# Patient Record
Sex: MALE | Race: WHITE | NOT HISPANIC OR LATINO | Employment: OTHER | URBAN - NONMETROPOLITAN AREA
[De-identification: names, ages, dates, MRNs, and addresses within clinical notes are randomized per-mention and may not be internally consistent; named-entity substitution may affect disease eponyms.]

---

## 2018-03-20 ENCOUNTER — OFFICE VISIT CONVERTED (OUTPATIENT)
Dept: FAMILY MEDICINE CLINIC | Age: 78
End: 2018-03-20
Attending: FAMILY MEDICINE

## 2018-03-21 LAB
ALBUMIN SERPL-MCNC: 4.1 G/DL
ALBUMIN/GLOB SERPL: 1.6 {RATIO}
ALP SERPL-CCNC: 69 IU/L
ALT SERPL-CCNC: 11 IU/L
AST SERPL-CCNC: 19 IU/L
BILIRUB SERPL-MCNC: 0.7 MG/DL
BUN SERPL-MCNC: 14 MG/DL
BUN/CREAT SERPL: 16
CALCIUM SERPL-MCNC: 9 MG/DL
CHLORIDE SERPL-SCNC: 99 MMOL/L
CHOLEST SERPL-MCNC: 114 MG/DL
CO2 SERPL-SCNC: 24 MMOL/L
CONV TOTAL PROTEIN: 6.7 G/DL
CREAT 24H UR-MCNC: 50.4 MG/DL
CREAT UR-MCNC: 0.88 MG/DL
GLOBULIN UR ELPH-MCNC: 2.6 G/DL
GLUCOSE SERPL-MCNC: 104 MG/DL
HBA1C MFR BLD: 6 %
HDLC SERPL-MCNC: 28 MG/DL
LDLC SERPL CALC-MCNC: 52 MG/DL
MICROALBUMIN UR-MCNC: 22.2 UG/ML
MICROALBUMIN/CREAT UR: 44 MG/G CREAT
POTASSIUM SERPL-SCNC: 4.4 MMOL/L
SODIUM SERPL-SCNC: 140 MMOL/L
TRIGL SERPL-MCNC: 170 MG/DL
VLDLC SERPL-MCNC: 34 MG/DL

## 2018-06-27 ENCOUNTER — OFFICE VISIT CONVERTED (OUTPATIENT)
Dept: FAMILY MEDICINE CLINIC | Age: 78
End: 2018-06-27
Attending: FAMILY MEDICINE

## 2018-07-17 ENCOUNTER — OFFICE VISIT CONVERTED (OUTPATIENT)
Dept: FAMILY MEDICINE CLINIC | Age: 78
End: 2018-07-17
Attending: FAMILY MEDICINE

## 2018-09-10 ENCOUNTER — OFFICE VISIT CONVERTED (OUTPATIENT)
Dept: FAMILY MEDICINE CLINIC | Age: 78
End: 2018-09-10
Attending: FAMILY MEDICINE

## 2018-10-24 ENCOUNTER — OFFICE VISIT CONVERTED (OUTPATIENT)
Dept: FAMILY MEDICINE CLINIC | Age: 78
End: 2018-10-24
Attending: FAMILY MEDICINE

## 2019-01-28 ENCOUNTER — HOSPITAL ENCOUNTER (OUTPATIENT)
Dept: OTHER | Facility: HOSPITAL | Age: 79
Discharge: HOME | End: 2019-01-28
Attending: FAMILY MEDICINE

## 2019-01-28 ENCOUNTER — OFFICE VISIT CONVERTED (OUTPATIENT)
Dept: FAMILY MEDICINE CLINIC | Age: 79
End: 2019-01-28
Attending: FAMILY MEDICINE

## 2019-01-28 LAB
CONV CREATININE URINE, RANDOM: 12.8 MG/DL (ref 10–300)
CONV MICROALBUM.,U,RANDOM: 51.6 MG/L (ref 0–20)
MICROALBUMIN/CREAT UR: 403.1 MG/G{CRE} (ref 0–25)

## 2019-02-01 ENCOUNTER — OFFICE VISIT CONVERTED (OUTPATIENT)
Dept: FAMILY MEDICINE CLINIC | Age: 79
End: 2019-02-01
Attending: FAMILY MEDICINE

## 2019-02-27 ENCOUNTER — OFFICE VISIT CONVERTED (OUTPATIENT)
Dept: FAMILY MEDICINE CLINIC | Age: 79
End: 2019-02-27
Attending: FAMILY MEDICINE

## 2019-02-28 ENCOUNTER — HOSPITAL ENCOUNTER (OUTPATIENT)
Dept: OTHER | Facility: HOSPITAL | Age: 79
Discharge: HOME | End: 2019-02-28
Attending: FAMILY MEDICINE

## 2019-04-10 ENCOUNTER — HOSPITAL ENCOUNTER (OUTPATIENT)
Dept: OTHER | Facility: HOSPITAL | Age: 79
Discharge: HOME | End: 2019-04-10
Attending: FAMILY MEDICINE

## 2019-04-10 LAB
ALBUMIN SERPL-MCNC: 4.2 G/DL (ref 3.5–5)
ALBUMIN/GLOB SERPL: 1.4 {RATIO} (ref 1.4–2.6)
ALP SERPL-CCNC: 58 U/L (ref 56–155)
ALT SERPL-CCNC: 14 U/L (ref 10–40)
ANION GAP SERPL CALC-SCNC: 16 MMOL/L (ref 8–19)
AST SERPL-CCNC: 19 U/L (ref 15–50)
BILIRUB SERPL-MCNC: 0.26 MG/DL (ref 0.2–1.3)
BUN SERPL-MCNC: 16 MG/DL (ref 5–25)
BUN/CREAT SERPL: 16 {RATIO} (ref 6–20)
CALCIUM SERPL-MCNC: 9.1 MG/DL (ref 8.7–10.4)
CHLORIDE SERPL-SCNC: 99 MMOL/L (ref 99–111)
CHOLEST SERPL-MCNC: 110 MG/DL (ref 107–200)
CHOLEST/HDLC SERPL: 2.9 {RATIO} (ref 3–6)
CONV CO2: 27 MMOL/L (ref 22–32)
CONV TOTAL PROTEIN: 7.1 G/DL (ref 6.3–8.2)
CREAT UR-MCNC: 0.98 MG/DL (ref 0.7–1.2)
EST. AVERAGE GLUCOSE BLD GHB EST-MCNC: 134 MG/DL
GFR SERPLBLD BASED ON 1.73 SQ M-ARVRAT: >60 ML/MIN/{1.73_M2}
GLOBULIN UR ELPH-MCNC: 2.9 G/DL (ref 2–3.5)
GLUCOSE SERPL-MCNC: 135 MG/DL (ref 70–99)
HBA1C MFR BLD: 6.3 % (ref 3.5–5.7)
HDLC SERPL-MCNC: 38 MG/DL (ref 40–60)
LDLC SERPL CALC-MCNC: 57 MG/DL (ref 70–100)
OSMOLALITY SERPL CALC.SUM OF ELEC: 289 MOSM/KG (ref 273–304)
POTASSIUM SERPL-SCNC: 4 MMOL/L (ref 3.5–5.3)
SODIUM SERPL-SCNC: 138 MMOL/L (ref 135–147)
TRIGL SERPL-MCNC: 75 MG/DL (ref 40–150)
VLDLC SERPL-MCNC: 15 MG/DL (ref 5–37)

## 2019-05-01 ENCOUNTER — OFFICE VISIT CONVERTED (OUTPATIENT)
Dept: FAMILY MEDICINE CLINIC | Age: 79
End: 2019-05-01
Attending: FAMILY MEDICINE

## 2019-05-03 ENCOUNTER — OFFICE VISIT CONVERTED (OUTPATIENT)
Dept: NEUROSURGERY | Facility: CLINIC | Age: 79
End: 2019-05-03
Attending: NEUROLOGICAL SURGERY

## 2019-05-07 ENCOUNTER — HOSPITAL ENCOUNTER (OUTPATIENT)
Dept: OTHER | Facility: HOSPITAL | Age: 79
Discharge: HOME | End: 2019-05-07
Attending: FAMILY MEDICINE

## 2019-05-07 LAB
BASOPHILS # BLD MANUAL: 0.03 10*3/UL (ref 0–0.2)
BASOPHILS NFR BLD MANUAL: 0.4 % (ref 0–3)
DEPRECATED RDW RBC AUTO: 41 FL
EOSINOPHIL # BLD MANUAL: 0.2 10*3/UL (ref 0–0.7)
EOSINOPHIL NFR BLD MANUAL: 3 % (ref 0–7)
ERYTHROCYTE [DISTWIDTH] IN BLOOD BY AUTOMATED COUNT: 13.1 % (ref 11.5–14.5)
GRANS (ABSOLUTE): 4.51 10*3/UL (ref 2–8)
GRANS: 67.5 % (ref 30–85)
HBA1C MFR BLD: 13.8 G/DL (ref 14–18)
HCT VFR BLD AUTO: 39.6 % (ref 42–52)
IMM GRANULOCYTES # BLD: 0.05 10*3/UL (ref 0–0.54)
IMM GRANULOCYTES NFR BLD: 0.7 % (ref 0–0.43)
LYMPHOCYTES # BLD MANUAL: 1.27 10*3/UL (ref 1–5)
LYMPHOCYTES NFR BLD MANUAL: 9.4 % (ref 3–10)
MCH RBC QN AUTO: 29.9 PG (ref 27–31)
MCHC RBC AUTO-ENTMCNC: 34.8 G/DL (ref 33–37)
MCV RBC AUTO: 85.9 FL (ref 80–96)
MONOCYTES # BLD AUTO: 0.63 10*3/UL (ref 0.2–1.2)
PLATELET # BLD AUTO: 184 10*3/UL (ref 130–400)
PMV BLD AUTO: 10 FL (ref 7.4–10.4)
RBC # BLD AUTO: 4.61 10*6/UL (ref 4.7–6.1)
VARIANT LYMPHS NFR BLD MANUAL: 19 % (ref 20–45)
WBC # BLD AUTO: 6.69 10*3/UL (ref 4.8–10.8)

## 2019-06-17 ENCOUNTER — OFFICE VISIT CONVERTED (OUTPATIENT)
Dept: FAMILY MEDICINE CLINIC | Age: 79
End: 2019-06-17
Attending: FAMILY MEDICINE

## 2019-06-28 ENCOUNTER — OFFICE VISIT CONVERTED (OUTPATIENT)
Dept: NEUROSURGERY | Facility: CLINIC | Age: 79
End: 2019-06-28
Attending: NEUROLOGICAL SURGERY

## 2019-07-19 ENCOUNTER — TELEPHONE (OUTPATIENT)
Dept: CARDIOLOGY | Facility: CLINIC | Age: 79
End: 2019-07-19

## 2019-07-19 NOTE — TELEPHONE ENCOUNTER
Received clearance request to hold medication.  Dr Kofi Campoverde reviewed, ok to hold Plavix 5-7 days prior.  Statement returned to their office. RTRN

## 2019-08-08 ENCOUNTER — OFFICE VISIT CONVERTED (OUTPATIENT)
Dept: FAMILY MEDICINE CLINIC | Age: 79
End: 2019-08-08
Attending: FAMILY MEDICINE

## 2019-08-13 PROBLEM — E78.5 HYPERLIPIDEMIA: Status: ACTIVE | Noted: 2019-08-13

## 2019-08-13 PROBLEM — Z92.89 H/O ECHOCARDIOGRAM: Status: ACTIVE | Noted: 2019-08-13

## 2019-08-13 PROBLEM — G47.30 SLEEP APNEA: Status: ACTIVE | Noted: 2019-08-13

## 2019-08-13 PROBLEM — I25.10 CORONARY ARTERIOSCLEROSIS: Status: ACTIVE | Noted: 2019-08-13

## 2019-08-13 PROBLEM — I51.89 CARDIAC DYSFUNCTION: Status: ACTIVE | Noted: 2019-08-13

## 2019-08-13 PROBLEM — R00.2 PALPITATIONS: Status: ACTIVE | Noted: 2019-08-13

## 2019-08-13 PROBLEM — Z95.1 HX OF CABG: Status: ACTIVE | Noted: 2019-08-13

## 2019-08-13 PROBLEM — Z95.5 STENTED CORONARY ARTERY: Status: ACTIVE | Noted: 2019-08-13

## 2019-08-13 PROBLEM — I42.9 CARDIOMYOPATHY: Status: ACTIVE | Noted: 2019-08-13

## 2019-08-13 PROBLEM — I10 BENIGN ESSENTIAL HYPERTENSION: Status: ACTIVE | Noted: 2019-08-13

## 2019-08-15 ENCOUNTER — OFFICE VISIT (OUTPATIENT)
Dept: CARDIOLOGY | Facility: CLINIC | Age: 79
End: 2019-08-15

## 2019-08-15 VITALS
WEIGHT: 143.7 LBS | DIASTOLIC BLOOD PRESSURE: 64 MMHG | OXYGEN SATURATION: 91 % | HEIGHT: 67 IN | BODY MASS INDEX: 22.55 KG/M2 | HEART RATE: 61 BPM | SYSTOLIC BLOOD PRESSURE: 116 MMHG

## 2019-08-15 DIAGNOSIS — I25.5 ISCHEMIC CARDIOMYOPATHY: ICD-10-CM

## 2019-08-15 DIAGNOSIS — Z95.5 STENTED CORONARY ARTERY: ICD-10-CM

## 2019-08-15 DIAGNOSIS — I10 BENIGN ESSENTIAL HYPERTENSION: ICD-10-CM

## 2019-08-15 DIAGNOSIS — Z95.1 HX OF CABG: ICD-10-CM

## 2019-08-15 DIAGNOSIS — I25.10 CORONARY ARTERIOSCLEROSIS: Primary | ICD-10-CM

## 2019-08-15 DIAGNOSIS — I51.89 CARDIAC DYSFUNCTION: ICD-10-CM

## 2019-08-15 DIAGNOSIS — G47.33 OBSTRUCTIVE SLEEP APNEA SYNDROME: ICD-10-CM

## 2019-08-15 DIAGNOSIS — I35.1 AORTIC VALVE INSUFFICIENCY, ETIOLOGY OF CARDIAC VALVE DISEASE UNSPECIFIED: ICD-10-CM

## 2019-08-15 DIAGNOSIS — E78.5 HYPERLIPIDEMIA, UNSPECIFIED HYPERLIPIDEMIA TYPE: ICD-10-CM

## 2019-08-15 DIAGNOSIS — R00.2 PALPITATIONS: ICD-10-CM

## 2019-08-15 PROCEDURE — 99214 OFFICE O/P EST MOD 30 MIN: CPT | Performed by: INTERNAL MEDICINE

## 2019-08-15 RX ORDER — MORPHINE SULFATE 15 MG/1
1 TABLET, FILM COATED, EXTENDED RELEASE ORAL 2 TIMES DAILY PRN
COMMUNITY
Start: 2019-07-20 | End: 2021-03-04 | Stop reason: ALTCHOICE

## 2019-08-15 NOTE — PROGRESS NOTES
[facility]        Subjective:     Encounter Date:08/15/2019      Patient ID: Marcello Larson is a 79 y.o. male.      HPI: I saw Marcello Larson today for continued cardiovascular care.  He is a very pleasant 79-year-old male with a history of coronary heart disease.  He underwent coronary artery bypass surgery 1/1/1991 with a left internal mammary graft to the LAD, saphenous vein graft to OM1 OM 2 in the right coronary artery.  On 4/11/2014 he underwent bare-metal stent deployment to the proximal segment of the vein graft to the right coronary artery and a second bare-metal stent to the mid to distal region of vein vein graft.  He remains relatively active and free of angina.  He does not experience any significant or progressive dyspnea on exertion.  And denies orthopnea or PND no lower extremity edema.  He is free of syncope near syncope or palpitation.    Of note is that June 2018 he had a severe GI bleed with a hemoglobin down to 6.6.  He remained stable from cardiovascular standpoint.  He has a history of obstructive sleep apnea and uses CPAP.  He has a history of dyslipidemia and remains on lovastatin 40 mg daily.  His lipids are monitored by Dr. Corley.  He has type 2 diabetes controlled with diet and metformin 500 mg daily.  He has a history of hypertension which remains stable and controlled under 130/80.    Echocardiogram was obtained 2/14/2019 which revealed left ventricular ejection fraction 40 to 45%, moderate concentric left ventricular hypertrophy, mild aortic mitral and tricuspid insufficiency.      The following portions of the patient's history were reviewed and updated as appropriate: allergies, current medications, past family history, past medical history, past social history, past surgical history and problem list.    Problem List:  Patient Active Problem List   Diagnosis   • Hyperlipidemia   • Benign essential hypertension   • Coronary arteriosclerosis   • Cardiomyopathy  (CMS/HCC)   • Cardiac dysfunction   • Sleep apnea   • Palpitations   • Hx of CABG   • Stented coronary artery   • H/O echocardiogram       Past Medical History:  Past Medical History:   Diagnosis Date   • Benign essential hypertension 8/13/2019   • Cardiac dysfunction 8/13/2019    Left ventricle   • Cardiomyopathy (CMS/HCC) 8/13/2019   • Coronary arteriosclerosis 8/13/2019   • H/O echocardiogram 8/13/2019 02/09/2015 - LV severely dilated.  LV systolic function is moderate to severely reduced.  EF 30-35%.  The transmittal spectral doppler flow pattern is suggestive of pseudonormalization.  There is moderate to severe global hypokinesis of the LV.  The left atrium is mildly dilated.  The mitral leaflets appear thickened, but open well.  Mild MR and AR.   • Hx of CABG 8/13/2019 01/01/1991 - left internal mammary graft to the LAD, SVG to OM1, OM2 in the RCA   • Hyperlipidemia 8/13/2019   • Palpitations 8/13/2019   • Sleep apnea 8/13/2019   • Stented coronary artery 8/13/2019 04/11/2014 - VISION bare metal stent to the proximal stenosis of the vein graft to the right and ISION bare metal stent in the mid to distal 80% stenosis.       Past Surgical History:  No past surgical history on file.    Social History:  Social History     Socioeconomic History   • Marital status:      Spouse name: Not on file   • Number of children: Not on file   • Years of education: Not on file   • Highest education level: Not on file   Tobacco Use   • Smoking status: Former Smoker     Types: Cigarettes       Allergies:  Allergies   Allergen Reactions   • Iodinated Diagnostic Agents Unknown (See Comments)         ROS:  Review of Systems   Constitution: Negative for weakness and malaise/fatigue.   HENT: Negative for hearing loss and nosebleeds.    Eyes: Negative for double vision and visual disturbance.   Cardiovascular: Negative for chest pain, claudication, dyspnea on exertion, near-syncope, orthopnea, palpitations, paroxysmal  "nocturnal dyspnea and syncope.   Respiratory: Negative for cough, shortness of breath, sleep disturbances due to breathing, snoring, sputum production and wheezing.    Endocrine: Negative for cold intolerance, heat intolerance, polydipsia and polyuria.   Hematologic/Lymphatic: Negative for adenopathy and bleeding problem. Does not bruise/bleed easily.   Skin: Negative for flushing and itching.   Musculoskeletal: Negative for back pain, falls, joint pain, joint swelling, muscle weakness and neck pain.   Gastrointestinal: Negative for abdominal pain, dysphagia, heartburn, nausea and vomiting.   Genitourinary: Negative for dysuria and frequency.   Neurological: Negative for disturbances in coordination, dizziness, light-headedness, loss of balance and numbness.   Psychiatric/Behavioral: Negative for altered mental status and memory loss. The patient is not nervous/anxious.    Allergic/Immunologic: Negative for hives and persistent infections.          Objective:         /64 (BP Location: Left arm, Patient Position: Sitting, Cuff Size: Adult)   Pulse 61   Ht 170.2 cm (67\")   Wt 65.2 kg (143 lb 11.2 oz)   SpO2 91%   BMI 22.51 kg/m²     Physical Exam   Constitutional: He is oriented to person, place, and time. He appears well-developed and well-nourished.   HENT:   Head: Normocephalic and atraumatic.   Eyes: Conjunctivae and EOM are normal. Pupils are equal, round, and reactive to light.   Neck: Neck supple. No thyromegaly present.   Cardiovascular: Normal rate, regular rhythm, S1 normal, S2 normal and intact distal pulses. PMI is not displaced. Exam reveals gallop and S4. Exam reveals no S3, no distant heart sounds, no friction rub, no midsystolic click and no opening snap.   No murmur heard.  Pulses:       Carotid pulses are 2+ on the right side, and 2+ on the left side.       Radial pulses are 2+ on the right side, and 2+ on the left side.        Femoral pulses are 2+ on the right side, and 2+ on the left " side.       Dorsalis pedis pulses are 2+ on the right side, and 2+ on the left side.        Posterior tibial pulses are 2+ on the right side, and 2+ on the left side.   Pulmonary/Chest: Effort normal and breath sounds normal. No respiratory distress. He has no wheezes. He has no rales.   Abdominal: Soft. Bowel sounds are normal. He exhibits no distension and no mass. There is no tenderness.   Musculoskeletal: Normal range of motion. He exhibits no edema.   Neurological: He is alert and oriented to person, place, and time.   Skin: Skin is warm and dry. No erythema.   Psychiatric: He has a normal mood and affect.       In-Office Procedure(s):  Procedures    ASCVD RIsk Score::  The ASCVD Risk score (Alexsander ABBY Jr., et al., 2013) failed to calculate for the following reasons:    Cannot find a previous HDL lab    Cannot find a previous total cholesterol lab        Assessment/Plan:         1. Coronary arteriosclerosis  Stable    2. Ischemic cardiomyopathy  Compensated    3. Hx of CABG  Stable    4. Stented coronary artery  Stable    5. Aortic valve insufficiency, etiology of cardiac valve disease unspecified  Compensated    6. Benign essential hypertension  Controlled    7. Hyperlipidemia, unspecified hyperlipidemia type  Continue diet and lovastatin 40 mg daily    8. Palpitations  Resolved    9. Obstructive sleep apnea syndrome  Continue CPAP    10. Cardiac dysfunction  Compensated    Mr. Larson has maintained his activity level, he is free of angina no significant volume excess.  I made no changes in his medications.  I will plan to see him in follow-up in 6 months sooner if needed.           Kofi Campoverde MD  08/15/19  .

## 2019-09-18 ENCOUNTER — HOSPITAL ENCOUNTER (OUTPATIENT)
Dept: OTHER | Facility: HOSPITAL | Age: 79
Discharge: HOME | End: 2019-09-18
Attending: FAMILY MEDICINE

## 2019-09-18 LAB
ALBUMIN SERPL-MCNC: 4.4 G/DL (ref 3.5–5)
ALBUMIN/GLOB SERPL: 1.4 {RATIO} (ref 1.4–2.6)
ALP SERPL-CCNC: 63 U/L (ref 56–155)
ALT SERPL-CCNC: 12 U/L (ref 10–40)
ANION GAP SERPL CALC-SCNC: 19 MMOL/L (ref 8–19)
AST SERPL-CCNC: 18 U/L (ref 15–50)
BILIRUB SERPL-MCNC: 0.59 MG/DL (ref 0.2–1.3)
BUN SERPL-MCNC: 15 MG/DL (ref 5–25)
BUN/CREAT SERPL: 18 {RATIO} (ref 6–20)
CALCIUM SERPL-MCNC: 9.6 MG/DL (ref 8.7–10.4)
CHLORIDE SERPL-SCNC: 101 MMOL/L (ref 99–111)
CHOLEST SERPL-MCNC: 115 MG/DL (ref 107–200)
CHOLEST/HDLC SERPL: 3.3 {RATIO} (ref 3–6)
CONV CO2: 26 MMOL/L (ref 22–32)
CONV CREATININE URINE, RANDOM: 39.4 MG/DL (ref 10–300)
CONV MICROALBUM.,U,RANDOM: 48.9 MG/L (ref 0–20)
CONV TOTAL PROTEIN: 7.6 G/DL (ref 6.3–8.2)
CREAT UR-MCNC: 0.83 MG/DL (ref 0.7–1.2)
EST. AVERAGE GLUCOSE BLD GHB EST-MCNC: 126 MG/DL
GFR SERPLBLD BASED ON 1.73 SQ M-ARVRAT: >60 ML/MIN/{1.73_M2}
GLOBULIN UR ELPH-MCNC: 3.2 G/DL (ref 2–3.5)
GLUCOSE SERPL-MCNC: 129 MG/DL (ref 70–99)
HBA1C MFR BLD: 6 % (ref 3.5–5.7)
HDLC SERPL-MCNC: 35 MG/DL (ref 40–60)
LDLC SERPL CALC-MCNC: 66 MG/DL (ref 70–100)
MICROALBUMIN/CREAT UR: 124.1 MG/G{CRE} (ref 0–25)
OSMOLALITY SERPL CALC.SUM OF ELEC: 297 MOSM/KG (ref 273–304)
POTASSIUM SERPL-SCNC: 4.3 MMOL/L (ref 3.5–5.3)
SODIUM SERPL-SCNC: 142 MMOL/L (ref 135–147)
TRIGL SERPL-MCNC: 72 MG/DL (ref 40–150)
VLDLC SERPL-MCNC: 14 MG/DL (ref 5–37)

## 2019-09-20 ENCOUNTER — OFFICE VISIT CONVERTED (OUTPATIENT)
Dept: FAMILY MEDICINE CLINIC | Age: 79
End: 2019-09-20
Attending: FAMILY MEDICINE

## 2019-09-23 ENCOUNTER — OFFICE VISIT CONVERTED (OUTPATIENT)
Dept: FAMILY MEDICINE CLINIC | Age: 79
End: 2019-09-23
Attending: NURSE PRACTITIONER

## 2019-09-30 ENCOUNTER — OFFICE VISIT CONVERTED (OUTPATIENT)
Dept: FAMILY MEDICINE CLINIC | Age: 79
End: 2019-09-30
Attending: FAMILY MEDICINE

## 2019-10-02 ENCOUNTER — HOSPITAL ENCOUNTER (OUTPATIENT)
Dept: OTHER | Facility: HOSPITAL | Age: 79
Discharge: HOME | End: 2019-10-02
Attending: FAMILY MEDICINE

## 2019-10-14 ENCOUNTER — OFFICE VISIT CONVERTED (OUTPATIENT)
Dept: FAMILY MEDICINE CLINIC | Age: 79
End: 2019-10-14
Attending: FAMILY MEDICINE

## 2019-11-07 ENCOUNTER — OFFICE VISIT CONVERTED (OUTPATIENT)
Dept: FAMILY MEDICINE CLINIC | Age: 79
End: 2019-11-07
Attending: FAMILY MEDICINE

## 2019-11-29 ENCOUNTER — OFFICE VISIT CONVERTED (OUTPATIENT)
Dept: FAMILY MEDICINE CLINIC | Age: 79
End: 2019-11-29
Attending: NURSE PRACTITIONER

## 2019-12-10 ENCOUNTER — OFFICE VISIT CONVERTED (OUTPATIENT)
Dept: SURGERY | Facility: CLINIC | Age: 79
End: 2019-12-10
Attending: SURGERY

## 2020-01-20 ENCOUNTER — HOSPITAL ENCOUNTER (OUTPATIENT)
Dept: GASTROENTEROLOGY | Facility: HOSPITAL | Age: 80
Setting detail: HOSPITAL OUTPATIENT SURGERY
Discharge: HOME | End: 2020-01-20
Attending: SURGERY

## 2020-01-20 LAB — GLUCOSE BLD-MCNC: 126 MG/DL (ref 70–99)

## 2020-01-28 ENCOUNTER — OFFICE VISIT CONVERTED (OUTPATIENT)
Dept: SURGERY | Facility: CLINIC | Age: 80
End: 2020-01-28
Attending: SURGERY

## 2020-02-19 PROBLEM — I51.89 CARDIAC DYSFUNCTION: Status: RESOLVED | Noted: 2019-08-13 | Resolved: 2020-02-19

## 2020-02-20 ENCOUNTER — OFFICE VISIT (OUTPATIENT)
Dept: CARDIOLOGY | Facility: CLINIC | Age: 80
End: 2020-02-20

## 2020-02-20 VITALS
OXYGEN SATURATION: 95 % | WEIGHT: 146 LBS | DIASTOLIC BLOOD PRESSURE: 68 MMHG | SYSTOLIC BLOOD PRESSURE: 142 MMHG | BODY MASS INDEX: 22.91 KG/M2 | HEIGHT: 67 IN | HEART RATE: 65 BPM

## 2020-02-20 DIAGNOSIS — I25.5 ISCHEMIC CARDIOMYOPATHY: ICD-10-CM

## 2020-02-20 DIAGNOSIS — Z95.1 HX OF CABG: ICD-10-CM

## 2020-02-20 DIAGNOSIS — R00.2 PALPITATIONS: ICD-10-CM

## 2020-02-20 DIAGNOSIS — I25.10 CORONARY ARTERIOSCLEROSIS: Primary | ICD-10-CM

## 2020-02-20 DIAGNOSIS — Z95.5 STENTED CORONARY ARTERY: ICD-10-CM

## 2020-02-20 DIAGNOSIS — G47.33 OBSTRUCTIVE SLEEP APNEA SYNDROME: ICD-10-CM

## 2020-02-20 DIAGNOSIS — I10 BENIGN ESSENTIAL HYPERTENSION: ICD-10-CM

## 2020-02-20 DIAGNOSIS — E78.5 HYPERLIPIDEMIA, UNSPECIFIED HYPERLIPIDEMIA TYPE: ICD-10-CM

## 2020-02-20 PROCEDURE — 99213 OFFICE O/P EST LOW 20 MIN: CPT | Performed by: INTERNAL MEDICINE

## 2020-02-20 RX ORDER — RANITIDINE 150 MG/1
150 TABLET ORAL 2 TIMES DAILY
COMMUNITY
End: 2021-03-04 | Stop reason: ALTCHOICE

## 2020-02-20 RX ORDER — ISOSORBIDE MONONITRATE 30 MG/1
30 TABLET, EXTENDED RELEASE ORAL DAILY
COMMUNITY
Start: 2019-12-06 | End: 2021-08-24

## 2020-02-20 NOTE — PROGRESS NOTES
[facility] SPECIALISTS        Subjective:     Encounter Date:02/20/2020      Patient ID: Marcello Larson is a 79 y.o. male.      HPI: I saw Marcello Baer today for cardiovascular care.  He is a very pleasant 79-year-old male with a history of coronary heart disease.  He underwent coronary artery bypass surgery in 1991 with a left internal mammary graft to the LAD, saphenous vein graft to OM1, OM 2 in the right coronary artery.  In 2014 he underwent bare-metal stent x2 to the saphenous vein graft to the right coronary artery.  His activity level has remained stable.  He is in good spirits and denies symptoms of angina.  He has his baseline dyspnea on exertion but this is not progressive.  He is free of orthopnea or PND no syncope near syncope or any significant palpitations.  Echocardiogram previously obtained 2/14/2019 revealed left ventricular ejection fraction 45% with moderate concentric left ventricular hypertrophy and mild aortic mitral and tricuspid insufficiency.  He has a history of hypertension his blood pressure today is 142/68.  He has type 2 diabetes controlled with diet and oral agent.  He has known dyslipidemia and remains on lovastatin 40 mg daily.      The following portions of the patient's history were reviewed and updated as appropriate: allergies, current medications, past family history, past medical history, past social history, past surgical history and problem list.    Problem List:  Patient Active Problem List   Diagnosis   • Hyperlipidemia   • Benign essential hypertension   • Coronary arteriosclerosis   • Cardiomyopathy (CMS/HCC)   • Sleep apnea   • Palpitations   • Hx of CABG   • Stented coronary artery   • H/O echocardiogram       Past Medical History:  Past Medical History:   Diagnosis Date   • Benign essential hypertension 8/13/2019   • Cardiac dysfunction 8/13/2019    Left ventricle   • Cardiomyopathy (CMS/HCC) 8/13/2019   • Coronary arteriosclerosis 8/13/2019   • H/O  echocardiogram 8/13/2019 02/09/2015 - LV severely dilated.  LV systolic function is moderate to severely reduced.  EF 30-35%.  The transmittal spectral doppler flow pattern is suggestive of pseudonormalization.  There is moderate to severe global hypokinesis of the LV.  The left atrium is mildly dilated.  The mitral leaflets appear thickened, but open well.  Mild MR and AR.   • Hx of CABG 8/13/2019 01/01/1991 - left internal mammary graft to the LAD, SVG to OM1, OM2 in the RCA   • Hyperlipidemia 8/13/2019   • Palpitations 8/13/2019   • Sleep apnea 8/13/2019   • Stented coronary artery 8/13/2019 04/11/2014 - VISION bare metal stent to the proximal stenosis of the vein graft to the right and ISION bare metal stent in the mid to distal 80% stenosis.       Past Surgical History:  No past surgical history on file.    Social History:  Social History     Socioeconomic History   • Marital status:      Spouse name: Not on file   • Number of children: Not on file   • Years of education: Not on file   • Highest education level: Not on file   Tobacco Use   • Smoking status: Former Smoker     Types: Cigarettes       Allergies:  Allergies   Allergen Reactions   • Iodinated Diagnostic Agents Unknown (See Comments)         ROS:  Review of Systems   Constitution: Negative for malaise/fatigue.   HENT: Negative for hearing loss and nosebleeds.    Eyes: Negative for double vision and visual disturbance.   Cardiovascular: Negative for chest pain, claudication, dyspnea on exertion, near-syncope, orthopnea, palpitations, paroxysmal nocturnal dyspnea and syncope.   Respiratory: Negative for cough, shortness of breath, sleep disturbances due to breathing, snoring, sputum production and wheezing.    Endocrine: Negative for cold intolerance, heat intolerance, polydipsia and polyuria.   Hematologic/Lymphatic: Negative for adenopathy and bleeding problem. Does not bruise/bleed easily.   Skin: Negative for flushing and itching.  "  Musculoskeletal: Negative for back pain, falls, joint pain, joint swelling, muscle weakness and neck pain.   Gastrointestinal: Negative for abdominal pain, dysphagia, heartburn, nausea and vomiting.   Genitourinary: Negative for dysuria and frequency.   Neurological: Negative for disturbances in coordination, dizziness, light-headedness, loss of balance, numbness and weakness.   Psychiatric/Behavioral: Negative for altered mental status and memory loss. The patient is not nervous/anxious.    Allergic/Immunologic: Negative for hives and persistent infections.          Objective:         /68 (BP Location: Left arm, Patient Position: Sitting, Cuff Size: Adult)   Pulse 65   Ht 170.2 cm (67\")   Wt 66.2 kg (146 lb)   SpO2 95%   BMI 22.87 kg/m²     Physical Exam   Constitutional: He is oriented to person, place, and time. He appears well-developed and well-nourished.   HENT:   Head: Normocephalic and atraumatic.   Eyes: Pupils are equal, round, and reactive to light. Conjunctivae and EOM are normal.   Neck: Neck supple. No thyromegaly present.   Cardiovascular: Normal rate, regular rhythm, S1 normal, S2 normal and intact distal pulses. PMI is not displaced. Exam reveals gallop and S4. Exam reveals no S3, no distant heart sounds, no friction rub, no midsystolic click and no opening snap.   No murmur heard.  Pulses:       Carotid pulses are 2+ on the right side, and 2+ on the left side.       Radial pulses are 2+ on the right side, and 2+ on the left side.        Femoral pulses are 2+ on the right side, and 2+ on the left side.       Dorsalis pedis pulses are 2+ on the right side, and 2+ on the left side.        Posterior tibial pulses are 2+ on the right side, and 2+ on the left side.   Pulmonary/Chest: Effort normal and breath sounds normal. No respiratory distress. He has no wheezes. He has no rales.   Abdominal: Soft. Bowel sounds are normal. He exhibits no distension and no mass. There is no tenderness. "   Musculoskeletal: Normal range of motion. He exhibits no edema.   Neurological: He is alert and oriented to person, place, and time.   Skin: Skin is warm and dry. No erythema.   Psychiatric: He has a normal mood and affect.       In-Office Procedure(s):  Procedures    ASCVD RIsk Score::  The ASCVD Risk score (Alexsanderdagmar MORA JrRoma, et al., 2013) failed to calculate for the following reasons:    Cannot find a previous HDL lab    Cannot find a previous total cholesterol lab        Assessment/Plan:         1. Coronary arteriosclerosis  Stable free of angina    2. Stented coronary artery  Stable    3. Hx of CABG  Stable    4. Ischemic cardiomyopathy  Compensated    5. Benign essential hypertension  Target less than 130/80    6. Hyperlipidemia, unspecified hyperlipidemia type  Continue diet and statin therapy    7. Palpitations  Stable    8. Obstructive sleep apnea syndrome  Encouraged CPAP    Mr. Larson is free of angina remains euvolemic.  His activity level has remained stable.  We discussed continued risk modification.  I have encouraged him to remain as active as possible.  His lipids remain well controlled on current diet and statin therapy.  I recommended he restrict his salt is close to 2000 mg a day as possible.  I have discussed with him further evaluation and treatment for his possible sleep apnea but he is reluctant at the present time.  Also discussed and recommended noninvasive ischemic assessment over the next 6 to 12 months.           Kofi Campoverde MD  02/20/20  .

## 2020-05-08 ENCOUNTER — OFFICE VISIT CONVERTED (OUTPATIENT)
Dept: FAMILY MEDICINE CLINIC | Age: 80
End: 2020-05-08
Attending: FAMILY MEDICINE

## 2020-08-18 ENCOUNTER — HOSPITAL ENCOUNTER (OUTPATIENT)
Dept: OTHER | Facility: HOSPITAL | Age: 80
Discharge: HOME | End: 2020-08-18
Attending: FAMILY MEDICINE

## 2020-08-18 ENCOUNTER — OFFICE VISIT CONVERTED (OUTPATIENT)
Dept: FAMILY MEDICINE CLINIC | Age: 80
End: 2020-08-18
Attending: FAMILY MEDICINE

## 2020-08-18 LAB
CONV CREATININE URINE, RANDOM: 32.4 MG/DL (ref 10–300)
CONV MICROALBUM.,U,RANDOM: 61.3 MG/L (ref 0–20)
MICROALBUMIN/CREAT UR: 189.2 MG/G{CRE} (ref 0–25)

## 2020-08-25 ENCOUNTER — HOSPITAL ENCOUNTER (OUTPATIENT)
Dept: OTHER | Facility: HOSPITAL | Age: 80
Discharge: HOME | End: 2020-08-25
Attending: FAMILY MEDICINE

## 2020-08-25 LAB
ALBUMIN SERPL-MCNC: 4.4 G/DL (ref 3.5–5)
ALBUMIN/GLOB SERPL: 1.3 {RATIO} (ref 1.4–2.6)
ALP SERPL-CCNC: 61 U/L (ref 56–155)
ALT SERPL-CCNC: 11 U/L (ref 10–40)
ANION GAP SERPL CALC-SCNC: 18 MMOL/L (ref 8–19)
AST SERPL-CCNC: 16 U/L (ref 15–50)
BILIRUB SERPL-MCNC: 0.51 MG/DL (ref 0.2–1.3)
BUN SERPL-MCNC: 16 MG/DL (ref 5–25)
BUN/CREAT SERPL: 19 {RATIO} (ref 6–20)
CALCIUM SERPL-MCNC: 9.6 MG/DL (ref 8.7–10.4)
CHLORIDE SERPL-SCNC: 98 MMOL/L (ref 99–111)
CHOLEST SERPL-MCNC: 117 MG/DL (ref 107–200)
CHOLEST/HDLC SERPL: 3.3 {RATIO} (ref 3–6)
CONV CO2: 26 MMOL/L (ref 22–32)
CONV CREATININE URINE, RANDOM: 44.5 MG/DL (ref 10–300)
CONV MICROALBUM.,U,RANDOM: 99.5 MG/L (ref 0–20)
CONV TOTAL PROTEIN: 7.7 G/DL (ref 6.3–8.2)
CREAT UR-MCNC: 0.83 MG/DL (ref 0.7–1.2)
EST. AVERAGE GLUCOSE BLD GHB EST-MCNC: 114 MG/DL
GFR SERPLBLD BASED ON 1.73 SQ M-ARVRAT: >60 ML/MIN/{1.73_M2}
GLOBULIN UR ELPH-MCNC: 3.3 G/DL (ref 2–3.5)
GLUCOSE SERPL-MCNC: 111 MG/DL (ref 70–99)
HBA1C MFR BLD: 5.6 % (ref 3.5–5.7)
HDLC SERPL-MCNC: 36 MG/DL (ref 40–60)
LDLC SERPL CALC-MCNC: 69 MG/DL (ref 70–100)
MICROALBUMIN/CREAT UR: 223.6 MG/G{CRE} (ref 0–25)
OSMOLALITY SERPL CALC.SUM OF ELEC: 288 MOSM/KG (ref 273–304)
POTASSIUM SERPL-SCNC: 4.1 MMOL/L (ref 3.5–5.3)
SODIUM SERPL-SCNC: 138 MMOL/L (ref 135–147)
TRIGL SERPL-MCNC: 60 MG/DL (ref 40–150)
VLDLC SERPL-MCNC: 12 MG/DL (ref 5–37)

## 2020-09-03 ENCOUNTER — OFFICE VISIT (OUTPATIENT)
Dept: CARDIOLOGY | Facility: CLINIC | Age: 80
End: 2020-09-03

## 2020-09-03 VITALS
HEIGHT: 67 IN | WEIGHT: 147 LBS | BODY MASS INDEX: 23.07 KG/M2 | DIASTOLIC BLOOD PRESSURE: 66 MMHG | SYSTOLIC BLOOD PRESSURE: 120 MMHG | HEART RATE: 57 BPM

## 2020-09-03 DIAGNOSIS — Z95.5 STENTED CORONARY ARTERY: ICD-10-CM

## 2020-09-03 DIAGNOSIS — I25.10 CORONARY ARTERIOSCLEROSIS: Primary | ICD-10-CM

## 2020-09-03 DIAGNOSIS — G47.33 OBSTRUCTIVE SLEEP APNEA SYNDROME: ICD-10-CM

## 2020-09-03 DIAGNOSIS — Z95.1 HX OF CABG: ICD-10-CM

## 2020-09-03 DIAGNOSIS — I25.5 ISCHEMIC CARDIOMYOPATHY: ICD-10-CM

## 2020-09-03 DIAGNOSIS — I10 BENIGN ESSENTIAL HYPERTENSION: ICD-10-CM

## 2020-09-03 DIAGNOSIS — E78.5 HYPERLIPIDEMIA, UNSPECIFIED HYPERLIPIDEMIA TYPE: ICD-10-CM

## 2020-09-03 PROCEDURE — 99214 OFFICE O/P EST MOD 30 MIN: CPT | Performed by: INTERNAL MEDICINE

## 2020-09-03 RX ORDER — LOSARTAN POTASSIUM 25 MG/1
1 TABLET ORAL DAILY
COMMUNITY
Start: 2020-08-26 | End: 2021-07-17 | Stop reason: SDUPTHER

## 2020-09-03 NOTE — PROGRESS NOTES
[facility] SPECIALISTS        Subjective:     Encounter Date:09/03/2020      Patient ID: Marcello Larson is a 80 y.o. male.      HPI: I saw Marcello Larson today for cardiovascular care.  He is a very pleasant 80-year-old male who observes the CDC guidelines for social distancing.  He remains free of fever cough or increased shortness of breath.  He does not report any change in his sense of smell or taste.  Mr. Larson has a history of coronary heart disease.  He underwent coronary artery bypass surgery in 1991 with placement of a left internal mammary graft to the LAD, saphenous vein graft to OM1, OM 2 and the right coronary artery.  In 2014 he underwent bare-metal stent deployment x2 to the saphenous vein graft to the right coronary artery.  His overall activity level is stable.  He does however report some left upper chest discomfort that he describes as an ache.  This seems to occur with activity more so than at rest.  It does not last very long less than a minute in duration.  He states there is no associated shortness of breath diaphoresis or nausea.  He is free of any progressive dyspnea on exertion.  He does not report orthopnea or PND no lower extremity edema.  He has a history of hypertension which is controlled.  He has known dyslipidemia and remains on lovastatin 40 mg daily.  His lipids are controlled.Echocardiogram obtained 2/14/2019 revealed left ventricular ejection fraction 40 to 45% with mild aortic and mitral insufficiency.      The following portions of the patient's history were reviewed and updated as appropriate: allergies, current medications, past family history, past medical history, past social history, past surgical history and problem list.    Problem List:  Patient Active Problem List   Diagnosis   • Hyperlipidemia   • Benign essential hypertension   • Coronary arteriosclerosis   • Cardiomyopathy (CMS/HCC)   • Sleep apnea   • Palpitations   • Hx of CABG   • Stented coronary  artery   • H/O echocardiogram       Past Medical History:  Past Medical History:   Diagnosis Date   • Benign essential hypertension 8/13/2019   • Cardiac dysfunction 8/13/2019    Left ventricle   • Cardiomyopathy (CMS/HCC) 8/13/2019   • Coronary arteriosclerosis 8/13/2019   • H/O echocardiogram 8/13/2019 02/09/2015 - LV severely dilated.  LV systolic function is moderate to severely reduced.  EF 30-35%.  The transmittal spectral doppler flow pattern is suggestive of pseudonormalization.  There is moderate to severe global hypokinesis of the LV.  The left atrium is mildly dilated.  The mitral leaflets appear thickened, but open well.  Mild MR and AR.   • Hx of CABG 8/13/2019 01/01/1991 - left internal mammary graft to the LAD, SVG to OM1, OM2 in the RCA   • Hyperlipidemia 8/13/2019   • Palpitations 8/13/2019   • Sleep apnea 8/13/2019   • Stented coronary artery 8/13/2019 04/11/2014 - VISION bare metal stent to the proximal stenosis of the vein graft to the right and ISION bare metal stent in the mid to distal 80% stenosis.       Past Surgical History:  No past surgical history on file.    Social History:  Social History     Socioeconomic History   • Marital status:      Spouse name: Not on file   • Number of children: Not on file   • Years of education: Not on file   • Highest education level: Not on file   Tobacco Use   • Smoking status: Former Smoker     Types: Cigarettes       Allergies:  Allergies   Allergen Reactions   • Iodinated Diagnostic Agents Anaphylaxis   • Prednisone Cough         ROS:  Review of Systems   Constitution: Negative for malaise/fatigue.   HENT: Negative for hearing loss and nosebleeds.    Eyes: Negative for double vision and visual disturbance.   Cardiovascular: Positive for chest pain. Negative for claudication, dyspnea on exertion, near-syncope, orthopnea, palpitations, paroxysmal nocturnal dyspnea and syncope.   Respiratory: Negative for cough, shortness of breath, sleep  "disturbances due to breathing, snoring, sputum production and wheezing.    Endocrine: Negative for cold intolerance, heat intolerance, polydipsia and polyuria.   Hematologic/Lymphatic: Negative for adenopathy and bleeding problem. Does not bruise/bleed easily.   Skin: Negative for flushing and itching.   Musculoskeletal: Negative for back pain, falls, joint pain, joint swelling, muscle weakness and neck pain.   Gastrointestinal: Negative for abdominal pain, dysphagia, heartburn, nausea and vomiting.   Genitourinary: Negative for dysuria and frequency.   Neurological: Negative for disturbances in coordination, dizziness, light-headedness, loss of balance, numbness and weakness.   Psychiatric/Behavioral: Negative for altered mental status and memory loss. The patient is not nervous/anxious.    Allergic/Immunologic: Negative for hives and persistent infections.          Objective:         /66   Pulse 57   Ht 170.2 cm (67\")   Wt 66.7 kg (147 lb)   BMI 23.02 kg/m²     Physical Exam   Constitutional: He is oriented to person, place, and time. He appears well-developed and well-nourished.   HENT:   Head: Normocephalic and atraumatic.   Eyes: Pupils are equal, round, and reactive to light. Conjunctivae and EOM are normal.   Neck: Neck supple. No thyromegaly present.   Cardiovascular: Normal rate, regular rhythm, S1 normal, S2 normal and intact distal pulses. PMI is not displaced. Exam reveals gallop and S4. Exam reveals no S3, no distant heart sounds, no friction rub, no midsystolic click and no opening snap.   No murmur heard.  Pulses:       Carotid pulses are 2+ on the right side, and 2+ on the left side.       Radial pulses are 2+ on the right side, and 2+ on the left side.        Femoral pulses are 2+ on the right side, and 2+ on the left side.       Dorsalis pedis pulses are 2+ on the right side, and 2+ on the left side.        Posterior tibial pulses are 2+ on the right side, and 2+ on the left side. "   Pulmonary/Chest: Effort normal and breath sounds normal. No respiratory distress. He has no wheezes. He has no rales.   Abdominal: Soft. Bowel sounds are normal. He exhibits no distension and no mass. There is no tenderness.   Musculoskeletal: Normal range of motion. He exhibits no edema.   Neurological: He is alert and oriented to person, place, and time.   Skin: Skin is warm and dry. No erythema.   Psychiatric: He has a normal mood and affect.       In-Office Procedure(s):  Procedures    ASCVD RIsk Score::  The ASCVD Risk score (Alexsander ABBY Jr., et al., 2013) failed to calculate for the following reasons:    The 2013 ASCVD risk score is only valid for ages 40 to 79        Assessment/Plan:         1. Coronary arteriosclerosis  New chest pain    2. Hx of CABG      3. Stented coronary artery      4. Ischemic cardiomyopathy  Compensated    5. Benign essential hypertension  Controlled    6. Hyperlipidemia, unspecified hyperlipidemia type  Controlled    7. Obstructive sleep apnea syndrome  Encourage CPAP    8.  New onset chest pain  Lexiscan Cardiolite noninvasive ischemic assessment    Mr. Larson has maintained his activity level while observing the CDC guidelines for social distancing.  He does report fleeting left upper chest discomfort but this does occur during exertion.  I will obtain a Lexiscan Cardiolite noninvasive ischemic assessment.  I stressed importance of continued risk modification by observing a low-cholesterol low saturated fat diet and following his medical regimen.  I will tentatively plan to see him in follow-up in 6 months pending the results of his study.       Kofi Campoverde MD  09/03/20  .

## 2020-09-17 ENCOUNTER — HOSPITAL ENCOUNTER (OUTPATIENT)
Dept: NUCLEAR MEDICINE | Facility: HOSPITAL | Age: 80
Discharge: HOME | End: 2020-09-17

## 2020-09-17 DIAGNOSIS — Z95.5 STENTED CORONARY ARTERY: ICD-10-CM

## 2020-09-17 DIAGNOSIS — Z95.1 HX OF CABG: ICD-10-CM

## 2020-09-17 DIAGNOSIS — I25.10 CORONARY ARTERIOSCLEROSIS: ICD-10-CM

## 2020-09-17 PROCEDURE — 0 TECHNETIUM SESTAMIBI: Performed by: INTERNAL MEDICINE

## 2020-09-17 PROCEDURE — 78452 HT MUSCLE IMAGE SPECT MULT: CPT | Performed by: INTERNAL MEDICINE

## 2020-09-17 PROCEDURE — 78452 HT MUSCLE IMAGE SPECT MULT: CPT

## 2020-09-17 PROCEDURE — 93017 CV STRESS TEST TRACING ONLY: CPT

## 2020-09-17 PROCEDURE — 25010000002 REGADENOSON 0.4 MG/5ML SOLUTION: Performed by: INTERNAL MEDICINE

## 2020-09-17 PROCEDURE — A9500 TC99M SESTAMIBI: HCPCS | Performed by: INTERNAL MEDICINE

## 2020-09-17 PROCEDURE — 93018 CV STRESS TEST I&R ONLY: CPT | Performed by: INTERNAL MEDICINE

## 2020-09-17 RX ADMIN — REGADENOSON 0.4 MG: 0.08 INJECTION, SOLUTION INTRAVENOUS at 10:20

## 2020-09-17 RX ADMIN — TECHNETIUM TC 99M SESTAMIBI 1 DOSE: 1 INJECTION INTRAVENOUS at 10:20

## 2020-09-17 RX ADMIN — TECHNETIUM TC 99M SESTAMIBI 1 DOSE: 1 INJECTION INTRAVENOUS at 07:27

## 2020-09-18 LAB
BH CV STRESS COMMENTS STAGE 1: NORMAL
BH CV STRESS DOSE REGADENOSON STAGE 1: 0.4
BH CV STRESS DURATION MIN STAGE 1: 0
BH CV STRESS DURATION SEC STAGE 1: 10
BH CV STRESS PROTOCOL 1: NORMAL
BH CV STRESS RECOVERY BP: NORMAL MMHG
BH CV STRESS RECOVERY HR: 72 BPM
BH CV STRESS STAGE 1: 1
MAXIMAL PREDICTED HEART RATE: 140 BPM
PERCENT MAX PREDICTED HR: 60.71 %
STRESS BASELINE BP: NORMAL MMHG
STRESS BASELINE HR: 67 BPM
STRESS PERCENT HR: 71 %
STRESS POST PEAK BP: NORMAL MMHG
STRESS POST PEAK HR: 85 BPM
STRESS TARGET HR: 119 BPM

## 2020-09-24 ENCOUNTER — TELEPHONE (OUTPATIENT)
Dept: CARDIOLOGY | Facility: CLINIC | Age: 80
End: 2020-09-24

## 2020-09-24 DIAGNOSIS — Z01.818 PRE-OP TESTING: ICD-10-CM

## 2020-09-24 DIAGNOSIS — R07.9 CHEST PAIN, UNSPECIFIED TYPE: ICD-10-CM

## 2020-09-24 DIAGNOSIS — Z91.041 ALLERGY TO CONTRAST MEDIA (USED FOR DIAGNOSTIC X-RAYS): ICD-10-CM

## 2020-09-24 DIAGNOSIS — R94.39 POSITIVE CARDIAC STRESS TEST: Primary | ICD-10-CM

## 2020-09-24 DIAGNOSIS — I25.10 CORONARY ARTERIOSCLEROSIS: ICD-10-CM

## 2020-09-24 NOTE — TELEPHONE ENCOUNTER
VANDANA and told pt he only needs to proceed with cath if he has SOB or CP. Asked pt to call me back and let me know if he is having these symptoms. Kat

## 2020-09-24 NOTE — TELEPHONE ENCOUNTER
CNA pt,  Patient had nuclear stress test on 09/03/2020 and was told he needs a heart catherization. Would like to know if he can proceed with heart cath?

## 2020-09-28 ENCOUNTER — TELEPHONE (OUTPATIENT)
Dept: CARDIOLOGY | Facility: CLINIC | Age: 80
End: 2020-09-28

## 2020-09-28 NOTE — TELEPHONE ENCOUNTER
CNA  Pt called stating he went to [facility] ER (EKG) last evening after experiencing some chest pain reaching into the washing machine.    CB# [phone]

## 2020-09-29 RX ORDER — PREDNISONE 50 MG/1
TABLET ORAL
Qty: 3 TABLET | Refills: 0 | Status: SHIPPED | OUTPATIENT
Start: 2020-09-29 | End: 2021-03-04 | Stop reason: ALTCHOICE

## 2020-09-29 RX ORDER — CIMETIDINE 400 MG/1
TABLET, FILM COATED ORAL
Qty: 1 TABLET | Refills: 0 | Status: SHIPPED | OUTPATIENT
Start: 2020-09-29 | End: 2021-03-04 | Stop reason: ALTCHOICE

## 2020-09-29 NOTE — TELEPHONE ENCOUNTER
Kat, yes I would like him premedicated with our standard to include prednisone Benadryl and Tagamet.  Thank you

## 2020-09-29 NOTE — TELEPHONE ENCOUNTER
LMOM for pt to call me back in regards to his CP. There is need to proceed with cath and CNA agrees. Ordered with pre med as pt lists iodinated drug allergy. Kat

## 2020-09-29 NOTE — TELEPHONE ENCOUNTER
Pt has drug allergy listed for iodinated diagnostic agents. Would you like him pre medicated?  Thanks,  Kat

## 2020-09-30 PROBLEM — R07.9 CHEST PAIN: Status: ACTIVE | Noted: 2020-09-30

## 2020-09-30 PROBLEM — R94.39 POSITIVE CARDIAC STRESS TEST: Status: ACTIVE | Noted: 2020-09-30

## 2020-10-06 ENCOUNTER — TRANSCRIBE ORDERS (OUTPATIENT)
Dept: SLEEP MEDICINE | Facility: HOSPITAL | Age: 80
End: 2020-10-06

## 2020-10-06 DIAGNOSIS — Z01.818 OTHER SPECIFIED PRE-OPERATIVE EXAMINATION: Primary | ICD-10-CM

## 2020-10-07 ENCOUNTER — HOSPITAL ENCOUNTER (OUTPATIENT)
Dept: OTHER | Facility: HOSPITAL | Age: 80
Discharge: HOME | End: 2020-10-07
Attending: INTERNAL MEDICINE

## 2020-10-07 LAB
ALBUMIN SERPL-MCNC: 4 G/DL (ref 3.5–5)
ALBUMIN/GLOB SERPL: 1.3 {RATIO} (ref 1.4–2.6)
ALP SERPL-CCNC: 57 U/L (ref 56–155)
ALT SERPL-CCNC: 8 U/L (ref 10–40)
ANION GAP SERPL CALC-SCNC: 14 MMOL/L (ref 8–19)
AST SERPL-CCNC: 15 U/L (ref 15–50)
BASOPHILS # BLD AUTO: 0.04 10*3/UL (ref 0–0.2)
BASOPHILS NFR BLD AUTO: 0.6 % (ref 0–3)
BILIRUB SERPL-MCNC: 0.67 MG/DL (ref 0.2–1.3)
BUN SERPL-MCNC: 11 MG/DL (ref 5–25)
BUN/CREAT SERPL: 15 {RATIO} (ref 6–20)
CALCIUM SERPL-MCNC: 8.9 MG/DL (ref 8.7–10.4)
CHLORIDE SERPL-SCNC: 94 MMOL/L (ref 99–111)
CONV ABS IMM GRAN: 0.02 10*3/UL (ref 0–0.2)
CONV CO2: 27 MMOL/L (ref 22–32)
CONV IMMATURE GRAN: 0.3 % (ref 0–1.8)
CONV TOTAL PROTEIN: 7.1 G/DL (ref 6.3–8.2)
CREAT UR-MCNC: 0.75 MG/DL (ref 0.7–1.2)
DEPRECATED RDW RBC AUTO: 42.1 FL (ref 35.1–43.9)
EOSINOPHIL # BLD AUTO: 0.1 10*3/UL (ref 0–0.7)
EOSINOPHIL # BLD AUTO: 1.5 % (ref 0–7)
ERYTHROCYTE [DISTWIDTH] IN BLOOD BY AUTOMATED COUNT: 13.2 % (ref 11.6–14.4)
GFR SERPLBLD BASED ON 1.73 SQ M-ARVRAT: >60 ML/MIN/{1.73_M2}
GLOBULIN UR ELPH-MCNC: 3.1 G/DL (ref 2–3.5)
GLUCOSE SERPL-MCNC: 117 MG/DL (ref 70–99)
HCT VFR BLD AUTO: 36.9 % (ref 42–52)
HGB BLD-MCNC: 12.1 G/DL (ref 14–18)
INR PPP: 0.95 (ref 2–3)
LYMPHOCYTES # BLD AUTO: 1.05 10*3/UL (ref 1–5)
LYMPHOCYTES NFR BLD AUTO: 16.2 % (ref 20–45)
MCH RBC QN AUTO: 28.8 PG (ref 27–31)
MCHC RBC AUTO-ENTMCNC: 32.8 G/DL (ref 33–37)
MCV RBC AUTO: 87.9 FL (ref 80–96)
MONOCYTES # BLD AUTO: 0.8 10*3/UL (ref 0.2–1.2)
MONOCYTES NFR BLD AUTO: 12.3 % (ref 3–10)
NEUTROPHILS # BLD AUTO: 4.49 10*3/UL (ref 2–8)
NEUTROPHILS NFR BLD AUTO: 69.1 % (ref 30–85)
NRBC CBCN: 0 % (ref 0–0.7)
OSMOLALITY SERPL CALC.SUM OF ELEC: 272 MOSM/KG (ref 273–304)
PLATELET # BLD AUTO: 138 10*3/UL (ref 130–400)
PMV BLD AUTO: 9.7 FL (ref 9.4–12.4)
POTASSIUM SERPL-SCNC: 4.1 MMOL/L (ref 3.5–5.3)
PROTHROMBIN TIME: 10.4 S (ref 9.4–12)
RBC # BLD AUTO: 4.2 10*6/UL (ref 4.7–6.1)
SODIUM SERPL-SCNC: 131 MMOL/L (ref 135–147)
WBC # BLD AUTO: 6.5 10*3/UL (ref 4.8–10.8)

## 2020-10-10 ENCOUNTER — LAB (OUTPATIENT)
Dept: LAB | Facility: HOSPITAL | Age: 80
End: 2020-10-10

## 2020-10-10 DIAGNOSIS — Z01.818 OTHER SPECIFIED PRE-OPERATIVE EXAMINATION: ICD-10-CM

## 2020-10-10 PROCEDURE — U0004 COV-19 TEST NON-CDC HGH THRU: HCPCS

## 2020-10-10 PROCEDURE — C9803 HOPD COVID-19 SPEC COLLECT: HCPCS

## 2020-10-12 LAB — SARS-COV-2 RNA RESP QL NAA+PROBE: NOT DETECTED

## 2020-10-13 ENCOUNTER — HOSPITAL ENCOUNTER (OUTPATIENT)
Facility: HOSPITAL | Age: 80
Setting detail: HOSPITAL OUTPATIENT SURGERY
Discharge: HOME | End: 2020-10-13
Attending: INTERNAL MEDICINE | Admitting: INTERNAL MEDICINE

## 2020-10-13 VITALS
TEMPERATURE: 98.3 F | DIASTOLIC BLOOD PRESSURE: 81 MMHG | RESPIRATION RATE: 18 BRPM | HEART RATE: 63 BPM | SYSTOLIC BLOOD PRESSURE: 119 MMHG | OXYGEN SATURATION: 98 % | WEIGHT: 144 LBS | HEIGHT: 67 IN | BODY MASS INDEX: 22.6 KG/M2

## 2020-10-13 DIAGNOSIS — I25.10 CORONARY ARTERIOSCLEROSIS: ICD-10-CM

## 2020-10-13 DIAGNOSIS — R94.39 POSITIVE CARDIAC STRESS TEST: ICD-10-CM

## 2020-10-13 DIAGNOSIS — R07.9 CHEST PAIN, UNSPECIFIED TYPE: ICD-10-CM

## 2020-10-13 LAB — GLUCOSE BLDC GLUCOMTR-MCNC: 160 MG/DL (ref 70–130)

## 2020-10-13 PROCEDURE — C1769 GUIDE WIRE: HCPCS | Performed by: INTERNAL MEDICINE

## 2020-10-13 PROCEDURE — 25010000002 MIDAZOLAM PER 1 MG: Performed by: INTERNAL MEDICINE

## 2020-10-13 PROCEDURE — C1894 INTRO/SHEATH, NON-LASER: HCPCS | Performed by: INTERNAL MEDICINE

## 2020-10-13 PROCEDURE — 25010000002 FENTANYL CITRATE (PF) 100 MCG/2ML SOLUTION: Performed by: INTERNAL MEDICINE

## 2020-10-13 PROCEDURE — 99152 MOD SED SAME PHYS/QHP 5/>YRS: CPT | Performed by: INTERNAL MEDICINE

## 2020-10-13 PROCEDURE — 82962 GLUCOSE BLOOD TEST: CPT

## 2020-10-13 PROCEDURE — 99153 MOD SED SAME PHYS/QHP EA: CPT | Performed by: INTERNAL MEDICINE

## 2020-10-13 PROCEDURE — 93459 L HRT ART/GRFT ANGIO: CPT | Performed by: INTERNAL MEDICINE

## 2020-10-13 PROCEDURE — 0 IOPAMIDOL PER 1 ML: Performed by: INTERNAL MEDICINE

## 2020-10-13 RX ORDER — SODIUM CHLORIDE 9 MG/ML
75 INJECTION, SOLUTION INTRAVENOUS CONTINUOUS
Status: DISCONTINUED | OUTPATIENT
Start: 2020-10-13 | End: 2020-10-13 | Stop reason: HOSPADM

## 2020-10-13 RX ORDER — SODIUM CHLORIDE 0.9 % (FLUSH) 0.9 %
10 SYRINGE (ML) INJECTION AS NEEDED
Status: DISCONTINUED | OUTPATIENT
Start: 2020-10-13 | End: 2020-10-13 | Stop reason: HOSPADM

## 2020-10-13 RX ORDER — LIDOCAINE HYDROCHLORIDE 20 MG/ML
INJECTION, SOLUTION INFILTRATION; PERINEURAL AS NEEDED
Status: DISCONTINUED | OUTPATIENT
Start: 2020-10-13 | End: 2020-10-13 | Stop reason: HOSPADM

## 2020-10-13 RX ORDER — MIDAZOLAM HYDROCHLORIDE 1 MG/ML
INJECTION INTRAMUSCULAR; INTRAVENOUS AS NEEDED
Status: DISCONTINUED | OUTPATIENT
Start: 2020-10-13 | End: 2020-10-13 | Stop reason: HOSPADM

## 2020-10-13 RX ORDER — ACETAMINOPHEN 325 MG/1
650 TABLET ORAL EVERY 4 HOURS PRN
Status: DISCONTINUED | OUTPATIENT
Start: 2020-10-13 | End: 2020-10-13 | Stop reason: HOSPADM

## 2020-10-13 RX ORDER — SODIUM CHLORIDE 0.9 % (FLUSH) 0.9 %
3 SYRINGE (ML) INJECTION EVERY 12 HOURS SCHEDULED
Status: DISCONTINUED | OUTPATIENT
Start: 2020-10-13 | End: 2020-10-13 | Stop reason: HOSPADM

## 2020-10-13 RX ORDER — SODIUM CHLORIDE 9 MG/ML
250 INJECTION, SOLUTION INTRAVENOUS ONCE AS NEEDED
Status: DISCONTINUED | OUTPATIENT
Start: 2020-10-13 | End: 2020-10-13 | Stop reason: HOSPADM

## 2020-10-13 RX ORDER — METFORMIN HYDROCHLORIDE 500 MG/1
500 TABLET, EXTENDED RELEASE ORAL
Qty: 3 TABLET | Refills: 0
Start: 2020-10-16 | End: 2021-07-06 | Stop reason: SDUPTHER

## 2020-10-13 RX ORDER — METFORMIN HYDROCHLORIDE 500 MG/1
500 TABLET, EXTENDED RELEASE ORAL
Start: 2020-10-13 | End: 2020-10-13 | Stop reason: SDUPTHER

## 2020-10-13 RX ORDER — FENTANYL CITRATE 50 UG/ML
INJECTION, SOLUTION INTRAMUSCULAR; INTRAVENOUS AS NEEDED
Status: DISCONTINUED | OUTPATIENT
Start: 2020-10-13 | End: 2020-10-13 | Stop reason: HOSPADM

## 2020-10-13 RX ADMIN — SODIUM CHLORIDE 75 ML/HR: 9 INJECTION, SOLUTION INTRAVENOUS at 09:06

## 2020-10-13 NOTE — H&P
[facility] H&P        Subjective:     Encounter Date:09/30/2020      Patient ID: Marcello Larson Sr. is a 80 y.o. male.      Chief Complaint:    HPI: He is a very pleasant 80-year-old male who observes the CDC guidelines for social distancing.  He remains free of fever cough or increased shortness of breath.  He does not report any change in his sense of smell or taste.  Mr. Larson has a history of coronary heart disease.  He underwent coronary artery bypass surgery in 1991 with placement of a left internal mammary graft to the LAD, saphenous vein graft to OM1, OM 2 and the right coronary artery.  In 2014 he underwent bare-metal stent deployment x2 to the saphenous vein graft to the right coronary artery.  His overall activity level is stable.  He does however report some left upper chest discomfort that he describes as an ache.  This seems to occur with activity more so than at rest.  It does not last very long less than a minute in duration.  He states there is no associated shortness of breath diaphoresis or nausea.  He is free of any progressive dyspnea on exertion.  He does not report orthopnea or PND no lower extremity edema.  He has a history of hypertension which is controlled.  He has known dyslipidemia and remains on lovastatin 40 mg daily.  His lipids are controlled.Echocardiogram obtained 2/14/2019 revealed left ventricular ejection fraction 40 to 45% with mild aortic and mitral insufficiency.  Mr. Larson underwent nuclear stress test 9/17/2020.  This demonstrated a decrease in his left ventricular contractility with an ejection fraction of 20%.  There was evidence of apical wall infarction, inferior wall infarction without reversibility.  There was evidence of anterior lateral wall ischemia.  He presents today to undergo coronary angiography.      The following portions of the patient's history were reviewed and updated as appropriate: allergies, current medications, past family  history, past medical history, past social history, past surgical history and problem list.      Past Medical History:   Diagnosis Date   • Benign essential hypertension 8/13/2019   • Cardiac dysfunction 8/13/2019    Left ventricle   • Cardiomyopathy (CMS/HCC) 8/13/2019   • CHF (congestive heart failure) (CMS/HCC)    • Coronary arteriosclerosis 8/13/2019   • H/O echocardiogram 8/13/2019 02/09/2015 - LV severely dilated.  LV systolic function is moderate to severely reduced.  EF 30-35%.  The transmittal spectral doppler flow pattern is suggestive of pseudonormalization.  There is moderate to severe global hypokinesis of the LV.  The left atrium is mildly dilated.  The mitral leaflets appear thickened, but open well.  Mild MR and AR.   • Hx of CABG 8/13/2019 01/01/1991 - left internal mammary graft to the LAD, SVG to OM1, OM2 in the RCA   • Hyperlipidemia 8/13/2019   • Palpitations 8/13/2019   • Sleep apnea 8/13/2019   • Stented coronary artery 8/13/2019 04/11/2014 - VISION bare metal stent to the proximal stenosis of the vein graft to the right and ISION bare metal stent in the mid to distal 80% stenosis.         Past Surgical History:   Procedure Laterality Date   • AMPUTATION Left     LOWER EXTREMITY   • APPENDECTOMY     • CARDIAC CATHETERIZATION     • CHOLECYSTECTOMY     • CORONARY ARTERY BYPASS GRAFT     • OTHER SURGICAL HISTORY      UROSTOMY S/P BLADDER CA         Social History     Socioeconomic History   • Marital status:      Spouse name: Not on file   • Number of children: Not on file   • Years of education: Not on file   • Highest education level: Not on file   Tobacco Use   • Smoking status: Former Smoker     Types: Cigarettes   • Smokeless tobacco: Never Used   Substance and Sexual Activity   • Alcohol use: Never     Frequency: Never   • Drug use: Never   • Sexual activity: Defer         Allergies   Allergen Reactions   • Iodinated Diagnostic Agents Anaphylaxis   • Prednisone Cough  "      Current Medications:   Scheduled Meds:sodium chloride, 3 mL, Intravenous, Q12H      Continuous Infusions:sodium chloride, 75 mL/hr, Last Rate: 75 mL/hr (10/13/20 0906)        Review of Systems   Constitution: Negative for malaise/fatigue.   HENT: Negative for hearing loss and nosebleeds.    Eyes: Negative for double vision and visual disturbance.   Cardiovascular: Positive for chest pain and dyspnea on exertion. Negative for claudication, near-syncope, orthopnea, palpitations, paroxysmal nocturnal dyspnea and syncope.   Respiratory: Negative for cough, shortness of breath, sleep disturbances due to breathing, snoring, sputum production and wheezing.    Endocrine: Negative for cold intolerance, heat intolerance, polydipsia and polyuria.   Hematologic/Lymphatic: Negative for adenopathy and bleeding problem. Does not bruise/bleed easily.   Skin: Negative for flushing and itching.   Musculoskeletal: Negative for back pain, falls, joint pain, joint swelling, muscle weakness and neck pain.   Gastrointestinal: Negative for abdominal pain, dysphagia, heartburn, nausea and vomiting.   Genitourinary: Negative for dysuria and frequency.   Neurological: Negative for disturbances in coordination, dizziness, light-headedness, loss of balance, numbness and weakness.   Psychiatric/Behavioral: Negative for altered mental status and memory loss. The patient is not nervous/anxious.    Allergic/Immunologic: Negative for hives and persistent infections.          Objective:         /77 (BP Location: Left arm, Patient Position: Lying)   Pulse 87   Temp 98.3 °F (36.8 °C) (Temporal)   Resp 16   Ht 170.2 cm (67\")   Wt 65.3 kg (144 lb)   SpO2 94%   BMI 22.55 kg/m²     Constitutional:       Appearance: Well-developed.   Eyes:      Conjunctiva/sclera: Conjunctivae normal.      Pupils: Pupils are equal, round, and reactive to light.   HENT:      Head: Normocephalic and atraumatic.   Neck:      Musculoskeletal: Neck supple.     "  Thyroid: No thyromegaly.   Pulmonary:      Effort: Pulmonary effort is normal. No respiratory distress.      Breath sounds: Normal breath sounds. No wheezing. No rales.   Cardiovascular:      Normal rate. Regular rhythm.      S4 Gallop. No S3 gallop. Midsystolic click click.   Edema:     Peripheral edema absent.   Abdominal:      General: Bowel sounds are normal. There is no distension.      Palpations: Abdomen is soft. There is no abdominal mass.      Tenderness: There is no abdominal tenderness.   Musculoskeletal: Normal range of motion.      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      Findings: No erythema.   Neurological:      Mental Status: Alert and oriented to person, place, and time.               ASCVD RIsk Score::  The ASCVD Risk score (Russellvilledagmar MORA Jr., et al., 2013) failed to calculate for the following reasons:    The 2013 ASCVD risk score is only valid for ages 40 to 79    Recent Radiology:  Imaging Results (Most Recent)     None          EKG:        Assessment:         Active Hospital Problems    Diagnosis POA   • **Coronary arteriosclerosis [I25.10] Unknown   • Positive cardiac stress test [R94.39] Unknown     Added automatically from request for surgery [id number]     • Chest pain [R07.9] Unknown     Added automatically from request for surgery [id number]     • Hyperlipidemia [E78.5] Yes   • Benign essential hypertension [I10] Yes   • Cardiomyopathy (CMS/HCC) [I42.9] Yes   • Sleep apnea [G47.30] Yes   • Hx of CABG [Z95.1] Not Applicable     01/01/1991 - left internal mammary graft to the LAD, SVG to OM1, OM2 in the RCA     • Stented coronary artery [Z95.5] Not Applicable     04/11/2014 - VISION bare metal stent to the proximal stenosis of the vein graft to the right and ISION bare metal stent in the mid to distal 80% stenosis.            Plan:       Mr. Larson has a history of coronary heart disease and is undergone redo coronary artery  bypass surgery in 1991.  He reports some recent chest discomfort and dyspnea.  Nuclear stress test revealed left ventricular ejection fraction 20% with anterior lateral ischemia, her apical and inferior infarct without ischemia.  He presents to undergo coronary angiography.  Risk benefits been explained patient understands and is prepared to proceed.             Kofi Campoverde MD  10/13/20  10:25 EDT

## 2020-10-13 NOTE — DISCHARGE INSTRUCTIONS
[facility]  [address] [place] [address]      Coronary Angiogram (Femoral Approach) After Care     Refer to this sheet in the next few weeks. These instructions provide you with information on caring for yourself after your procedure. Your health care provider may also give you more specific instructions. Your treatment has been planned according to current medical practices, but problems sometimes occur. Call your health care provider if you have any problems or questions after your procedure.      What to Expect After the Procedure:  After your procedure, it is typical to have the following sensations:  · Minor discomfort or tenderness and a small bump at the catheter insertion site. The bump should usually decrease in size and tenderness within 1 to 2 weeks.  · Any bruising will usually fade within 2 to 4 weeks.  Home Care Instructions:  · Do not apply powder or lotion to the site.  · Do not take baths, swim, or use a hot tub until your health care provider approves and the site is completely healed.  · Do not bend, squat, or lift anything over 20 lb (9 kg) or as directed by your health care provider. However, we recommend lifting nothing heavier than a gallon of milk.    · You may shower 24 hours after the procedure. Remove the bandage (dressing) and gently wash the site with plain soap and water. Gently pat the site dry. You may apply a band aid daily for 2 days if desired.    · Inspect the site at least twice daily.  · Increase your fluid intake for the next 2 days.    · Limit your activity for the first 48 hours. .    · Avoid strenuous activity for 1 week or as advised by your physician.    · Follow instructions about when you can drive or return to work as directed by your physician.    · Hold direct pressure over the site when you cough, sneeze, laugh or change positions.  Do this for the next 2 days.    · Do not operate machinery or power tools for 24 hours.  · A responsible adult  should be with you for the first 24 hours after you arrive home. Do not make any important legal decisions or sign legal papers for 24 hours.  Do not drink alcohol for 24 hours.  · Metformin or any medications containing Metformin should not be taken for 48 hours after your procedure.    Call Your Doctor If:  · You have drainage (other than a small amount of blood on the dressing).  · You have chills or a fever > 101.  · You have redness, warmth, swelling(larger than a walnut), or pain at the insertion site  · .You have heavy bleeding from the site. If this happens, hold pressure on the site and call 911.  · You develop chest pain or shortness of breath, feel faint, or pass out.  · You develop pain, discoloration, coldness, numbness, tingling, or severe bruising in the leg that held the catheter.  · You develop bleeding from any other place, such as the bowels.    Make Sure You:  · Understand these instructions.  · Will watch your condition.  · Will get help right away if you are not doing well or get worse.

## 2020-10-27 ENCOUNTER — TELEPHONE (OUTPATIENT)
Dept: CARDIOLOGY | Facility: CLINIC | Age: 80
End: 2020-10-27

## 2020-10-27 NOTE — TELEPHONE ENCOUNTER
10/27/20-Dr Kofi Campoverde  Pt: Marcello Larson  : 1940  Number:   Reason for Call: Pt. Needs to come off his plavix for 5 days for his back injection. He states that Dr Campoverde has okayed it before. Please call him at the number above, Thank you

## 2021-01-06 ENCOUNTER — OFFICE VISIT CONVERTED (OUTPATIENT)
Dept: FAMILY MEDICINE CLINIC | Age: 81
End: 2021-01-06
Attending: NURSE PRACTITIONER

## 2021-01-30 ENCOUNTER — OFFICE VISIT CONVERTED (OUTPATIENT)
Dept: FAMILY MEDICINE CLINIC | Age: 81
End: 2021-01-30
Attending: NURSE PRACTITIONER

## 2021-02-19 ENCOUNTER — OFFICE VISIT CONVERTED (OUTPATIENT)
Dept: FAMILY MEDICINE CLINIC | Age: 81
End: 2021-02-19
Attending: FAMILY MEDICINE

## 2021-02-20 ENCOUNTER — HOSPITAL ENCOUNTER (OUTPATIENT)
Dept: OTHER | Facility: HOSPITAL | Age: 81
Discharge: HOME | End: 2021-02-20
Attending: FAMILY MEDICINE

## 2021-02-20 LAB
ALBUMIN SERPL-MCNC: 4.2 G/DL (ref 3.5–5)
ALBUMIN/GLOB SERPL: 1.6 {RATIO} (ref 1.4–2.6)
ALP SERPL-CCNC: 62 U/L (ref 56–155)
ALT SERPL-CCNC: 12 U/L (ref 10–40)
ANION GAP SERPL CALC-SCNC: 13 MMOL/L (ref 8–19)
AST SERPL-CCNC: 18 U/L (ref 15–50)
BILIRUB SERPL-MCNC: 0.41 MG/DL (ref 0.2–1.3)
BUN SERPL-MCNC: 13 MG/DL (ref 5–25)
BUN/CREAT SERPL: 18 {RATIO} (ref 6–20)
CALCIUM SERPL-MCNC: 8.8 MG/DL (ref 8.7–10.4)
CHLORIDE SERPL-SCNC: 97 MMOL/L (ref 99–111)
CHOLEST SERPL-MCNC: 129 MG/DL (ref 107–200)
CHOLEST/HDLC SERPL: 3.1 {RATIO} (ref 3–6)
CONV CO2: 25 MMOL/L (ref 22–32)
CONV TOTAL PROTEIN: 6.9 G/DL (ref 6.3–8.2)
CREAT UR-MCNC: 0.72 MG/DL (ref 0.7–1.2)
EST. AVERAGE GLUCOSE BLD GHB EST-MCNC: 123 MG/DL
GFR SERPLBLD BASED ON 1.73 SQ M-ARVRAT: >60 ML/MIN/{1.73_M2}
GLOBULIN UR ELPH-MCNC: 2.7 G/DL (ref 2–3.5)
GLUCOSE SERPL-MCNC: 127 MG/DL (ref 70–99)
HBA1C MFR BLD: 5.9 % (ref 3.5–5.7)
HDLC SERPL-MCNC: 42 MG/DL (ref 40–60)
LDLC SERPL CALC-MCNC: 77 MG/DL (ref 70–100)
OSMOLALITY SERPL CALC.SUM OF ELEC: 274 MOSM/KG (ref 273–304)
POTASSIUM SERPL-SCNC: 4.2 MMOL/L (ref 3.5–5.3)
SODIUM SERPL-SCNC: 131 MMOL/L (ref 135–147)
TRIGL SERPL-MCNC: 50 MG/DL (ref 40–150)
VLDLC SERPL-MCNC: 10 MG/DL (ref 5–37)

## 2021-03-03 PROBLEM — I25.10 CORONARY ARTERIOSCLEROSIS: Chronic | Status: ACTIVE | Noted: 2019-08-13

## 2021-03-03 PROBLEM — Z95.5 STENTED CORONARY ARTERY: Chronic | Status: ACTIVE | Noted: 2019-08-13

## 2021-03-03 PROBLEM — I10 BENIGN ESSENTIAL HYPERTENSION: Chronic | Status: ACTIVE | Noted: 2019-08-13

## 2021-03-03 PROBLEM — Z95.1 HX OF CABG: Chronic | Status: ACTIVE | Noted: 2019-08-13

## 2021-03-03 PROBLEM — I42.9 CARDIOMYOPATHY: Chronic | Status: ACTIVE | Noted: 2019-08-13

## 2021-03-03 PROBLEM — E78.5 HYPERLIPIDEMIA: Chronic | Status: ACTIVE | Noted: 2019-08-13

## 2021-03-03 PROBLEM — G47.30 SLEEP APNEA: Chronic | Status: ACTIVE | Noted: 2019-08-13

## 2021-03-04 ENCOUNTER — OFFICE VISIT (OUTPATIENT)
Dept: CARDIOLOGY | Facility: CLINIC | Age: 81
End: 2021-03-04

## 2021-03-04 VITALS
HEIGHT: 67 IN | WEIGHT: 150 LBS | HEART RATE: 66 BPM | BODY MASS INDEX: 23.54 KG/M2 | DIASTOLIC BLOOD PRESSURE: 64 MMHG | SYSTOLIC BLOOD PRESSURE: 130 MMHG

## 2021-03-04 DIAGNOSIS — I10 BENIGN ESSENTIAL HYPERTENSION: Chronic | ICD-10-CM

## 2021-03-04 DIAGNOSIS — Z95.5 STENTED CORONARY ARTERY: Chronic | ICD-10-CM

## 2021-03-04 DIAGNOSIS — R00.2 PALPITATIONS: ICD-10-CM

## 2021-03-04 DIAGNOSIS — I25.10 CORONARY ARTERIOSCLEROSIS: Primary | Chronic | ICD-10-CM

## 2021-03-04 DIAGNOSIS — G47.33 OBSTRUCTIVE SLEEP APNEA SYNDROME: Chronic | ICD-10-CM

## 2021-03-04 DIAGNOSIS — I25.5 ISCHEMIC CARDIOMYOPATHY: Chronic | ICD-10-CM

## 2021-03-04 DIAGNOSIS — E78.5 HYPERLIPIDEMIA, UNSPECIFIED HYPERLIPIDEMIA TYPE: Chronic | ICD-10-CM

## 2021-03-04 DIAGNOSIS — Z95.1 HX OF CABG: Chronic | ICD-10-CM

## 2021-03-04 PROCEDURE — 99214 OFFICE O/P EST MOD 30 MIN: CPT | Performed by: INTERNAL MEDICINE

## 2021-03-04 RX ORDER — BUSPIRONE HYDROCHLORIDE 5 MG/1
5 TABLET ORAL 2 TIMES DAILY
COMMUNITY
End: 2022-03-09

## 2021-03-04 RX ORDER — RANOLAZINE 500 MG/1
1 TABLET, EXTENDED RELEASE ORAL 2 TIMES DAILY
COMMUNITY
Start: 2021-01-22 | End: 2021-11-23

## 2021-03-04 RX ORDER — HYDROCODONE BITARTRATE AND ACETAMINOPHEN 5; 325 MG/1; MG/1
1 TABLET ORAL EVERY 8 HOURS PRN
COMMUNITY
Start: 2021-01-21

## 2021-03-04 NOTE — PROGRESS NOTES
Chief Complaint  Coronary Artery Disease    Subjective    History of Present Illness      I saw Marcello Larson today for continued cardiovascular care.  He is a very pleasant 80-year-old male who continues to observe the CDC guidelines during the coronavirus pandemic.  He is received his COVID-19 vaccine.  Mr. Larson has a history of coronary heart disease and has undergone previous coronary artery bypass surgery in 1991.  At that time he had a LIMA to the LAD, saphenous vein graft OM1, OM2 of the right coronary artery.  2014 he underwent bare-metal stent deployment x2 to the saphenous vein graft to the right coronary artery.  He underwent coronary [id number]/13/2020.  This demonstrated extensive three-vessel native coronary artery disease.  The LIMA to the LAD was widely patent.  There is a patent large saphenous vein graft to the right coronary artery.  Saphenous vein graft to OM1 and OM 2 are occluded.  There are extensive collaterals from the LAD and the right coronary artery to the circumflex system.  Mr. Tineo remains free of angina on medical treatment.  He does not report any significant or progressive dyspnea on exertion.  He is free of orthopnea or PND no lower extremity edema.  Review of echocardiogram from 2/14/2019 reveals left ventricular ejection fraction 40 to 45%, mild aortic mitral tricuspid and pulmonic insufficiency.  There was stage I diastolic dysfunction, moderate concentric left ventricular hypertrophy.  Marcello is free of syncope near syncope or palpitations.  He tolerates his medications well.  His blood pressure is controlled.  He remains on dual antiplatelet therapy which he tolerates well.  He has a history of hyperlipidemia and remains on lovastatin 40 mg daily.  Lab data from recent evaluation at [facility] for nephrolithiasis on 2/20/2021 reveals creatinine 1.72, potassium 4.2 liver function test within normal limits triglycerides 50, HDL 42 and LDL  "77    Objective   Vital Signs:   /64   Pulse 66   Ht 170.2 cm (67\")   Wt 68 kg (150 lb)   BMI 23.49 kg/m²     Constitutional:       Appearance: Well-developed.   Eyes:      Conjunctiva/sclera: Conjunctivae normal.      Pupils: Pupils are equal, round, and reactive to light.   HENT:      Head: Normocephalic and atraumatic.   Neck:      Musculoskeletal: Neck supple.      Thyroid: No thyromegaly.   Pulmonary:      Effort: Pulmonary effort is normal. No respiratory distress.      Breath sounds: Normal breath sounds. No wheezing. No rales.   Cardiovascular:      Normal rate. Regular rhythm.      Murmurs: There is a grade 1/4 high frequency blowing decrescendo, early diastolic murmur at the URSB, radiating to the apex.      S4 Gallop. No S3 gallop. Midsystolic click click.   Edema:     Peripheral edema absent.   Abdominal:      General: Bowel sounds are normal. There is no distension.      Palpations: Abdomen is soft. There is no abdominal mass.      Tenderness: There is no abdominal tenderness.   Musculoskeletal: Normal range of motion.      Comments: Left lower extremity amputation with prosthesis in place   Skin:     General: Skin is warm and dry.      Findings: No erythema.   Neurological:      Mental Status: Alert and oriented to person, place, and time.         Result Review :   The following data was reviewed by: Kofi Campoverde MD on 03/04/2021: Lab data reviewed from 2/20/2021      Data reviewed: Cardiology studies Echocardiogram 2/14/2019, coronary angiography 10/13/2020          Assessment and Plan    1. Coronary arteriosclerosis  Stable free of angina    2. Hx of CABG  Stable    3. Stented coronary artery  Continue dual antiplatelet therapy    4. Ischemic cardiomyopathy  Compensated     5. Benign essential hypertension  Controlled    6. Hyperlipidemia, unspecified hyperlipidemia type  Controlled    7. Palpitations  Stable    8. Obstructive sleep apnea syndrome  CPAP    Mr. Larson remains stable from " the cardiovascular standpoint.  He enjoys a good activity level and observes the CDC guidelines.  He is free of angina and remains euvolemic.  His blood pressure lipids are controlled.  He tolerates his medications well.  He is aware the importance of continued risk modification.  We will see him in follow-up in 6 months at which time we will plan to repeat an echocardiogram to reevaluate valvular function and left ventricular contractility.        Follow Up   No follow-ups on file.  Patient was given instructions and counseling regarding his condition or for health maintenance advice. Please see specific information pulled into the AVS if appropriate.

## 2021-03-10 ENCOUNTER — HOSPITAL ENCOUNTER (OUTPATIENT)
Dept: OTHER | Facility: HOSPITAL | Age: 81
Discharge: HOME | End: 2021-03-10
Attending: FAMILY MEDICINE

## 2021-03-10 LAB
ANION GAP SERPL CALC-SCNC: 15 MMOL/L (ref 8–19)
BUN SERPL-MCNC: 13 MG/DL (ref 5–25)
BUN/CREAT SERPL: 18 {RATIO} (ref 6–20)
CALCIUM SERPL-MCNC: 9.1 MG/DL (ref 8.7–10.4)
CHLORIDE SERPL-SCNC: 98 MMOL/L (ref 99–111)
CONV CO2: 27 MMOL/L (ref 22–32)
CREAT UR-MCNC: 0.73 MG/DL (ref 0.7–1.2)
GFR SERPLBLD BASED ON 1.73 SQ M-ARVRAT: >60 ML/MIN/{1.73_M2}
GLUCOSE SERPL-MCNC: 124 MG/DL (ref 70–99)
OSMOLALITY SERPL CALC.SUM OF ELEC: 284 MOSM/KG (ref 273–304)
POTASSIUM SERPL-SCNC: 4.1 MMOL/L (ref 3.5–5.3)
SODIUM SERPL-SCNC: 136 MMOL/L (ref 135–147)

## 2021-05-15 VITALS
HEIGHT: 67 IN | SYSTOLIC BLOOD PRESSURE: 154 MMHG | WEIGHT: 147 LBS | DIASTOLIC BLOOD PRESSURE: 84 MMHG | BODY MASS INDEX: 23.07 KG/M2

## 2021-05-15 VITALS — HEIGHT: 67 IN | RESPIRATION RATE: 16 BRPM | BODY MASS INDEX: 23.6 KG/M2 | WEIGHT: 150.37 LBS

## 2021-05-15 VITALS — HEIGHT: 67 IN | RESPIRATION RATE: 16 BRPM | BODY MASS INDEX: 23.43 KG/M2 | WEIGHT: 149.25 LBS

## 2021-05-15 VITALS
SYSTOLIC BLOOD PRESSURE: 140 MMHG | HEIGHT: 67 IN | DIASTOLIC BLOOD PRESSURE: 73 MMHG | WEIGHT: 147.12 LBS | BODY MASS INDEX: 23.09 KG/M2

## 2021-05-18 NOTE — PROGRESS NOTES
Marcello Larson 1940     Office/Outpatient Visit    Visit Date: Thu, Aug 8, 2019 10:32 am    Provider: Zeny Corley MD (Assistant: Donna Vazquez MA)    Location: [facility]        Electronically signed by Zeny Corley MD on  08/08/2019 10:54:09 AM                             SUBJECTIVE:        CC:     Mr. Larson is a 79 year old White male.  Patient presents today for three month follow up         HPI: Marcello is here today to follow-up of chronic issues.          He is on metformin for diabetes.  He has been extremely well controlled.  He does adhere to a diabetic diet.  He is on a statin and ASA.  He is UTD on eye exam (6/2019).  He is due for foot exam (7/2018) -- R foot only, s/p L BKA.        He is on DAPT with ASA/Plavix as well as amlodipine, carvedilol, isosorbide mononitrate, lovastatin, and Lasix with KCl supplementation for HTN, HLD and CAD.  He is s/p 2v. CABG.  He has nitro for prn use but rarely uses them.   Follows with Dr. Kofi Knight.        He is on pantoprazole for GERD and h/o bleeding ulcer thought to be NSAID-induced.  He also has sucralfate that he uses prn.  He is currently on iron supplementation.        He is now following with [facility] for chronic low back pain and leg pain.  They are prescribing morphine for him.  He is also doing PT and that helps as well.  He has some constipation with the morphine, unfortunately.  He is using Metamucil and stool softener both.  [facility] in Sept.        He is on Singulair for allergies.         He feels pretty sleepy during the day.  He does sleep 7 hours at night but wakes up still feeling tired.  He has been told he stops breathing when he sleeps.  He is not interested in getting checked for JANNY at this time.     ROS:     CONSTITUTIONAL:  Positive for fatigue.   Negative for fever.      EYES:  Negative for blurred vision.      E/N/T:  Positive for diminished hearing, nasal congestion and  frequent rhinorrhea.      CARDIOVASCULAR:  Negative for chest pain, palpitations and tachycardia.      RESPIRATORY:  Negative for recent cough and dyspnea.      GASTROINTESTINAL:  Positive for constipation.   Negative for abdominal pain, diarrhea, nausea or vomiting.      MUSCULOSKELETAL:  Positive for back pain.   Negative for arthralgias or myalgias.      NEUROLOGICAL:  Negative for headaches, paresthesias and weakness.      PSYCHIATRIC:  Negative for anxiety, depression and sleep disturbance.          PMH/FMH/SH:     Last Reviewed on 8/08/2019 10:42 AM by Zeny Corley    Past Medical History:             PAST MEDICAL HISTORY         Coronary Artery Disease    Hyperlipidemia    Hypertension     Gastroesophageal Reflux Disease     Glomerulonephritis: membranous;     Gout    Osteoarthritis    back pain     bladder cancer         CURRENT MEDICAL PROVIDERS:    Cardiologist: Dr. Tuttle in [place]    Chiropractor    Ophthalmologist         PREVENTIVE HEALTH MAINTENANCE             BONE DENSITY: was last done 6/7/13 with the following abnormality noted-- osteoporosis     COLORECTAL CANCER SCREENING: Up to date (colonoscopy q10y; sigmoidoscopy q5y; Cologuard q3y) was last done 6/7/13; colonoscopy with the following abnormalities noted-- Polyp(s)     EYE EXAM: Diabetic Eye Exam during this calendar year and results are in chart was last done 6/10/2019 no retinopathy     PSA: was last done 5/26/15 with the following abnormalaties noted-- 20.1         PAST MEDICAL HISTORY             ADVANCE DIRECTIVE Living will on file         PAST MEDICAL HISTORY             CURRENT MEDICAL PROVIDERS:    Cardiologist: Kofi Knight    Chiropractor: mayda         Surgical History:         Coronary Artery Bypass Graft: 1984, 1991;      Appendectomy    Cholecystectomy    cystoprostatectomy with ileoconduit;      Cataract Removal    LLE amputation;         Social History:     Occupation:    Disabled     Marital Status:       Children: 2 children         Tobacco/Alcohol/Supplements:     Last Reviewed on 8/08/2019 10:42 AM by Zeny Corley    Tobacco: He has a past history of cigarette smoking; quit date:  1984.          Substance Abuse History:     Last Reviewed on 8/08/2019 10:42 AM by Zeny Corley        Mental Health History:     Last Reviewed on 8/08/2019 10:42 AM by Zeny Corley        Communicable Diseases (eg STDs):     Last Reviewed on 8/08/2019 10:42 AM by Zeny Corley            Current Problems:     Last Reviewed on 6/17/2019 03:30 PM by Zeny Corley    Amputation below knee     Anemia, unspecified     Fatigue     Type 2 diabetes     Chronic low back pain     Hypertensive urgency     Use of high risk medications     GERD     Anxiety     Osteoarthritis     Chronic membranous glomerulonephritis     Mixed hyperlipidemia     Essential hypertension     History of bladder cancer     CAD     Allergic rhinitis due to pollen         Immunizations:     Havrix -adult dose (HepA) 6/14/2018     Prevnar 13 (Pneumococcal PCV 13) 9/1/2015     Fluzone High-Dose pf (>=65 yr) 10/3/2016     Fluzone High-Dose pf (>=65 yr) 9/19/2017     Fluzone High-Dose pf (>=65 yr) 10/26/2018     PNEUMOVAX 23 (Pneumococcal PPV23) 10/1/2008     Tdap (Tetanus, reduced diph, acellular pertussis) 1/1/2013     Zostavax (Zoster live) 10/1/2006         Allergies:     Last Reviewed on 6/17/2019 03:30 PM by Zeny Corley    Prednisone: sweating (Adverse Reaction)    Lisinopril: cough        Current Medications:     Last Reviewed on 6/17/2019 03:30 PM by Zeny Corley    Amlodipine  10mg Tablet 1 tab daily     Sucralfate 1gm Tablets 1 tab qid     Carvedilol 6.25mg Tablet 1 tab bid     Lovastatin 40mg Tablets, Extended Release Take 1 tablet(s) by mouth each evening     Metformin HCl 500mg Tablet 1 tab daily     Potassium Chloride 10mEq Tablets, Extended Release Take 1 tablet(s) by mouth daily     Voltaren  1% Topical Gel applly qid     Isosorbide  Mononitrate 30mg Tablets, Extended Release Take 1 tablet(s) by mouth qam     Singulair  10mg Tablet Take 1 tablet(s) by mouth each evening     Nitrostat 0.4mg Tablets, Sublingual as directed     Gabapentin 300mg Capsules 2 capsules BID     Clopidogrel 75mg Tablet 1 tab daily     Prochlorperazine 10mg Tablets 1 tab q8h prn     Furosemide 40mg Tablets 1 by mouth  daily     Fish Oil 8 tabs daily 1200mg each     Aspirin (ASA) 81mg Tablets, Enteric Coated 1 tab daily     Pantoprazole 40mg Tablets, Delayed Release 1 tab bid     Ciprofloxacin HCl 500mg Tablet 1 tab bid     Metronidazole 500mg Tablet 1 tab tid till gone     Morphine Sulfate     Ondansetron HCl 4mg Tablet 1 po q 6 hours prn     Iron 28 MG     Vitamin B6 100MG qd     Vitamin C 1,000mg Tablet Take 1 tablet(s) by mouth daily         OBJECTIVE:        Vitals:         Current: 8/8/2019 10:38:40 AM    Ht:  5 ft, 6.5 in;  Wt: 146.8 lbs;  BMI: 23.3    T: 98.8 F (oral);  BP: 134/57 mm Hg (right arm, sitting);  P: 57 bpm (right arm (BP Cuff), sitting);  sCr: 0.98 mg/dL;  GFR: 52.84        Exams:     PHYSICAL EXAM:     GENERAL: Vitals recorded well developed, well nourished;  well groomed;  no apparent distress;     EYES: extraocular movements intact; conjunctiva and cornea are normal; PERRLA;     E/N/T: OROPHARYNX:  normal mucosa, dentition, gingiva, and posterior pharynx;     RESPIRATORY: normal respiratory rate and pattern with no distress; normal breath sounds with no rales, rhonchi, wheezes or rubs;     CARDIOVASCULAR: normal rate; rhythm is regular;  no systolic murmur; no edema;     GASTROINTESTINAL: nontender; normal bowel sounds; stoma appears pink and healthy with clear urine output in bag;     MUSCULOSKELETAL: normal gait; normal overall tone LLE BKA; prosthesis in place;     Right foot exam    Protective sensation using Monofilament test: NORMAL sensation. Patient detects .07 grams of force which is considered normal.    Vascular status: normal peripheral  vascular exam with palpable dorsal pedal and posterior tibal pulses and brisk digital capillary refill    Skin is intact without sores or ulcers         ASSESSMENT           250.00   E11.9  Type 2 diabetes              DDx:     401   I10  Essential hypertension              DDx:     272.2   E78.2  Mixed hyperlipidemia              DDx:     530.81   K21.9  GERD              DDx:         ORDERS:         Radiology/Test Orders:       3017F  Colorectal CA screen results documented and reviewed (PV)  (In-House)         2022F  Dilated retinal eye exam w/interpret by ophthalmologist/optometrist documented/reviewed (DM)4  (In-House)           Lab Orders:       [id number]  DIAB1 - [facility] LIPID,CMP, A1C: 76553, 20866, 86165  (Send-Out)         49767  DANIEL - [facility] Microablbumin, quantitative  (Send-Out)         APPTO  Appointment need  (In-House)           Other Orders:       2028F  Foot examination performed (includes examination through visual inspection, sensory exam with monofi  (In-House)                   PLAN:          Type 2 diabetes He is on metformin for diabetes.  No refills needed.  He has been extremely well controlled; checking labs, ordered as below.  He does adhere to a diabetic diet.  He is on a statin and ASA.  He is UTD on eye exam (6/2019).  R foot exam today is normal, s/p L BKA.  RTC 3 months.     LABORATORY:  Labs ordered to be performed today include Diabetes Panel 1; CMP, Lipid, A1C and Microalbumin.  MIPS Vaccines Flu and Pneumonia updated in Shot record Colorectal Cancer Screening is up to date and the results are in the chart   Diabetic Eye Exam during this calendar year and results are in chart     FOLLOW-UP: Schedule a follow-up visit in 3 months..  f/u DM with [place]           Orders:       [id number]  DIAB1 - [facility] LIPID,CMP, A1C: 41467, 80089, 81728  (Send-Out)         94341  DANIEL [facility] Microablbumin, quantitative  (Send-Out)         3017F  Colorectal CA screen results documented and reviewed (PV)  (In-House)          2022F  Dilated retinal eye exam w/interpret by ophthalmologist/optometrist documented/reviewed (DM)4  (In-House)         APPTO  Appointment need  (In-House)            Essential hypertension BP at goal.  No refills needed.  Checking labs.           Mixed hyperlipidemia No refills needed.  Checking labs.           GERD Stable.  No refills needed.  Still on PPI plus sucralfate prn.             Other Orders:       2028F  Foot examination performed (includes examination through visual inspection, sensory exam with monofi  (In-House)           Patient Recommendations:        For  Type 2 diabetes:     Schedule a follow-up visit in 3 months.                APPOINTMENT INFORMATION:        Monday Tuesday Wednesday Thursday Friday Saturday Sunday            Time:___________________AM  PM   Date:_____________________             CHARGE CAPTURE           **Please note: ICD descriptions below are intended for billing purposes only and may not represent clinical diagnoses**        Primary Diagnosis:         250.00 Type 2 diabetes            E11.9    Type 2 diabetes mellitus without complications              Orders:          04439   Office/outpatient visit; established patient, level 4  (In-House)             3017F   Colorectal CA screen results documented and reviewed (PV)  (In-House)             2022F   Dilated retinal eye exam w/interpret by ophthalmologist/optometrist documented/reviewed (DM)4  (In-House)             APPTO   Appointment need  (In-House)           401 Essential hypertension            I10    Essential (primary) hypertension    272.2 Mixed hyperlipidemia            E78.2    Mixed hyperlipidemia    530.81 GERD            K21.9    Gastro-esophageal reflux disease without esophagitis        Other Orders:           2028F   Foot examination performed (includes examination through visual inspection, sensory exam with monofi  (In-House)

## 2021-05-18 NOTE — PROGRESS NOTES
Marcello Larson  1940     Office/Outpatient Visit    Visit Date: Fri, Nov 29, 2019 09:33 am    Provider: Bryanna Ascencio N.P. (Assistant: Donna Vazquez MA)    Location: [facility]        Electronically signed by Bryanna Ascencio N.P. on  11/29/2019 01:04:48 PM                             Subjective:        CC: Mr. Larson is a 79 year old White male.  Patient presents today with complaints of congestion and drainage X 2-3 days, also has possible infection of left lower leg         HPI:           Patient complains of acute upper respiratory infection, unspecified.  These have been present for the past 2 weeks.  The symptoms include productive cough, runny nose and scratchy throat.  He denies ear complaints or fever.  He has already tried to relieve the symptoms with Zyrtec.  Marcello also complains of irritation to left stump for the past 3 days. Started after walking frequently when visiting at hospital. Notes that prosthetic was rubbing. He also has abrasion to left knee. Using crutches today.    ROS:     CONSTITUTIONAL:  Negative for chills and fever.      EYES:  Negative for blurred vision and eye drainage.      E/N/T:  Positive for frequent rhinorrhea, sinus pressure and scratchy throat.   Negative for ear pain or sore throat.      CARDIOVASCULAR:  Negative for chest pain and palpitations.      RESPIRATORY:  Positive for recent cough.   Negative for dyspnea or frequent wheezing.      GASTROINTESTINAL:  Negative for abdominal pain and vomiting.      INTEGUMENTARY:  Positive for irritation to left stump.          Past Medical History / Family History / Social History:         Last Reviewed on 11/07/2019 09:58 AM by Zeny Corley    Past Medical History:             PAST MEDICAL HISTORY         Coronary Artery Disease    Hyperlipidemia    Hypertension     Gastroesophageal Reflux Disease     Glomerulonephritis: membranous;     Gout    Osteoarthritis    back pain     bladder cancer         CURRENT  MEDICAL PROVIDERS:    Cardiologist: Dr. Tuttle in [place]    Chiropractor    Ophthalmologist         PREVENTIVE HEALTH MAINTENANCE             BONE DENSITY: was last done 6/7/13 with the following abnormality noted-- osteoporosis     COLORECTAL CANCER SCREENING: Up to date (colonoscopy q10y; sigmoidoscopy q5y; Cologuard q3y) was last done 8/26/2019; sigmoidoscopy with the following abnormalaties noted-- diverticulitis with stricture; unable to pass scope to complete full colonoscopy     EYE EXAM: Diabetic Eye Exam during this calendar year and results are in chart was last done 6/10/2019 no retinopathy     PSA: was last done 5/26/15 with the following abnormalaties noted-- 20.1         PAST MEDICAL HISTORY             ADVANCE DIRECTIVE Living will on file         PAST MEDICAL HISTORY             CURRENT MEDICAL PROVIDERS:    Cardiologist: Kofi Knight    Chiropractor: mayda         Surgical History:         Coronary Artery Bypass Graft: 1984, 1991;     Appendectomy    Cholecystectomy    cystoprostatectomy with ileoconduit;     Cataract Removal    LLE amputation;         Social History:     Occupation:    Disabled     Marital Status:      Children: 2 children         Tobacco/Alcohol/Supplements:     Last Reviewed on 11/07/2019 09:58 AM by Zeny Corley    Tobacco: He has a past history of cigarette smoking; quit date:  1984.          Substance Abuse History:     Last Reviewed on 11/07/2019 09:58 AM by Zeny Corley        Mental Health History:     Last Reviewed on 11/07/2019 09:58 AM by Zeny Corley        Communicable Diseases (eg STDs):     Last Reviewed on 11/07/2019 09:58 AM by Zeny Corley        Current Problems:     Last Reviewed on 11/07/2019 09:58 AM by Zeny Corley    Mixed hyperlipidemia    Essential hypertension    Chronic membranous glomerulonephritis    History of bladder cancer    Mixed hyperlipidemia    Anxiety disorder, unspecified    Essential (primary) hypertension     Atherosclerotic heart disease of native coronary artery without angina pectoris    Gastro-esophageal reflux disease without esophagitis    Bilateral primary osteoarthritis of knee    CAD    GERD    Osteoarthritis    Anxiety    Allergic rhinitis due to pollen    Allergic rhinitis due to pollen    Low back pain    Chronic low back pain    Type 2 diabetes    Type 2 diabetes mellitus without complications    Anemia, unspecified    Anemia, unspecified    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    Amputation below knee    Olecranon bursitis    Olecranon bursitis, unspecified elbow        Immunizations:     Havrix -adult dose (HepA) 6/14/2018    Prevnar 13 (Pneumococcal PCV 13) 9/1/2015    Fluzone High-Dose pf (>=65 yr) 10/3/2016    Fluzone High-Dose pf (>=65 yr) 9/19/2017    Fluzone High-Dose pf (>=65 yr) 10/26/2018    Fluzone High-Dose pf (>=65 yr) 10/14/2019    PNEUMOVAX 23 (Pneumococcal PPV23) 10/1/2008    Tdap (Tetanus, reduced diph, acellular pertussis) 1/1/2013    Zostavax (Zoster live) 10/1/2006        Allergies:     Last Reviewed on 11/07/2019 09:58 AM by Zeny Corley    Cardiac Dye:      Prednisone: sweating  (Adverse Reaction)    Lisinopril: cough         Current Medications:     Last Reviewed on 11/07/2019 09:58 AM by Zeny Corley    Vitamin C 1,000 mg oral tablet [Take 1 tablet(s) by mouth daily]    Iron 28 MG     aspirin 81 mg oral tablet, delayed release (enteric coated) [1 tab daily]    Vitamin B6 100MG qd     Clopidogrel 75 mg oral tablet [1 tab daily]    Nitrostat 0.4mg Tablets, Sublingual [as directed]    Potassium Chloride 10 mEq oral tablet, extended release [Take 1 tablet(s) by mouth daily]    Lovastatin 40mg Tablets, Extended Release [Take 1 tablet(s) by mouth each evening]    Sucralfate 1 gram oral tablet [1 tab qid]    Isosorbide Mononitrate 30mg Tablets, Extended Release [Take 1 tablet(s) by mouth qam]    Furosemide 40 mg oral tablet [1 by mouth  daily]    Carvedilol 6.25 mg oral  tablet [1 tab bid]    Amlodipine  10 mg oral tablet [1 tab daily]    diclofenac sodium 1 % Topical Gel [APPLY FOUR TIMES DAILY]    Fish Oil 8 tabs daily 1200mg each     metFORMIN 500 mg oral tablet [TAKE ONE TABLET BY MOUTH DAILY]    Singulair  10mg Tablet [Take 1 tablet(s) by mouth each evening]    prochlorperazine maleate 10 mg oral tablet [1 tab q8h prn]    Pantoprazole 40 mg oral tablet, delayed release (enteric coated) [1 tab bid]    Gabapentin 300 mg oral capsule [2 capsules BID]    Ondansetron HCl 4 mg oral tablet [1 po q 6 hours prn]    Morphine Sulfate     Movantik 12.5mg Tablet [Take 1 tablet(s) by mouth daily on empty stomach 1 hour before or 2 hours after first meal.]        Objective:        Vitals:         Current: 11/29/2019 9:38:06 AM    Ht:  5 ft, 6.5 in;  Wt: 133.2 lbs;  BMI: 21.2T: 98.1 F (oral);  BP: 134/53 mm Hg (right arm, sitting);  P: 79 bpm (right arm (BP Cuff), sitting);  sCr: 0.83 mg/dL;  GFR: 59.86        Exams:     PHYSICAL EXAM:     GENERAL: well developed, well nourished;  no apparent distress;     EYES: PERRL, EOMI     E/N/T: EARS: external auditory canal normal;  both TMs are dull;  NOSE: normal turbinates; bilateral maxillary sinus tenderness present; OROPHARYNX: oral mucosa is normal; posterior pharynx shows no exudate and post nasal drip;     NECK: range of motion is normal; trachea is midline;     RESPIRATORY: normal appearance and symmetric expansion of chest wall; normal respiratory rate and pattern with no distress; normal breath sounds with no rales, rhonchi, wheezes or rubs;     CARDIOVASCULAR: normal rate; rhythm is regular;     SKIN: quarter sized abrasion to left knee, left amputation with dime sized blister with surrounding erythema, no drainage noted;     NEUROLOGIC: mental status: alert and oriented x 3; GROSSLY INTACT     PSYCHIATRIC: appropriate affect and demeanor;         Assessment:         J01   Acute sinusitis       S80.822A   Blister (nonthermal), left lower  leg, initial encounter       S80.212   Abrasion, left knee           ORDERS:         Meds Prescribed:       [New Rx] doxycycline hyclate 100 mg oral capsule [take 1 capsule (100 mg) by oral route every 12 hours ], #20 (twenty) capsules, Refills: 0 (zero)                 Plan:         Acute sinusitis        RECOMMENDATIONS given include: Push Fluids, Rest, Follow up if no improvement or worsening symptoms like high fevers, vomiting, weakness, or increasing shortness of air.    .      FOLLOW-UP: Patient already scheduled for follow-up appointment with PCP           Prescriptions:       [New Rx] doxycycline hyclate 100 mg oral capsule [take 1 capsule (100 mg) by oral route every 12 hours ], #20 (twenty) capsules, Refills: 0 (zero)         Blister (nonthermal), left lower leg, initial encounterBlister to left stump with surrounding redness. Advised not to wear prosthetic until healed. Monitor for worsening symptoms. Covering with antibiotics as above.        Abrasion, left kneeKeep area clean. Monitor for worsening symptoms.             Charge Capture:         Primary Diagnosis:     J01  Acute sinusitis           Orders:      [id number]  Office/outpatient visit; established patient, level 4  (In-House)              S80.822A  Blister (nonthermal), left lower leg, initial encounter     S80.212  Abrasion, left knee

## 2021-05-18 NOTE — PROGRESS NOTES
Marcello Larson 1940     Office/Outpatient Visit    Visit Date: Fri, Feb 1, 2019 09:18 am    Provider: Zeny Corley MD (Assistant: Lucy Bustillos LPN)    Location: [facility]        Electronically signed by Zeny Corley MD on  02/01/2019 09:52:07 AM                             SUBJECTIVE:        CC:     Mr. Larson is a 78 year old White male.  Chronic back pain         HPI: Marcello is here as a walk-in for ongoing back pain.  He has had worsened back pain since Christmas.  He was seen earlier this week, at which time he was started on tizanidine.  He has Norco that he uses for breakthrough back pain, but he tries to use that pretty sparingly.  He also uses Voltaren gel.  He is having some radiculopathy with radiation of the pain down the RLE to the knee.  He does follow with [facility] where he gets epidurals.  He went to the ED last night because the pain was so bad.  He has tried the Zanaflex but he doesn't really think that it has helped much.      ROS:     CONSTITUTIONAL:  Negative for fatigue and fever.      CARDIOVASCULAR:  Negative for chest pain and palpitations.      RESPIRATORY:  Negative for recent cough and dyspnea.      MUSCULOSKELETAL:  Positive for arthralgias, (R knee) back pain and limb pain ( right leg pain ).   Negative for myalgias.      NEUROLOGICAL:  Negative for paresthesias and weakness.          PMH/FMH/SH:     Last Reviewed on 2/01/2019 09:42 AM by Zeny Corley    Past Medical History:             PAST MEDICAL HISTORY         Coronary Artery Disease    Hyperlipidemia    Hypertension     Gastroesophageal Reflux Disease     Glomerulonephritis: membranous;     Gout    Osteoarthritis    back pain     bladder cancer         CURRENT MEDICAL PROVIDERS:    Cardiologist: Dr. Ttutle in [place]    Chiropractor    Ophthalmologist         PREVENTIVE HEALTH MAINTENANCE             BONE DENSITY: was last done 6/7/13 with the following abnormality noted--  osteoporosis     COLORECTAL CANCER SCREENING: Up to date (colonoscopy q10y; sigmoidoscopy q5y; Cologuard q3y) was last done 6/7/13; colonoscopy with the following abnormalities noted-- Polyp(s)     EYE EXAM: Diabetic Eye Exam during this calendar year and results are in chart was last done 2/15/2018     PSA: was last done 5/26/15 with the following abnormalaties noted-- 20.1         PAST MEDICAL HISTORY             ADVANCE DIRECTIVE Living will on file         PAST MEDICAL HISTORY             CURRENT MEDICAL PROVIDERS:    Cardiologist: Kofi Cha    Chiropractor: mayda         Surgical History:         Coronary Artery Bypass Graft: 1984, 1991;      Appendectomy    Cholecystectomy    cystoprostatectomy with ileoconduit;      Cataract Removal    LLE amputation;         Social History:     Occupation:    Disabled     Marital Status:      Children: 2 children         Tobacco/Alcohol/Supplements:     Last Reviewed on 2/01/2019 09:42 AM by Zeny Corley    Tobacco: He has a past history of cigarette smoking; quit date:  1984.          Substance Abuse History:     Last Reviewed on 2/01/2019 09:42 AM by Zeny Corley        Mental Health History:     Last Reviewed on 2/01/2019 09:42 AM by Zeny Corley        Communicable Diseases (eg STDs):     Last Reviewed on 2/01/2019 09:42 AM by Zeny Corley            Current Problems:     Last Reviewed on 1/28/2019 10:50 AM by Zeny Corley    Anemia, unspecified     Fatigue     Type 2 diabetes     Chronic low back pain     Hypertensive urgency     Use of high risk medications     GERD     Anxiety     Osteoarthritis     Chronic membranous glomerulonephritis     Mixed hyperlipidemia     Essential hypertension     History of bladder cancer     CAD     Screening for depression     Allergic rhinitis due to pollen         Immunizations:     Prevnar 13 (Pneumococcal PCV 13) 9/1/2015     Fluzone High-Dose pf (>=65 yr) 10/3/2016     Fluzone High-Dose pf (>=65 yr)  9/19/2017     Fluzone High-Dose pf (>=65 yr) 10/26/2018     PNEUMOVAX 23 (Pneumococcal PPV23) 10/1/2008     Tdap (Tetanus, reduced diph, acellular pertussis) 1/1/2013     Zostavax (Zoster live) 10/1/2006         Allergies:     Last Reviewed on 2/01/2019 09:20 AM by Lucy Bustillos    Prednisone: sweating (Adverse Reaction)    Lisinopril: cough        Current Medications:     Last Reviewed on 2/01/2019 09:22 AM by Lucy Bustillos    Hydrocodone/Acetaminophen 7.5mg/325mg Tablet 1 PO Q4-6 HOURS PRN PAIN     Amlodipine  10mg Tablet 1 tab daily     Carvedilol 6.25mg Tablet 1 tab bid     Metformin HCl 500mg Tablet 1 tab daily     Potassium Chloride 10mEq Tablets, Extended Release Take 1 tablet(s) by mouth daily     Sucralfate 1gm Tablets 1 tab qid     Isosorbide Mononitrate 30mg Tablets, Extended Release Take 1 tablet(s) by mouth qam     Clopidogrel 75mg Tablet 1 tab daily     Furosemide 40mg Tablets 1 by mouth  daily     Voltaren  1% Topical Gel applly qid     Lovastatin 40mg Tablets, Extended Release Take 1 tablet(s) by mouth each evening     Singulair  10mg Tablet Take 1 tablet(s) by mouth each evening     Nitrostat 0.4mg Tablets, Sublingual as directed     Prochlorperazine 10mg Tablets 1 tab q8h prn     Fish Oil 8 tabs daily 1200mg each     Aspirin (ASA) 81mg Tablets, Enteric Coated 1 tab daily     Tizanidine HCl 2mg Tablet 1 tab PO BID prn spasm     Pantoprazole 40mg Tablets, Delayed Release 1 tab bid     Contour Next Test Strips  Reagent Strips check blood sugar 1-2 daily  DX  E11.9     Fluticasone Propionate 50mcg/1actuation Nasal Spray 1 spray each nostil daily     Cetirizine HCl 10mg Tablet 1 tab daily     Iron 28 MG     Vitamin B6 100MG qd     Vitamin C 1,000mg Tablet Take 1 tablet(s) by mouth daily         OBJECTIVE:        Vitals:         Current: 2/1/2019 9:24:31 AM    Ht:  5 ft, 6.5 in;  Wt: 155 lbs;  BMI: 24.6    T: 97.4 F (oral);  BP: 154/61 mm Hg (left arm, sitting);  P: 63 bpm (left arm (BP Cuff),  sitting);  sCr: 0.94 mg/dL;  GFR: 57.26        Exams:     PHYSICAL EXAM:     GENERAL: Vitals recorded well developed, well nourished;  well groomed;  no apparent distress;     EYES: extraocular movements intact; conjunctiva and cornea are normal; PERRLA;     RESPIRATORY: normal respiratory rate and pattern with no distress; normal breath sounds with no rales, rhonchi, wheezes or rubs;     CARDIOVASCULAR: normal rate; rhythm is regular;  no systolic murmur; no edema;     MUSCULOSKELETAL: gait: antalgic;  normal overall tone LLE BKA; prosthesis in place; + R lumbar paraspinal TTP;     NEUROLOGIC: mental status: alert and oriented x 3;         ASSESSMENT           724.2   M54.5  Chronic low back pain              DDx:         ORDERS:         Meds Prescribed:       Gabapentin 300mg Capsules 1 capsule PO QHS x 3 days, then increase to 1 capsule PO BID if tolerated  #60 (Sixty) capsule(s) Refills: 0         Procedures Ordered:       REFER  Referral to Specialist or Other Facility  (Send-Out)                   PLAN:          Chronic low back pain His back pain has continued to worsen throughout the week.  He is having trouble sleeping, the pain is so bad, and actually went to the ED last night.  I am going to start him on some gabapentin 300 mg BID (OK to start BID right away if he doesn't have anywhere he needs to drive today).  He is already UTD on his controlled substance monitoring due to his Norco; he has been trying to avoid use of this.  OK to increase his Zanaflex to 2 caps (4 mg total) since he is not having any sedation with it and current dose is ineffective.  However, be cautious combining the gabapentin and Zanaflex together as both can cause sedation.  He is aware.  He would like to get back in with [facility].  Has not been there since 4/2018.  Will get him an appt today.         REFERRALS:  Referral initiated to a chronic pain specialist ( [facility]; for evaluation of back pain;  already a pt there (last seen 4/2018), just needs an appt laila ).      FOLLOW-UP: Keep currently scheduled appointment..  Controlled substance documentation: Raffi reviewed; prior drug screen consistent; consent is reviewed and signed and on the chart.  He is aware of risk of addiction on this medication, understands that he will need to follow up for a review every 3 months and his medications will be adjusted or decreased as deemed appropriate at each visit.  No history of drug or alcohol abuse.  No concerns about diversion or abuse. He denies side effects related to the medication.  He is aware that he may be called in for pill counts.  The dosing of this medication will be reviewed on a regular basis and reduced if possible..  Ongoing use of a controlled substance is necessary for this patient to have a normal quality of life           Prescriptions:       Gabapentin 300mg Capsules 1 capsule PO QHS x 3 days, then increase to 1 capsule PO BID if tolerated  #60 (Sixty) capsule(s) Refills: 0           Orders:       REFER  Referral to Specialist or Other Facility  (Send-Out)             Patient Education Handouts:       [facility] Medication Compliance              Patient Recommendations:        For  Chronic low back pain:                     APPOINTMENT INFORMATION:        Monday Tuesday Wednesday Thursday Friday Saturday Sunday            Time:___________________AM  PM   Date:_____________________             CHARGE CAPTURE           **Please note: ICD descriptions below are intended for billing purposes only and may not represent clinical diagnoses**        Primary Diagnosis:         724.2 Chronic low back pain            M54.5    Low back pain              Orders:          [id number]   Office/outpatient visit; established patient, level 3  (In-House)

## 2021-05-18 NOTE — PROGRESS NOTES
Marcello Larson 1940     Office/Outpatient Visit    Visit Date: Wed, Jun 27, 2018 02:41 pm    Provider: Zeny Corley MD (Assistant: Daphney Puri RN)    Location: [facility]        Electronically signed by Zeny Corley MD on  06/27/2018 03:53:17 PM                             SUBJECTIVE:        CC:     Mr. Larson is a 78 year old White male.  He is here today following a transition of care from an inpatient hospital: [facility]. The patient was admitted on 6/21/18 and discharged on 6/26/18. The patient was admitted for sycope/bleeding ulcer. During the patient's hospital stay the patient was treated by Dr. Arias.          HPI: Marcello is here today for a JONATHAN appt after being admitted to [facility] 6/21/18 to 6/26/18 with bleeding gastric ulcer and ABLA.  He initially presented to the hospital after a syncopal episode.  Upon arrival, he was found to have a Hgb of 6.6.  He was transfused then admitted for emergency EGD by Dr. Po Arias.  Marcello was found to have a large gastric ulceration in the mid-body of the stomach which was injected with epinephrine.  His initial Protonix gtt was transitioned to BID dosing in addition to continuation of carafate.  Hemoglobin stabilized at 11.  It was recommended that he hold ASA/Plavix for 1 week after discharge and hold all NSAIDs, as his ulcer was felt to be NSAID-induced.  He has a lot of chronic pain issues for which he has been taking NSAIDs OTC.  He was discharged with a rx of tramadol and also told that he could use his hydrocodone/APAP for severe pain as long as he did not overlap this with the tramadol.  Since discharge, he has been feeling weak.  No CP, palpitations, SOB, epigastric pain.  He did have some N/V prior to going in to the hospital, but that has resolved.     ROS:     CONSTITUTIONAL:  Positive for fatigue.   Negative for fever.      EYES:  Negative for blurred vision.      CARDIOVASCULAR:  Negative for chest pain,  palpitations and tachycardia.      RESPIRATORY:  Negative for recent cough and dyspnea.      GASTROINTESTINAL:  Positive for abdominal pain ( epigastric ).   Negative for constipation, diarrhea, nausea or vomiting.      MUSCULOSKELETAL:  Positive for arthralgias, back pain ( occasional ) and limb pain ( left leg pain ).   Negative for myalgias.      NEUROLOGICAL:  Negative for headaches, paresthesias and weakness.          PMH/FMH/SH:     Last Reviewed on 6/27/2018 03:02 PM by Zeny Corley    Past Medical History:             PAST MEDICAL HISTORY         Coronary Artery Disease    Hyperlipidemia    Hypertension     Gastroesophageal Reflux Disease     Glomerulonephritis: membranous;     Gout    Osteoarthritis    back pain     bladder cancer         CURRENT MEDICAL PROVIDERS:    Cardiologist: Dr. Tuttle in [place]    Chiropractor    Ophthalmologist         PREVENTIVE HEALTH MAINTENANCE             BONE DENSITY: was last done 6/7/13 with the following abnormality noted-- osteoporosis     COLORECTAL CANCER SCREENING: Up to date (colonoscopy q10y; sigmoidoscopy q5y; Cologuard q3y) was last done 6/7/13; colonoscopy with the following abnormalities noted-- Polyp(s)     EYE EXAM: Diabetic Eye Exam during this calendar year and results are in chart was last done 2/15/2018     PSA: was last done 5/26/15 with the following abnormalaties noted-- 20.1         PAST MEDICAL HISTORY             ADVANCE DIRECTIVE Living will on file         Surgical History:         Coronary Artery Bypass Graft: 1984, 1991;      Appendectomy    Cholecystectomy    cystoprostatectomy with ileoconduit;      Cataract Removal    LLE amputation;         Social History:     Occupation:    Disabled     Marital Status:      Children: 2 children         Tobacco/Alcohol/Supplements:     Last Reviewed on 6/27/2018 03:02 PM by Zeny Corley    Tobacco: He has a past history of cigarette smoking; quit date:  1984.          Substance Abuse  History:     Last Reviewed on 6/27/2018 03:02 PM by Zeny Corley        Mental Health History:     Last Reviewed on 6/27/2018 03:02 PM by Zeny Corley        Communicable Diseases (eg STDs):     Last Reviewed on 6/27/2018 03:02 PM by Zeny Corley            Current Problems:     Last Reviewed on 3/20/2018 09:58 AM by Zeny Corley    Fatigue     Type 2 diabetes     Chronic low back pain     Hypertensive urgency     Use of high risk medications     GERD     Anxiety     Osteoarthritis     Chronic membranous glomerulonephritis     Mixed hyperlipidemia     Essential hypertension     History of bladder cancer     CAD     Allergic rhinitis due to pollen         Immunizations:     Prevnar 13 (Pneumococcal PCV 13) 9/1/2015     Fluzone High-Dose pf (>=65 yr) 10/3/2016     Fluzone High-Dose pf (>=65 yr) 9/19/2017     PNEUMOVAX 23 (Pneumococcal PPV23) 10/1/2008     Tdap (Tetanus, reduced diph, acellular pertussis) 1/1/2013     Zostavax (Zoster) 10/1/2006         Allergies:     Last Reviewed on 6/27/2018 02:45 PM by Daphney Puri    Prednisone: sweating (Adverse Reaction)    Lisinopril: cough        Current Medications:     Last Reviewed on 6/27/2018 02:51 PM by Daphney Puri    Carvedilol 6.25mg Tablet 1 tab bid     Amlodipine  10mg Tablet 1 tab daily     Clopidogrel 75mg Tablet 1 tab daily     Hydrocodone/Acetaminophen 7.5mg/325mg Tablet 1 PO Q4-6 HOURS PRN PAIN     Isosorbide Mononitrate 30mg Tablets, Extended Release Take 1 tablet(s) by mouth qam     Lovastatin 40mg Tablets, Extended Release Take 1 tablet(s) by mouth each evening     Potassium Chloride 10mEq Tablets, Extended Release Take 1 tablet(s) by mouth daily     Sucralfate 1gm Tablets 1 tab qid     Singulair  10mg Tablet Take 1 tablet(s) by mouth each evening     Furosemide 40mg Tablets 1 by mouth  daily     Voltaren  1% Topical Gel applly qid     Ranitidine 150mg Capsules 1 tab bid     Metformin HCl 500mg Tablet 1 tab daily     Nitrostat 0.4mg  Tablets, Sublingual as directed     Prochlorperazine 10mg Tablets 1 tab q8h prn     Fish Oil 8 tabs daily 1200mg each     Aspirin (ASA) 81mg Tablets, Enteric Coated 1 tab daily     Fluticasone Propionate     Cetirizine HCl 10mg Tablet 1 tab daily     Iron 28 MG     Vitamin B6 100MG qd     Vitamin C 1,000mg Tablet Take 1 tablet(s) by mouth daily     Pantoprazole 40mg Tablets, Delayed Release 1 tab bid     Tramadol 50mg Tablet 1  tid prn         OBJECTIVE:        Vitals:         Current: 6/27/2018 2:45:08 PM    Ht:  5 ft, 6.5 in;  Wt: 145.3 lbs;  BMI: 23.1    T: 98.3 F (oral);  BP: 138/68 mm Hg (left arm, sitting);  P: 78 bpm (left arm (BP Cuff), sitting);  sCr: 0.88 mg/dL;  GFR: 59.51        Exams:     PHYSICAL EXAM:     GENERAL: Vitals recorded well developed, well nourished;  well groomed;  no apparent distress;     EYES: extraocular movements intact; conjunctiva and cornea are normal; PERRLA;     E/N/T: OROPHARYNX:  normal mucosa, dentition, gingiva, and posterior pharynx;     RESPIRATORY: normal respiratory rate and pattern with no distress; normal breath sounds with no rales, rhonchi, wheezes or rubs;     CARDIOVASCULAR: normal rate; rhythm is regular;  no systolic murmur; no edema;     GASTROINTESTINAL: nontender; normal bowel sounds; stoma appears pink and healthy with clear urine output in bag;     MUSCULOSKELETAL: normal gait; normal overall tone LLE BKA; prosthesis in place;         ASSESSMENT           531.00   K25.0  Acute bleeding gastric ulcer              DDx:     285.1   D62  Anemia due to acute blood loss              DDx:         PLAN:          Acute bleeding gastric ulcer Marcello is doing well since discharge. Discharge medications reconciled with our list.  He is no longer taking naproxen and instead has tramadol to use for pain.  He also has hydrocodone/APAP for breakthrough pain.  I will continue this regimen for him.  After July 1st, he is going to restart his Plavix, ASA, ascorbic acid, B6, and  ferrous sulfate.  He has routine f/u scheduled for July and at that time, we will recheck a CBC to see where his blood levels are.  No refills needed today but he can call back if he runs out of anything (including the tramadol) before his next appt.  Continue Protonix BID and carafate.           Patient Education Handouts:       [facility] Medication Compliance           Anemia due to acute blood loss S/p transfusion.  As above.             CHARGE CAPTURE           **Please note: ICD descriptions below are intended for billing purposes only and may not represent clinical diagnoses**        Primary Diagnosis:         531.00 Acute bleeding gastric ulcer            K25.0    Acute gastric ulcer with hemorrhage              Orders:          [id number]   Transitional care manage service 7 day discharge  (In-House)           285.1 Anemia due to acute blood loss            D62    Acute posthemorrhagic anemia

## 2021-05-18 NOTE — PROGRESS NOTES
Marcello Larson A  1940     Office/Outpatient Visit    Visit Date: Fri, Feb 19, 2021 08:39 am    Provider: Zeny Corley MD (Assistant: Timmy Lawton MA)    Location: [facility]        Electronically signed by Zeny Corley MD on  02/19/2021 09:15:38 AM                             Subjective:        CC: Mr. Larson is a 80 year old White male.  This is a follow-up visit.  not taking morphine         HPI: Marcello is here today for routine follow-up on chronic issues.          He is on metformin for diabetes.  His diabetes has been quite well controlled.  He does adhere to a diabetic diet.  He is on a statin and ASA.  He is UTD on eye exam (7/2020); no diabetic retinopathy.  He is UTD on foot exam (8/2020) -- R foot only, s/p L BKA.        He is on DAPT with ASA/Plavix, isosorbide mononitrate and Ranexa for CAD.  He was started on Ranexa for chest pain earlier in the winter, but he is pretty sure that it was just pain coming from the back, so he is not sure he needs to stay on it.  Also takes Lasix with K+.  He is on amlodipine and carvedilol for HTN.  He is on lovastatin for cholesterol. He is s/p 2v. CABG.  He has nitro for prn use but rarely uses them.  He follows with Dr. Kofi Campoverde.  He goes there in another week.        He is on pantoprazole for GERD and h/o bleeding ulcer thought to be NSAID-induced.  He also has sucralfate that he uses prn.  Does still take iron supplementation OTC.        He is on gabapentin for chronic low back pain and leg pain through [facility].  S/p physical therapy, which did help some.  He had RFA done in December and got wonderful pain relief from that.  No longer needing morphine (which has resolved his constipation).  Did get some Norco post-op but hasn't needed any.        He is on Singulair for allergies.        No falls in the past year.    ROS:     CONSTITUTIONAL:  Positive for fatigue.   Negative for fever.      EYES:  Negative for  blurred vision.      E/N/T:  Positive for diminished hearing, nasal congestion and frequent rhinorrhea.      CARDIOVASCULAR:  Negative for chest pain, palpitations and tachycardia.      RESPIRATORY:  Negative for recent cough and dyspnea.      GASTROINTESTINAL:  Negative for abdominal pain, constipation, diarrhea, nausea and vomiting.      GENITOURINARY:  Negative for nocturia and change in urine stream.      MUSCULOSKELETAL:  Positive for back pain.   Negative for arthralgias or myalgias.      NEUROLOGICAL:  Negative for headaches, paresthesias and weakness.      PSYCHIATRIC:  Negative for anxiety, depression and sleep disturbance.          Past Medical History / Family History / Social History:         Last Reviewed on 2/19/2021 08:57 AM by Zeny Corley    Past Medical History:             PAST MEDICAL HISTORY         Coronary Artery Disease    Hyperlipidemia    Hypertension     Gastroesophageal Reflux Disease     Glomerulonephritis: membranous;     Gout    Osteoarthritis    back pain     bladder cancer         CURRENT MEDICAL PROVIDERS:    Cardiologist: Dr. Tuttle in [place]    Chiropractor    Ophthalmologist         PREVENTIVE HEALTH MAINTENANCE             BONE DENSITY: was last done 6/7/13 with the following abnormality noted-- osteoporosis     COLORECTAL CANCER SCREENING: Up to date (colonoscopy q10y; sigmoidoscopy q5y; Cologuard q3y) was last done 8/26/2019; sigmoidoscopy with the following abnormalaties noted-- diverticulitis with stricture; unable to pass scope to complete full colonoscopy     EYE EXAM: Diabetic Eye Exam during this calendar year and results are in chart was last done 7/1/2020 no retinopathy     PSA: was last done 5/26/15 with the following abnormalaties noted-- 20.1         PAST MEDICAL HISTORY             ADVANCE DIRECTIVE Living will on file         PAST MEDICAL HISTORY             CURRENT MEDICAL PROVIDERS:    Cardiologist: Kofi Knight    Chiropractor: mayda Araiza  History:         Coronary Artery Bypass Graft: 1984, 1991;     Appendectomy    Cholecystectomy    cystoprostatectomy with ileoconduit;     Cataract Removal    LLE amputation;         Social History:     Occupation:    Disabled     Marital Status:      Children: 2 children         Tobacco/Alcohol/Supplements:     Last Reviewed on 2/19/2021 08:57 AM by Zeny Corley    Tobacco: He has a past history of cigarette smoking; quit date:  1984.          Substance Abuse History:     Last Reviewed on 2/19/2021 08:57 AM by Zeny Corley        Mental Health History:     Last Reviewed on 2/19/2021 08:57 AM by Zeny Corley        Communicable Diseases (eg STDs):     Last Reviewed on 2/19/2021 08:57 AM by Zeny Corley        Current Problems:     Last Reviewed on 1/30/2021 02:05 PM by Chandrika Leyva    HLD - Mixed hyperlipidemia    Anxiety disorder, unspecified    HTN - Essential (primary) hypertension    CAD - Atherosclerotic heart disease of native coronary artery without angina pectoris    GERD - Gastro-esophageal reflux disease without esophagitis    OA B knees - Bilateral primary osteoarthritis of knee    Allergic rhinitis due to pollen    Low back pain    Type 2 diabetes mellitus without complications    Anemia, unspecified    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    Olecranon bursitis, unspecified elbow    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    H/o bladder CA - Personal history of malignant neoplasm of bladder    Other long term (current) drug therapy    Encounter for general adult medical examination without abnormal findings    Encounter for follow-up examination after completed treatment for malignant neoplasm    Follow-up examination    Unstable angina    Acute sinusitis, unspecified        Immunizations:     Hep A, adult 11/20/2019    Influenza, high dose seasonal 10/1/2020    zoster recombinant 5/6/2020    Havrix -adult dose (HepA) 6/14/2018    Prevnar 13  (Pneumococcal PCV 13) 9/1/2015    Fluzone High-Dose pf (>=65 yr) 10/3/2016    Fluzone High-Dose pf (>=65 yr) 9/19/2017    Fluzone High-Dose pf (>=65 yr) 10/26/2018    Fluzone High-Dose pf (>=65 yr) 10/14/2019    PNEUMOVAX 23 (Pneumococcal PPV23) 10/1/2008    Tdap (Tetanus, reduced diph, acellular pertussis) 1/1/2013    Zostavax (Zoster live) 10/1/2006        Allergies:     Last Reviewed on 2/19/2021 08:41 AM by Timmy Lawton    Cardiac Dye:      Prednisone: sweating  (Adverse Reaction)    Lisinopril: cough         Current Medications:     Last Reviewed on 2/19/2021 08:46 AM by Timmy Lawton    fluticasone propionate 50 mcg/actuation Intranasal Spray, Suspension [USE 1 SPRAY EACH NOSTIL DAILY]    diclofenac sodium 1 % Topical Gel [APPLY FOUR TIMES DAILY]    nitroglycerin 0.4 mg Sublingual Tablet, Sublingual [DISSOLVE 1 UNDER TONGUE EVERY 5 MINUTES FOR 3 DOSES; IF NO RELIEF GO TO ER]    Vitamin C 1,000 mg oral tablet [Take 1 tablet(s) by mouth daily]    Iron 28 MG     aspirin 81 mg oral tablet, delayed release (enteric coated) [1 tab daily]    Vitamin B6 100MG qd     furosemide 40 mg oral tablet [TAKE 1 TABLET BY MOUTH DAILY]    lovastatin 40 mg oral tablet [TAKE 1 TABLET BY MOUTH EVERY EVENING]    potassium chloride 10 mEq oral Tablet, Extended Release Particles/Crystals [TAKE 1 TABLET BY MOUTH DAILY]    clopidogreL 75 mg oral tablet [TAKE 1 TABLET DAILY]    carvediloL 6.25 mg oral tablet [TAKE 1 TABLET BY MOUTH TWICE DAILY]    amLODIPine 10 mg oral tablet [TAKE 1 TABLET BY MOUTH EVERY DAY]    sucralfate 1 gram oral tablet [TAKE 1 TABLET BY MOUTH FOUR TIMES DAILY]    isosorbide mononitrate 30 mg oral Tablet, Extended Release 24 hr [TAKE 1 TABLET BY MOUTH EVERY MORNING]    Fish Oil 8 tabs daily 1200mg each     metFORMIN 500 mg oral tablet [TAKE 1 TABLET BY MOUTH EVERY DAY]    montelukast 10 mg oral tablet [TAKE 1 TABLET BY MOUTH EACH EVENING]    prochlorperazine maleate 10 mg oral tablet [TAKE 1 TABLET BY  MOUTH EVERY 8 HOURS AS NEEDED]    pantoprazole 40 mg oral tablet, delayed release (enteric coated) [TAKE 1 TABLET BY MOUTH TWICE DAILY]    gabapentin 300 mg oral capsule [TAKE 2 CAPSULES BY MOUTH TWICE DAILY]    Ondansetron HCl 4 mg oral tablet [1 po q 6 hours prn]    Morphine Sulfate     busPIRone 5 mg oral tablet [take 1 tablet (5 mg) by oral route 2 times per day prn]    losartan 25 mg oral tablet [TAKE 1/2 TABLET BY MOUTH EVERY DAY]    ranolazine 500 mg oral Tablet, Extended Release 12 hr [take 1 tablet (500 mg) by oral route 2 times per day]    hydrocodone 5 mg-acetaminophen 325 mg tablet        Objective:        Vitals:         Current: 2/19/2021 8:47:49 AM    Ht:  5 ft, 6.5 in;  Wt: 152 lbs;  BMI: 24.2T: 96.6 F (temporal);  BP: 134/64 mm Hg (left arm, sitting);  P: 67 bpm (left arm (BP Cuff), sitting);  sCr: 0.83 mg/dL;  GFR: 62.32        Exams:     PHYSICAL EXAM:     GENERAL: Vitals recorded well developed, well nourished;  well groomed;  no apparent distress;     EYES: extraocular movements intact; conjunctiva and cornea are normal; PERRLA;     RESPIRATORY: normal respiratory rate and pattern with no distress; normal breath sounds with no rales, rhonchi, wheezes or rubs;     CARDIOVASCULAR: normal rate; rhythm is regular;  no systolic murmur; no edema;     GASTROINTESTINAL: nontender; stoma appears pink and healthy with clear urine output in bag; normal bowel sounds;     MUSCULOSKELETAL: normal gait; normal overall tone LLE BKA; prosthesis in place;         Assessment:         E11.9   Type 2 diabetes mellitus without complications       I10   HTN - Essential (primary) hypertension       E78.2   HLD - Mixed hyperlipidemia       I25.10   CAD - Atherosclerotic heart disease of native coronary artery without angina pectoris       K21.9   GERD - Gastro-esophageal reflux disease without esophagitis       M54.5   Low back pain       Z85.51   H/o bladder CA - Personal history of malignant neoplasm of bladder        Z89.519   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           ORDERS:         Meds Prescribed:       [Refilled] metFORMIN 500 mg oral tablet [TAKE 1 TABLET BY MOUTH EVERY DAY], #90 (ninety) tablets, Refills: 1 (one)         Radiology/Test Orders:       3017F  Colorectal CA screen results documented and reviewed (PV)  (In-House)            2022F  Dilated retinal eye exam w/interpret by ophthalmologist/optometrist documented/reviewed (DM)4  (In-House)              Lab Orders:       [id number]  [facility] [place] [address] LIPID,CMP, A1C: [id number], 40640, [id number]  (Send-Out)            APPTO  Appointment need  (In-House)              Other Orders:       1123F  Advance Care Planning discussed and doc; advance care plan or surrogate decision maker doc. in MR  (Send-Out)            1101F  Pt screen for fall risk; document no falls in past year or only 1 fall w/o injury in past year (SOFYA)  (In-House)                      Plan:         Type 2 diabetes mellitus without complicationsHe is on metformin for diabetes.  Refill sent.  His diabetes has been quite well controlled.  Checking labs.  He does adhere to a diabetic diet.  He is on a statin and ASA.  He is UTD on eye exam (7/2020); no diabetic retinopathy.  He is UTD on foot exam (8/2020) -- R foot only, s/p L BKA.  RTC 3 months.    LABORATORY:  Labs ordered to be performed today include Diabetes Panel 1; CMP, Lipid, A1C.  MIPS Has had no falls or only one fall without injury in the past year Vaccines Flu and Pneumonia updated in Shot record Colorectal Cancer Screening is up to date and the results are in the chart Diabetic Eye Exam during this calendar year and results are in chart ACP discussion: Advance Directive/Surrogate Decision Maker discussed and scanned into chart     FOLLOW-UP: Schedule a follow-up visit in 3 months.:.  f/u DM with [facility]          Prescriptions:       [Refilled] metFORMIN 500 mg oral tablet [TAKE 1 TABLET BY MOUTH EVERY DAY], #90 (ninety) tablets, Refills: 1 (one)            Orders:       [id number]  DIAB - [facility] LIPID,CMP, A1C: 16424, 71641, [id number]  (Send-Out)            3017F  Colorectal CA screen results documented and reviewed (PV)  (In-House)            2022F  Dilated retinal eye exam w/interpret by ophthalmologist/optometrist documented/reviewed (DM)4  (In-House)            1123F  Advance Care Planning discussed and doc; advance care plan or surrogate decision maker doc. in MR  (Send-Out)            APPTO  Appointment need  (In-House)            1101F  Pt screen for fall risk; document no falls in past year or only 1 fall w/o injury in past year (SOFYA)  (In-House)              HTN - Essential (primary) hypertensionBP at goal.  No refills needed.  Checking labs.        HLD - Mixed hyperlipidemiaNo refills needed.  Checking labs.        CAD - Atherosclerotic heart disease of native coronary artery without angina pectorisNo refills needed.  Checking labs.  Follows with Dr. Kofi Campoverde and goes back there in a week.  He is going to ask if he really needs to continue on the Ranexa or if he can come off.        GERD - Gastro-esophageal reflux disease without esophagitisStable.  No refills needed.        Low back painFollows with [facility].  S/p RFA and he is off of all opioids, doing GREAT!  Following with Dr. Rascon.        H/o bladder CA - Personal history of malignant neoplasm of bladderNo recurrence.        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Stable.            Patient Recommendations:        For  Type 2 diabetes mellitus without complications:    Schedule a follow-up visit in 3 months.                APPOINTMENT INFORMATION:        Monday Tuesday Wednesday Thursday Friday Saturday Sunday            Time:___________________AM  PM   Date:_____________________             Charge Capture:         Primary Diagnosis:     E11.9  Type 2 diabetes mellitus without complications           Orders:      [id number]  Office/outpatient visit; established patient, level 4   (In-House)            3017F  Colorectal CA screen results documented and reviewed (PV)  (In-House)            2022F  Dilated retinal eye exam w/interpret by ophthalmologist/optometrist documented/reviewed (DM)4  (In-House)            APPTO  Appointment need  (In-House)            1101F  Pt screen for fall risk; document no falls in past year or only 1 fall w/o injury in past year (SOFYA)  (In-House)              I10  HTN - Essential (primary) hypertension     E78.2  HLD - Mixed hyperlipidemia     I25.10  CAD - Atherosclerotic heart disease of native coronary artery without angina pectoris     K21.9  GERD - Gastro-esophageal reflux disease without esophagitis     M54.5  Low back pain     Z85.51  H/o bladder CA - Personal history of malignant neoplasm of bladder     Z89.519  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

## 2021-05-18 NOTE — PROGRESS NOTES
Marcello Larson 1940     Office/Outpatient Visit    Visit Date: Fri, Sep 20, 2019 12:59 pm    Provider: Zeny Corley MD (Assistant: Donna Vazquez MA)    Location: [facility]        Electronically signed by Zeny Corley MD on  09/20/2019 03:04:02 PM                             SUBJECTIVE:        CC:     Mr. Larson is a 79 year old White male.  Patient presents today for follow up on ER - [facility] ER 09/12/2019 due to N/V         HPI: Marcello is here today to follow up after presenting to the [facility] ED on 9/12 with N/V and concern for colonic obstruction.  He was actually seen by Dr. Kaur for colonoscopy on 8/26 at which time he was noted to have a spasm or stricture (thought d/t adhesions) at 40 cm beyond which Dr. Kaur was unable to safely pass the scope.  When he presented to the ED, he was primarily having N/V and lightheadedness.  He has had problems with constipation intermittently.  He is now on Metamucil and stool softener.  Also using Miralax.  He really started having the most problems after the [facility] started him on morphine.        He is on metformin for diabetes.  He has been extremely well controlled.  He does adhere to a diabetic diet.  He is on a statin and ASA.  He is UTD on eye exam (6/2019).  He is UTD on foot exam (8/2019) -- R foot only, s/p L BKA.     ROS:     CONSTITUTIONAL:  Positive for fatigue.   Negative for fever.      EYES:  Negative for blurred vision.      E/N/T:  Positive for diminished hearing, nasal congestion and frequent rhinorrhea.      CARDIOVASCULAR:  Negative for chest pain, palpitations and tachycardia.      RESPIRATORY:  Negative for recent cough and dyspnea.      GASTROINTESTINAL:  Positive for constipation.   Negative for abdominal pain, diarrhea, nausea or vomiting.      MUSCULOSKELETAL:  Positive for back pain.   Negative for arthralgias or myalgias.      NEUROLOGICAL:  Negative for headaches, paresthesias and weakness.       PSYCHIATRIC:  Negative for anxiety, depression and sleep disturbance.          PMH/FMH/SH:     Last Reviewed on 9/20/2019 01:08 PM by Zeny Corley    Past Medical History:             PAST MEDICAL HISTORY         Coronary Artery Disease    Hyperlipidemia    Hypertension     Gastroesophageal Reflux Disease     Glomerulonephritis: membranous;     Gout    Osteoarthritis    back pain     bladder cancer         CURRENT MEDICAL PROVIDERS:    Cardiologist: Dr. Tuttle in [place]    Chiropractor    Ophthalmologist         PREVENTIVE HEALTH MAINTENANCE             BONE DENSITY: was last done 6/7/13 with the following abnormality noted-- osteoporosis     COLORECTAL CANCER SCREENING: Up to date (colonoscopy q10y; sigmoidoscopy q5y; Cologuard q3y) was last done 8/26/2019; sigmoidoscopy with the following abnormalaties noted-- diverticulitis with stricture; unable to pass scope to complete full colonoscopy     EYE EXAM: Diabetic Eye Exam during this calendar year and results are in chart was last done 6/10/2019 no retinopathy     PSA: was last done 5/26/15 with the following abnormalaties noted-- 20.1         PAST MEDICAL HISTORY             ADVANCE DIRECTIVE Living will on file         PAST MEDICAL HISTORY             CURRENT MEDICAL PROVIDERS:    Cardiologist: Kofi Knight    Chiropractor: mayda         Surgical History:         Coronary Artery Bypass Graft: 1984, 1991;      Appendectomy    Cholecystectomy    cystoprostatectomy with ileoconduit;      Cataract Removal    LLE amputation;         Social History:     Occupation:    Disabled     Marital Status:      Children: 2 children         Tobacco/Alcohol/Supplements:     Last Reviewed on 9/20/2019 01:08 PM by Zeny Corley    Tobacco: He has a past history of cigarette smoking; quit date:  1984.          Substance Abuse History:     Last Reviewed on 9/20/2019 01:08 PM by Zeny Corley        Mental Health History:     Last Reviewed on 9/20/2019 01:08 PM by  Zeny Corley        Communicable Diseases (eg STDs):     Last Reviewed on 9/20/2019 01:08 PM by Zeny Corley            Current Problems:     Last Reviewed on 8/08/2019 10:42 AM by Zeny Corley    Amputation below knee     Anemia, unspecified     Type 2 diabetes     Chronic low back pain     GERD     Anxiety     Osteoarthritis     Chronic membranous glomerulonephritis     Mixed hyperlipidemia     Essential hypertension     History of bladder cancer     CAD     Allergic rhinitis due to pollen         Immunizations:     Havrix -adult dose (HepA) 6/14/2018     Prevnar 13 (Pneumococcal PCV 13) 9/1/2015     Fluzone High-Dose pf (>=65 yr) 10/3/2016     Fluzone High-Dose pf (>=65 yr) 9/19/2017     Fluzone High-Dose pf (>=65 yr) 10/26/2018     PNEUMOVAX 23 (Pneumococcal PPV23) 10/1/2008     Tdap (Tetanus, reduced diph, acellular pertussis) 1/1/2013     Zostavax (Zoster live) 10/1/2006         Allergies:     Last Reviewed on 8/08/2019 10:42 AM by Zeny Corley    Prednisone: sweating (Adverse Reaction)    Lisinopril: cough        Current Medications:     Last Reviewed on 8/08/2019 10:42 AM by Zeny Corley    Furosemide 40mg Tablets 1 by mouth  daily     Isosorbide Mononitrate 30mg Tablets, Extended Release Take 1 tablet(s) by mouth qam     Metformin HCl 500mg Tablet 1 tab daily     Amlodipine  10mg Tablet 1 tab daily     Sucralfate 1gm Tablets 1 tab qid     Carvedilol 6.25mg Tablet 1 tab bid     Lovastatin 40mg Tablets, Extended Release Take 1 tablet(s) by mouth each evening     Potassium Chloride 10mEq Tablets, Extended Release Take 1 tablet(s) by mouth daily     Voltaren  1% Topical Gel applly qid     Singulair  10mg Tablet Take 1 tablet(s) by mouth each evening     Nitrostat 0.4mg Tablets, Sublingual as directed     Gabapentin 300mg Capsules 2 capsules BID     Clopidogrel 75mg Tablet 1 tab daily     Prochlorperazine 10mg Tablets 1 tab q8h prn     Fish Oil 8 tabs daily 1200mg each     Aspirin (ASA) 81mg  Tablets, Enteric Coated 1 tab daily     Pantoprazole 40mg Tablets, Delayed Release 1 tab bid     Morphine Sulfate     Ondansetron HCl 4mg Tablet 1 po q 6 hours prn     Iron 28 MG     Vitamin B6 100MG qd     Vitamin C 1,000mg Tablet Take 1 tablet(s) by mouth daily         OBJECTIVE:        Vitals:         Current: 9/20/2019 1:04:05 PM    Ht:  5 ft, 6.5 in;  Wt: 145.2 lbs;  BMI: 23.1    T: 98.7 F (oral);  BP: 147/63 mm Hg (right arm, sitting);  P: 67 bpm (right arm (BP Cuff), sitting);  sCr: 0.83 mg/dL;  GFR: 62.10        Repeat:     1:26:57 PM     BP:   146/61mm Hg (right arm, sitting)         Exams:     PHYSICAL EXAM:     GENERAL: Vitals recorded well developed, well nourished;  well groomed;  no apparent distress;     EYES: extraocular movements intact; conjunctiva and cornea are normal; PERRLA;     E/N/T: OROPHARYNX:  normal mucosa, dentition, gingiva, and posterior pharynx;     RESPIRATORY: normal respiratory rate and pattern with no distress; normal breath sounds with no rales, rhonchi, wheezes or rubs;     CARDIOVASCULAR: normal rate; rhythm is regular;  no systolic murmur; no edema;     GASTROINTESTINAL: nontender; stoma appears pink and healthy with clear urine output in bag; normal bowel sounds;     MUSCULOSKELETAL: normal gait; normal overall tone LLE BKA; prosthesis in place;         ASSESSMENT           560.9   K56.69  Obstruction of colon              DDx:     E935.2   K59.03  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  #30 (Thirty) tablet(s) Refills: 2         Radiology/Test Orders:       3017F  Colorectal CA screen results documented and reviewed (PV)   (In-House)         2022F  Dilated retinal eye exam w/interpret by ophthalmologist/optometrist documented/reviewed (DM)4  (In-House)                   PLAN:          Obstruction of colon It sounds like Marcello's main issue re: constipation is related to his morphine use.  Will try to get Movantik approved for him; sent as below.  I will see him back as scheduled in 2 months.     MIPS Vaccines Flu and Pneumonia updated in Shot record Colorectal Cancer Screening is up to date and the results are in the chart   Diabetic Eye Exam during this calendar year and results are in chart     FOLLOW-UP: Keep currently scheduled appointment.:.            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           Prescriptions:       Movantik (Naloxegol) 12.5mg Tablet Take 1 tablet(s) by mouth daily on empty stomach 1 hour before or 2 hours after first meal.  #30 (Thirty) tablet(s) Refills: 2          Type 2 diabetes He is on metformin for diabetes.  He has been extremely well controlled.  He does adhere to a diabetic diet.  He is on a statin and ASA.  He is UTD on eye exam (6/2019).  He is UTD on foot exam (8/2019) -- R foot only, s/p L BKA.  Labs reviewed with him today and copies provided.           Prescriptions:       Refill of: Gabapentin 300mg Capsules 2 capsules BID  #120 (One [place] and Twenty) capsule(s) Refills: 1             Patient Recommendations:        For  Obstruction of colon:                     APPOINTMENT INFORMATION:        Monday Tuesday Wednesday Thursday Friday Saturday Sunday            Time:___________________AM  PM   Date:_____________________             CHARGE CAPTURE           **Please note: ICD descriptions below are intended for billing purposes only and may not represent  clinical diagnoses**        Primary Diagnosis:         560.9 Obstruction of colon            K56.69    Other intestinal obstruction              Orders:          18110   Office/outpatient visit; established patient, level 4  (In-House)             3017F   Colorectal CA screen results documented and reviewed (PV)  (In-House)             2022F   Dilated retinal eye exam w/interpret by ophthalmologist/optometrist documented/reviewed (DM)4  (In-House)           E935.2 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

## 2021-05-18 NOTE — PROGRESS NOTES
Marcello Larson 1940     Office/Outpatient Visit    Visit Date: Thu, Nov 7, 2019 09:43 am    Provider: Zeny Corley MD (Assistant: Donna Vazquez MA)    Location: [facility]        Electronically signed by Zeny Corley MD on  11/07/2019 12:48:11 PM                             SUBJECTIVE:        CC:     Mr. Larson is a 79 year old White male.  Patient presents today for three month follow up, also wants to discuss Shingrix and Hep A         HPI: Marcello is here today to follow-up of chronic issues.          He is on metformin for diabetes.  He has been extremely well controlled.  He does adhere to a diabetic diet.  He is on a statin and ASA.  He is UTD on eye exam (6/2019).  He is due for foot exam (8/2019) -- R foot only, s/p L BKA.        He is on DAPT with ASA/Plavix as well as amlodipine, carvedilol, isosorbide mononitrate, lovastatin, and Lasix with KCl supplementation for HTN, HLD and CAD.  He is s/p 2v. CABG.  He has nitro for prn use but rarely uses them.   Follows with Dr. Kofi Knight.        He is on pantoprazole for GERD and h/o bleeding ulcer thought to be NSAID-induced.  He also has sucralfate that he uses prn.  He is currently on iron supplementation.  He is having some trouble first thing in the morning swallowing water for the past 6 months.  For the rest of the day he's fine, after that first swallow.        He is on morphine for chronic low back pain and leg pain through [facility].  He is also doing PT and that helps as well.  He has some constipation with the morphine.  He is using Metamucil and stool softener both.        He is on Singulair for allergies.     ROS:     CONSTITUTIONAL:  Positive for fatigue.   Negative for fever.      EYES:  Negative for blurred vision.      E/N/T:  Positive for diminished hearing, nasal congestion and frequent rhinorrhea.      CARDIOVASCULAR:  Negative for chest pain, palpitations and tachycardia.      RESPIRATORY:   Negative for recent cough and dyspnea.      GASTROINTESTINAL:  Positive for constipation.   Negative for abdominal pain, diarrhea, nausea or vomiting.      MUSCULOSKELETAL:  Positive for back pain.   Negative for arthralgias or myalgias.      NEUROLOGICAL:  Negative for headaches, paresthesias and weakness.      PSYCHIATRIC:  Negative for anxiety, depression and sleep disturbance.          PMH/FMH/SH:     Last Reviewed on 11/07/2019 09:58 AM by Zeny Corley    Past Medical History:             PAST MEDICAL HISTORY         Coronary Artery Disease    Hyperlipidemia    Hypertension     Gastroesophageal Reflux Disease     Glomerulonephritis: membranous;     Gout    Osteoarthritis    back pain     bladder cancer         CURRENT MEDICAL PROVIDERS:    Cardiologist: Dr. Tuttle in [place]    Chiropractor    Ophthalmologist         PREVENTIVE HEALTH MAINTENANCE             BONE DENSITY: was last done 6/7/13 with the following abnormality noted-- osteoporosis     COLORECTAL CANCER SCREENING: Up to date (colonoscopy q10y; sigmoidoscopy q5y; Cologuard q3y) was last done 8/26/2019; sigmoidoscopy with the following abnormalaties noted-- diverticulitis with stricture; unable to pass scope to complete full colonoscopy     EYE EXAM: Diabetic Eye Exam during this calendar year and results are in chart was last done 6/10/2019 no retinopathy     PSA: was last done 5/26/15 with the following abnormalaties noted-- 20.1         PAST MEDICAL HISTORY             ADVANCE DIRECTIVE Living will on file         PAST MEDICAL HISTORY             CURRENT MEDICAL PROVIDERS:    Cardiologist: Kofi Knight    Chiropractor: mayda         Surgical History:         Coronary Artery Bypass Graft: 1984, 1991;      Appendectomy    Cholecystectomy    cystoprostatectomy with ileoconduit;      Cataract Removal    LLE amputation;         Social History:     Occupation:    Disabled     Marital Status:      Children: 2 children          Tobacco/Alcohol/Supplements:     Last Reviewed on 11/07/2019 09:58 AM by Zeny Corley    Tobacco: He has a past history of cigarette smoking; quit date:  1984.          Substance Abuse History:     Last Reviewed on 11/07/2019 09:58 AM by Zeny Corley        Mental Health History:     Last Reviewed on 11/07/2019 09:58 AM by Zeny Corley        Communicable Diseases (eg STDs):     Last Reviewed on 11/07/2019 09:58 AM by Zeny Corley            Current Problems:     Last Reviewed on 9/20/2019 01:08 PM by Zeny Corley    Amputation below knee     Anemia, unspecified     Type 2 diabetes     Chronic low back pain     GERD     Anxiety     Osteoarthritis     Chronic membranous glomerulonephritis     Mixed hyperlipidemia     Essential hypertension     History of bladder cancer     CAD     Olecranon bursitis     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     Obstruction of colon     Allergic rhinitis due to pollen         Immunizations:     Havrix -adult dose (HepA) 6/14/2018     Prevnar 13 (Pneumococcal PCV 13) 9/1/2015     Fluzone High-Dose pf (>=65 yr) 10/3/2016     Fluzone High-Dose pf (>=65 yr) 9/19/2017     Fluzone High-Dose pf (>=65 yr) 10/26/2018     Fluzone High-Dose pf (>=65 yr) 10/14/2019     PNEUMOVAX 23 (Pneumococcal PPV23) 10/1/2008     Tdap (Tetanus, reduced diph, acellular pertussis) 1/1/2013     Zostavax (Zoster live) 10/1/2006         Allergies:     Last Reviewed on 10/14/2019 11:49 AM by Spurling, Sarah C    Prednisone: sweating (Adverse Reaction)    Lisinopril: cough        Current Medications:     Last Reviewed on 10/14/2019 11:50 AM by Spurling, Sarah C    Amlodipine  10mg Tablet 1 tab daily     Carvedilol 6.25mg Tablet 1 tab bid     Prochlorperazine 10mg Tablets 1 tab q8h prn     Gabapentin 300mg Capsules 2 capsules BID     Furosemide 40mg Tablets 1 by mouth  daily     Isosorbide Mononitrate 30mg Tablets, Extended Release Take 1 tablet(s) by mouth qam     Metformin HCl 500mg Tablet 1 tab daily      Sucralfate 1gm Tablets 1 tab qid     Lovastatin 40mg Tablets, Extended Release Take 1 tablet(s) by mouth each evening     Potassium Chloride 10mEq Tablets, Extended Release Take 1 tablet(s) by mouth daily     Voltaren  1% Topical Gel applly qid     Singulair  10mg Tablet Take 1 tablet(s) by mouth each evening     Nitrostat 0.4mg Tablets, Sublingual as directed     Clopidogrel 75mg Tablet 1 tab daily     Fish Oil 8 tabs daily 1200mg each     Aspirin (ASA) 81mg Tablets, Enteric Coated 1 tab daily     Pantoprazole 40mg Tablets, Delayed Release 1 tab bid     Movantik 12.5mg Tablet Take 1 tablet(s) by mouth daily on empty stomach 1 hour before or 2 hours after first meal.     Morphine Sulfate     Ondansetron HCl 4mg Tablet 1 po q 6 hours prn     Iron 28 MG     Vitamin B6 100MG qd     Vitamin C 1,000mg Tablet Take 1 tablet(s) by mouth daily         OBJECTIVE:        Vitals:         Current: 11/7/2019 9:49:01 AM    Ht:  5 ft, 6.5 in;  Wt: 148.8 lbs;  BMI: 23.7    T: 97.8 F (oral);  BP: 141/65 mm Hg (left arm, sitting);  P: 84 bpm (left arm (BP Cuff), sitting);  sCr: 0.83 mg/dL;  GFR: 62.75        Repeat:     10:15:14 AM     BP:   132/62mm Hg (right arm, sitting)     10:15:28 AM     P:   60bpm (right arm (BP Cuff), sitting)         Exams:     PHYSICAL EXAM:     GENERAL: Vitals recorded well developed, well nourished;  well groomed;  no apparent distress;     EYES: extraocular movements intact; conjunctiva and cornea are normal; PERRLA;     E/N/T: OROPHARYNX:  normal mucosa, dentition, gingiva, and posterior pharynx;     RESPIRATORY: normal respiratory rate and pattern with no distress; normal breath sounds with no rales, rhonchi, wheezes or rubs;     CARDIOVASCULAR: normal rate; rhythm is regular;  no systolic murmur; no edema;     GASTROINTESTINAL: nontender; stoma appears pink and healthy with clear urine output in bag; normal bowel sounds;     MUSCULOSKELETAL: normal gait; normal overall tone LLE BKA; prosthesis in  place;         ASSESSMENT           250.00   E11.9  Type 2 diabetes              DDx:     401   I10  Essential hypertension              DDx:     272.2   E78.2  Mixed hyperlipidemia              DDx:     414.9   I25.10  CAD              DDx:     530.81   K21.9  GERD              DDx:     724.2   M54.5  Chronic low back pain              DDx:     477.0   J30.1  Allergic rhinitis due to pollen              DDx:         ORDERS:         Radiology/Test Orders:       3017F  Colorectal CA screen results documented and reviewed (PV)  (In-House)         2022F  Dilated retinal eye exam w/interpret by ophthalmologist/optometrist documented/reviewed (DM)4  (In-House)           Lab Orders:       APPTO  Appointment need  (In-House)           Procedures Ordered:       REFER  Referral to Specialist or Other Facility  (Send-Out)                   PLAN:          Type 2 diabetes He is on metformin for diabetes.  No refills needed.  He has been extremely well controlled.  He does adhere to a diabetic diet.  He is on a statin and ASA.  He is UTD on eye exam (6/2019).  He is UTD on foot exam (8/2019) -- R foot only, s/p L BKA.  RTC 3 months for AWV.     MIPS Vaccines Flu and Pneumonia updated in Shot record Colorectal Cancer Screening is up to date and the results are in the chart   Diabetic Eye Exam during this calendar year and results are in chart     FOLLOW-UP: Schedule a follow-up visit in 3 months.:.  Medicare wellness 30 min with [place]           Orders:       3017F  Colorectal CA screen results documented and reviewed (PV)  (In-House)         2022F  Dilated retinal eye exam w/interpret by ophthalmologist/optometrist documented/reviewed (DM)4  (In-House)         APPTO  Appointment need  (In-House)            Essential hypertension BP at goal at home.  No refills needed.  Labs reviewed and UTD.          Mixed hyperlipidemia Stable.  No refills needed.  Labs reviewed and UTD.          CAD Stable.  No refills needed.          GERD  C/o refractory GERD on PPI.  H/o peptic ulcer, now having dysphagia x 6 months.  He also has sucralfate that he uses at home prn.         REFERRALS:  Referral initiated to a general surgeon ( Dr. Po Arias; for evaluation of refractory GERD on PPI, h/o peptic ulcer, now having dysphagia x 6 months ).            Orders:       REFER  Referral to Specialist or Other Facility  (Send-Out)            Chronic low back pain On morphine, follows with [facility].          Allergic rhinitis due to pollen Stable.  No refills needed.             Patient Recommendations:        For  Type 2 diabetes:     Schedule a follow-up visit in 3 months.                APPOINTMENT INFORMATION:        Monday Tuesday Wednesday Thursday Friday Saturday Sunday            Time:___________________AM  PM   Date:_____________________             CHARGE CAPTURE           **Please note: ICD descriptions below are intended for billing purposes only and may not represent clinical diagnoses**        Primary Diagnosis:         250.00 Type 2 diabetes            E11.9    Type 2 diabetes mellitus without complications              Orders:          33121   Office/outpatient visit; established patient, level 4  (In-House)             3017F   Colorectal CA screen results documented and reviewed (PV)  (In-House)             2022F   Dilated retinal eye exam w/interpret by ophthalmologist/optometrist documented/reviewed (DM)4  (In-House)             APPTO   Appointment need  (In-House)           401 Essential hypertension            I10    Essential (primary) hypertension    272.2 Mixed hyperlipidemia            E78.2    Mixed hyperlipidemia    414.9 CAD            I25.10    Atherosclerotic heart disease of native coronary artery without angina pectoris    530.81 GERD            K21.9    Gastro-esophageal reflux disease without esophagitis    724.2 Chronic low back pain            M54.5    Low back pain    477.0 Allergic rhinitis due to  pollen            J30.1    Allergic rhinitis due to pollen

## 2021-05-18 NOTE — PROGRESS NOTES
Marcello Larson 1940     Office/Outpatient Visit    Visit Date: Wed, Oct 24, 2018 08:28 am    Provider: Zeny Corley MD (Assistant: Donna Vazquez MA)    Location: [facility]        Electronically signed by Zeny Corley MD on  10/24/2018 11:13:51 AM                             SUBJECTIVE:        CC:     Mr. Larson is a 78 year old White male.  This is a follow-up visit.  patient presents today for three month follow up         HPI: Marcello is here today for routine follow-up of chronic issues.          He is on metformin for diabetes.  Last A1c was 5.8 in July; extremely well controlled.  He does adhere to a diabetic diet.  He is on a statin and ASA.  He is UTD on eye exam (2/2018).  He is UTD on foot exam (7/2017) -- R foot only, s/p L BKA.        He is on DAPT with ASA/Plavix as well as amlodipine, carvedilol, isosorbide mononitrate, lovastatin, and Lasix with KCl supplementation for HTN, HLD and CAD.  He is s/p 2v. CABG.  He has nitro for prn use but rarely uses them.  He follows with Dr. Kofi Knight.  He has an appt with him in a couple of months.        He is on pantoprazole for GERD and h/o bleeding ulcer thought to be NSAID-induced.  He is currently on iron supplementation.        He is on hydrocodone/APAP for chronic low back and leg pain from a LLE amputation.  He uses this only for breakthrough pain, 0-4 times per week.  He uses ice and heat preferentially.  He is on Voltaren gel for back pain.  He follows with [facility] for epidurals but I have been filling his hydrocodone/APAP since he does not require it regularly.  He has been getting massages regularly.  He does need a refill on his hydrocodone/APAP today.  He goes through about 10 tabs per month.        He is on Singulair for allergies.     ROS:     CONSTITUTIONAL:  Positive for fatigue.   Negative for fever.      EYES:  Negative for blurred vision.      E/N/T:  Positive for diminished hearing, nasal  congestion and frequent rhinorrhea.      CARDIOVASCULAR:  Negative for chest pain, palpitations and tachycardia.      RESPIRATORY:  Negative for recent cough and dyspnea.      GASTROINTESTINAL:  Negative for abdominal pain, constipation, diarrhea, nausea and vomiting.      MUSCULOSKELETAL:  Positive for back pain ( occasional ).   Negative for arthralgias or myalgias.      NEUROLOGICAL:  Negative for headaches, paresthesias and weakness.          PMH/FMH/SH:     Last Reviewed on 10/24/2018 08:39 AM by Zeny Corley    Past Medical History:             PAST MEDICAL HISTORY         Coronary Artery Disease    Hyperlipidemia    Hypertension     Gastroesophageal Reflux Disease     Glomerulonephritis: membranous;     Gout    Osteoarthritis    back pain     bladder cancer         CURRENT MEDICAL PROVIDERS:    Cardiologist: Dr. Tuttle in [place]    Chiropractor    Ophthalmologist         PREVENTIVE HEALTH MAINTENANCE             BONE DENSITY: was last done 6/7/13 with the following abnormality noted-- osteoporosis     COLORECTAL CANCER SCREENING: Up to date (colonoscopy q10y; sigmoidoscopy q5y; Cologuard q3y) was last done 6/7/13; colonoscopy with the following abnormalities noted-- Polyp(s)     EYE EXAM: Diabetic Eye Exam during this calendar year and results are in chart was last done 2/15/2018     PSA: was last done 5/26/15 with the following abnormalaties noted-- 20.1         PAST MEDICAL HISTORY             ADVANCE DIRECTIVE Living will on file         Surgical History:         Coronary Artery Bypass Graft: 1984, 1991;      Appendectomy    Cholecystectomy    cystoprostatectomy with ileoconduit;      Cataract Removal    LLE amputation;         Social History:     Occupation:    Disabled     Marital Status:      Children: 2 children         Tobacco/Alcohol/Supplements:     Last Reviewed on 10/24/2018 08:39 AM by Zeny Corley    Tobacco: He has a past history of cigarette smoking; quit date:  1984.           Substance Abuse History:     Last Reviewed on 10/24/2018 08:39 AM by Zeny Corley        Mental Health History:     Last Reviewed on 10/24/2018 08:39 AM by Zeny Corley        Communicable Diseases (eg STDs):     Last Reviewed on 10/24/2018 08:39 AM by Zeny Corley            Current Problems:     Last Reviewed on 6/27/2018 03:02 PM by Zeny Corley    Anemia, unspecified     Fatigue     Type 2 diabetes     Chronic low back pain     Hypertensive urgency     Use of high risk medications     GERD     Anxiety     Osteoarthritis     Chronic membranous glomerulonephritis     Mixed hyperlipidemia     Essential hypertension     History of bladder cancer     CAD     Eustachian tube dysfunction     Allergic rhinitis due to pollen         Immunizations:     Prevnar 13 (Pneumococcal PCV 13) 9/1/2015     Fluzone High-Dose pf (>=65 yr) 10/3/2016     Fluzone High-Dose pf (>=65 yr) 9/19/2017     PNEUMOVAX 23 (Pneumococcal PPV23) 10/1/2008     Tdap (Tetanus, reduced diph, acellular pertussis) 1/1/2013     Zostavax (Zoster live) 10/1/2006         Allergies:     Last Reviewed on 9/10/2018 01:57 PM by Joanne Troncoso April    Prednisone: sweating (Adverse Reaction)    Lisinopril: cough        Current Medications:     Last Reviewed on 9/10/2018 01:58 PM by Joanne Troncoso April    Carvedilol 6.25mg Tablet 1 tab bid     Clopidogrel 75mg Tablet 1 tab daily     Furosemide 40mg Tablets 1 by mouth  daily     Voltaren  1% Topical Gel applly qid     Amlodipine  10mg Tablet 1 tab daily     Lovastatin 40mg Tablets, Extended Release Take 1 tablet(s) by mouth each evening     Hydrocodone/Acetaminophen 7.5mg/325mg Tablet 1 PO Q4-6 HOURS PRN PAIN     Isosorbide Mononitrate 30mg Tablets, Extended Release Take 1 tablet(s) by mouth qam     Potassium Chloride 10mEq Tablets, Extended Release Take 1 tablet(s) by mouth daily     Sucralfate 1gm Tablets 1 tab qid     Singulair  10mg Tablet Take 1 tablet(s) by mouth each evening      Metformin HCl 500mg Tablet 1 tab daily     Nitrostat 0.4mg Tablets, Sublingual as directed     Prochlorperazine 10mg Tablets 1 tab q8h prn     Fish Oil 8 tabs daily 1200mg each     Aspirin (ASA) 81mg Tablets, Enteric Coated 1 tab daily     Pantoprazole 40mg Tablets, Delayed Release 1 tab bid     Fluticasone Propionate     Cetirizine HCl 10mg Tablet 1 tab daily     Iron 28 MG     Vitamin B6 100MG qd     Vitamin C 1,000mg Tablet Take 1 tablet(s) by mouth daily         OBJECTIVE:        Vitals:         Current: 10/24/2018 8:34:40 AM    Ht:  5 ft, 6.5 in;  Wt: 149.6 lbs;  BMI: 23.8    T: 97.6 F (oral);  BP: 136/65 mm Hg (left arm, sitting);  P: 54 bpm (left arm (BP Cuff), sitting);  sCr: 0.94 mg/dL;  GFR: 56.41        Exams:     PHYSICAL EXAM:     GENERAL: Vitals recorded well developed, well nourished;  well groomed;  no apparent distress;     EYES: extraocular movements intact; conjunctiva and cornea are normal; PERRLA;     E/N/T: OROPHARYNX:  normal mucosa, dentition, gingiva, and posterior pharynx;     RESPIRATORY: normal respiratory rate and pattern with no distress; normal breath sounds with no rales, rhonchi, wheezes or rubs;     CARDIOVASCULAR: normal rate; rhythm is regular;  no systolic murmur; no edema;     GASTROINTESTINAL: nontender; normal bowel sounds; stoma appears pink and healthy with clear urine output in bag;     MUSCULOSKELETAL: normal gait; normal overall tone LLE BKA; prosthesis in place;         Lab/Test Results:             Amphetamines Screen, Urin:  Negative (10/24/2018),     BAR-Barbiturates Screen, Urin:  Negative (10/24/2018),     Buprenorphine:  Negative (10/24/2018),     BZO-Benzodiazepines Screen,Ur:  Negative (10/24/2018),     Cocaine(Metab.)Screen, Ur:  Negative (10/24/2018),     MDMA-Ecstasy:  Negative (10/24/2018),     Met-Methamphetamine:  Positive (10/24/2018),     MTD-Methadone Screen, Urine:  Negative (10/24/2018),     Opiate Screen, Urine:  Negative (10/24/2018),      OXY-Oxycodone:  Negative (10/24/2018),     PCP-Phencyclidine Screen, Uri:  Negative (10/24/2018),     THC Cannabinoids Screen, Urin:  Negative (10/24/2018),     Urine temperature:  confirmed (10/24/2018),     Date and time of last pill:  Hydrocodone 10/23/18 @10:30PM/ AS (10/24/2018),     Performed by:  tls (10/24/2018),     Collection Time:  0850 (10/24/2018),             ASSESSMENT           250.00   E11.9  Type 2 diabetes              DDx:     414.9   I25.10  CAD              DDx:     724.2   M54.5  Chronic low back pain              DDx:     V58.69   Z79.891  Use of high risk medications              DDx:     530.81   K21.9  GERD              DDx:         ORDERS:         Meds Prescribed:       Refill of: Hydrocodone/Acetaminophen 7.5mg/325mg Tablet 1 PO Q4-6 HOURS PRN PAIN  #30 (Thirty) tablet(s) Refills: 0       Refill of: Fluticasone Propionate 50mcg/1actuation Nasal Spray 1 spray each nostil daily  #48 ([place]) ml Refills: 3       Contour Next Test Strips (Glucose Reagent Blood Test Strips) Reagent Strips check blood sugar 1-2 daily  DX  E11.9  #100 ([place]) each Refills: 5         Lab Orders:       APPTO  Appointment need  (In-House)         [id number]  Drug test prsmv qual dir optical obs per day  (In-House)                   PLAN:          Type 2 diabetes He is on metformin for diabetes.  Last A1c was 5.8 in July; extremely well controlled.  Will plan to recheck that in 3 months.  He does adhere to a diabetic diet.  He is on a statin and ASA.  He is UTD on eye exam (2/2018).  He is UTD on foot exam (7/2017) -- R foot only, s/p L BKA.  Refills sent on test strips today; no other refills needed.  RTC 3 months for AWV.         FOLLOW-UP: Schedule a follow-up visit in 3 months..  Medicare wellness 30 min with Barney           Prescriptions:       Contour Next Test Strips (Glucose Reagent Blood Test Strips) Reagent Strips check blood sugar 1-2 daily  DX  E11.9  #100 ([place]) each Refills: 5            Orders:       APPTO  Appointment need  (In-House)             Patient Education Handouts:       [facility] Medication Compliance           CAD Stable on meds.  Follows with Dr. Knight.  No refills needed.  Labs UTD.          Chronic low back pain He does use the hydrocodone/APAP about #10 per month.  He is no longer able to use NSAIDs regularly (which was his former go-to) after having a bleeding ulcer back in June.  He does require ongoing use of this controlled substance to function.  Tox screen and Raffi run today.  Refills sent.            Prescriptions:       Refill of: Hydrocodone/Acetaminophen 7.5mg/325mg Tablet 1 PO Q4-6 HOURS PRN PAIN  #30 (Thirty) tablet(s) Refills: 0          Use of high risk medications     Controlled substance documentation: Raffi reviewed; drug screen performed and appropriate; consent is reviewed and signed and on the chart.  He is aware of risk of addiction on this medication, understands that he will need to follow up for a review every 3 months and his medications will be adjusted or decreased as deemed appropriate at each visit.  No history of drug or alcohol abuse.  No concerns about diversion or abuse. He denies side effects related to the medication.  He is aware that he may be called in for pill counts.  The dosing of this medication will be reviewed on a regular basis and reduced if possible..  Ongoing use of a controlled substance is necessary for this patient to have a normal quality of life           Orders:       [id number]  Drug test prsmv qual dir optical obs per day  (In-House)            GERD Stable.  No recurrence of bleeding.  Has been avoiding NSAIDs.  No refills needed.             Other Prescriptions:       Refill of: Fluticasone Propionate 50mcg/1actuation Nasal Spray 1 spray each nostil daily  #48 ([place]) ml Refills: 3         Patient Recommendations:        For  Type 2 diabetes:     Schedule a follow-up visit in 3 months.                APPOINTMENT  INFORMATION:        Monday Tuesday Wednesday Thursday Friday Saturday Sunday            Time:___________________AM  PM   Date:_____________________             CHARGE CAPTURE           **Please note: ICD descriptions below are intended for billing purposes only and may not represent clinical diagnoses**        Primary Diagnosis:         250.00 Type 2 diabetes            E11.9    Type 2 diabetes mellitus without complications              Orders:          [id number]   Office/outpatient visit; established patient, level 4  (In-House)             APPTO   Appointment need  (In-House)           414.9 CAD            I25.10    Atherosclerotic heart disease of native coronary artery without angina pectoris    724.2 Chronic low back pain            M54.5    Low back pain    V58.69 Use of high risk medications            Z79.891    Long term (current) use of opiate analgesic              Orders:          [id number]   Drug test prsmv qual dir optical obs per day  (In-House)           530.81 GERD            K21.9    Gastro-esophageal reflux disease without esophagitis

## 2021-05-18 NOTE — PROGRESS NOTES
"Marcello Larson. 1940     Office/Outpatient Visit    Visit Date: Wed, May 1, 2019 10:29 am    Provider: Zeny Corley MD (Assistant: Donna Vazquez MA)    Location: [facility]        Electronically signed by Zeny Corley MD on  05/02/2019 11:51:09 AM                             SUBJECTIVE:        CC:     Mr. Larson is a 79 year old White male.  Patient presents today for two month follow up         HPI: Marcello is here today to follow up on chronic back pain.  Pain worsened around 4 months ago.  He was given tizanidine, gabapentin and even Norco , which he started requiring much more frequently than normal.  He was referred to Dr. Henriquez at [facility] where they started doing epidural injections.  However, the epidurals did not seem to provide much benefit.  He gets some relief but only for a couple days at most.  He put him on some morphine.  Tried him on OxyContin but he could not tolerate that.  The morphine is giving him nausea.  \"I don't feel right on it.\"  He has been taking less than prescribed.  He requested and was given a referral to [facility] for Neurosurgery consultation.  He has an appt to see Dr. Bustillos on Friday.  He goes back to see Dr. Henriquez May 7th.        He went to [facility] ED on 4-19-19 and again on 4-28-19.  Treated with cipro/Flagyl.  Feeling better now.  N/V resolved.     ROS:     CONSTITUTIONAL:  Negative for fatigue and fever.      CARDIOVASCULAR:  Negative for chest pain and palpitations.      RESPIRATORY:  Negative for recent cough and dyspnea.      MUSCULOSKELETAL:  Positive for arthralgias, (R knee) back pain and limb pain ( right leg pain ).   Negative for myalgias.      NEUROLOGICAL:  Negative for paresthesias and weakness.          PMH/FMH/SH:     Last Reviewed on 5/01/2019 10:58 AM by Zeny Corley    Past Medical History:             PAST MEDICAL HISTORY         Coronary Artery Disease    Hyperlipidemia    Hypertension     " Gastroesophageal Reflux Disease     Glomerulonephritis: membranous;     Gout    Osteoarthritis    back pain     bladder cancer         CURRENT MEDICAL PROVIDERS:    Cardiologist: Dr. uTttle in [place]    Chiropractor    Ophthalmologist         PREVENTIVE HEALTH MAINTENANCE             BONE DENSITY: was last done 6/7/13 with the following abnormality noted-- osteoporosis     COLORECTAL CANCER SCREENING: Up to date (colonoscopy q10y; sigmoidoscopy q5y; Cologuard q3y) was last done 6/7/13; colonoscopy with the following abnormalities noted-- Polyp(s)     EYE EXAM: Diabetic Eye Exam during this calendar year and results are in chart was last done 2/15/2018     PSA: was last done 5/26/15 with the following abnormalaties noted-- 20.1         PAST MEDICAL HISTORY             ADVANCE DIRECTIVE Living will on file         PAST MEDICAL HISTORY             CURRENT MEDICAL PROVIDERS:    Cardiologist: Kofi Cha    Chiropractor: mayda         Surgical History:         Coronary Artery Bypass Graft: 1984, 1991;      Appendectomy    Cholecystectomy    cystoprostatectomy with ileoconduit;      Cataract Removal    LLE amputation;         Social History:     Occupation:    Disabled     Marital Status:      Children: 2 children         Tobacco/Alcohol/Supplements:     Last Reviewed on 5/01/2019 10:58 AM by Zeny Corley    Tobacco: He has a past history of cigarette smoking; quit date:  1984.          Substance Abuse History:     Last Reviewed on 5/01/2019 10:58 AM by Zeny Corley        Mental Health History:     Last Reviewed on 5/01/2019 10:58 AM by Zeny Corley        Communicable Diseases (eg STDs):     Last Reviewed on 5/01/2019 10:58 AM by Zeny Corley            Current Problems:     Last Reviewed on 2/27/2019 09:03 AM by Zeny Corley    Anemia, unspecified     Fatigue     Type 2 diabetes     Chronic low back pain     Hypertensive urgency     Use of high risk medications     GERD     Anxiety      Osteoarthritis     Chronic membranous glomerulonephritis     Mixed hyperlipidemia     Essential hypertension     History of bladder cancer     CAD     Allergic rhinitis due to pollen         Immunizations:     Havrix -adult dose (HepA) 6/14/2018     Prevnar 13 (Pneumococcal PCV 13) 9/1/2015     Fluzone High-Dose pf (>=65 yr) 10/3/2016     Fluzone High-Dose pf (>=65 yr) 9/19/2017     Fluzone High-Dose pf (>=65 yr) 10/26/2018     PNEUMOVAX 23 (Pneumococcal PPV23) 10/1/2008     Tdap (Tetanus, reduced diph, acellular pertussis) 1/1/2013     Zostavax (Zoster live) 10/1/2006         Allergies:     Last Reviewed on 2/27/2019 09:03 AM by Zeny Corley    Prednisone: sweating (Adverse Reaction)    Lisinopril: cough        Current Medications:     Last Reviewed on 2/27/2019 09:03 AM by Zeny Corley    Sucralfate 1gm Tablets 1 tab qid     Amlodipine  10mg Tablet 1 tab daily     Carvedilol 6.25mg Tablet 1 tab bid     Clopidogrel 75mg Tablet 1 tab daily     Prochlorperazine 10mg Tablets 1 tab q8h prn     Metformin HCl 500mg Tablet 1 tab daily     Furosemide 40mg Tablets 1 by mouth  daily     Potassium Chloride 10mEq Tablets, Extended Release Take 1 tablet(s) by mouth daily     Isosorbide Mononitrate 30mg Tablets, Extended Release Take 1 tablet(s) by mouth qam     Voltaren  1% Topical Gel applly qid     Lovastatin 40mg Tablets, Extended Release Take 1 tablet(s) by mouth each evening     Singulair  10mg Tablet Take 1 tablet(s) by mouth each evening     Nitrostat 0.4mg Tablets, Sublingual as directed     Fish Oil 8 tabs daily 1200mg each     Aspirin (ASA) 81mg Tablets, Enteric Coated 1 tab daily     Pantoprazole 40mg Tablets, Delayed Release 1 tab bid     Tizanidine HCl 4mg Capsules One PO BID PRN     Iron 28 MG     Vitamin B6 100MG qd     Vitamin C 1,000mg Tablet Take 1 tablet(s) by mouth daily         OBJECTIVE:        Vitals:         Current: 5/1/2019 10:35:22 AM    Ht:  5 ft, 6.5 in;  Wt: 148.2 lbs;  BMI: 23.6    T: 98.2 F  (oral);  BP: 136/66 mm Hg (right arm, sitting);  P: 65 bpm (right arm (BP Cuff), sitting);  sCr: 0.98 mg/dL;  GFR: 53.05        Exams:     PHYSICAL EXAM:     GENERAL: Vitals recorded well developed, well nourished;  well groomed;  no apparent distress;     EYES: extraocular movements intact; conjunctiva and cornea are normal; PERRLA;     RESPIRATORY: normal respiratory rate and pattern with no distress; normal breath sounds with no rales, rhonchi, wheezes or rubs;     CARDIOVASCULAR: normal rate; rhythm is regular;  no systolic murmur; no edema;     MUSCULOSKELETAL: gait: antalgic;  normal overall tone LLE BKA; prosthesis in place; + R lumbar paraspinal TTP;         ASSESSMENT           724.2   M54.5  Chronic low back pain              DDx:         ORDERS:         Lab Orders:       APPTO  Appointment need  (In-House)                   PLAN:          Chronic low back pain Now following with Pain Management.  Epidurals are not doing much but they have him on morphine as well.  He can talk to Dr. Henriquez about side effects from this.  Going to see Dr. Jarrett Bustillos on Friday for Neurosurg consult.  I will see Marcello mckeon in 3 months or sooner prn.         FOLLOW-UP: Schedule a follow-up visit in 3 months..  f/u HTN with [place]           Orders:       APPTO  Appointment need  (In-House)               Patient Recommendations:        For  Chronic low back pain:     Schedule a follow-up visit in 3 months.                APPOINTMENT INFORMATION:        Monday Tuesday Wednesday Thursday Friday Saturday Sunday            Time:___________________AM  PM   Date:_____________________             CHARGE CAPTURE           **Please note: ICD descriptions below are intended for billing purposes only and may not represent clinical diagnoses**        Primary Diagnosis:         724.2 Chronic low back pain            M54.5    Low back pain              Orders:          [id number]   Office/outpatient visit; established patient, level 3   (In-House)             APPTO   Appointment need  (In-House)

## 2021-05-18 NOTE — PROGRESS NOTES
Marcello Larson 1940     Office/Outpatient Visit    Visit Date: Mon, Sep 23, 2019 09:33 am    Provider: Mariluz Carbone N.P. (Assistant: Sarah Spurling, MA)    Location: [facility]        Electronically signed by Mariluz Carbone N.P. on  09/30/2019 09:12:49 AM                             SUBJECTIVE:        CC:     Mr. Larson is a 79 year old White male.  Pt is here today for left elbow pain. Started yesterday. He said he remembered bumping it a fewe weeks ago but nothing ever happened after that. He can almost straighten it out completely but he dislocated that elbow years ago.          HPI:         Patient to be evaluated for elbow pain.  This is the right elbow.  The initial onset was 2 days ago.  The patient reports a pain level (between 1 and 10) of a 4.  The precipitating event seems to be remembers hitting elbow about 2 weeks ago.  It does not radiate.  Associated symptoms include erythema and swelling.  He denies associated arm weakness, fever or rash.  Palliative factors include ice.  The pain is aggravated by elbow extension.      ROS:     CONSTITUTIONAL:  Negative for chills, fatigue and fever.      CARDIOVASCULAR:  Negative for chest pain and dizziness.      RESPIRATORY:  Negative for recent cough and dyspnea.      GASTROINTESTINAL:  Negative for abdominal pain, nausea and vomiting.      INTEGUMENTARY:  Negative for rash.      NEUROLOGICAL:  Negative for dizziness and headaches.          PMH/FMH/SH:     Last Reviewed on 9/20/2019 01:08 PM by Zeny Corley    Past Medical History:             PAST MEDICAL HISTORY         Coronary Artery Disease    Hyperlipidemia    Hypertension     Gastroesophageal Reflux Disease     Glomerulonephritis: membranous;     Gout    Osteoarthritis    back pain     bladder cancer         CURRENT MEDICAL PROVIDERS:    Cardiologist: Dr. Tuttle in [place]    Chiropractor    Ophthalmologist         PREVENTIVE HEALTH MAINTENANCE             BONE DENSITY:  was last done 6/7/13 with the following abnormality noted-- osteoporosis     COLORECTAL CANCER SCREENING: Up to date (colonoscopy q10y; sigmoidoscopy q5y; Cologuard q3y) was last done 8/26/2019; sigmoidoscopy with the following abnormalaties noted-- diverticulitis with stricture; unable to pass scope to complete full colonoscopy     EYE EXAM: Diabetic Eye Exam during this calendar year and results are in chart was last done 6/10/2019 no retinopathy     PSA: was last done 5/26/15 with the following abnormalaties noted-- 20.1         PAST MEDICAL HISTORY             ADVANCE DIRECTIVE Living will on file         PAST MEDICAL HISTORY             CURRENT MEDICAL PROVIDERS:    Cardiologist: Kofi Knight    Chiropractor: mayda         Surgical History:         Coronary Artery Bypass Graft: 1984, 1991;      Appendectomy    Cholecystectomy    cystoprostatectomy with ileoconduit;      Cataract Removal    LLE amputation;         Social History:     Occupation:    Disabled     Marital Status:      Children: 2 children         Tobacco/Alcohol/Supplements:     Last Reviewed on 9/20/2019 01:08 PM by Zeny Corley    Tobacco: He has a past history of cigarette smoking; quit date:  1984.          Substance Abuse History:     Last Reviewed on 9/20/2019 01:08 PM by Zeny Corley        Mental Health History:     Last Reviewed on 9/20/2019 01:08 PM by Zeny Corley        Communicable Diseases (eg STDs):     Last Reviewed on 9/20/2019 01:08 PM by Zeny Corley            Current Problems:     Last Reviewed on 9/20/2019 01:08 PM by Zeny Corley    Amputation below knee     Anemia, unspecified     Type 2 diabetes     Chronic low back pain     GERD     Anxiety     Osteoarthritis     Chronic membranous glomerulonephritis     Mixed hyperlipidemia     Essential hypertension     History of bladder cancer     CAD     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     Obstruction of colon     Allergic rhinitis due to pollen          Allergies:     Last Reviewed on 9/20/2019 01:08 PM by Zeny Corley    Prednisone: sweating (Adverse Reaction)    Lisinopril: cough    Cardiac Dye:        Current Medications:     Last Reviewed on 9/20/2019 01:08 PM by Zeny Corley    Gabapentin 300mg Capsules 2 capsules BID     Furosemide 40mg Tablets 1 by mouth  daily     Isosorbide Mononitrate 30mg Tablets, Extended Release Take 1 tablet(s) by mouth qam     Metformin HCl 500mg Tablet 1 tab daily     Amlodipine  10mg Tablet 1 tab daily     Sucralfate 1gm Tablets 1 tab qid     Carvedilol 6.25mg Tablet 1 tab bid     Lovastatin 40mg Tablets, Extended Release Take 1 tablet(s) by mouth each evening     Potassium Chloride 10mEq Tablets, Extended Release Take 1 tablet(s) by mouth daily     Voltaren  1% Topical Gel applly qid     Singulair  10mg Tablet Take 1 tablet(s) by mouth each evening     Nitrostat 0.4mg Tablets, Sublingual as directed     Clopidogrel 75mg Tablet 1 tab daily     Prochlorperazine 10mg Tablets 1 tab q8h prn     Fish Oil 8 tabs daily 1200mg each     Aspirin (ASA) 81mg Tablets, Enteric Coated 1 tab daily     Movantik 12.5mg Tablet Take 1 tablet(s) by mouth daily on empty stomach 1 hour before or 2 hours after first meal.     Pantoprazole 40mg Tablets, Delayed Release 1 tab bid     Morphine Sulfate     Ondansetron HCl 4mg Tablet 1 po q 6 hours prn     Iron 28 MG     Vitamin B6 100MG qd     Vitamin C 1,000mg Tablet Take 1 tablet(s) by mouth daily         OBJECTIVE:        Vitals:         Current: 9/23/2019 9:38:38 AM    Ht:  5 ft, 6.5 in;  Wt: 147.8 lbs;  BMI: 23.5    T: 99.1 F (oral);  BP: 131/58 mm Hg (left arm, sitting);  P: 70 bpm (left arm (BP Cuff), sitting);  sCr: 0.83 mg/dL;  GFR: 62.57        Exams:     PHYSICAL EXAM:     GENERAL:  well developed and nourished; appropriately groomed; in no apparent distress;     SKIN: right elbow significant swelling, mild redness, no drainage;     MUSCULOSKELETAL: full ROM with right arm, pain elicited with  elbow extension;         Procedures:     Olecranon bursitis         Procedure Note: Fine Needle Aspirate     Written informed consent is obtained.  Fine needle aspiration of right elbow is performed.  The site is prepped with betadine.  The site is anesthetized with spray.  The needle is inserted into the nodule. Localization of the nodule is achived by palpation.   Material obtained from the biopsy includes clear/yellow fluid obtained 10 cc.  No complications.  Estimated blood loss: 0 cc.  The specimen is not sent for path.              ASSESSMENT           726.33   M70.20  Olecranon bursitis              DDx:         ORDERS:         Procedures Ordered:       [id number]  Fine needle aspiration; without imaging guidance  (In-House)                   PLAN:          Olecranon bursitis     Procedure performed by Dr. Esequiel Kirk. Patient informed of procedure and risks including infection and bleeding; consent signed and in chart. Patient tolerated procedure well. Wrapped arm with ace bandage. If the swelling returns, f/u for an OV. Any s/s of infection f/u immediately, just walk in to be seen. Discussed anti-inflammatory use. Patient said he is on one for his back but can't remember the name of it. I only see the Voltaren topical on his list. Reviewed kidney function and asked patient to call back with name of the anti-inflammatory he is taking so we can add to his list.            Orders:       [id number]  Fine needle aspiration; without imaging guidance  (In-House)               CHARGE CAPTURE           **Please note: ICD descriptions below are intended for billing purposes only and may not represent clinical diagnoses**        Primary Diagnosis:         726.33 Olecranon bursitis            M70.20    Olecranon bursitis, unspecified elbow              Orders:          [id number]   Office/outpatient visit; established patient, level 3  (In-House)             [id number]   Fine needle aspiration; without imaging guidance  (In-House)                ADDENDUMS:      ____________________________________    Addendum: 10/01/2019 04:24 PM - Esequiel Kirk        Agree with the above note.    Procedure was performed without difficulty.    Patient tolerated well.    Instructions given to call back if any evidence of infection        mas

## 2021-05-18 NOTE — PROGRESS NOTES
Marcello Larson 1940     Office/Outpatient Visit    Visit Date: Tue, Mar 20, 2018 09:09 am    Provider: Zeny Corley MD (Assistant: Trudy Torres MA)    Location: [facility]        Electronically signed by Zeny Corley MD on  03/20/2018 10:45:42 AM                             SUBJECTIVE:        CC:     Mr. Larson is a 77 year old White male.  This is a follow-up visit.  med refills, sinuses         HPI: Marcello is here today for routine follow-up of chronic issues.          Marcello is on metformin for diabetes.  He is on a statin and ASA.  He has been adhering to a diabetic diet.  UTD on eye exam (2/2018).  He is UTD on foot exam (9/2017).  He is due for labs.        He is on ranitidine for GERD.  He also uses sucralfate prn.        He is on DAPT with ASA/Plavix as well as amlodipine, lisinopril, carvedilol, isosorbide mononitrate, and Lasix with KCl supplementation for HTN and CAD.  He is on lovastatin for HLD and CAD.  He is s/p 2v. CABG.  He has nitro for prn use but rarely uses them.  He follows with Dr. Kofi Knight.        He is on hydrocodone/APAP for chronic low back and leg pain from a LLE amputated leg.  He uses this very rarely, usually not more than 3-4 x per week.  He uses ice and heat to avoid it when he can.  He also has Voltaren gel for the back.  He has been following with [facility] and is doing injections there.  The first one only lasted 3 days.  He goes back in April for his next one.        He is on Singulair for allergies.  He has congestion and rhinorrhea x 2 days.     ROS:     CONSTITUTIONAL:  Positive for fatigue.   Negative for fever.      EYES:  Negative for blurred vision.      E/N/T:  Positive for diminished hearing, nasal congestion and frequent rhinorrhea.      CARDIOVASCULAR:  Negative for chest pain, palpitations and tachycardia.      RESPIRATORY:  Negative for recent cough and dyspnea.      GASTROINTESTINAL:  Negative for abdominal pain,  constipation, diarrhea, nausea and vomiting.      MUSCULOSKELETAL:  Positive for back pain ( occasional ).   Negative for arthralgias or myalgias.      NEUROLOGICAL:  Negative for headaches, paresthesias and weakness.          PMH/FMH/SH:     Last Reviewed on 3/20/2018 09:58 AM by Zeny Corley    Past Medical History:             PAST MEDICAL HISTORY         Coronary Artery Disease    Hyperlipidemia    Hypertension     Gastroesophageal Reflux Disease     Glomerulonephritis: membranous;     Gout    Osteoarthritis    back pain     bladder cancer         CURRENT MEDICAL PROVIDERS:    Cardiologist: Dr. Tuttle in [place]    Chiropractor    Ophthalmologist         PREVENTIVE HEALTH MAINTENANCE             BONE DENSITY: was last done 6/7/13 with the following abnormality noted-- osteoporosis     COLORECTAL CANCER SCREENING: Up to date (colonoscopy q10y; sigmoidoscopy q5y; Cologuard q3y) was last done 6/7/13; colonoscopy with the following abnormalities noted-- Polyp(s)     EYE EXAM: Diabetic Eye Exam during this calendar year and results are in chart was last done 2/15/2018     PSA: was last done 5/26/15 with the following abnormalaties noted-- 20.1         PAST MEDICAL HISTORY             ADVANCE DIRECTIVE Living will on file         Surgical History:         Coronary Artery Bypass Graft: 1984, 1991;      Appendectomy    Cholecystectomy    cystoprostatectomy with ileoconduit;      Cataract Removal    LLE amputation;         Social History:     Occupation:    Disabled     Marital Status:      Children: 2 children         Tobacco/Alcohol/Supplements:     Last Reviewed on 3/20/2018 09:58 AM by Zeny Corley    Tobacco: He has a past history of cigarette smoking; quit date:  1984.          Substance Abuse History:     Last Reviewed on 3/20/2018 09:58 AM by Zeny Corley        Mental Health History:     Last Reviewed on 3/20/2018 09:58 AM by Zeny Corley        Communicable Diseases (eg STDs):     Last  Reviewed on 3/20/2018 09:58 AM by Zeny Corley            Current Problems:     Last Reviewed on 12/18/2017 09:03 AM by Zeny Corley    Fatigue     Type 2 diabetes     Chronic low back pain     Hypertensive urgency     Use of high risk medications     GERD     Anxiety     Osteoarthritis     Chronic membranous glomerulonephritis     Mixed hyperlipidemia     Essential hypertension     History of bladder cancer     CAD     Allergic rhinitis due to pollen         Immunizations:     Prevnar 13 (Pneumococcal PCV 13) 9/1/2015     Fluzone High-Dose pf (>=65 yr) 10/3/2016     Fluzone High-Dose pf (>=65 yr) 9/19/2017     PNEUMOVAX 23 (Pneumococcal PPV23) 10/1/2008     Tdap (Tetanus, reduced diph, acellular pertussis) 1/1/2013     Zostavax (Zoster) 10/1/2006         Allergies:     Last Reviewed on 12/18/2017 09:03 AM by Zeny Corley    Prednisone: sweating (Adverse Reaction)    Lisinopril: cough        Current Medications:     Last Reviewed on 12/18/2017 09:03 AM by Zeny Corley    Clopidogrel 75mg Tablet 1 tab daily     Singulair  10mg Tablet Take 1 tablet(s) by mouth each evening     Furosemide 40mg Tablets 1 by mouth  daily     Isosorbide Mononitrate 30mg Tablets, Extended Release Take 1 tablet(s) by mouth qam     Sucralfate 1gm Tablets 1 tab qid     Voltaren  1% Topical Gel applly qid     Ranitidine 150mg Capsules 1 tab bid     Hydrocodone/Acetaminophen 7.5mg/325mg Tablet 1 PO Q4-6 HOURS PRN PAIN     Metformin HCl 500mg Tablet 1 tab daily     Nitrostat 0.4mg Tablets, Sublingual as directed     Carvedilol 3.125mg Tablet 1 tab bid     Amlodipine  10mg Tablet 1 tab daily     Lovastatin 40mg Tablets, Extended Release Take 1 tablet(s) by mouth each evening     Potassium Chloride 10mEq Tablets, Extended Release Take 1 tablet(s) by mouth daily     Fish Oil 8 tabs daily 1200mg each     Aspirin (ASA) 81mg Tablets, Enteric Coated 1 tab daily     Cetirizine HCl 10mg Tablet 1 tab daily     Naproxen Sodium 220mg Tablet 1  tab PRN     Iron 28 MG     Vitamin B6 100MG qd     Vitamin C 1,000mg Tablet Take 1 tablet(s) by mouth daily         OBJECTIVE:        Vitals:         Current: 3/20/2018 9:11:33 AM    Ht:  5 ft, 6.5 in;  Wt: 150.2 lbs;  BMI: 23.9    T: 97 F (oral);  BP: 113/60 mm Hg (left arm, sitting);  P: 64 bpm (left arm (BP Cuff), sitting);  sCr: 0.87 mg/dL;  GFR: 61.99        Exams:     PHYSICAL EXAM:     GENERAL: Vitals recorded well developed, well nourished;  well groomed;  no apparent distress;     EYES: extraocular movements intact; conjunctiva and cornea are normal; PERRLA;     E/N/T: EARS:  normal external auditory canals and tympanic membranes;  grossly normal hearing; NOSE: nasal mucosa is edematous and erythematous;  OROPHARYNX: oral mucosa reveals erythema;  posterior pharynx shows no exudate;     RESPIRATORY: normal respiratory rate and pattern with no distress; normal breath sounds with no rales, rhonchi, wheezes or rubs;     CARDIOVASCULAR: normal rate; rhythm is regular;  no systolic murmur; no edema;     GASTROINTESTINAL: nontender; normal bowel sounds; stoma appears pink and healthy with clear urine output in bag;     MUSCULOSKELETAL: normal gait; normal overall tone LLE BKA; prosthesis in place;         Lab/Test Results:             Urine temperature:  confirmed (03/20/2018),     All urine drug screen levels confirmed negative:  yes (03/20/2018),     Date and time of last pill:  Hydrocodone 3-19-18 8:30pm (03/20/2018),     Performed by:  pr (03/20/2018),     Collection Time:  10:12 (03/20/2018),             ASSESSMENT           250.00   E11.9  Type 2 diabetes              DDx:     401   I10  Essential hypertension              DDx:     272.2   E78.2  Mixed hyperlipidemia              DDx:     414.9   I25.10  CAD              DDx:     724.2   M54.5  Chronic low back pain              DDx:     V58.69   Z79.891  Use of high risk medications              DDx:     477.0   J30.1  Allergic rhinitis due to pollen               DDx:         ORDERS:         Meds Prescribed:       Refill of: Clopidogrel 75mg Tablet 1 tab daily  #90 (Ninety) tablet(s) Refills: 1       Refill of: Isosorbide Mononitrate 30mg Tablets, Extended Release Take 1 tablet(s) by mouth qam  #90 (Ninety) tablet(s) Refills: 1       Refill of: Sucralfate 1gm Tablets 1 tab qid  #120 (One [place] [address]) tablet(s) Refills: 5       Refill of: Hydrocodone/Acetaminophen 7.5mg/325mg Tablet 1 PO Q4-6 HOURS PRN PAIN  #30 (Thirty) tablet(s) Refills: 0       Refill of: Amlodipine  10mg Tablet 1 tab daily  #90 (Ninety) tablet(s) Refills: 1       Refill of: Lovastatin 40mg Tablets, Extended Release Take 1 tablet(s) by mouth each evening  #90 (Ninety) tablet(s) Refills: 1       Refill of: Potassium Chloride 10mEq Tablets, Extended Release Take 1 tablet(s) by mouth daily  #90 (Ninety) tablet(s) Refills: 1         Lab Orders:       [id number]  [id number] Diabetes 2 Panel [facility]: CMP A1C Lipid and Micro Albumin  (Send-Out)         [id number]  Drug test prsmv qual dir optical obs per day  (In-House)           Other Orders:         Calculated BMI within normal parameters and documented  (In-House)                   PLAN:          Type 2 diabetes Marcello is on metformin for diabetes.  No refills needed.  He is on a statin and ASA.  He has been adhering to a diabetic diet.  UTD on eye exam (2/2018).  He is UTD on foot exam (9/2017).  He is due for labs; ordered as below.  RTC 3 months.     LABORAT[facility]:  [facility] panel: CMP, A1C, Lipid MicroAlbumin.  MIPS     BMI Normal - No follow-up plan necessary           Orders:       [id number]  [id number] Diabetes 2 Panel [facility]: CMP A1C Lipid and Micro Albumin  (Send-Out)           Calculated BMI within normal parameters and documented  (In-House)             Patient Education Handouts:       [facility] [place] [facility]           Essential hypertension BP at goal.  Follows with Dr. Knight for CAD,  HTN, HLD.  Refills sent as below.  Checking labs.  He has appt with Dr. Knight 4-12-18.  Will defer to his cardiologist as to whether he can safely stop the Plavix for his next epidural injection for the back pain.          Mixed hyperlipidemia As per HTN.          CAD As per HTN.           Prescriptions:       Refill of: Clopidogrel 75mg Tablet 1 tab daily  #90 (Ninety) tablet(s) Refills: 1       Refill of: Isosorbide Mononitrate 30mg Tablets, Extended Release Take 1 tablet(s) by mouth qam  #90 (Ninety) tablet(s) Refills: 1       Refill of: Amlodipine  10mg Tablet 1 tab daily  #90 (Ninety) tablet(s) Refills: 1       Refill of: Lovastatin 40mg Tablets, Extended Release Take 1 tablet(s) by mouth each evening  #90 (Ninety) tablet(s) Refills: 1       Refill of: Potassium Chloride 10mEq Tablets, Extended Release Take 1 tablet(s) by mouth daily  #90 (Ninety) tablet(s) Refills: 1          Chronic low back pain Stable on current low usage of hydrocodone/APAP.  He is following with Pain Management and getting back injections there.  First one was only minimally helpful; he goes back in less than a month.  For now, he does require ongoing use of this controlled substance to function.  No adverse effects.  Refill sent today.  Tox screen and Raffi run.  RTC 3 months.           Prescriptions:       Refill of: Hydrocodone/Acetaminophen 7.5mg/325mg Tablet 1 PO Q4-6 HOURS PRN PAIN  #30 (Thirty) tablet(s) Refills: 0          Use of high risk medications         FOLLOW-UP: Schedule a follow-up visit in 3 months..  Controlled substance documentation: Raffi reviewed; drug screen performed and appropriate; consent is reviewed and signed and on the chart.  He is aware of risk of addiction on this medication, understands that he will need to follow up for a review every 3 months and his medications will be adjusted or decreased as deemed appropriate at each visit.  No history of drug or alcohol abuse.  No concerns about diversion or  abuse. He denies side effects related to the medication.  He is aware that he may be called in for pill counts.  The dosing of this medication will be reviewed on a regular basis and reduced if possible..  Ongoing use of a controlled substance is necessary for this patient to have a normal quality of life Drug screen           Orders:       [id number]  Drug test prsmv qual dir optical obs per day  (In-House)            Allergic rhinitis due to pollen No refills needed.  I think he may be starting with a viral URI.  Watch sx for now.  He can call back at the end of the week if no improvement and I will send in an antibiotic.             Other Prescriptions:       Refill of: Sucralfate 1gm Tablets 1 tab qid  #120 ([address] [place] [address]) tablet(s) Refills: 5         Patient Recommendations:        For  Use of high risk medications:     Schedule a follow-up visit in 3 months.                APPOINTMENT INFORMATION:        Monday Tuesday Wednesday Thursday Friday Saturday Sunday            Time:___________________AM  PM   Date:_____________________             CHARGE CAPTURE           **Please note: ICD descriptions below are intended for billing purposes only and may not represent clinical diagnoses**        Primary Diagnosis:         250.00 Type 2 diabetes            E11.9    Type 2 diabetes mellitus without complications              Orders:          [id number]   Office/outpatient visit; established patient, level 4  (In-House)                Calculated BMI within normal parameters and documented  (In-House)           401 Essential hypertension            I10    Essential (primary) hypertension    272.2 Mixed hyperlipidemia            E78.2    Mixed hyperlipidemia    414.9 CAD            I25.10    Atherosclerotic heart disease of native coronary artery without angina pectoris    724.2 Chronic low back pain            M54.5    Low back pain    V58.69 Use of high risk medications            Z79.891    Long term  (current) use of opiate analgesic              Orders:          [id number]   Drug test prsmv qual dir optical obs per day  (In-House)           477.0 Allergic rhinitis due to pollen            J30.1    Allergic rhinitis due to pollen

## 2021-05-18 NOTE — PROGRESS NOTES
Marcello Larson 1940     Office/Outpatient Visit    Visit Date: Wed, Feb 27, 2019 08:41 am    Provider: Zeny Corley MD (Assistant: Samantha Ch MA)    Location: [facility]        Electronically signed by Zeny Corley MD on  02/27/2019 09:21:09 AM                             SUBJECTIVE:        CC:     Mr. Larson is a 78 year old White male.  This is a follow-up visit.  chronic illnesses         HPI: Marcello is here today for follow up on his back pain.  He has a history of chronic back pain, but it had been pretty much at baseline up until Christmas.  It has been really hard for him to sleep.  He has been using tizanidine with minimal relief and he was also started on gabapentin 300 mg BID in addition to his usual Norco.  He does feel that the gabapentin is helping somewhat.  He has been relying on that a lot more than usual lately.  In fact, after his second trip to the ED, I increased his Norco from 7.5/325 mg to 10/325 mg.  We have been working on getting him back into [facility], where he was previously seen, and he goes there on 3-4-19 (next week).  He has also started having some knee pain so he is getting back in with Dr. Mathews for that.         He is on pantoprazole for GERD and esophagitis along with sucralfate.  Follows with GI.     ROS:     CONSTITUTIONAL:  Negative for fatigue and fever.      CARDIOVASCULAR:  Negative for chest pain and palpitations.      RESPIRATORY:  Negative for recent cough and dyspnea.      MUSCULOSKELETAL:  Positive for arthralgias, (R knee) back pain and limb pain ( right leg pain ).   Negative for myalgias.      NEUROLOGICAL:  Negative for paresthesias and weakness.          PMH/FMH/SH:     Last Reviewed on 2/27/2019 09:03 AM by Zeny Corley    Past Medical History:             PAST MEDICAL HISTORY         Coronary Artery Disease    Hyperlipidemia    Hypertension     Gastroesophageal Reflux Disease     Glomerulonephritis:  membranous;     Gout    Osteoarthritis    back pain     bladder cancer         CURRENT MEDICAL PROVIDERS:    Cardiologist: Dr. Tuttle in [place]    Chiropractor    Ophthalmologist         PREVENTIVE HEALTH MAINTENANCE             BONE DENSITY: was last done 6/7/13 with the following abnormality noted-- osteoporosis     COLORECTAL CANCER SCREENING: Up to date (colonoscopy q10y; sigmoidoscopy q5y; Cologuard q3y) was last done 6/7/13; colonoscopy with the following abnormalities noted-- Polyp(s)     EYE EXAM: Diabetic Eye Exam during this calendar year and results are in chart was last done 2/15/2018     PSA: was last done 5/26/15 with the following abnormalaties noted-- 20.1         PAST MEDICAL HISTORY             ADVANCE DIRECTIVE Living will on file         PAST MEDICAL HISTORY             CURRENT MEDICAL PROVIDERS:    Cardiologist: Kofi Cha    Chiropractor: mayda         Surgical History:         Coronary Artery Bypass Graft: 1984, 1991;      Appendectomy    Cholecystectomy    cystoprostatectomy with ileoconduit;      Cataract Removal    LLE amputation;         Social History:     Occupation:    Disabled     Marital Status:      Children: 2 children         Tobacco/Alcohol/Supplements:     Last Reviewed on 2/27/2019 09:03 AM by Zeny Corley    Tobacco: He has a past history of cigarette smoking; quit date:  1984.          Substance Abuse History:     Last Reviewed on 2/27/2019 09:03 AM by Zeny Corley        Mental Health History:     Last Reviewed on 2/27/2019 09:03 AM by Zeny Corley        Communicable Diseases (eg STDs):     Last Reviewed on 2/27/2019 09:03 AM by Zeny Corley            Current Problems:     Last Reviewed on 2/01/2019 09:42 AM by Zeny Corley    Anemia, unspecified     Fatigue     Type 2 diabetes     Chronic low back pain     Hypertensive urgency     Use of high risk medications     GERD     Anxiety     Osteoarthritis     Chronic membranous glomerulonephritis      Mixed hyperlipidemia     Essential hypertension     History of bladder cancer     CAD     Screening for depression     Allergic rhinitis due to pollen         Immunizations:     Havrix -adult dose (HepA) 6/14/2018     Prevnar 13 (Pneumococcal PCV 13) 9/1/2015     Fluzone High-Dose pf (>=65 yr) 10/3/2016     Fluzone High-Dose pf (>=65 yr) 9/19/2017     Fluzone High-Dose pf (>=65 yr) 10/26/2018     PNEUMOVAX 23 (Pneumococcal PPV23) 10/1/2008     Tdap (Tetanus, reduced diph, acellular pertussis) 1/1/2013     Zostavax (Zoster live) 10/1/2006         Allergies:     Last Reviewed on 2/27/2019 08:48 AM by Samantha Ch    Prednisone: sweating (Adverse Reaction)    Lisinopril: cough        Current Medications:     Last Reviewed on 2/27/2019 08:48 AM by Samantha Ch    Amlodipine  10mg Tablet 1 tab daily     Carvedilol 6.25mg Tablet 1 tab bid     Metformin HCl 500mg Tablet 1 tab daily     Potassium Chloride 10mEq Tablets, Extended Release Take 1 tablet(s) by mouth daily     Sucralfate 1gm Tablets 1 tab qid     Isosorbide Mononitrate 30mg Tablets, Extended Release Take 1 tablet(s) by mouth qam     Clopidogrel 75mg Tablet 1 tab daily     Furosemide 40mg Tablets 1 by mouth  daily     Voltaren  1% Topical Gel applly qid     Lovastatin 40mg Tablets, Extended Release Take 1 tablet(s) by mouth each evening     Singulair  10mg Tablet Take 1 tablet(s) by mouth each evening     Nitrostat 0.4mg Tablets, Sublingual as directed     Prochlorperazine 10mg Tablets 1 tab q8h prn     Fish Oil 8 tabs daily 1200mg each     Aspirin (ASA) 81mg Tablets, Enteric Coated 1 tab daily     Hydrocodone/Acetaminophen 10mg/325mg Tablet 1 tablet q 6 hrs prn     Gabapentin 300mg Capsules 2 capsules BID     Contour Next Test Strips  Reagent Strips check blood sugar 1-2 daily  DX  E11.9     Iron 28 MG     Vitamin B6 100MG qd     Vitamin C 1,000mg Tablet Take 1 tablet(s) by mouth daily         OBJECTIVE:        Vitals:         Current: 2/27/2019  8:49:44 AM    Ht:  5 ft, 6.5 in;  Wt: 153.4 lbs;  BMI: 24.4    T: 97.5 F (oral);  BP: 107/57 mm Hg (right arm, sitting);  P: 56 bpm (right arm (BP Cuff), sitting);  sCr: 0.94 mg/dL;  GFR: 57.01        Exams:     PHYSICAL EXAM:     GENERAL: Vitals recorded well developed, well nourished;  well groomed;  no apparent distress;     EYES: extraocular movements intact; conjunctiva and cornea are normal; PERRLA;     RESPIRATORY: normal respiratory rate and pattern with no distress; normal breath sounds with no rales, rhonchi, wheezes or rubs;     CARDIOVASCULAR: normal rate; rhythm is regular;  no systolic murmur; no edema;     MUSCULOSKELETAL: gait: antalgic;  normal overall tone LLE BKA; prosthesis in place; + R lumbar paraspinal TTP;         ASSESSMENT           724.2   M54.5  Chronic low back pain              DDx:     530.81   K21.9  GERD              DDx:         ORDERS:         Meds Prescribed:       Refill of: Tizanidine HCl 4mg Capsules One PO BID PRN  #60 (Sixty) capsule(s) Refills: 0       Refill of: Pantoprazole (Pantoprazole)  40mg Tablets, Delayed Release 1 tab bid  #60 (Sixty) tablet(s) Refills: 1       Refill of: Gabapentin (Gabapentin)  300mg Capsules 2 capsules BID  #120 (One [place] and Twenty) capsule(s) Refills: 0         Radiology/Test Orders:       [id number]  Magnetic resonance imaging, spinal canal and contents, lumbar; without contrast  (Send-Out)           Lab Orders:       APPTO  Appointment need  (In-House)                   PLAN:          Chronic low back pain Ongoing issues with chronic low back pain despite multiple trials of therapy.  XRs looked okay.  I would like to get an MRI L spine to make sure he does not have anything more sinister going on, given severe nature of pain and sudden onset.  He is going back to [facility] next month.  Continue gabapentin.  He has cut back on the Norco because he felt it was giving him sweats.  Will try increasing Zanaflex.  He can continue topical  agents as well including OTC pain patch and Voltaren gel.  RTC 2 months.         RADIOLOGY:  I have ordered MRI Lumbar Spine w/o contrast to be done today.      FOLLOW-UP: Schedule a follow-up visit in 2 months..  f/u chronic back pain with [place]           Prescriptions:       Refill of: Tizanidine HCl 4mg Capsules One PO BID PRN  #60 (Sixty) capsule(s) Refills: 0       Refill of: Gabapentin (Gabapentin)  300mg Capsules 2 capsules BID  #120 (One [place] and Twenty) capsule(s) Refills: 0           Orders:       APPTO  Appointment need  (In-House)         [id number]  Magnetic resonance imaging, spinal canal and contents, lumbar; without contrast  (Send-Out)             Patient Education Handouts:       [facility] Medication Compliance           GERD Stable.  Refills sent.           Prescriptions:       Refill of: Pantoprazole (Pantoprazole)  40mg Tablets, Delayed Release 1 tab bid  #60 (Sixty) tablet(s) Refills: 1             Patient Recommendations:        For  Chronic low back pain:     Schedule a follow-up visit in 2 months.                APPOINTMENT INFORMATION:        Monday Tuesday Wednesday Thursday Friday Saturday Sunday            Time:___________________AM  PM   Date:_____________________             CHARGE CAPTURE           **Please note: ICD descriptions below are intended for billing purposes only and may not represent clinical diagnoses**        Primary Diagnosis:         724.2 Chronic low back pain            M54.5    Low back pain              Orders:          [id number]   Office/outpatient visit; established patient, level 4  (In-House)             APPTO   Appointment need  (In-House)           530.81 GERD            K21.9    Gastro-esophageal reflux disease without esophagitis

## 2021-05-18 NOTE — PROGRESS NOTES
Marcello Larson 1940     Office/Outpatient Visit    Visit Date: Mon, Sep 10, 2018 01:52 pm    Provider: Augustin Magana,   (Assistant: Joanne Troncoso LPN)    Location: [facility]        Electronically signed by Augustin Magana,   on  09/10/2018 02:52:45 PM                             SUBJECTIVE:        CC:     Mr. Larson is a 78 year old White male.  He presents with earache.          HPI:     Overall symptoms are mild     Mr. Larson presents with ear ache.      Mr. Larson complains of right ear pain.  Associated symptoms include diminished hearing.  He denies cough, dizziness, ear drainage or fever.  The symptoms began one week ago.  Pertinent history includes allergies.  History is negative for wearing hearing aid(s) or recent swimming.  He has not tried any medications for symptomatic relief.  feels as if there's water in right ear, with popping, no pain.  Overall symptoms are mild, not really pain but more of a sensation of water in his ear. Patient reports maybe there is hearing loss but not really sure. He has no fever, drainage, or any alarming Sx.     ROS:     CONSTITUTIONAL:  Negative for fatigue and fever.      EYES:  Negative for blurred vision.      E/N/T:  Positive for ear pain ( right ), diminished hearing ( right ) and hoarseness ( slight hoarse throat coincident with right ear sensation ).   Negative for nasal congestion, sinus pressure or sore throat.      CARDIOVASCULAR:  Negative for chest pain and palpitations.      RESPIRATORY:  Negative for recent cough and dyspnea.      GASTROINTESTINAL:  Negative for abdominal pain, constipation, diarrhea, nausea and vomiting.      GENITOURINARY:  Negative for dysuria.      MUSCULOSKELETAL:  Negative for arthralgias and myalgias.      INTEGUMENTARY:  Negative for atypical mole(s) and rash.      NEUROLOGICAL:  Negative for paresthesias and weakness.      PSYCHIATRIC:  Negative for anxiety, depression and sleep disturbance.           PMH/FMH/SH:     Last Reviewed on 6/27/2018 03:02 PM by Zeny Corley    Past Medical History:             PAST MEDICAL HISTORY         Coronary Artery Disease    Hyperlipidemia    Hypertension     Gastroesophageal Reflux Disease     Glomerulonephritis: membranous;     Gout    Osteoarthritis    back pain     bladder cancer         CURRENT MEDICAL PROVIDERS:    Cardiologist: Dr. Tuttle in [place]    Chiropractor    Ophthalmologist         PREVENTIVE HEALTH MAINTENANCE             BONE DENSITY: was last done 6/7/13 with the following abnormality noted-- osteoporosis     COLORECTAL CANCER SCREENING: Up to date (colonoscopy q10y; sigmoidoscopy q5y; Cologuard q3y) was last done 6/7/13; colonoscopy with the following abnormalities noted-- Polyp(s)     EYE EXAM: Diabetic Eye Exam during this calendar year and results are in chart was last done 2/15/2018     PSA: was last done 5/26/15 with the following abnormalaties noted-- 20.1         PAST MEDICAL HISTORY             ADVANCE DIRECTIVE Living will on file         Surgical History:         Coronary Artery Bypass Graft: 1984, 1991;      Appendectomy    Cholecystectomy    cystoprostatectomy with ileoconduit;      Cataract Removal    LLE amputation;         Social History:     Occupation:    Disabled     Marital Status:      Children: 2 children         Tobacco/Alcohol/Supplements:     Last Reviewed on 7/17/2018 09:16 AM by Daphney Puri    Tobacco: He has a past history of cigarette smoking; quit date:  1984.          Substance Abuse History:     Last Reviewed on 6/27/2018 03:02 PM by Zeny Corley        Mental Health History:     Last Reviewed on 6/27/2018 03:02 PM by Zeny Corley        Communicable Diseases (eg STDs):     Last Reviewed on 6/27/2018 03:02 PM by Zeny Corley            Current Problems:     Last Reviewed on 6/27/2018 03:02 PM by Zeny Corley    Anemia, unspecified     Fatigue     Type 2 diabetes     Chronic low back pain      Hypertensive urgency     Use of high risk medications     GERD     Anxiety     Osteoarthritis     Chronic membranous glomerulonephritis     Mixed hyperlipidemia     Essential hypertension     History of bladder cancer     CAD     Allergic rhinitis due to pollen         Immunizations:     Prevnar 13 (Pneumococcal PCV 13) 9/1/2015     Fluzone High-Dose pf (>=65 yr) 10/3/2016     Fluzone High-Dose pf (>=65 yr) 9/19/2017     PNEUMOVAX 23 (Pneumococcal PPV23) 10/1/2008     Tdap (Tetanus, reduced diph, acellular pertussis) 1/1/2013     Zostavax (Zoster) 10/1/2006         Allergies:     Last Reviewed on 9/10/2018 01:57 PM by Joanne Troncoso April    Prednisone: sweating (Adverse Reaction)    Lisinopril: cough    Cardiac Dye:        Current Medications:     Last Reviewed on 9/10/2018 01:58 PM by Joanne Troncoso April    Clopidogrel 75mg Tablet 1 tab daily     Furosemide 40mg Tablets 1 by mouth  daily     Voltaren  1% Topical Gel applly qid     Amlodipine  10mg Tablet 1 tab daily     Lovastatin 40mg Tablets, Extended Release Take 1 tablet(s) by mouth each evening     Hydrocodone/Acetaminophen 7.5mg/325mg Tablet 1 PO Q4-6 HOURS PRN PAIN     Carvedilol 6.25mg Tablet 1 tab bid     Isosorbide Mononitrate 30mg Tablets, Extended Release Take 1 tablet(s) by mouth qam     Potassium Chloride 10mEq Tablets, Extended Release Take 1 tablet(s) by mouth daily     Sucralfate 1gm Tablets 1 tab qid     Singulair  10mg Tablet Take 1 tablet(s) by mouth each evening     Metformin HCl 500mg Tablet 1 tab daily     Nitrostat 0.4mg Tablets, Sublingual as directed     Prochlorperazine 10mg Tablets 1 tab q8h prn     Fish Oil 8 tabs daily 1200mg each     Aspirin (ASA) 81mg Tablets, Enteric Coated 1 tab daily     Pantoprazole 40mg Tablets, Delayed Release 1 tab bid     Fluticasone Propionate     Cetirizine HCl 10mg Tablet 1 tab daily     Iron 28 MG     Vitamin B6 100MG qd     Vitamin C 1,000mg Tablet Take 1 tablet(s) by mouth daily          OBJECTIVE:        Vitals:         Current: 9/10/2018 1:57:31 PM    Ht:  5 ft, 6.5 in;  Wt: 147.6 lbs;  BMI: 23.5    T: 98.5 F (oral);  BP: 142/70 mm Hg (left arm, sitting);  P: 73 bpm (left arm (BP Cuff), sitting);  sCr: 0.94 mg/dL;  GFR: 56.08        Exams:     PHYSICAL EXAM:     GENERAL: Vitals recorded well developed, well nourished;  well groomed;  no apparent distress;     EYES: extraocular movements intact; conjunctiva and cornea are normal; PERRLA;     E/N/T: EARS: external auditory canal normal bilaterally;  the left TM is scarred and mild, probably old scarring and the right TM is retracted and possibly retracted right TM;  OROPHARYNX:  normal mucosa, dentition, gingiva, and posterior pharynx;     NECK: range of motion is normal; thyroid exam reveals not enlarged;     RESPIRATORY: normal respiratory rate and pattern with no distress; normal breath sounds with no rales, rhonchi, wheezes or rubs;     CARDIOVASCULAR: normal rate; rhythm is regular;  no systolic murmur; no edema;     GASTROINTESTINAL: nontender; normal bowel sounds;     LYMPHATIC: no enlargement of cervical or facial nodes;     MUSCULOSKELETAL: normal gait; normal overall tone     NEUROLOGIC: mental status: alert and oriented x 3;     PSYCHIATRIC:  appropriate affect and demeanor; normal speech pattern; grossly normal memory;         ASSESSMENT           381.81   H69.81  Eustachian tube dysfunction              DDx:         PLAN:          Eustachian tube dysfunction     Overall Sx are mild and there are no alarming Sx such as drainage or confirmed hearing loss. Ear exam is normal aside from possible retracted right TM and scarred left TM. Given mild presentation with Sx only 1 week, will not order labs or imaging at this time. Pt counseled if Sx persist for 1 more week or worsen then RTC for additional evaluation. Sx of hoarse voice is slightly suspicious for throat cancer but no dysphagia. Will cont to monitor at this time.              CHARGE  CAPTURE           **Please note: ICD descriptions below are intended for billing purposes only and may not represent clinical diagnoses**        Primary Diagnosis:         381.81 Eustachian tube dysfunction            H69.81    Other specified disorders of Eustachian tube, right ear              Orders:          [id number]   Office/outpatient visit; established patient, level 2  (In-House)

## 2021-05-18 NOTE — PROGRESS NOTES
Marcello Larson 1940     Office/Outpatient Visit    Visit Date: Mon, Jun 17, 2019 03:04 pm    Provider: Zeny Corley MD (Assistant: Donna Vazquez MA)    Location: [facility]        Electronically signed by Zeny Corley MD on  06/17/2019 03:42:53 PM                             SUBJECTIVE:        CC:     Mr. Larson is a 79 year old White male.  Patient presents today to discuss prosthetic leg         HPI: Marcello is here today to discuss his prosthetic leg.  He underwent LLE BKA in 1971 after a big die fell on his leg.  He needs some new sleeves for padding over the LLE.  HE has already developed a small ulcer on the L medial knee from where the prosthesis rubs.  He goes to [facility].      ROS:     CONSTITUTIONAL:  Negative for fatigue and fever.      CARDIOVASCULAR:  Negative for chest pain and palpitations.      RESPIRATORY:  Negative for recent cough and dyspnea.      MUSCULOSKELETAL:  Positive for arthralgias, (R knee) back pain and limb pain ( right leg pain ).   Negative for myalgias.      NEUROLOGICAL:  Negative for paresthesias and weakness.          PMH/FMH/SH:     Last Reviewed on 6/17/2019 03:30 PM by Zeny Corley    Past Medical History:             PAST MEDICAL HISTORY         Coronary Artery Disease    Hyperlipidemia    Hypertension     Gastroesophageal Reflux Disease     Glomerulonephritis: membranous;     Gout    Osteoarthritis    back pain     bladder cancer         CURRENT MEDICAL PROVIDERS:    Cardiologist: Dr. Tuttle in [place]    Chiropractor    Ophthalmologist         PREVENTIVE HEALTH MAINTENANCE             BONE DENSITY: was last done 6/7/13 with the following abnormality noted-- osteoporosis     COLORECTAL CANCER SCREENING: Up to date (colonoscopy q10y; sigmoidoscopy q5y; Cologuard q3y) was last done 6/7/13; colonoscopy with the following abnormalities noted-- Polyp(s)     EYE EXAM: Diabetic Eye Exam during this calendar year and results are in  chart was last done 6/10/2019 no retinopathy     PSA: was last done 5/26/15 with the following abnormalaties noted-- 20.1         PAST MEDICAL HISTORY             ADVANCE DIRECTIVE Living will on file         PAST MEDICAL HISTORY             CURRENT MEDICAL PROVIDERS:    Cardiologist: Kofi Cha    Chiropractor: mayda         Surgical History:         Coronary Artery Bypass Graft: 1984, 1991;      Appendectomy    Cholecystectomy    cystoprostatectomy with ileoconduit;      Cataract Removal    LLE amputation;         Social History:     Occupation:    Disabled     Marital Status:      Children: 2 children         Tobacco/Alcohol/Supplements:     Last Reviewed on 6/17/2019 03:30 PM by Zeny Corley    Tobacco: He has a past history of cigarette smoking; quit date:  1984.          Substance Abuse History:     Last Reviewed on 6/17/2019 03:30 PM by Zeny Corley        Mental Health History:     Last Reviewed on 6/17/2019 03:30 PM by Zeny Corley        Communicable Diseases (eg STDs):     Last Reviewed on 6/17/2019 03:30 PM by Zeny Corley            Current Problems:     Last Reviewed on 5/01/2019 10:58 AM by Zeny Corley    Anemia, unspecified     Fatigue     Type 2 diabetes     Chronic low back pain     Hypertensive urgency     Use of high risk medications     GERD     Anxiety     Osteoarthritis     Chronic membranous glomerulonephritis     Mixed hyperlipidemia     Essential hypertension     History of bladder cancer     CAD     Low platelets     Allergic rhinitis due to pollen         Immunizations:     Havrix -adult dose (HepA) 6/14/2018     Prevnar 13 (Pneumococcal PCV 13) 9/1/2015     Fluzone High-Dose pf (>=65 yr) 10/3/2016     Fluzone High-Dose pf (>=65 yr) 9/19/2017     Fluzone High-Dose pf (>=65 yr) 10/26/2018     PNEUMOVAX 23 (Pneumococcal PPV23) 10/1/2008     Tdap (Tetanus, reduced diph, acellular pertussis) 1/1/2013     Zostavax (Zoster live) 10/1/2006         Allergies:     Last  Reviewed on 5/01/2019 10:58 AM by Zeny Corley    Prednisone: sweating (Adverse Reaction)    Lisinopril: cough        Current Medications:     Last Reviewed on 5/01/2019 10:58 AM by Zeny Corley    Lovastatin 40mg Tablets, Extended Release Take 1 tablet(s) by mouth each evening     Metformin HCl 500mg Tablet 1 tab daily     Potassium Chloride 10mEq Tablets, Extended Release Take 1 tablet(s) by mouth daily     Voltaren  1% Topical Gel applly qid     Isosorbide Mononitrate 30mg Tablets, Extended Release Take 1 tablet(s) by mouth qam     Singulair  10mg Tablet Take 1 tablet(s) by mouth each evening     Nitrostat 0.4mg Tablets, Sublingual as directed     Gabapentin 300mg Capsules 2 capsules BID     Sucralfate 1gm Tablets 1 tab qid     Amlodipine  10mg Tablet 1 tab daily     Carvedilol 6.25mg Tablet 1 tab bid     Clopidogrel 75mg Tablet 1 tab daily     Prochlorperazine 10mg Tablets 1 tab q8h prn     Furosemide 40mg Tablets 1 by mouth  daily     Fish Oil 8 tabs daily 1200mg each     Aspirin (ASA) 81mg Tablets, Enteric Coated 1 tab daily     Pantoprazole 40mg Tablets, Delayed Release 1 tab bid     Tizanidine HCl 4mg Capsules One PO BID PRN     Ciprofloxacin HCl 500mg Tablet 1 tab bid     Metronidazole 500mg Tablet 1 tab tid till gone     Morphine Sulfate     Ondansetron HCl 4mg Tablet 1 po q 6 hours prn     Iron 28 MG     Vitamin B6 100MG qd     Vitamin C 1,000mg Tablet Take 1 tablet(s) by mouth daily         OBJECTIVE:        Vitals:         Current: 6/17/2019 3:08:34 PM    Ht:  5 ft, 6.5 in;  Wt: 150.8 lbs;  BMI: 24.0    T: 98.2 F (oral);  BP: 134/57 mm Hg (right arm, sitting);  P: 68 bpm (right arm (BP Cuff), sitting);  sCr: 0.98 mg/dL;  GFR: 53.45        Exams:     PHYSICAL EXAM:     GENERAL: Vitals recorded well developed, well nourished;  well groomed;  no apparent distress;     EYES: extraocular movements intact; conjunctiva and cornea are normal; PERRLA;     RESPIRATORY: normal respiratory rate and pattern  with no distress; normal breath sounds with no rales, rhonchi, wheezes or rubs;     CARDIOVASCULAR: normal rate; rhythm is regular;  no systolic murmur; no edema;     SKIN: small clean 1 cm linear ulcer on L medial knee under the lip of his BKA prosthesis;     MUSCULOSKELETAL: gait: antalgic;  normal overall tone LLE BKA; prosthesis in place;         ASSESSMENT           V49.75   Z89.519  Amputation below knee              DDx:         PLAN:          Amputation below knee Will send in order to [facility] for requested supplies for LLE BKA prosthesis.  He has developed a small 1 cm ulcer on the L medial knee.  He will continue to watch this and hopefully it will quickly resolve with the new equipment.  No signs of infection today.             CHARGE CAPTURE           **Please note: ICD descriptions below are intended for billing purposes only and may not represent clinical diagnoses**        Primary Diagnosis:         V49.75 Amputation below knee            Z89.519    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              Orders:          [id number]   Office/outpatient visit; established patient, level 3  (In-House)

## 2021-05-18 NOTE — PROGRESS NOTES
Marcello Larson 1940     Office/Outpatient Visit    Visit Date: Mon, Jan 28, 2019 10:26 am    Provider: Zeny Corley MD (Assistant: Daphney Puri RN)    Location: [facility]        Electronically signed by Zeny Corley MD on  01/28/2019 12:08:57 PM                             SUBJECTIVE:        CC:     Mr. Larson is a 78 year old White male.  Medicare Wellness Exam         HPI:     He is UTD on colonoscopy, last done 6/2013 and this showed polyps.  Prostate cancer screening is no longer indicated by age and history; s/p prostatectomy 2/2 bladder cancer.  He is UTD on Pneumovax, (10/2008), Prevnar (9/2015), Zostavax (10/2006), Tdap (1/2013) and flu (10/2018).  He is due for Shingrix and Havrix.  He is due for routine labs including diabetes panel.  He is UTD on eye exam (2/2018).  He is UTD on foot exam (7/2018).        He is on Norco for breakthrough pain from chronic low back and leg pain from LLE amputation, 0-4 times per week, about #10 tabs per month.  He uses ice and heat preferentially.  He is on Voltaren gel for back pain.  He follows with [facility] for epidurals.  He goes for regular massages.  +Low back pain with radiation down the R leg to the knee.  He thinks the R leg pain is radiating down from the back.  He went for massage this morning and that helped some.  He took a Norco this morning but has been worried about running out because he's getting low.  Pain in the back has been worse since [place].  The only thing he knows he did was turn on a stool while lifting down a Ruthy present from a high shelf.  No numbness or paresthesias.      Mr. Larson is here for a Medicare wellness visit.  ADVANCE DIRECTIVES (Please update in PMH): Living will on file, [facility] Returning to health checkup, the required HRA questions are integrated within this visit note. Family medical history and individual medical/surgical history were reviewed and updated.  A  current height, weight, BMI, blood pressure, and pulse were recorded in the vitals section of the note and have been reviewed. Patient's medications, including supplements, were recorded in the chart and reviewed.  Current providers and suppliers were reviewed and updated.          Self-Assessment of Health: He rates his health as fair. He rates his confidence of being able to control/manage most of his health problems as very confident. His physical/emotional health has limited his social activites not at all.  A review of cognitive impairment was performed, including ability to drive a car, manage finances, and any memory changes, and was found to be negative.  A review of functional ability, including bathing, dressing, walking, and urine/bowel continence as well as level of safety was performed and was found to be negative.  Falls Risk: Has fallen 2 or more times or had one fall with injury in the past year.  He denies having trouble hearing the TV/radio when others do not, having to strain to hear or understand conversations and wearing hearing aid(s).  Concerning home safety, he reports that at home he DOES have adequate lighting, a skid resistant shower/tub, functioning smoke alarms and absence of throw rugs, but not grab bars in the bath or handrails on stairs.          Immunization Status: Up to date; Physical Activity: He exercises for at least 20 minutes 3 or more days/week.; Type of diet patient normally eats is described as diabetic diet Preventative Health updated today.          PHQ-9 Depression Screening: Completed form scanned and in chart; Total Score 4 Alcohol Consumption Screening: Completed form scanned and in chart; Total Score 0     ROS:     CONSTITUTIONAL:  Negative for fatigue and fever.      EYES:  Negative for blurred vision.      E/N/T:  Negative for diminished hearing and nasal congestion.      CARDIOVASCULAR:  Negative for chest pain and palpitations.      RESPIRATORY:  Negative for  recent cough and dyspnea.      GASTROINTESTINAL:  Negative for abdominal pain, constipation, diarrhea, nausea and vomiting.      GENITOURINARY:  Negative for nocturia and change in urine stream.      MUSCULOSKELETAL:  Positive for arthralgias, (R knee) back pain and limb pain ( right leg pain ).   Negative for myalgias.  He thinks the R leg pain is radiating down from the back.  He went for massage this morning and that helped some.  He took a Norco this morning but has been worried about running out because he's getting low.      INTEGUMENTARY:  Negative for atypical mole(s) and rash.      NEUROLOGICAL:  Negative for paresthesias and weakness.      PSYCHIATRIC:  Negative for anxiety, depression and sleep disturbance.          PMH/FMH/SH:     Last Reviewed on 1/28/2019 10:50 AM by Zeny Corley    Past Medical History:             PAST MEDICAL HISTORY         Coronary Artery Disease    Hyperlipidemia    Hypertension     Gastroesophageal Reflux Disease     Glomerulonephritis: membranous;     Gout    Osteoarthritis    back pain     bladder cancer         CURRENT MEDICAL PROVIDERS:    Cardiologist: Dr. Tuttle in [place]    Chiropractor    Ophthalmologist         PREVENTIVE HEALTH MAINTENANCE             BONE DENSITY: was last done 6/7/13 with the following abnormality noted-- osteoporosis     COLORECTAL CANCER SCREENING: Up to date (colonoscopy q10y; sigmoidoscopy q5y; Cologuard q3y) was last done 6/7/13; colonoscopy with the following abnormalities noted-- Polyp(s)     EYE EXAM: Diabetic Eye Exam during this calendar year and results are in chart was last done 2/15/2018     PSA: was last done 5/26/15 with the following abnormalaties noted-- 20.1         PAST MEDICAL HISTORY             ADVANCE DIRECTIVE Living will on file         PAST MEDICAL HISTORY             CURRENT MEDICAL PROVIDERS:    Cardiologist: Kofi Cha    Chiropractor: mayda         Surgical History:         Coronary Artery Bypass Graft:  1984, 1991;      Appendectomy    Cholecystectomy    cystoprostatectomy with ileoconduit;      Cataract Removal    LLE amputation;         Social History:     Occupation:    Disabled     Marital Status:      Children: 2 children         Tobacco/Alcohol/Supplements:     Last Reviewed on 1/28/2019 10:50 AM by Zeny Corley    Tobacco: He has a past history of cigarette smoking; quit date:  1984.          Substance Abuse History:     Last Reviewed on 1/28/2019 10:50 AM by Zeny Corley        Mental Health History:     Last Reviewed on 1/28/2019 10:50 AM by Zeny Corley        Communicable Diseases (eg STDs):     Last Reviewed on 1/28/2019 10:50 AM by Zeny Corley            Current Problems:     Last Reviewed on 10/24/2018 08:39 AM by Zeny Corley    Anemia, unspecified     Fatigue     Type 2 diabetes     Chronic low back pain     Hypertensive urgency     Use of high risk medications     GERD     Anxiety     Osteoarthritis     Chronic membranous glomerulonephritis     Mixed hyperlipidemia     Essential hypertension     History of bladder cancer     CAD     Allergic rhinitis due to pollen         Immunizations:     Prevnar 13 (Pneumococcal PCV 13) 9/1/2015     Fluzone High-Dose pf (>=65 yr) 10/3/2016     Fluzone High-Dose pf (>=65 yr) 9/19/2017     Fluzone High-Dose pf (>=65 yr) 10/26/2018     PNEUMOVAX 23 (Pneumococcal PPV23) 10/1/2008     Tdap (Tetanus, reduced diph, acellular pertussis) 1/1/2013     Zostavax (Zoster live) 10/1/2006         Allergies:     Last Reviewed on 1/28/2019 10:33 AM by Daphney Puri    Prednisone: sweating (Adverse Reaction)    Lisinopril: cough        Current Medications:     Last Reviewed on 1/28/2019 10:34 AM by Daphney Puri    Amlodipine  10mg Tablet 1 tab daily     Carvedilol 6.25mg Tablet 1 tab bid     Metformin HCl 500mg Tablet 1 tab daily     Potassium Chloride 10mEq Tablets, Extended Release Take 1 tablet(s) by mouth daily     Sucralfate 1gm Tablets 1 tab qid      Isosorbide Mononitrate 30mg Tablets, Extended Release Take 1 tablet(s) by mouth qam     Hydrocodone/Acetaminophen 7.5mg/325mg Tablet 1 PO Q4-6 HOURS PRN PAIN     Clopidogrel 75mg Tablet 1 tab daily     Furosemide 40mg Tablets 1 by mouth  daily     Voltaren  1% Topical Gel applly qid     Lovastatin 40mg Tablets, Extended Release Take 1 tablet(s) by mouth each evening     Singulair  10mg Tablet Take 1 tablet(s) by mouth each evening     Nitrostat 0.4mg Tablets, Sublingual as directed     Prochlorperazine 10mg Tablets 1 tab q8h prn     Fish Oil 8 tabs daily 1200mg each     Aspirin (ASA) 81mg Tablets, Enteric Coated 1 tab daily     Pantoprazole 40mg Tablets, Delayed Release 1 tab bid     Contour Next Test Strips  Reagent Strips check blood sugar 1-2 daily  DX  E11.9     Fluticasone Propionate 50mcg/1actuation Nasal Spray 1 spray each nostil daily     Cetirizine HCl 10mg Tablet 1 tab daily     Iron 28 MG     Vitamin B6 100MG qd     Vitamin C 1,000mg Tablet Take 1 tablet(s) by mouth daily         OBJECTIVE:        Vitals:         Current: 1/28/2019 10:45:01 AM    Ht:  5 ft, 6.5 in;  Wt: 152.6 lbs;  BMI: 24.3    T: 97.4 F (oral);  BP: 133/74 mm Hg (left arm, sitting);  P: 71 bpm (left arm (BP Cuff), sitting);  sCr: 0.94 mg/dL;  GFR: 56.88    VA: 20/200 OD, 20/200 OS (near, without correction)        Exams:     PHYSICAL EXAM:     GENERAL: Vitals recorded well developed, well nourished;  well groomed;  no apparent distress;     EYES: extraocular movements intact; conjunctiva and cornea are normal; PERRLA;     E/N/T: OROPHARYNX:  normal mucosa, dentition, gingiva, and posterior pharynx;     RESPIRATORY: normal respiratory rate and pattern with no distress; normal breath sounds with no rales, rhonchi, wheezes or rubs;     CARDIOVASCULAR: normal rate; rhythm is regular;  no systolic murmur; no edema;     GASTROINTESTINAL: nontender; normal bowel sounds; stoma appears pink and healthy with clear urine output in bag;      MUSCULOSKELETAL: gait: antalgic;  normal overall tone LLE BKA; prosthesis in place; + R lumbar paraspinal TTP;     NEUROLOGIC: mental status: alert and oriented x 3; reflexes: brachioradialis: 2+; knee jerks: R 2+;     PSYCHIATRIC: appropriate affect and demeanor; normal psychomotor function; normal speech pattern;         Lab/Test Results:             Amphetamines Screen, Urin:  Negative (01/28/2019),     BAR-Barbiturates Screen, Urin:  Negative (01/28/2019),     Buprenorphine:  Negative (01/28/2019),     BZO-Benzodiazepines Screen,Ur:  Negative (01/28/2019),     Cocaine(Metab.)Screen, Ur:  Negative (01/28/2019),     MDMA-Ecstasy:  Negative (01/28/2019),     MTD-Methadone Screen, Urine:  Negative (01/28/2019),     Opiate Screen, Urine:  Positive (01/28/2019),     OXY-Oxycodone:  Negative (01/28/2019),     PCP-Phencyclidine Screen, Uri:  Negative (01/28/2019),     THC Cannabinoids Screen, Urin:  Negative (01/28/2019),     Urine temperature:  confirmed (01/28/2019),     Date and time of last pill:  Hydrocodone 01/28/19 @200am/AS (01/28/2019),     Performed by:  judy (01/28/2019),     Collection Time:  1100 (01/28/2019),             ASSESSMENT           V70.0   Z00.00  Health checkup              DDx:     V79.0   Z13.89  Screening for depression              DDx:     724.2   M54.5  Chronic low back pain              DDx:     V58.69   Z79.891  Use of high risk medications              DDx:     250.00   E11.9  Type 2 diabetes              DDx:         ORDERS:         Meds Prescribed:       Refill of: Hydrocodone/Acetaminophen 7.5mg/325mg Tablet 1 PO Q4-6 HOURS PRN PAIN  #30 (Thirty) tablet(s) Refills: 0       Tizanidine HCl 2mg Tablet 1 tab PO BID prn spasm  #30 (Thirty) tablet(s) Refills: 0         Radiology/Test Orders:       3017F  Colorectal CA screen results documented and reviewed (PV)  (In-House)         2022F  Dilated retinal eye exam w/interpret by ophthalmologist/optometrist documented/reviewed (DM)4   (In-House)           Lab Orders:       75805  Drug test prsmv qual dir optical obs per day  (In-House)         76712  DIAB1 [facility] LIPID,CMP, A1C: [id number], [id number], [id number]  (Send-Out)         [id number]  DANIEL [facility] Microablbumin, quantitative  (Send-Out)         APPTO  Appointment need  (In-House)           Procedures Ordered:         Annual wellness visit, includes a PPPS, subsequent visit  (In-House)           Other Orders:         Depression screen negative  (In-House)         1100F  Pt screen for fall risk; document 2+ falls in the past yr or any fall w/injury in past year (SOFYA)  (In-House)           Negative EtOH screen  (In-House)                   PLAN:          Health checkup He is UTD on colonoscopy, last done 6/2013 and this showed polyps.  Prostate cancer screening is no longer indicated by age and history; s/p prostatectomy 2/2 bladder cancer.  He is UTD on Pneumovax, (10/2008), Prevnar (9/2015), Zostavax (10/2006), Tdap (1/2013) and flu (10/2018).  He is due for Shingrix and Havrix; can be done at pharmacy or Health Dept.  He is due for routine labs including diabetes panel; ordered.  He is UTD on eye exam (2/2018).  He is UTD on foot exam (7/2018).  He has had some falls in the past year; he would like to hold off on PT at this time but may call back if he changes his mind ([facility]ers [facility]).  No memory issues, no signs/symptoms of depression.  He lives alone.  He is able to drive and perform ADLs/manages finances independently.   Hearing is adequate.  He has a living will and preventive services handout and safety handout were given to him.  Current doctor list updated.  RTC 3 months.     [facility] PHQ-9 Depression Screening Completed form scanned and in chart; Total Score 4 Negative alcohol screen     COLORECTAL CANCER SCREENING: Results are in chart     FOLLOW-UP: Schedule a follow-up visit in 3 months..  f/u DM with Mac[facility] AND 1 MONTH F/U BACK PAIN WITH MACIUBA **OK TO OPEN ACUTE ILLNESS**            Orders:         Annual wellness visit, includes a PPPS, subsequent visit  (In-House)           Depression screen negative  (In-House)         1100F  Pt screen for fall risk; document 2+ falls in the past yr or any fall w/injury in past year (SOFYA)  (In-House)           Negative EtOH screen  (In-House)         3017F  Colorectal CA screen results documented and reviewed (PV)  (In-House)         2022F  Dilated retinal eye exam w/interpret by ophthalmologist/optometrist documented/reviewed (DM)4  (In-House)         APPTO  Appointment need  (In-House)             Patient Education Handouts:       Physical Exam 65+ year, Male.1           Chronic low back pain Stable on hydrocodone/APAP in general but has had worsened back pain with RLE radiation for the past month after twisting while lifting down a Ruthy present.  He declines PT at this time but will let me know if he changes his mind.  Needs refills on Norco today; he does require ongoing use of this controlled substance to function.  Tox screen and Raffi run today.  I will also send in some Zanaflex for him to try.  He has had problems with sedation from Flexeril in the past; 2 mg tabs sent.  He will let me know if he has a problem  with sedation.  RTC 1 month to f/u back pain.           Prescriptions:       Refill of: Hydrocodone/Acetaminophen 7.5mg/325mg Tablet 1 PO Q4-6 HOURS PRN PAIN  #30 (Thirty) tablet(s) Refills: 0       Tizanidine HCl 2mg Tablet 1 tab PO BID prn spasm  #30 (Thirty) tablet(s) Refills: 0           Patient Education Handouts:       Exercises - Treatment for Low Back Pain    PTC           Use of high risk medications     Controlled substance documentation: Raffi reviewed; drug screen performed and appropriate; consent is reviewed and signed and on the chart.  He is aware of risk of addiction on this medication, understands that he will need to follow up for a review every 3 months and his medications will be adjusted or  decreased as deemed appropriate at each visit.  No history of drug or alcohol abuse.  No concerns about diversion or abuse. He denies side effects related to the medication.  He is aware that he may be called in for pill counts.  The dosing of this medication will be reviewed on a regular basis and reduced if possible..  Ongoing use of a controlled substance is necessary for this patient to have a normal quality of life           Orders:       68069  Drug test prsmv qual dir optical obs per day  (In-House)            Type 2 diabetes     LABORATORY:  Labs ordered to be performed today include Diabetes Panel 1; CMP, Lipid, A1C and Microalbumin.            Orders:       [id number]  DIAB1 [facility] LIPID,CMP, A1C: 96243, 08882, 95695  (Send-Out)         638[facility]  [facility]  (Send-Out)               Patient Recommendations:        For  Health checkup:     Schedule a follow-up visit in 3 months.                APPOINTMENT INFORMATION:        Monday Tuesday Wednesday Thursday Friday Saturday Sunday            Time:___________________AM  PM   Date:_____________________             CHARGE CAPTURE           **Please note: ICD descriptions below are intended for billing purposes only and may not represent clinical diagnoses**        Primary Diagnosis:         V70.0 Health checkup            Z00.00    Encounter for general adult medical examination without abnormal findings              Orders:             Annual wellness visit, includes a PPPS, subsequent visit  (In-House)                Depression screen negative  (In-House)             1100F   Pt screen for fall risk; document 2+ falls in the past yr or any fall w/injury in past year (SOFYA)  (In-House)                Negative EtOH screen  (In-House)             3017F   Colorectal CA screen results documented and reviewed (PV)  (In-House)             2022F   Dilated retinal eye exam w/interpret by ophthalmologist/optometrist  documented/reviewed (DM)4  (In-House)             APPTO   Appointment need  (In-House)           V79.0 Screening for depression            Z13.89    Encounter for screening for other disorder    724.2 Chronic low back pain            M54.5    Low back pain    V58.69 Use of high risk medications            Z79.891    Long term (current) use of opiate analgesic              Orders:          [id number]   Drug test prsmv qual dir optical obs per day  (In-House)           250.00 Type 2 diabetes            E11.9    Type 2 diabetes mellitus without complications

## 2021-05-18 NOTE — PROGRESS NOTES
Marcello Larson 1940     Office/Outpatient Visit    Visit Date: Mon, Oct 14, 2019 11:47 am    Provider: Esequiel Kirk MD (Assistant: Sarah Spurling, MA)    Location: [facility]        Electronically signed by Esequiel Kirk MD on  10/14/2019 12:43:07 PM                             SUBJECTIVE:        CC:     Mr. Larson is a 79 year old White male.  This is a follow-up visit.  right elbow, getting better. says it's going down a little every day.          HPI:     Mr. Goldstein says that his elbow is doing fine now.  He will just has a little bit of fullness there but nearly returned to normal.  We reviewed the x-ray results together.  He says that he has had a dislocated elbow in that location before.     His blood pressure looks okay today.  We reviewed his recent lab work which looked great.  He is got a follow-up appointment with Dr. Corley in November.     ROS:     CONSTITUTIONAL:  Negative for chills, fatigue and fever.      CARDIOVASCULAR:  Negative for chest pain, orthopnea, paroxysmal nocturnal dyspnea and pedal edema.      RESPIRATORY:  Negative for dyspnea and cough.      GASTROINTESTINAL:  Negative for abdominal pain, heartburn, constipation, diarrhea, and stool changes.      GENITOURINARY:  Negative for dysuria and polyuria.      PSYCHIATRIC:  Negative for anxiety and depression.          PMH/FMH/SH:     Last Reviewed on 9/20/2019 01:08 PM by Zeny Corley    Past Medical History:             PAST MEDICAL HISTORY         Coronary Artery Disease    Hyperlipidemia    Hypertension     Gastroesophageal Reflux Disease     Glomerulonephritis: membranous;     Gout    Osteoarthritis    back pain     bladder cancer         CURRENT MEDICAL PROVIDERS:    Cardiologist: Dr. Tuttle in [place]    Chiropractor    Ophthalmologist         PREVENTIVE HEALTH MAINTENANCE             BONE DENSITY: was last done 6/7/13 with the following abnormality noted-- osteoporosis     COLORECTAL CANCER  SCREENING: Up to date (colonoscopy q10y; sigmoidoscopy q5y; Cologuard q3y) was last done 8/26/2019; sigmoidoscopy with the following abnormalaties noted-- diverticulitis with stricture; unable to pass scope to complete full colonoscopy     EYE EXAM: Diabetic Eye Exam during this calendar year and results are in chart was last done 6/10/2019 no retinopathy     PSA: was last done 5/26/15 with the following abnormalaties noted-- 20.1         PAST MEDICAL HISTORY             ADVANCE DIRECTIVE Living will on file         PAST MEDICAL HISTORY             CURRENT MEDICAL PROVIDERS:    Cardiologist: Kofi Knight    Chiropractor: mayda         Surgical History:         Coronary Artery Bypass Graft: 1984, 1991;      Appendectomy    Cholecystectomy    cystoprostatectomy with ileoconduit;      Cataract Removal    LLE amputation;         Social History:     Occupation:    Disabled     Marital Status:      Children: 2 children         Tobacco/Alcohol/Supplements:     Last Reviewed on 9/30/2019 09:01 AM by Spurling, Sarah C    Tobacco: He has a past history of cigarette smoking; quit date:  1984.          Substance Abuse History:     Last Reviewed on 9/20/2019 01:08 PM by Zeny Corley        Mental Health History:     Last Reviewed on 9/20/2019 01:08 PM by Zeny Corley        Communicable Diseases (eg STDs):     Last Reviewed on 9/20/2019 01:08 PM by Zeny Corley            Allergies:     Last Reviewed on 9/30/2019 09:01 AM by Spurling, Sarah C    Prednisone: sweating (Adverse Reaction)    Lisinopril: cough        Current Medications:     Last Reviewed on 9/30/2019 09:01 AM by Spurling, Sarah C    Carvedilol 6.25mg Tablet 1 tab bid     Prochlorperazine 10mg Tablets 1 tab q8h prn     Gabapentin 300mg Capsules 2 capsules BID     Furosemide 40mg Tablets 1 by mouth  daily     Isosorbide Mononitrate 30mg Tablets, Extended Release Take 1 tablet(s) by mouth qam     Metformin HCl 500mg Tablet 1 tab daily     Amlodipine   10mg Tablet 1 tab daily     Sucralfate 1gm Tablets 1 tab qid     Lovastatin 40mg Tablets, Extended Release Take 1 tablet(s) by mouth each evening     Potassium Chloride 10mEq Tablets, Extended Release Take 1 tablet(s) by mouth daily     Voltaren  1% Topical Gel applly qid     Singulair  10mg Tablet Take 1 tablet(s) by mouth each evening     Nitrostat 0.4mg Tablets, Sublingual as directed     Clopidogrel 75mg Tablet 1 tab daily     Fish Oil 8 tabs daily 1200mg each     Aspirin (ASA) 81mg Tablets, Enteric Coated 1 tab daily     Movantik 12.5mg Tablet Take 1 tablet(s) by mouth daily on empty stomach 1 hour before or 2 hours after first meal.     Pantoprazole 40mg Tablets, Delayed Release 1 tab bid     Morphine Sulfate     Ondansetron HCl 4mg Tablet 1 po q 6 hours prn     Iron 28 MG     Vitamin B6 100MG qd     Vitamin C 1,000mg Tablet Take 1 tablet(s) by mouth daily         OBJECTIVE:        Vitals:         Current: 10/14/2019 11:52:15 AM    Ht:  5 ft, 6.5 in;  Wt: 144.4 lbs;  BMI: 23.0    T: 98.2 F (oral);  BP: 140/67 mm Hg (left arm, sitting);  P: 75 bpm (left arm (BP Cuff), sitting);  sCr: 0.83 mg/dL;  GFR: 61.95        Exams:     PHYSICAL EXAM:     GENERAL:  well developed and nourished; appropriately groomed; in no apparent distress;     NECK: carotid exam is normal with good upstroke and no bruits;     RESPIRATORY:  lungs clear to auscultation and percussion; symmetric expansion; no dyspnea;     CARDIOVASCULAR: regular rate and rhythm; normal S1, S2; no murmur, rub, or gallop; normal PMI;     SKIN: Once again has somewhat of a saccular dilatation overlying the right elbow.  It is more rubbery feeling today.  There is no redness present.;     MUSCULOSKELETAL: Full range of motion of the right elbow.  Just a little fullness but no fluctuance or free fluid present.No evidence of infection.;         Procedures:     Vaccination against other viral diseases, Influenza     1. Influenza high dose 0.5 ml unit dose, ABN  signed given IM in the left upper arm; administered by pdr 10/14/19;  lot number [id number]; expires 5/16/20 Regarding contraindications to an Influenza vaccine:        No contraindications were noted.              ASSESSMENT           V04.81   Z23  Vaccination against other viral diseases, Influenza              DDx:     726.33   M70.20  Olecranon bursitis              DDx:     401   I10  Essential hypertension              DDx:     272.2   E78.2  Mixed hyperlipidemia              DDx:     250.00   E11.9  Type 2 diabetes              DDx:         ORDERS:         Procedures Ordered:       [id number]  Fluzone High Dose  (In-House)           Other Orders:         Administration of influenza virus vaccine (x1)                 PLAN:          Vaccination against other viral diseases, Influenza         IMMUNIZATIONS given today: Influenza HIGH Dose.            Orders:       [id number]  Fluzone High Dose  (In-House)                     Administration of influenza virus vaccine (x1)             Patient Recommendations:    Dragon transcription disclaimer:        Much of this encounter note is an electronic transcription/translation of spoken language to printed text.  The electronic translation of spoken language may permit erroneous, or at times, nonsensical words or phrases to be inadvertently transcribed.  Although I have reviewed the note for such errors, some may still exist.             CHARGE CAPTURE           **Please note: ICD descriptions below are intended for billing purposes only and may not represent clinical diagnoses**        Primary Diagnosis:         V04.81 Vaccination against other viral diseases, Influenza            Z23    Encounter for immunization              Orders:          [id number]   Office/outpatient visit; established patient, level 3  (In-House)             93367   Fluzone High Dose  (In-House)                                           Administration of influenza virus vaccine (x1)         726.33  Olecranon bursitis            M70.20    Olecranon bursitis, unspecified elbow    401 Essential hypertension            I10    Essential (primary) hypertension    272.2 Mixed hyperlipidemia            E78.2    Mixed hyperlipidemia    250.00 Type 2 diabetes            E11.9    Type 2 diabetes mellitus without complications

## 2021-05-18 NOTE — PROGRESS NOTES
Marcello Larson  1940     Office/Outpatient Visit    Visit Date: Wed, Jan 6, 2021 01:51 pm    Provider: Aristides Travis N.P. (Assistant: Evelyn Dominguez MA)    Location: [facility]        Electronically signed by Aristides Travis N.P. on  01/10/2021 10:40:49 PM                             Subjective:        CC: Mr. Larson is a 80 year old White male.  He is here today following a transition of care from an inpatient hospital: [facility]. The patient was admitted on 12/22/2020-12/23/020. The patient was admitted for Chest pain. Our office called the patient within 48 hours of discharge and scheduled the follow-up appointment. During the patient's hospital stay the patient was treated by Dr. Us. Medications have been reviewed and reconciled with discharge summary..          HPI:       Here for fu from inpatient stay at [facility] 12/22 to 12/23 was admitted for chest pain rule out MI. Had serial EKGs all were neg for acute MI, no changes from previous. His labs including trop were neg. He does have hx of a heart attack 1983, 4 vessels bypass in 1984, then MI with 2 vessels occluded  in 2013 but they were not able to do anything at the time for those 2 vessels , treated medically. Since then has been pain free and felt fine until  this event.  Last stress 10/2020 Dr Tuttle was normal , Since discharge has felt fine, no meds were stopped or changed but Ranexa 500mg po bid was added, and is to continue Imdur 30 along with this med.    ROS:     CONSTITUTIONAL:  Negative for chills, fatigue, fever, and weight change.      CARDIOVASCULAR:  Negative for dizziness, palpitations, pedal edema and tachycardia.      RESPIRATORY:  Negative for recent cough, dyspnea and frequent wheezing.      NEUROLOGICAL:  Negative for dizziness, memory loss, paresthesias, tremor and weakness.      PSYCHIATRIC:  Negative for anxiety, depression, and sleep disturbances.          Past Medical History / Family History  / Social History:         Last Reviewed on 1/10/2021 10:39 PM by Aristides Travis    Past Medical History:             PAST MEDICAL HISTORY         Coronary Artery Disease    Hyperlipidemia    Hypertension     Gastroesophageal Reflux Disease     Glomerulonephritis: membranous;     Gout    Osteoarthritis    back pain     bladder cancer         CURRENT MEDICAL PROVIDERS:    Cardiologist: Dr. Tuttle in [place]    Chiropractor    Ophthalmologist         PREVENTIVE HEALTH MAINTENANCE             BONE DENSITY: was last done 6/7/13 with the following abnormality noted-- osteoporosis     COLORECTAL CANCER SCREENING: Up to date (colonoscopy q10y; sigmoidoscopy q5y; Cologuard q3y) was last done 8/26/2019; sigmoidoscopy with the following abnormalaties noted-- diverticulitis with stricture; unable to pass scope to complete full colonoscopy     EYE EXAM: Diabetic Eye Exam during this calendar year and results are in chart was last done 7/1/2020 no retinopathy     PSA: was last done 5/26/15 with the following abnormalaties noted-- 20.1         PAST MEDICAL HISTORY             ADVANCE DIRECTIVE Living will on file         PAST MEDICAL HISTORY             CURRENT MEDICAL PROVIDERS:    Cardiologist: Kofi Knight    Chiropractor: mayda         Surgical History:         Coronary Artery Bypass Graft: 1984, 1991;     Appendectomy    Cholecystectomy    cystoprostatectomy with ileoconduit;     Cataract Removal    LLE amputation;         Social History:     Occupation:    Disabled     Marital Status:      Children: 2 children         Tobacco/Alcohol/Supplements:     Last Reviewed on 1/10/2021 10:39 PM by Aristides Travis    Tobacco: He has a past history of cigarette smoking; quit date:  1984.          Substance Abuse History:     Last Reviewed on 1/10/2021 10:39 PM by Aristides Travis        Mental Health History:     Last Reviewed on 1/10/2021 10:39 PM by Aristides Travis        Communicable Diseases (eg STDs):      Last Reviewed on 1/10/2021 10:39 PM by Aristides Travis        Current Problems:     Last Reviewed on 1/10/2021 10:39 PM by Aristides Travis    HLD - Mixed hyperlipidemia    Anxiety disorder, unspecified    HTN - Essential (primary) hypertension    CAD - Atherosclerotic heart disease of native coronary artery without angina pectoris    GERD - Gastro-esophageal reflux disease without esophagitis    OA B knees - Bilateral primary osteoarthritis of knee    Allergic rhinitis due to pollen    Low back pain    Type 2 diabetes mellitus without complications    Anemia, unspecified    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    Olecranon bursitis, unspecified elbow    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    H/o bladder CA - Personal history of malignant neoplasm of bladder    Other long term (current) drug therapy    Encounter for general adult medical examination without abnormal findings    Encounter for follow-up examination after completed treatment for malignant neoplasm    Unstable angina        Immunizations:     Hep A, adult 11/20/2019    zoster recombinant 5/6/2020    Havrix -adult dose (HepA) 6/14/2018    Prevnar 13 (Pneumococcal PCV 13) 9/1/2015    Fluzone High-Dose pf (>=65 yr) 10/3/2016    Fluzone High-Dose pf (>=65 yr) 9/19/2017    Fluzone High-Dose pf (>=65 yr) 10/26/2018    Fluzone High-Dose pf (>=65 yr) 10/14/2019    PNEUMOVAX 23 (Pneumococcal PPV23) 10/1/2008    Tdap (Tetanus, reduced diph, acellular pertussis) 1/1/2013    Zostavax (Zoster live) 10/1/2006        Allergies:     Last Reviewed on 1/10/2021 10:39 PM by Aristides Travis    Cardiac Dye:      Prednisone: sweating  (Adverse Reaction)    Lisinopril: cough         Current Medications:     Last Reviewed on 1/10/2021 10:39 PM by Aristides Travis    ranolazine 500 mg oral Tablet, Extended Release 12 hr [take 1 tablet (500 mg) by oral route 2 times per day]    fluticasone propionate 50 mcg/actuation  Intranasal Spray, Suspension [USE 1 SPRAY EACH NOSTIL DAILY]    diclofenac sodium 1 % Topical Gel [APPLY FOUR TIMES DAILY]    amLODIPine 10 mg oral tablet [TAKE 1 TABLET BY MOUTH EVERY DAY]    carvediloL 6.25 mg oral tablet [TAKE 1 TABLET BY MOUTH TWICE DAILY]    nitroglycerin 0.4 mg Sublingual Tablet, Sublingual [DISSOLVE 1 UNDER TONGUE EVERY 5 MINUTES FOR 3 DOSES; IF NO RELIEF GO TO ER]    sucralfate 1 gram oral tablet [TAKE 1 TABLET BY MOUTH FOUR TIMES DAILY]    Vitamin C 1,000 mg oral tablet [Take 1 tablet(s) by mouth daily]    Iron 28 MG     aspirin 81 mg oral tablet, delayed release (enteric coated) [1 tab daily]    Vitamin B6 100MG qd     isosorbide mononitrate 30 mg oral Tablet, Extended Release 24 hr [TAKE 1 TABLET BY MOUTH EVERY MORNING]    furosemide 40 mg oral tablet [TAKE 1 TABLET BY MOUTH DAILY]    lovastatin 40 mg oral tablet [TAKE 1 TABLET BY MOUTH EVERY EVENING]    potassium chloride 10 mEq oral Tablet, Extended Release Particles/Crystals [TAKE 1 TABLET BY MOUTH DAILY]    clopidogreL 75 mg oral tablet [TAKE 1 TABLET DAILY]    Fish Oil 8 tabs daily 1200mg each     metFORMIN 500 mg oral tablet [TAKE 1 TABLET BY MOUTH EVERY DAY]    montelukast 10 mg oral tablet [TAKE 1 TABLET BY MOUTH EACH EVENING]    prochlorperazine maleate 10 mg oral tablet [TAKE 1 TABLET BY MOUTH EVERY 8 HOURS AS NEEDED]    pantoprazole 40 mg oral tablet, delayed release (enteric coated) [TAKE 1 TABLET BY MOUTH TWICE DAILY]    gabapentin 300 mg oral capsule [TAKE 2 CAPSULES BY MOUTH TWICE DAILY]    Ondansetron HCl 4 mg oral tablet [1 po q 6 hours prn]    Morphine Sulfate     busPIRone 5 mg oral tablet [take 1 tablet (5 mg) by oral route 2 times per day prn]    losartan 25 mg oral tablet [TAKE 1/2 TABLET BY MOUTH EVERY DAY]        Objective:        Vitals:         Current: 1/6/2021 1:57:51 PM    Ht:  5 ft, 6.5 in;  Wt: 152.4 lbs;  BMI: 24.2T: 96.4 F (temporal);  BP: 157/76 mm Hg (left arm, sitting);  P: 78 bpm (right arm (BP Cuff),  sitting);  sCr: 0.83 mg/dL;  GFR: 62.39        Exams:     PHYSICAL EXAM:     GENERAL: Vitals recorded well developed, well nourished;  well groomed;  no apparent distress;     NECK: carotid exam is normal with good upstroke and no bruits;     RESPIRATORY: normal respiratory rate and pattern with no distress; normal breath sounds with no rales, rhonchi, wheezes or rubs;     CARDIOVASCULAR: normal rate; rhythm is regular;  no systolic murmur; no edema;     NEUROLOGIC: GROSSLY INTACT     PSYCHIATRIC:  appropriate affect and demeanor; normal speech pattern; grossly normal memory;         Assessment:         I20.0   Unstable angina           Plan:         Unstable anginaNo changes from previous , med list verified , will fu with Cardiologist as already scheduled. For any CP , Return to ER             Charge Capture:         Primary Diagnosis:     I20.0  Unstable angina           Orders:      [id number]  Transitional care manage service 14 day discharge  (In-House)                  ADDENDUMS:      ____________________________________    Addendum: 01/12/2021 10:50 AM - Aristides Travis            ADDENDUM: Add [id number]; Remove [id number] dmt

## 2021-05-18 NOTE — PROGRESS NOTES
Marcello Larson  1940     Office/Outpatient Visit    Visit Date: Tue, Aug 18, 2020 01:40 pm    Provider: Zeny Corley MD (Assistant: Donna Vazquez MA)    Location: [facility]        Electronically signed by Zeny Corley MD on  08/18/2020 05:12:47 PM                             Subjective:        CC: Mr. Larson is a 80 year old White male.  Patient presents today for [facility] visit         HPI:       He is UTD on colonoscopy, last done 8/2019 and this showed diverticulosis.  Prostate cancer screening is no longer indicated by age and history; s/p prostatectomy 2/2 bladder cancer.  He is UTD on Pneumovax, (10/2008), Prevnar (9/2015), Zostavax (10/2006), Tdap (1/2013) and flu (10/2019).  He is UTD on Shingrix #2 (#1 done 5/2020).  He is due for routine labs including diabetes panel.  He is UTD on eye exam (7/2020).  He is UTD on foot exam (8/2019).    Mr. Larson is here for a Medicare wellness visit.  The required HRA questions are integrated within this visit note. Family medical history and individual medical/surgical history were reviewed and updated.  A current height, weight, BMI, blood pressure, and pulse were recorded in the vitals section of the note and have been reviewed. Patient's medications, including supplements, were recorded in the chart and reviewed.  Current providers and suppliers were reviewed and updated.          Self-Assessment of Health: He rates his health as good. He rates his confidence of being able to control/manage most of his health problems as very confident. His physical/emotional health has limited his social activites slightly.  A review of cognitive impairment was performed, including ability to drive a car, manage finances, and any memory changes, and was found to be negative.  A review of functional ability, including bathing, dressing, walking, and urine/bowel continence as well as level of safety was performed and was found to be negative.  Falls Risk:  Has not had any falls or only one fall without injury in the past year.  He denies having trouble hearing the TV/radio when others do not, having to strain to hear or understand conversations and wearing hearing aid(s).  Concerning home safety, he reports that at home he DOES have adequate lighting, a skid resistant shower/tub, handrails on stairs, functioning smoke alarms and absence of throw rugs, but not grab bars in the bath.          Immunization Status: ** >10 years since last Td booster; Physical Activity: He exercises for at least 20 minutes 3 or more days/week.; Type of diet patient normally eats is described as well-balanced with fruits and vegetables Tobacco: He has a past history of cigarette smoking; quit date:  1984.  Preventative Health updated today.      ROS:     CONSTITUTIONAL:  Positive for fatigue.   Negative for fever.      EYES:  Negative for blurred vision.      E/N/T:  Positive for diminished hearing, nasal congestion and frequent rhinorrhea.      CARDIOVASCULAR:  Negative for chest pain, palpitations and tachycardia.      RESPIRATORY:  Negative for recent cough and dyspnea.      GASTROINTESTINAL:  Positive for constipation.   Negative for abdominal pain, diarrhea, nausea or vomiting.      GENITOURINARY:  Negative for nocturia and change in urine stream.      MUSCULOSKELETAL:  Positive for back pain.   Negative for arthralgias or myalgias.      NEUROLOGICAL:  Negative for headaches, paresthesias and weakness.      PSYCHIATRIC:  Negative for anxiety, depression and sleep disturbance.          Past Medical History / Family History / Social History:         Last Reviewed on 8/18/2020 02:06 PM by Zeny Corley    Past Medical History:             PAST MEDICAL HISTORY         Coronary Artery Disease    Hyperlipidemia    Hypertension     Gastroesophageal Reflux Disease     Glomerulonephritis: membranous;     Gout    Osteoarthritis    back pain     bladder cancer         CURRENT MEDICAL  PROVIDERS:    Cardiologist: Dr. Tuttle in [place]    Chiropractor    Ophthalmologist         PREVENTIVE HEALTH MAINTENANCE             BONE DENSITY: was last done 6/7/13 with the following abnormality noted-- osteoporosis     COLORECTAL CANCER SCREENING: Up to date (colonoscopy q10y; sigmoidoscopy q5y; Cologuard q3y) was last done 8/26/2019; sigmoidoscopy with the following abnormalaties noted-- diverticulitis with stricture; unable to pass scope to complete full colonoscopy     EYE EXAM: Diabetic Eye Exam during this calendar year and results are in chart was last done 7/1/2020 no retinopathy     PSA: was last done 5/26/15 with the following abnormalaties noted-- 20.1         PAST MEDICAL HISTORY             ADVANCE DIRECTIVE Living will on file         PAST MEDICAL HISTORY             CURRENT MEDICAL PROVIDERS:    Cardiologist: Kofi Knight    Chiropractor: mayda         Surgical History:         Coronary Artery Bypass Graft: 1984, 1991;     Appendectomy    Cholecystectomy    cystoprostatectomy with ileoconduit;     Cataract Removal    LLE amputation;         Social History:     Occupation:    Disabled     Marital Status:      Children: 2 children         Tobacco/Alcohol/Supplements:     Last Reviewed on 8/18/2020 02:06 PM by Zeny Corley    Tobacco: He has a past history of cigarette smoking; quit date:  1984.          Substance Abuse History:     Last Reviewed on 8/18/2020 02:06 PM by Zeny Corley        Mental Health History:     Last Reviewed on 8/18/2020 02:06 PM by Zeny Corley        Communicable Diseases (eg STDs):     Last Reviewed on 8/18/2020 02:06 PM by Zeny Corley        Current Problems:     Last Reviewed on 5/08/2020 03:31 PM by Zney Corley    HLD - Mixed hyperlipidemia    Anxiety disorder, unspecified    HTN - Essential (primary) hypertension    CAD - Atherosclerotic heart disease of native coronary artery without angina pectoris    GERD - Gastro-esophageal reflux  disease without esophagitis    OA B knees - Bilateral primary osteoarthritis of knee    Allergic rhinitis due to pollen    Low back pain    Type 2 diabetes mellitus without complications    Anemia, unspecified    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    Olecranon bursitis, unspecified elbow    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    H/o bladder CA - Personal history of malignant neoplasm of bladder        Immunizations:     Hep A, adult 11/20/2019    zoster recombinant 5/6/2020    Havrix -adult dose (HepA) 6/14/2018    Prevnar 13 (Pneumococcal PCV 13) 9/1/2015    Fluzone High-Dose pf (>=65 yr) 10/3/2016    Fluzone High-Dose pf (>=65 yr) 9/19/2017    Fluzone High-Dose pf (>=65 yr) 10/26/2018    Fluzone High-Dose pf (>=65 yr) 10/14/2019    PNEUMOVAX 23 (Pneumococcal PPV23) 10/1/2008    Tdap (Tetanus, reduced diph, acellular pertussis) 1/1/2013    Zostavax (Zoster live) 10/1/2006        Allergies:     Last Reviewed on 5/08/2020 03:31 PM by Zeny Corley    Cardiac Dye:      Prednisone: sweating  (Adverse Reaction)    Lisinopril: cough         Current Medications:     Last Reviewed on 5/08/2020 03:31 PM by Zeny Corley    fluticasone propionate 50 mcg/actuation Intranasal Spray, Suspension [USE 1 SPRAY EACH NOSTIL DAILY]    sucralfate 1 gram oral tablet [TAKE 1 TABLET TABLET FOUR TIMES DAILY]    furosemide 40 mg oral tablet [1T  BY MOUTH  DAILY]    Vitamin C 1,000 mg oral tablet [Take 1 tablet(s) by mouth daily]    Iron 28 MG     aspirin 81 mg oral tablet, delayed release (enteric coated) [1 tab daily]    Vitamin B6 100MG qd     diclofenac sodium 1 % Topical Gel [VERONICA QID]    sucralfate 1 gram oral tablet [1T PO QID]    potassium chloride 10 mEq oral Tablet, Extended Release Particles/Crystals [TAKE 1 TABLET BY MOUTH DAILY]    isosorbide mononitrate 30 mg oral Tablet, Extended Release 24 hr [TAKE 1 TABLET  BY MOUTH QAM]    lovastatin 40 mg oral tablet [TAKE 1 TABLET BY MOUTH  EVERY EVENING]    clopidogreL 75 mg oral tablet [TAKE 1 TABLET DAILY]    carvediloL 6.25 mg oral tablet [TAKE 1 TABLET BY MOUTH TWICE DAILY]    amLODIPine 10 mg oral tablet [TAKE 1 TABLET BY MOUTH EVERY DAY]    Nitrostat 0.4mg Tablets, Sublingual [as directed]    Fish Oil 8 tabs daily 1200mg each     metFORMIN 500 mg oral tablet [TAKE ONE TABLET BY MOUTH DAILY]    montelukast 10 mg oral tablet [TAKE 1 TABLET(S) BY MOUTH EACH EVENING]    prochlorperazine maleate 10 mg oral tablet [TAKE 1 TABLET BY MOUTH EVERY 8 HOURS AS NEEDED]    pantoprazole 40 mg oral tablet, delayed release (enteric coated) [TAKE 1 TABLET BY MOUTH TWICE DAILY]    gabapentin 300 mg oral capsule [TAKE 2 CAPSULES BY MOUTH TWICE DAILY]    Ondansetron HCl 4 mg oral tablet [1 po q 6 hours prn]    Morphine Sulfate     doxycycline hyclate 100 mg oral capsule [take 1 capsule (100 mg) by oral route every 12 hours ]    busPIRone 5 mg oral tablet [take 1 tablet (5 mg) by oral route 2 times per day prn]        Objective:        Vitals:         Current: 8/18/2020 1:49:38 PM    Ht:  5 ft, 6.5 in;  Wt: 148 lbs;  BMI: 23.5T: 97.6 F (temporal);  BP: 146/60 mm Hg (right arm, sitting);  P: 63 bpm (right arm (BP Cuff), sitting);  sCr: 0.83 mg/dL;  GFR: 61.62VA: 20/50 OD, 20/40 OS (near, with correction)        Repeat:     2:39:40 PM  BP:   154/71mm Hg (left arm, sitting, HR: 90)     Exams:     PHYSICAL EXAM:     GENERAL: Vitals recorded well developed, well nourished;  well groomed;  no apparent distress;     EYES: extraocular movements intact; conjunctiva and cornea are normal; PERRLA;     E/N/T: OROPHARYNX:  normal mucosa, dentition, gingiva, and posterior pharynx;     RESPIRATORY: normal respiratory rate and pattern with no distress; normal breath sounds with no rales, rhonchi, wheezes or rubs;     CARDIOVASCULAR: normal rate; rhythm is regular;  no systolic murmur; no edema;     GASTROINTESTINAL: nontender; stoma appears pink and healthy with clear urine output  in bag; normal bowel sounds;     MUSCULOSKELETAL: normal gait; normal overall tone LLE BKA; prosthesis in place;     NEUROLOGIC: mental status: alert and oriented x 3; reflexes: brachioradialis: 2+; knee jerks: 2+;     PSYCHIATRIC: appropriate affect and demeanor; normal psychomotor function; normal speech pattern;     Right foot exam    Protective sensation using Monofilament test: NORMAL sensation. Patient detects .07 grams of force which is considered normal.    Vascular status: normal peripheral vascular exam with palpable dorsal pedal and posterior tibal pulses and brisk digital capillary refill    Skin is intact without sores or ulcers         Lab/Test Results:         Amphetamines Screen, Urin: Negative (08/18/2020),     BAR-Barbiturates Screen, Urin: Negative (08/18/2020),     Buprenorphine: Negative (08/18/2020),     BZO-Benzodiazepines Screen,Ur: Negative (08/18/2020),     Cocaine(Metab.)Screen, Ur: Negative (08/18/2020),     MDMA-Ecstasy: Negative (08/18/2020),     Met-Methamphetamine: Negative (08/18/2020),     MTD-Methadone Screen, Urine: Negative (08/18/2020),     Opiate Screen, Urine: Positive (08/18/2020),     OXY-Oxycodone: Negative (08/18/2020),     PCP-Phencyclidine Screen, Uri: Negative (08/18/2020),     THC Cannabinoids Screen, Urin: Negative (08/18/2020),     Urine temperature: confi rmed (08/18/2020),     Date and time of last pill: Morphine, Gabapentin 08/18/2020 @ 0700/AS (08/18/2020),     Performed by: tls (08/18/2020),     Collection Time: 1445 (08/18/2020),             Assessment:         Z00.00   Encounter for general adult medical examination without abnormal findings       M54.5   Low back pain       Z79.899   Other long term (current) drug therapy       E11.9   Type 2 diabetes mellitus without complications           ORDERS:         Meds Prescribed:       [Refilled] prochlorperazine maleate 10 mg oral tablet [TAKE 1 TABLET BY MOUTH EVERY 8 HOURS AS NEEDED], #24 (twenty four) tablets,  Refills: 0 (zero)         Radiology/Test Orders:       3017F  Colorectal CA screen results documented and reviewed (PV)  (In-House)            2022F  Dilated retinal eye exam w/interpret by ophthalmologist/optometrist documented/reviewed (DM)4  (In-House)              Lab Orders:       [id number]  DIAB1 - [facility] LIPID,CMP, A1C: 78007, 90097, 16449  (Send-Out)            05609  DANIEL - [facility] Microablbumin, quantitative  (Send-Out)            97653  Drug test prsmv qual dir optical obs per day  (In-House)            APPTO  Appointment need  (In-House)              Procedures Ordered:         Annual wellness visit, includes a PPPS, subsequent visit  (In-House)              Other Orders:       1101F  Pt screen for fall risk; document no falls in past year or only 1 fall w/o injury in past year (SOFYA)  (In-House)            1123F  Advance Care Planning discussed and doc; advance care plan or surrogate decision maker doc. in MR  (Send-Out)            2028F  Foot examination performed (includes examination through visual inspection, sensory exam with monofilament, and pulse exam - report when any of the three components are completed) (DM)4  (In-House)              Depression screen negative  (In-House)                      Plan:         Encounter for general adult medical examination without abnormal findingsHe is UTD on colonoscopy, last done 8/2019 and this showed diverticulosis.  Prostate cancer screening is no longer indicated by age and history; s/p prostatectomy 2/2 bladder cancer.  He is UTD on Pneumovax, (10/2008), Prevnar (9/2015), Zostavax (10/2006), Tdap (1/2013) and flu (10/2019).  He is UTD on Shingrix #2 (#1 done 5/2020).  He is due for routine labs including diabetes panel; ordered.  He is UTD on eye exam (7/2020).  He is UTD on foot exam (8/2019); foot exam today shows normal sensation.  No fall risk, no memory issues, no signs/symptoms of depression.  He lives alone.  He is able to drive and perform  ADLs/manages finances independently.   Hearing is adequate.  He has a living will.  Preventive services handout and safety handout were given to him.  Current doctor list updated.  RTC 6 months.    MIPS PHQ-9 Depression Screening: Completed form scanned and in chart; Total Score 4; Negative Depression Screen ADVANCE DIRECTIVES (Please update in PMH): Living will     FOLLOW-UP: Schedule a follow-up visit in 6 months.:.  f/u DM with [place]          Prescriptions:       [Refilled] prochlorperazine maleate 10 mg oral tablet [TAKE 1 TABLET BY MOUTH EVERY 8 HOURS AS NEEDED], #24 (twenty four) tablets, Refills: 0 (zero)           Orders:         Annual wellness visit, includes a PPPS, subsequent visit  (In-House)            1101F  Pt screen for fall risk; document no falls in past year or only 1 fall w/o injury in past year (SOFYA)  (In-House)            3017F  Colorectal CA screen results documented and reviewed (PV)  (In-House)            2022F  Dilated retinal eye exam w/interpret by ophthalmologist/optometrist documented/reviewed (DM)4  (In-House)            1123F  Advance Care Planning discussed and doc; advance care plan or surrogate decision maker doc. in MR  (Send-Out)            APPTO  Appointment need  (In-House)              Depression screen negative  (In-House)              Other long term (current) drug therapy    Controlled substance documentation: Raffi reviewed; drug screen performed and appropriate; consent is reviewed and signed and on the chart.  He is aware of risk of addiction on this medication, understands that he will need to follow up for a review every 3 months and his medications will be adjusted or decreased as deemed appropriate at each visit.  No history of drug or alcohol abuse.  No concerns about diversion or abuse. He denies side effects related to the medication.  He is aware that he may be called in for pill counts.  The dosing of this medication will be reviewed on a regular  basis and reduced if possible..  Ongoing use of a controlled substance is necessary for this patient to have a normal quality of life           Orders:       87787  Drug test prsmv qual dir optical obs per day  (In-House)              Type 2 diabetes mellitus without complications    LABORATORY:  Labs ordered to be performed today include Diabetes Panel 1; CMP, Lipid, A1C and Microalbumin.            Orders:       92435  DIAB1 - [facility] LIPID,CMP, A1C: 77406, 66481, 05589  (Send-Out)            74963  DANIEL - [facility] Microablbumin, quantitative  (Send-Out)                  Other Orders      2028F  Foot examination performed (includes examination through visual inspection, sensory exam with monofilament, and pulse exam - report when any of the three components are completed) (DM)4  (In-House)              Patient Recommendations:        For  Encounter for general adult medical examination without abnormal findings:    Schedule a follow-up visit in 6 months.                APPOINTMENT INFORMATION:        Monday Tuesday Wednesday Thursday Friday Saturday Sunday            Time:___________________AM  PM   Date:_____________________             Charge Capture:         Primary Diagnosis:     Z00.00  Encounter for general adult medical examination without abnormal findings           Orders:        Annual wellness visit, includes a PPPS, subsequent visit  (In-House)            1101F  Pt screen for fall risk; document no falls in past year or only 1 fall w/o injury in past year (SOFYA)  (In-House)            3017F  Colorectal CA screen results documented and reviewed (PV)  (In-House)            2022F  Dilated retinal eye exam w/interpret by ophthalmologist/optometrist documented/reviewed (DM)4  (In-House)            APPTO  Appointment need  (In-House)              Depression screen negative  (In-House)              M54.5  Low back pain     Z79.899  Other long term (current) drug therapy           Orders:      19057   Drug test prsmv qual dir optical obs per day  (In-House)              E11.9  Type 2 diabetes mellitus without complications         Other Orders:       2028F  Foot examination performed (includes examination through visual inspection, sensory exam with monofilament, and pulse exam - report when any of the three components are completed) (DM)4  (In-House)              ADDENDUMS:      ____________________________________    Addendum: 12/08/2020 10:18 AM - Five, Team         Visit Note Faxed to:        User Entered Recipient; Number [phone]

## 2021-05-18 NOTE — PROGRESS NOTES
Marcello Larson  1940     Office/Outpatient Visit    Visit Date: Sat, Jan 30, 2021 11:41 am    Provider: Chandrika Leyva N.P. (Assistant: Daphney Puri RN)    Location: [facility]        Electronically signed by Chandrika Leyva N.P. on  01/30/2021 02:08:22 PM                             Subjective:        CC: Mr. Larson is a 80 year old White male.  sinus congestion/coughing up some mucous         HPI: 80-year-old male presenting to the office complaining of sinus congestion upon waking x2 weeks.  Patient has been trying to take Mucinex at home.  He takes Singulair and Flonase regularly.  No known ill contacts.  No shortness of air.    ROS:     CONSTITUTIONAL:  Negative for chills, fatigue and fever.      EYES:  Negative for blurred vision.      E/N/T:  Positive for nasal congestion, frequent rhinorrhea and sinus pressure.   Negative for ear pain or sore throat.      CARDIOVASCULAR:  Negative for chest pain, dizziness and palpitations.      RESPIRATORY:  Positive for recent cough.   Negative for dyspnea.      GASTROINTESTINAL:  Negative for abdominal pain, diarrhea and nausea.      MUSCULOSKELETAL:  Negative for myalgias.      INTEGUMENTARY:  Negative for rash.      NEUROLOGICAL:  Positive for headaches.   Negative for weakness.          Past Medical History / Family History / Social History:         Last Reviewed on 1/30/2021 02:05 PM by Chandrika Leyva    Past Medical History:             PAST MEDICAL HISTORY         Coronary Artery Disease    Hyperlipidemia    Hypertension     Gastroesophageal Reflux Disease     Glomerulonephritis: membranous;     Gout    Osteoarthritis    back pain     bladder cancer         CURRENT MEDICAL PROVIDERS:    Cardiologist: Dr. Tuttle in [place]    Chiropractor    Ophthalmologist         PREVENTIVE HEALTH MAINTENANCE             BONE DENSITY: was last done 6/7/13 with the following abnormality noted-- osteoporosis     COLORECTAL CANCER SCREENING:  Up to date (colonoscopy q10y; sigmoidoscopy q5y; Cologuard q3y) was last done 8/26/2019; sigmoidoscopy with the following abnormalaties noted-- diverticulitis with stricture; unable to pass scope to complete full colonoscopy     EYE EXAM: Diabetic Eye Exam during this calendar year and results are in chart was last done 7/1/2020 no retinopathy     PSA: was last done 5/26/15 with the following abnormalaties noted-- 20.1         PAST MEDICAL HISTORY             ADVANCE DIRECTIVE Living will on file         PAST MEDICAL HISTORY             CURRENT MEDICAL PROVIDERS:    Cardiologist: Kofi Knight    Chiropractor: mayda         Surgical History:         Coronary Artery Bypass Graft: 1984, 1991;     Appendectomy    Cholecystectomy    cystoprostatectomy with ileoconduit;     Cataract Removal    LLE amputation;         Social History:     Occupation:    Disabled     Marital Status:      Children: 2 children         Tobacco/Alcohol/Supplements:     Last Reviewed on 1/30/2021 02:05 PM by Chandrika Leyva    Tobacco: He has a past history of cigarette smoking; quit date:  1984.          Substance Abuse History:     Last Reviewed on 1/10/2021 10:39 PM by Aristides Travis        Mental Health History:     Last Reviewed on 1/10/2021 10:39 PM by Aristides Travis        Communicable Diseases (eg STDs):     Last Reviewed on 1/10/2021 10:39 PM by Aristides Travis        Immunizations:     Hep A, adult 11/20/2019    zoster recombinant 5/6/2020    Havrix -adult dose (HepA) 6/14/2018    Prevnar 13 (Pneumococcal PCV 13) 9/1/2015    Fluzone High-Dose pf (>=65 yr) 10/3/2016    Fluzone High-Dose pf (>=65 yr) 9/19/2017    Fluzone High-Dose pf (>=65 yr) 10/26/2018    Fluzone High-Dose pf (>=65 yr) 10/14/2019    PNEUMOVAX 23 (Pneumococcal PPV23) 10/1/2008    Tdap (Tetanus, reduced diph, acellular pertussis) 1/1/2013    Zostavax (Zoster live) 10/1/2006    Influenza, high dose seasonal 10/1/2020        Allergies:     Last  Reviewed on 1/30/2021 02:05 PM by Chandrika Leyva    Cardiac Dye:      Prednisone: sweating  (Adverse Reaction)    Lisinopril: cough         Current Medications:     Last Reviewed on 1/30/2021 02:05 PM by Chandrika Leyva    fluticasone propionate 50 mcg/actuation Intranasal Spray, Suspension [USE 1 SPRAY EACH NOSTIL DAILY]    diclofenac sodium 1 % Topical Gel [APPLY FOUR TIMES DAILY]    isosorbide mononitrate 30 mg oral Tablet, Extended Release 24 hr [TAKE 1 TABLET BY MOUTH EVERY MORNING]    nitroglycerin 0.4 mg Sublingual Tablet, Sublingual [DISSOLVE 1 UNDER TONGUE EVERY 5 MINUTES FOR 3 DOSES; IF NO RELIEF GO TO ER]    Vitamin C 1,000 mg oral tablet [Take 1 tablet(s) by mouth daily]    Iron 28 MG     aspirin 81 mg oral tablet, delayed release (enteric coated) [1 tab daily]    Vitamin B6 100MG qd     furosemide 40 mg oral tablet [TAKE 1 TABLET BY MOUTH DAILY]    lovastatin 40 mg oral tablet [TAKE 1 TABLET BY MOUTH EVERY EVENING]    potassium chloride 10 mEq oral Tablet, Extended Release Particles/Crystals [TAKE 1 TABLET BY MOUTH DAILY]    clopidogreL 75 mg oral tablet [TAKE 1 TABLET DAILY]    carvediloL 6.25 mg oral tablet [TAKE 1 TABLET BY MOUTH TWICE DAILY]    amLODIPine 10 mg oral tablet [TAKE 1 TABLET BY MOUTH EVERY DAY]    sucralfate 1 gram oral tablet [TAKE 1 TABLET BY MOUTH FOUR TIMES DAILY]    Fish Oil 8 tabs daily 1200mg each     metFORMIN 500 mg oral tablet [TAKE 1 TABLET BY MOUTH EVERY DAY]    montelukast 10 mg oral tablet [TAKE 1 TABLET BY MOUTH EACH EVENING]    prochlorperazine maleate 10 mg oral tablet [TAKE 1 TABLET BY MOUTH EVERY 8 HOURS AS NEEDED]    pantoprazole 40 mg oral tablet, delayed release (enteric coated) [TAKE 1 TABLET BY MOUTH TWICE DAILY]    gabapentin 300 mg oral capsule [TAKE 2 CAPSULES BY MOUTH TWICE DAILY]    Ondansetron HCl 4 mg oral tablet [1 po q 6 hours prn]    Morphine Sulfate     busPIRone 5 mg oral tablet [take 1 tablet (5 mg) by oral route 2 times per day prn]    losartan  25 mg oral tablet [TAKE 1/2 TABLET BY MOUTH EVERY DAY]    ranolazine 500 mg oral Tablet, Extended Release 12 hr [take 1 tablet (500 mg) by oral route 2 times per day]        Objective:        Vitals:         Current: 1/30/2021 11:45:45 AM    Ht:  5 ft, 6.5 in;  Wt: 129.6 lbs;  BMI: 20.6T: 96.3 F (temporal);  BP: 160/67 mm Hg (left arm, sitting);  P: 75 bpm (left arm (BP Cuff), sitting);  sCr: 0.83 mg/dL;  GFR: 58.24        Exams:     PHYSICAL EXAM:     GENERAL: Vitals recorded no apparent distress;     EYES: conjunctiva and cornea are normal; PERRLA;     E/N/T: NOSE: bilateral maxillary sinus tenderness present;     RESPIRATORY: normal respiratory rate and pattern with no distress; normal breath sounds with no rales, rhonchi, wheezes or rubs;     CARDIOVASCULAR: normal rate; rhythm is regular;  no systolic murmur; no edema;     LYMPHATIC: no enlargement of cervical or facial nodes;     SKIN:  no rash; no jaundice, good turgor;     MUSCULOSKELETAL: normal gait; normal overall tone     NEUROLOGIC: mental status: alert and oriented x 3;         Assessment:         J01.90   Acute sinusitis, unspecified           ORDERS:         Meds Prescribed:       [New Rx] amoxicillin 875 mg oral tablet [take 1 tablet (875 mg) by oral route every 12 hours], #20 (twenty) tablets, Refills: 0 (zero)                 Plan:         Acute sinusitis, unspecifiedIncrease fluid intake and rest.  Tylenol/ibuprofen for discomfort/fever.  Complete prescribed antibiotic.  Continue to take Singulair and Flonase as directed.  Monitor for worsening signs or symptoms of infection and notify office with any acute concerns or issues.  Patient verbalizes understanding and has no further questions upon discharge.          Prescriptions:       [New Rx] amoxicillin 875 mg oral tablet [take 1 tablet (875 mg) by oral route every 12 hours], #20 (twenty) tablets, Refills: 0 (zero)             Charge Capture:         Primary Diagnosis:     J01.90  Acute  sinusitis, unspecified           Orders:      [id number]  Office/outpatient visit; established patient, level 3  (In-House)

## 2021-05-18 NOTE — PROGRESS NOTES
Marcello Larson 1940     Office/Outpatient Visit    Visit Date: Mon, Sep 30, 2019 08:56 am    Provider: Esequiel Kirk MD (Assistant: Mariluz Solomon LPN)    Location: [facility]        Electronically signed by Esequiel Kirk MD on  09/30/2019 04:46:30 PM                             SUBJECTIVE:        CC:     Mr. Larson is a 79 year old White male.  fluid build up on right elbow         HPI:     Here today follow-up on the right elbow olecranon bursitis.  Improved significantly after drainage at his last visit.  Has noted gradual increase swelling again.     ROS:     CONSTITUTIONAL:  Negative for chills, fatigue and fever.      CARDIOVASCULAR:  Negative for chest pain, orthopnea, paroxysmal nocturnal dyspnea and pedal edema.      RESPIRATORY:  Negative for dyspnea and cough.      GASTROINTESTINAL:  Negative for abdominal pain, heartburn, constipation, diarrhea, and stool changes.      GENITOURINARY:  Negative for dysuria and polyuria.      PSYCHIATRIC:  Negative for anxiety and depression.          PMH/FM/SH:     Last Reviewed on 9/20/2019 01:08 PM by Zeny Corley    Past Medical History:             PAST MEDICAL HISTORY         Coronary Artery Disease    Hyperlipidemia    Hypertension     Gastroesophageal Reflux Disease     Glomerulonephritis: membranous;     Gout    Osteoarthritis    back pain     bladder cancer         CURRENT MEDICAL PROVIDERS:    Cardiologist: Dr. Tuttle in [place]    Chiropractor    Ophthalmologist         PREVENTIVE HEALTH MAINTENANCE             BONE DENSITY: was last done 6/7/13 with the following abnormality noted-- osteoporosis     COLORECTAL CANCER SCREENING: Up to date (colonoscopy q10y; sigmoidoscopy q5y; Cologuard q3y) was last done 8/26/2019; sigmoidoscopy with the following abnormalaties noted-- diverticulitis with stricture; unable to pass scope to complete full colonoscopy     EYE EXAM: Diabetic Eye Exam during this calendar year and results are in  chart was last done 6/10/2019 no retinopathy     PSA: was last done 5/26/15 with the following abnormalaties noted-- 20.1         PAST MEDICAL HISTORY             ADVANCE DIRECTIVE Living will on file         PAST MEDICAL HISTORY             CURRENT MEDICAL PROVIDERS:    Cardiologist: Kofi Knight    Chiropractor: mayda         Surgical History:         Coronary Artery Bypass Graft: 1984, 1991;      Appendectomy    Cholecystectomy    cystoprostatectomy with ileoconduit;      Cataract Removal    LLE amputation;         Social History:     Occupation:    Disabled     Marital Status:      Children: 2 children         Tobacco/Alcohol/Supplements:     Last Reviewed on 9/23/2019 09:35 AM by Spurling, Sarah C    Tobacco: He has a past history of cigarette smoking; quit date:  1984.          Substance Abuse History:     Last Reviewed on 9/20/2019 01:08 PM by Zeny Corley        Mental Health History:     Last Reviewed on 9/20/2019 01:08 PM by Zeny Corley        Communicable Diseases (eg STDs):     Last Reviewed on 9/20/2019 01:08 PM by Zeny Corley            Allergies:     Last Reviewed on 9/23/2019 09:35 AM by Spurling, Sarah C    Prednisone: sweating (Adverse Reaction)    Lisinopril: cough        Current Medications:     Last Reviewed on 9/23/2019 09:36 AM by Spurling, Sarah C    Gabapentin 300mg Capsules 2 capsules BID     Furosemide 40mg Tablets 1 by mouth  daily     Isosorbide Mononitrate 30mg Tablets, Extended Release Take 1 tablet(s) by mouth qam     Metformin HCl 500mg Tablet 1 tab daily     Amlodipine  10mg Tablet 1 tab daily     Sucralfate 1gm Tablets 1 tab qid     Carvedilol 6.25mg Tablet 1 tab bid     Lovastatin 40mg Tablets, Extended Release Take 1 tablet(s) by mouth each evening     Potassium Chloride 10mEq Tablets, Extended Release Take 1 tablet(s) by mouth daily     Voltaren  1% Topical Gel applly qid     Singulair  10mg Tablet Take 1 tablet(s) by mouth each evening     Nitrostat 0.4mg  Tablets, Sublingual as directed     Clopidogrel 75mg Tablet 1 tab daily     Prochlorperazine 10mg Tablets 1 tab q8h prn     Fish Oil 8 tabs daily 1200mg each     Aspirin (ASA) 81mg Tablets, Enteric Coated 1 tab daily     Movantik 12.5mg Tablet Take 1 tablet(s) by mouth daily on empty stomach 1 hour before or 2 hours after first meal.     Pantoprazole 40mg Tablets, Delayed Release 1 tab bid     Morphine Sulfate     Ondansetron HCl 4mg Tablet 1 po q 6 hours prn     Iron 28 MG     Vitamin B6 100MG qd     Vitamin C 1,000mg Tablet Take 1 tablet(s) by mouth daily         OBJECTIVE:        Vitals:         Current: 9/30/2019 9:01:42 AM    Ht:  5 ft, 6.5 in;  Wt: 147.2 lbs;  BMI: 23.4    T: 98.5 F (oral);  BP: 134/63 mm Hg (left arm, sitting);  P: 63 bpm (left arm (BP Cuff), sitting);  sCr: 0.83 mg/dL;  GFR: 62.46        Exams:     PHYSICAL EXAM:     GENERAL:  well developed and nourished; appropriately groomed; in no apparent distress;     SKIN: Once again has somewhat of a saccular dilatation overlying the right elbow.  It is more rubbery feeling today.  There is no redness present.;     MUSCULOSKELETAL: full ROM with right arm, minimally painful with palpation over the olecranon;         Procedures:     Olecranon bursitis     Procedure Note:  Arthrocentesis/Injection     Arthrocentesis of right olecranon bursa is performed.  Written informed consent was obtained.  The site is prepped with betadine.  The site is anesthetized with Ethyl Chloride spray.  The needle is carefully introduced into the bursa. No fluid was obtained from the biopsy.  Complications: donya tap x 2.              ASSESSMENT           726.33   M70.20  Olecranon bursitis              DDx:         ORDERS:         Radiology/Test Orders:       [id number]  Right radiologic examination, elbow; complete, minimum of three views  (Send-Out)           Lab Orders:       APPTO  Appointment need  (In-House)           Procedures Ordered:       [id number]  Arthrocentesis,  aspiration and/or injection; intermediate joint, bursa or ganglion cyst  (In-House)           Other Orders:       [id number]  knee bursa  (Send-Out)                   PLAN:          Olecranon bursitis Since I could not get any fluid out recommend we just treat conservatively with warm compresses followed by cold compress couple times a day.  He says that he does not tolerate prednisone so that eliminated that option.  And he has had a history of bleeding ulcers in the past so do not want to give NSAIDs.  If he fails to improve after another couple of weeks telling me to get him into see orthopedics         FOLLOW-UP: Schedule a follow-up appointment in 2 weeks.:.      FOLLOW-UP TESTING #1:    RADIOLOGY:  I have ordered a right elbow x-ray to be done today.            Orders:       APPTO  Appointment need  (In-House)         [id number]  Right radiologic examination, elbow; complete, minimum of three views  (Send-Out)         20605  Arthrocentesis, aspiration and/or injection; intermediate joint, bursa or ganglion cyst  (In-House)         [id number]  knee bursa  (Send-Out)               Patient Recommendations:    Dragon transcription disclaimer:        Much of this encounter note is an electronic transcription/translation of spoken language to printed text.  The electronic translation of spoken language may permit erroneous, or at times, nonsensical words or phrases to be inadvertently transcribed.  Although I have reviewed the note for such errors, some may still exist.         For  Olecranon bursitis:     Schedule a follow-up visit in 2 weeks.                APPOINTMENT INFORMATION:        Monday Tuesday Wednesday Thursday Friday Saturday Sunday            Time:___________________AM  PM   Date:_____________________ right elbow x-ray             CHARGE CAPTURE           **Please note: ICD descriptions below are intended for billing purposes only and may not represent clinical diagnoses**        Primary Diagnosis:          726.33 Olecranon bursitis            M70.20    Olecranon bursitis, unspecified elbow              Orders:          [id number]  Office/outpatient visit; established patient, level 2  (In-House)             APPTO   Appointment need  (In-House)             [id number]   Arthrocentesis, aspiration and/or injection; intermediate joint, bursa or ganglion cyst  (In-House)

## 2021-05-18 NOTE — PROGRESS NOTES
Marcello Larson  1940     Office/Outpatient Visit    Visit Date: Fri, May 8, 2020 03:00 pm    Provider: Zeny Corley MD (Assistant: Donna Vazquez MA)    Location: [facility]        Electronically signed by Zeny Corley MD on  05/08/2020 03:46:18 PM                             Subjective:        CC: Mr. Larson is a 80 year old White male.  Follow up visit ([place] AUDIO CALL )         HPI: Marcello's telehealth visit today is for f/u on chronic issues.          He is on metformin for diabetes.  He has been extremely well controlled.  He does adhere to a diabetic diet.  He is on a statin and ASA.  He is UTD on eye exam (6/2019).  He is UTD on foot exam (8/2019) -- R foot only, s/p L BKA.        He is on DAPT with ASA/Plavix as well as amlodipine, carvedilol, isosorbide mononitrate, lovastatin, and Lasix with KCl supplementation for HTN, HLD and CAD.  He is s/p 2v. CABG.  He has nitro for prn use but rarely uses them.   Follows with Dr. Kofi Knight.        He is on pantoprazole for GERD and h/o bleeding ulcer thought to be NSAID-induced.  He also has sucralfate that he uses prn.  He is currently on iron supplementation.        He is on morphine for chronic low back pain and leg pain through [facility].  He is also doing PT and that helps as well.  He has some constipation with the morphine.  He is using Metamucil and stool softener both.        He is on Singulair for allergies.     ROS:     CONSTITUTIONAL:  Positive for fatigue.   Negative for fever.      EYES:  Negative for blurred vision.      E/N/T:  Positive for diminished hearing, nasal congestion and frequent rhinorrhea.      CARDIOVASCULAR:  Negative for chest pain, palpitations and tachycardia.      RESPIRATORY:  Negative for recent cough and dyspnea.      GASTROINTESTINAL:  Positive for constipation.   Negative for abdominal pain, diarrhea, nausea or vomiting.      MUSCULOSKELETAL:  Positive for back  pain.   Negative for arthralgias or myalgias.      NEUROLOGICAL:  Negative for headaches, paresthesias and weakness.      PSYCHIATRIC:  Negative for anxiety, depression and sleep disturbance.          Past Medical History / Family History / Social History:         Last Reviewed on 5/08/2020 03:31 PM by Zeny Corley    Past Medical History:             PAST MEDICAL HISTORY         Coronary Artery Disease    Hyperlipidemia    Hypertension     Gastroesophageal Reflux Disease     Glomerulonephritis: membranous;     Gout    Osteoarthritis    back pain     bladder cancer         CURRENT MEDICAL PROVIDERS:    Cardiologist: Dr. Tuttle in [place]    Chiropractor    Ophthalmologist         PREVENTIVE HEALTH MAINTENANCE             BONE DENSITY: was last done 6/7/13 with the following abnormality noted-- osteoporosis     COLORECTAL CANCER SCREENING: Up to date (colonoscopy q10y; sigmoidoscopy q5y; Cologuard q3y) was last done 8/26/2019; sigmoidoscopy with the following abnormalaties noted-- diverticulitis with stricture; unable to pass scope to complete full colonoscopy     EYE EXAM: Diabetic Eye Exam during this calendar year and results are in chart was last done 6/10/2019 no retinopathy     PSA: was last done 5/26/15 with the following abnormalaties noted-- 20.1         PAST MEDICAL HISTORY             ADVANCE DIRECTIVE Living will on file         PAST MEDICAL HISTORY             CURRENT MEDICAL PROVIDERS:    Cardiologist: Kofi Knight    Chiropractor: mayda         Surgical History:         Coronary Artery Bypass Graft: 1984, 1991;     Appendectomy    Cholecystectomy    cystoprostatectomy with ileoconduit;     Cataract Removal    LLE amputation;         Social History:     Occupation:    Disabled     Marital Status:      Children: 2 children         Tobacco/Alcohol/Supplements:     Last Reviewed on 5/08/2020 03:31 PM by Zeny Corley    Tobacco: He has a past history of cigarette smoking; quit date:   1984.          Substance Abuse History:     Last Reviewed on 5/08/2020 03:31 PM by Zeny Corley        Mental Health History:     Last Reviewed on 5/08/2020 03:31 PM by Zeny Corley        Communicable Diseases (eg STDs):     Last Reviewed on 5/08/2020 03:31 PM by Zeny Corley        Current Problems:     Last Reviewed on 11/07/2019 09:58 AM by Zeny Corley    HLD - Mixed hyperlipidemia    Anxiety disorder, unspecified    HTN - Essential (primary) hypertension    CAD - Atherosclerotic heart disease of native coronary artery without angina pectoris    GERD - Gastro-esophageal reflux disease without esophagitis    OA B knees - Bilateral primary osteoarthritis of knee    Allergic rhinitis due to pollen    Low back pain    Type 2 diabetes mellitus without complications    Anemia, unspecified    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    Olecranon bursitis, unspecified elbow    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    H/o bladder CA - Personal history of malignant neoplasm of bladder        Immunizations:     Hep A, adult 11/20/2019    Havrix -adult dose (HepA) 6/14/2018    Prevnar 13 (Pneumococcal PCV 13) 9/1/2015    Fluzone High-Dose pf (>=65 yr) 10/3/2016    Fluzone High-Dose pf (>=65 yr) 9/19/2017    Fluzone High-Dose pf (>=65 yr) 10/26/2018    Fluzone High-Dose pf (>=65 yr) 10/14/2019    PNEUMOVAX 23 (Pneumococcal PPV23) 10/1/2008    Tdap (Tetanus, reduced diph, acellular pertussis) 1/1/2013    Zostavax (Zoster live) 10/1/2006        Allergies:     Last Reviewed on 5/08/2020 03:00 PM by Donna Vazquez    Cardiac Dye:      Prednisone: sweating  (Adverse Reaction)    Lisinopril: cough         Current Medications:     Last Reviewed on 5/08/2020 03:01 PM by Donna Vazquez    fluticasone propionate 50 mcg/actuation Intranasal Spray, Suspension [USE 1 SPRAY EACH NOSTIL DAILY]    sucralfate 1 gram oral tablet [TAKE 1 TABLET TABLET FOUR TIMES DAILY]    Nitrostat 0.4mg  Tablets, Sublingual [as directed]    Vitamin C 1,000 mg oral tablet [Take 1 tablet(s) by mouth daily]    Iron 28 MG     aspirin 81 mg oral tablet, delayed release (enteric coated) [1 tab daily]    Vitamin B6 100MG qd     Potassium Chloride 10 mEq oral tablet, extended release [Take 1 tablet(s) by mouth daily]    diclofenac sodium 1 % Topical Gel [VERONICA QID]    isosorbide mononitrate 30 mg oral Tablet, Extended Release 24 hr [TAKE 1 TABLET  BY MOUTH QAM]    furosemide 40 mg oral tablet [1T  BY MOUTH  DAILY]    lovastatin 40 mg oral tablet [1T PO QPM]    clopidogreL 75 mg oral tablet [TAKE 1 TAB DAILY]    sucralfate 1 gram oral tablet [1T PO QID]    amLODIPine 10 mg oral tablet [1T PO QD]    carvediloL 6.25 mg oral tablet [1T PO BID]    Fish Oil 8 tabs daily 1200mg each     metFORMIN 500 mg oral tablet [TAKE ONE TABLET BY MOUTH DAILY]    Singulair  10mg Tablet [Take 1 tablet(s) by mouth each evening]    prochlorperazine maleate 10 mg oral tablet [1T PO Q8H PRN]    pantoprazole 40 mg oral tablet, delayed release (enteric coated) [TAKE 1 TABLET BY MOUTH TWICE DAILY]    gabapentin 300 mg oral capsule [2C PO BID]    Morphine Sulfate     Ondansetron HCl 4 mg oral tablet [1 po q 6 hours prn]    doxycycline hyclate 100 mg oral capsule [take 1 capsule (100 mg) by oral route every 12 hours ]        Objective:        Vitals:         Current: 5/8/2020 3:02:13 PM    Ht:  5 ft, 6.5 in;  Wt: 147 lbs;  BMI: 23.4T: 98.4 F (oral);  BP: 124/60 mm Hg (right arm, sitting);  P: 58 bpm (right arm (BP Cuff), sitting);  sCr: 0.83 mg/dL;  GFR: 61.44        Assessment:         E11.9   Type 2 diabetes mellitus without complications       I10   HTN - Essential (primary) hypertension       E78.2   HLD - Mixed hyperlipidemia       I25.10   CAD - Atherosclerotic heart disease of native coronary artery without angina pectoris       F41.9   Anxiety disorder, unspecified           ORDERS:         Meds Prescribed:       [New Rx] busPIRone 5 mg oral  tablet [take 1 tablet (5 mg) by oral route 2 times per day prn], #60 (sixty) tablets, Refills: 2 (two)         Radiology/Test Orders:       3017F  Colorectal CA screen results documented and reviewed (PV)  (In-House)            2022F  Dilated retinal eye exam w/interpret by ophthalmologist/optometrist documented/reviewed (DM)4  (In-House)              Lab Orders:       APPTO  Appointment need  (In-House)              Other Orders:       1124F  Advance Care Planning discussed and doc in MR; no surrogate named or advance care plan provided  (Send-Out)                      Plan:         Type 2 diabetes mellitus without complicationsHe is on metformin for diabetes.  No refills needed.   He has been extremely well controlled.  He does adhere to a diabetic diet.  He is on a statin and ASA.  He is UTD on eye exam (6/2019).  He is UTD on foot exam (8/2019) -- R foot only, s/p L BKA.    MIPS Vaccines Flu and Pneumonia updated in Shot record Colorectal Cancer Screening is up to date and the results are in the chart Diabetic Eye Exam during this calendar year and results are in chart Advance Directive/Surrogate Decision Maker discussed and pt declines to complete today Telehealth: Verbal consent obtained for visit to occur via phone call; Staff, other than provider, present during telephone visit include Donna Vazquez MA; Total time spent was 15 minutes; [id number]--[id number] E/M 11-20 minutes     FOLLOW-UP: Schedule a follow-up visit in 3 months.:.  Medicare wellness 30 min with [place]          Orders:       APPTO  Appointment need  (In-House)            3017F  Colorectal CA screen results documented and reviewed (PV)  (In-House)            2022F  Dilated retinal eye exam w/interpret by ophthalmologist/optometrist documented/reviewed (DM)4  (In-House)            1124F  Advance Care Planning discussed and doc in MR; no surrogate named or advance care plan provided  (Send-Out)              HTN - Essential (primary)  hypertensionStable.  No refills needed.  Labs deferred.        HLD - Mixed hyperlipidemiaStable.  No refills needed.  Labs deferred.        CAD - Atherosclerotic heart disease of native coronary artery without angina pectorisStable.  No refills needed.  Labs deferred.        Anxiety disorder, unspecifiedHe has had some anxiety, especially lately.  He has been on Valium in the past, but we discussed that this is not a good option with him being on the morphine now.  Will instead try some buspirone that he can use as needed.  Counseled on possible side effects including insomnia.          Prescriptions:       [New Rx] busPIRone 5 mg oral tablet [take 1 tablet (5 mg) by oral route 2 times per day prn], #60 (sixty) tablets, Refills: 2 (two)             Patient Recommendations:        For  Type 2 diabetes mellitus without complications:    Schedule a follow-up visit in 3 months.                APPOINTMENT INFORMATION:        Monday Tuesday Wednesday Thursday Friday Saturday Sunday            Time:___________________AM  PM   Date:_____________________             Charge Capture:         Primary Diagnosis:     E11.9  Type 2 diabetes mellitus without complications           Orders:      APPTO  Appointment need  (In-House)            3017F  Colorectal CA screen results documented and reviewed (PV)  (In-House)            2022F  Dilated retinal eye exam w/interpret by ophthalmologist/optometrist documented/reviewed (DM)4  (In-House)            [id number]  Phys/QHP telephone evaluation 11-20 minutes  (In-House)              I10  HTN - Essential (primary) hypertension     E78.2  HLD - Mixed hyperlipidemia     I25.10  CAD - Atherosclerotic heart disease of native coronary artery without angina pectoris     F41.9  Anxiety disorder, unspecified

## 2021-05-19 ENCOUNTER — OFFICE VISIT CONVERTED (OUTPATIENT)
Dept: FAMILY MEDICINE CLINIC | Age: 81
End: 2021-05-19
Attending: FAMILY MEDICINE

## 2021-06-05 NOTE — PROGRESS NOTES
"Marcello Larson A  1940     Office/Outpatient Visit    Visit Date: Wed, May 19, 2021 08:20 am    Provider: Zeny Corley MD (Assistant: Luiza Triplett MA)    Location: [facility]        Electronically signed by Zeny Corley MD on  05/19/2021 08:55:08 AM                             Subjective:        CC: Mr. Larson is a 81 year old White male.  This is a follow-up visit.  next [facility]W scheduled for august.        HPI: Marcello is here today for routine f/u of chronic issues.          He is on metformin for DM.  His diabetes has been quite well controlled.  He does adhere to a diabetic diet.  He is on a statin and ASA.  He is UTD on eye exam (last done 7/2020); no diabetic retinopathy.  He is UTD on foot exam (last done 8/2020) -- R foot only, s/p L BKA.        He is on DAPT with ASA/Plavix, isosorbide mononitrate and Ranexa for CAD.  He is also on Lasix with K+.  He is on carvedilol and amlodipine for HTN.  He is on lovastatin for HLD. He is s/p 2v. CABG.  He has nitro for prn use but rarely uses them.  He follows with Dr. Kofi Campoverde.  He goes there in another week.        He is on pantoprazole for GERD and h/o bleeding ulcer thought to be NSAID-induced.  This is \"doing good.\"He also has sucralfate that he uses prn.  Does still take iron supplementation OTC.        He is on gabapentin for chronic low back pain and leg pain through [facility].  \"It's doing pretty good.\"  S/p physical therapy, which did help some.  He had RFA done in December 2020 and got wonderful pain relief from that.        He is on Singulair for allergies.  These have been fairly well controlled despite the spring pollen.    ROS:     CONSTITUTIONAL:  Positive for fatigue.   Negative for fever.      EYES:  Negative for blurred vision.      CARDIOVASCULAR:  Negative for chest pain, palpitations and tachycardia.      RESPIRATORY:  Negative for recent cough and dyspnea.      GASTROINTESTINAL:  Negative for " abdominal pain, constipation, diarrhea, nausea and vomiting.      MUSCULOSKELETAL:  Positive for back pain.   Negative for arthralgias or myalgias.      NEUROLOGICAL:  Negative for headaches, paresthesias and weakness.      PSYCHIATRIC:  Negative for anxiety, depression and sleep disturbance.          Past Medical History / Family History / Social History:         Last Reviewed on 5/19/2021 08:42 AM by Zeny Corley    Past Medical History:             PAST MEDICAL HISTORY         Coronary Artery Disease    Hyperlipidemia    Hypertension     Gastroesophageal Reflux Disease     Glomerulonephritis: membranous;     Gout    Osteoarthritis    back pain     bladder cancer         CURRENT MEDICAL PROVIDERS:    Cardiologist: Dr. Tuttle in [place]    Chiropractor    Ophthalmologist         PREVENTIVE HEALTH MAINTENANCE             BONE DENSITY: was last done 6/7/13 with the following abnormality noted-- osteoporosis     COLORECTAL CANCER SCREENING: Up to date (colonoscopy q10y; sigmoidoscopy q5y; Cologuard q3y) was last done 8/26/2019; sigmoidoscopy with the following abnormalaties noted-- diverticulitis with stricture; unable to pass scope to complete full colonoscopy     EYE EXAM: Diabetic Eye Exam during this calendar year and results are in chart was last done 7/1/2020 no retinopathy     PSA: was last done 5/26/15 with the following abnormalaties noted-- 20.1         PAST MEDICAL HISTORY             ADVANCE DIRECTIVE Living will on file         PAST MEDICAL HISTORY             CURRENT MEDICAL PROVIDERS:    Cardiologist: Kofi Knight    Chiropractor: mayda         Surgical History:         Coronary Artery Bypass Graft: 1984, 1991;     Appendectomy    Cholecystectomy    cystoprostatectomy with ileoconduit;     Cataract Removal    LLE amputation;         Social History:     Occupation:    Disabled     Marital Status:      Children: 2 children         Tobacco/Alcohol/Supplements:     Last Reviewed on  5/19/2021 08:42 AM by Zeny Corley    Tobacco: He has a past history of cigarette smoking; quit date:  1984.          Substance Abuse History:     Last Reviewed on 5/19/2021 08:42 AM by Zeny Corley        Mental Health History:     Last Reviewed on 5/19/2021 08:42 AM by Zeny Corley        Communicable Diseases (eg STDs):     Last Reviewed on 5/19/2021 08:42 AM by Zeny Corley        Current Problems:     Last Reviewed on 2/19/2021 08:57 AM by Zeny Corley    HLD - Mixed hyperlipidemia    Anxiety disorder, unspecified    HTN - Essential (primary) hypertension    CAD - Atherosclerotic heart disease of native coronary artery without angina pectoris    GERD - Gastro-esophageal reflux disease without esophagitis    OA B knees - Bilateral primary osteoarthritis of knee    Allergic rhinitis due to pollen    Low back pain    Type 2 diabetes mellitus without complications    Anemia, unspecified    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    H/o bladder CA - Personal history of malignant neoplasm of bladder    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    Other long term (current) drug therapy    Unstable angina    Hypo-osmolality and hyponatremia        Immunizations:     COVID-19, mRNA, LNP-S, PF, 100 mcg/0.5 mL dose 1/19/2021    Hep A, adult 11/20/2019    Influenza, high dose seasonal 10/1/2020    zoster recombinant 5/6/2020    Havrix -adult dose (HepA) 6/14/2018    Prevnar 13 (Pneumococcal PCV 13) 9/1/2015    Fluzone High-Dose pf (>=65 yr) 10/3/2016    Fluzone High-Dose pf (>=65 yr) 9/19/2017    Fluzone High-Dose pf (>=65 yr) 10/26/2018    Fluzone High-Dose pf (>=65 yr) 10/14/2019    PNEUMOVAX 23 (Pneumococcal PPV23) 10/1/2008    Tdap (Tetanus, reduced diph, acellular pertussis) 1/1/2013    Zostavax (Zoster live) 10/1/2006        Allergies:     Last Reviewed on 5/19/2021 08:28 AM by Luiza Triplett    Cardiac Dye:      Prednisone: sweating  (Adverse Reaction)     Lisinopril: cough         Current Medications:     Last Reviewed on 5/19/2021 08:28 AM by Luiza Triplett    fluticasone propionate 50 mcg/actuation Intranasal Spray, Suspension [USE 1 SPRAY EACH NOSTIL DAILY]    nitroglycerin 0.4 mg Sublingual Tablet, Sublingual [DISSOLVE 1 UNDER TONGUE EVERY 5 MINUTES FOR 3 DOSES; IF NO RELIEF GO TO ER]    carvediloL 6.25 mg oral tablet [TAKE 1 TABLET BY MOUTH TWICE DAILY]    sucralfate 1 gram oral tablet [TAKE 1 TABLET BY MOUTH FOUR TIMES DAILY]    amLODIPine 10 mg oral tablet [TAKE 1 TABLET BY MOUTH EVERY DAY]    Vitamin B6 100MG qd     aspirin 81 mg oral tablet, delayed release (enteric coated) [1 tab daily]    Iron 28 MG     Vitamin C 1,000 mg oral tablet [Take 1 tablet(s) by mouth daily]    potassium chloride 10 mEq oral Tablet, Extended Release Particles/Crystals [TAKE 1 TABLET BY MOUTH DAILY]    furosemide 40 mg oral tablet [TAKE 1 TABLET BY MOUTH DAILY]    clopidogreL 75 mg oral tablet [TAKE 1 TABLET DAILY]    lovastatin 40 mg oral tablet [TAKE 1 TABLET BY MOUTH EVERY EVENING]    diclofenac sodium 1 % Topical Gel [APPLY FOUR TIMES DAILY]    isosorbide mononitrate 30 mg oral Tablet, Extended Release 24 hr [TAKE 1 TABLET BY MOUTH EVERY MORNING]    Fish Oil 8 tabs daily 1200mg each     metFORMIN 500 mg oral tablet [TAKE 1 TABLET BY MOUTH EVERY DAY]    montelukast 10 mg oral tablet [TAKE 1 TABLET BY MOUTH EACH EVENING]    prochlorperazine maleate 10 mg oral tablet [TAKE 1 TABLET BY MOUTH EVERY 8 HOURS AS NEEDED]    pantoprazole 40 mg oral tablet, delayed release (enteric coated) [TAKE 1 TABLET BY MOUTH TWICE DAILY]    gabapentin 300 mg oral capsule [TAKE 2 CAPSULES BY MOUTH TWICE DAILY]    Ondansetron HCl 4 mg oral tablet [1 po q 6 hours prn]    busPIRone 5 mg oral tablet [take 1 tablet (5 mg) by oral route 2 times per day prn]    losartan 25 mg oral tablet [TAKE 1/2 TABLET BY MOUTH EVERY DAY]    HYDROcodone-acetaminophen 5-325 mg oral tablet    ranolazine 500 mg oral  Tablet, Extended Release 12 hr [TAKE 1 TABLET BY MOUTH TWICE DAILY]        Objective:        Vitals:         Current: 5/19/2021 8:22:46 AM    Ht:  5 ft, 6.5 in;  Wt: 151.8 lbs;  BMI: 24.1T: 97 F (temporal);  BP: 112/64 mm Hg (left arm, sitting);  P: 56 bpm (left arm (BP Cuff), sitting);  sCr: 0.73 mg/dL;  GFR: 69.68        Exams:     PHYSICAL EXAM:     GENERAL: Vitals recorded well developed, well nourished;  well groomed;  no apparent distress;     EYES: extraocular movements intact; conjunctiva and cornea are normal; PERRLA;     RESPIRATORY: normal respiratory rate and pattern with no distress; normal breath sounds with no rales, rhonchi, wheezes or rubs;     CARDIOVASCULAR: normal rate; rhythm is regular;  no systolic murmur; no edema;     GASTROINTESTINAL: nontender; stoma appears pink and healthy with clear urine output in bag; normal bowel sounds;     MUSCULOSKELETAL: normal gait; normal overall tone LLE BKA; prosthesis in place;         Assessment:         E11.9   Type 2 diabetes mellitus without complications       I10   HTN - Essential (primary) hypertension       E78.2   HLD - Mixed hyperlipidemia       I25.10   CAD - Atherosclerotic heart disease of native coronary artery without angina pectoris       K21.9   GERD - Gastro-esophageal reflux disease without esophagitis       M17.0   OA B knees - Bilateral primary osteoarthritis of knee       Z89.519   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       Z85.51   H/o bladder CA - Personal history of malignant neoplasm of bladder           ORDERS:         Meds Prescribed:       [Refilled] metFORMIN 500 mg oral tablet [TAKE 1 TABLET BY MOUTH EVERY DAY], #90 (ninety) tablets, Refills: 1 (one)         Radiology/Test Orders:       3017F  Colorectal CA screen results documented and reviewed (PV)  (In-House)            2022F  Dilated retinal eye exam w/interpret by ophthalmologist/optometrist documented/reviewed (DM)4  (In-House)              Lab Orders:        APPTO  Appointment need  (In-House)                      Plan:         Type 2 diabetes mellitus without complicationsHe is on metformin for DM.  His diabetes has been quite well controlled.  He does adhere to a diabetic diet.  He is on a statin and ASA.  He is UTD on eye exam (last done 7/2020); no diabetic retinopathy.  He is UTD on foot exam (last done 8/2020) -- R foot only, s/p L BKA.  RTC 4 months for AWV.    MIPS Vaccines Flu and Pneumonia updated in Shot record Colorectal Cancer Screening is up to date and the results are in the chart Diabetic Eye Exam during this calendar year and results are in chart     FOLLOW-UP: Schedule a follow-up visit in 4 months.:.  Medicare wellness 30 min with Barney          Prescriptions:       [Refilled] metFORMIN 500 mg oral tablet [TAKE 1 TABLET BY MOUTH EVERY DAY], #90 (ninety) tablets, Refills: 1 (one)           Orders:       3017F  Colorectal CA screen results documented and reviewed (PV)  (In-House)            2022F  Dilated retinal eye exam w/interpret by ophthalmologist/optometrist documented/reviewed (DM)4  (In-House)            APPTO  Appointment need  (In-House)              HTN - Essential (primary) hypertensionBP at goal.  No changes to meds.  Labs reviewed and UTD.  No refills needed today.        HLD - Mixed hyperlipidemiaStable on lovastatin 40 mg daily.  No changes to meds.  No refills needed.  Labs reviewed and UTD.        CAD - Atherosclerotic heart disease of native coronary artery without angina pectorisStable.  Follows with Cardiology.  Copies of last labs printed and given to him to take to his cardiologist.        OA B knees - Bilateral primary osteoarthritis of kneeStable.  Continue topical diclofenac.        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Stable.        H/o bladder CA - Personal history of malignant neoplasm of bladderNo recurrence.  Stoma functioning well.            Patient Recommendations:        For  Type 2 diabetes mellitus without  complications:    Schedule a follow-up visit in 4 months.                APPOINTMENT INFORMATION:        Monday Tuesday Wednesday Thursday Friday Saturday Sunday            Time:___________________AM  PM   Date:_____________________             Charge Capture:         Primary Diagnosis:     E11.9  Type 2 diabetes mellitus without complications           Orders:      55350  Office/outpatient visit; established patient, level 4  (In-House)            3017F  Colorectal CA screen results documented and reviewed (PV)  (In-House)            2022F  Dilated retinal eye exam w/interpret by ophthalmologist/optometrist documented/reviewed (DM)4  (In-House)            APPTO  Appointment need  (In-House)              I10  HTN - Essential (primary) hypertension     E78.2  HLD - Mixed hyperlipidemia     I25.10  CAD - Atherosclerotic heart disease of native coronary artery without angina pectoris     K21.9  GERD - Gastro-esophageal reflux disease without esophagitis     M17.0  OA B knees - Bilateral primary osteoarthritis of knee     Z89.519  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     Z85.51  H/o bladder CA - Personal history of malignant neoplasm of bladder

## 2021-06-09 RX ORDER — PANTOPRAZOLE SODIUM 40 MG/1
TABLET, DELAYED RELEASE ORAL
Qty: 180 TABLET | Refills: 1 | Status: SHIPPED | OUTPATIENT
Start: 2021-06-09 | End: 2021-06-14 | Stop reason: SDUPTHER

## 2021-06-15 RX ORDER — PANTOPRAZOLE SODIUM 40 MG/1
40 TABLET, DELAYED RELEASE ORAL 2 TIMES DAILY
Qty: 180 TABLET | Refills: 1 | Status: SHIPPED | OUTPATIENT
Start: 2021-06-15 | End: 2022-05-16

## 2021-06-25 RX ORDER — PROCHLORPERAZINE MALEATE 10 MG
TABLET ORAL
Qty: 24 TABLET | Refills: 0 | Status: SHIPPED | OUTPATIENT
Start: 2021-06-25 | End: 2022-09-20 | Stop reason: SDUPTHER

## 2021-07-01 VITALS
HEART RATE: 64 BPM | HEIGHT: 67 IN | WEIGHT: 150.2 LBS | BODY MASS INDEX: 23.57 KG/M2 | DIASTOLIC BLOOD PRESSURE: 60 MMHG | TEMPERATURE: 97 F | SYSTOLIC BLOOD PRESSURE: 113 MMHG

## 2021-07-01 VITALS
BODY MASS INDEX: 23.35 KG/M2 | WEIGHT: 148.8 LBS | DIASTOLIC BLOOD PRESSURE: 62 MMHG | HEART RATE: 60 BPM | TEMPERATURE: 97.8 F | HEIGHT: 67 IN | SYSTOLIC BLOOD PRESSURE: 132 MMHG

## 2021-07-01 VITALS
HEIGHT: 67 IN | HEART RATE: 65 BPM | TEMPERATURE: 98.2 F | WEIGHT: 148.2 LBS | SYSTOLIC BLOOD PRESSURE: 136 MMHG | BODY MASS INDEX: 23.26 KG/M2 | DIASTOLIC BLOOD PRESSURE: 66 MMHG

## 2021-07-01 VITALS
DIASTOLIC BLOOD PRESSURE: 74 MMHG | WEIGHT: 152.6 LBS | HEIGHT: 67 IN | SYSTOLIC BLOOD PRESSURE: 133 MMHG | HEART RATE: 71 BPM | BODY MASS INDEX: 23.95 KG/M2 | TEMPERATURE: 97.4 F

## 2021-07-01 VITALS
HEIGHT: 67 IN | BODY MASS INDEX: 22.81 KG/M2 | TEMPERATURE: 98.3 F | HEART RATE: 78 BPM | SYSTOLIC BLOOD PRESSURE: 138 MMHG | WEIGHT: 145.3 LBS | DIASTOLIC BLOOD PRESSURE: 68 MMHG

## 2021-07-01 VITALS
WEIGHT: 147.2 LBS | DIASTOLIC BLOOD PRESSURE: 63 MMHG | SYSTOLIC BLOOD PRESSURE: 134 MMHG | BODY MASS INDEX: 23.1 KG/M2 | HEIGHT: 67 IN | TEMPERATURE: 98.5 F | HEART RATE: 63 BPM

## 2021-07-01 VITALS
WEIGHT: 147.6 LBS | BODY MASS INDEX: 23.17 KG/M2 | DIASTOLIC BLOOD PRESSURE: 70 MMHG | HEART RATE: 73 BPM | HEIGHT: 67 IN | TEMPERATURE: 98.5 F | SYSTOLIC BLOOD PRESSURE: 142 MMHG

## 2021-07-01 VITALS
BODY MASS INDEX: 24.08 KG/M2 | TEMPERATURE: 97.5 F | HEART RATE: 56 BPM | HEIGHT: 67 IN | WEIGHT: 153.4 LBS | DIASTOLIC BLOOD PRESSURE: 57 MMHG | SYSTOLIC BLOOD PRESSURE: 107 MMHG

## 2021-07-01 VITALS
HEART RATE: 54 BPM | BODY MASS INDEX: 23.48 KG/M2 | TEMPERATURE: 97.6 F | WEIGHT: 149.6 LBS | DIASTOLIC BLOOD PRESSURE: 65 MMHG | SYSTOLIC BLOOD PRESSURE: 136 MMHG | HEIGHT: 67 IN

## 2021-07-01 VITALS
HEART RATE: 63 BPM | SYSTOLIC BLOOD PRESSURE: 154 MMHG | BODY MASS INDEX: 24.33 KG/M2 | WEIGHT: 155 LBS | DIASTOLIC BLOOD PRESSURE: 61 MMHG | HEIGHT: 67 IN | TEMPERATURE: 97.4 F

## 2021-07-01 VITALS
SYSTOLIC BLOOD PRESSURE: 134 MMHG | DIASTOLIC BLOOD PRESSURE: 57 MMHG | HEIGHT: 67 IN | TEMPERATURE: 98.8 F | BODY MASS INDEX: 23.04 KG/M2 | WEIGHT: 146.8 LBS | HEART RATE: 57 BPM

## 2021-07-01 VITALS
HEIGHT: 67 IN | DIASTOLIC BLOOD PRESSURE: 57 MMHG | HEART RATE: 68 BPM | TEMPERATURE: 98.2 F | BODY MASS INDEX: 23.67 KG/M2 | WEIGHT: 150.8 LBS | SYSTOLIC BLOOD PRESSURE: 134 MMHG

## 2021-07-01 VITALS
BODY MASS INDEX: 23.2 KG/M2 | HEIGHT: 67 IN | WEIGHT: 147.8 LBS | HEART RATE: 70 BPM | SYSTOLIC BLOOD PRESSURE: 131 MMHG | DIASTOLIC BLOOD PRESSURE: 58 MMHG | TEMPERATURE: 99.1 F

## 2021-07-01 VITALS
DIASTOLIC BLOOD PRESSURE: 67 MMHG | HEART RATE: 75 BPM | SYSTOLIC BLOOD PRESSURE: 140 MMHG | BODY MASS INDEX: 22.66 KG/M2 | WEIGHT: 144.4 LBS | TEMPERATURE: 98.2 F | HEIGHT: 67 IN

## 2021-07-01 VITALS
HEART RATE: 79 BPM | BODY MASS INDEX: 20.91 KG/M2 | TEMPERATURE: 98.1 F | WEIGHT: 133.2 LBS | HEIGHT: 67 IN | SYSTOLIC BLOOD PRESSURE: 134 MMHG | DIASTOLIC BLOOD PRESSURE: 53 MMHG

## 2021-07-01 VITALS
TEMPERATURE: 98.7 F | WEIGHT: 145.2 LBS | HEART RATE: 67 BPM | DIASTOLIC BLOOD PRESSURE: 61 MMHG | SYSTOLIC BLOOD PRESSURE: 146 MMHG | HEIGHT: 67 IN | BODY MASS INDEX: 22.79 KG/M2

## 2021-07-01 VITALS
SYSTOLIC BLOOD PRESSURE: 136 MMHG | BODY MASS INDEX: 23.23 KG/M2 | HEIGHT: 67 IN | WEIGHT: 148 LBS | TEMPERATURE: 97.4 F | HEART RATE: 70 BPM | DIASTOLIC BLOOD PRESSURE: 76 MMHG

## 2021-07-02 VITALS
BODY MASS INDEX: 23.07 KG/M2 | WEIGHT: 147 LBS | DIASTOLIC BLOOD PRESSURE: 60 MMHG | HEART RATE: 58 BPM | SYSTOLIC BLOOD PRESSURE: 124 MMHG | HEIGHT: 67 IN | TEMPERATURE: 98.4 F

## 2021-07-02 VITALS
BODY MASS INDEX: 23.92 KG/M2 | SYSTOLIC BLOOD PRESSURE: 157 MMHG | DIASTOLIC BLOOD PRESSURE: 76 MMHG | WEIGHT: 152.4 LBS | TEMPERATURE: 96.4 F | HEIGHT: 67 IN | HEART RATE: 78 BPM

## 2021-07-02 VITALS
HEART RATE: 75 BPM | BODY MASS INDEX: 20.34 KG/M2 | WEIGHT: 129.6 LBS | DIASTOLIC BLOOD PRESSURE: 67 MMHG | SYSTOLIC BLOOD PRESSURE: 160 MMHG | TEMPERATURE: 96.3 F | HEIGHT: 67 IN

## 2021-07-02 VITALS
TEMPERATURE: 97 F | BODY MASS INDEX: 23.83 KG/M2 | SYSTOLIC BLOOD PRESSURE: 112 MMHG | DIASTOLIC BLOOD PRESSURE: 64 MMHG | HEART RATE: 56 BPM | HEIGHT: 67 IN | WEIGHT: 151.8 LBS

## 2021-07-02 VITALS
HEIGHT: 67 IN | HEART RATE: 67 BPM | TEMPERATURE: 96.6 F | BODY MASS INDEX: 23.86 KG/M2 | DIASTOLIC BLOOD PRESSURE: 64 MMHG | SYSTOLIC BLOOD PRESSURE: 134 MMHG | WEIGHT: 152 LBS

## 2021-07-02 VITALS
SYSTOLIC BLOOD PRESSURE: 154 MMHG | TEMPERATURE: 97.6 F | DIASTOLIC BLOOD PRESSURE: 71 MMHG | HEIGHT: 67 IN | BODY MASS INDEX: 23.23 KG/M2 | WEIGHT: 148 LBS | HEART RATE: 63 BPM

## 2021-07-06 RX ORDER — AMLODIPINE BESYLATE 10 MG/1
TABLET ORAL
Qty: 90 TABLET | Refills: 1 | Status: SHIPPED | OUTPATIENT
Start: 2021-07-06 | End: 2021-12-21

## 2021-07-06 RX ORDER — AMOXICILLIN 500 MG
1200 CAPSULE ORAL DAILY
COMMUNITY
End: 2021-09-09 | Stop reason: SDUPTHER

## 2021-07-09 RX ORDER — CLOPIDOGREL BISULFATE 75 MG/1
TABLET ORAL
Qty: 90 TABLET | Refills: 3 | Status: SHIPPED | OUTPATIENT
Start: 2021-07-09 | End: 2022-05-10

## 2021-07-17 RX ORDER — LOSARTAN POTASSIUM 25 MG/1
TABLET ORAL
Qty: 45 TABLET | Refills: 0 | Status: SHIPPED | OUTPATIENT
Start: 2021-07-17 | End: 2021-07-21

## 2021-07-21 RX ORDER — LOSARTAN POTASSIUM 25 MG/1
TABLET ORAL
Qty: 45 TABLET | Refills: 1 | Status: SHIPPED | OUTPATIENT
Start: 2021-07-21 | End: 2022-01-03

## 2021-08-24 RX ORDER — ISOSORBIDE MONONITRATE 30 MG/1
TABLET, EXTENDED RELEASE ORAL
Qty: 90 TABLET | Refills: 1 | Status: SHIPPED | OUTPATIENT
Start: 2021-08-24 | End: 2022-02-08

## 2021-09-07 RX ORDER — MONTELUKAST SODIUM 10 MG/1
TABLET ORAL
Qty: 90 TABLET | Refills: 1 | Status: SHIPPED | OUTPATIENT
Start: 2021-09-07 | End: 2022-02-28

## 2021-09-09 ENCOUNTER — OFFICE VISIT (OUTPATIENT)
Dept: CARDIOLOGY | Facility: CLINIC | Age: 81
End: 2021-09-09

## 2021-09-09 VITALS
SYSTOLIC BLOOD PRESSURE: 122 MMHG | WEIGHT: 150 LBS | BODY MASS INDEX: 24.11 KG/M2 | HEIGHT: 66 IN | DIASTOLIC BLOOD PRESSURE: 68 MMHG | HEART RATE: 60 BPM

## 2021-09-09 DIAGNOSIS — E78.5 HYPERLIPIDEMIA, UNSPECIFIED HYPERLIPIDEMIA TYPE: ICD-10-CM

## 2021-09-09 DIAGNOSIS — Z95.1 HX OF CABG: ICD-10-CM

## 2021-09-09 DIAGNOSIS — I25.5 ISCHEMIC CARDIOMYOPATHY: ICD-10-CM

## 2021-09-09 DIAGNOSIS — Z95.5 STENTED CORONARY ARTERY: ICD-10-CM

## 2021-09-09 DIAGNOSIS — I10 BENIGN ESSENTIAL HYPERTENSION: ICD-10-CM

## 2021-09-09 DIAGNOSIS — I25.10 CORONARY ARTERIOSCLEROSIS: Primary | ICD-10-CM

## 2021-09-09 DIAGNOSIS — G47.33 OBSTRUCTIVE SLEEP APNEA SYNDROME: ICD-10-CM

## 2021-09-09 DIAGNOSIS — R00.2 PALPITATIONS: ICD-10-CM

## 2021-09-09 PROCEDURE — 99214 OFFICE O/P EST MOD 30 MIN: CPT | Performed by: INTERNAL MEDICINE

## 2021-09-09 NOTE — PROGRESS NOTES
"Chief Complaint  Coronary Artery Disease    Subjective    History of Present Illness      I saw Marcello Larson today for cardiovascular care. He is a very pleasant 81-year-old male with coronary heart disease. He has undergone previous coronary artery bypass surgery in 1991 as well as percutaneous coronary intervention in 2014. He remains active and free of angina. He does not report any significant or progressive dyspnea on exertion. He denies orthopnea PND or lower extremity edema. He is free of syncope near syncope or palpitation. He tolerates his medical regimen well no reported side effects. His blood pressure is well controlled and his lipids are monitored.    Objective   Vital Signs:   /68   Pulse 60   Ht 167.6 cm (66\")   Wt 68 kg (150 lb)   BMI 24.21 kg/m²     Constitutional:       Appearance: Well-developed.   Eyes:      Conjunctiva/sclera: Conjunctivae normal.      Pupils: Pupils are equal, round, and reactive to light.   HENT:      Head: Normocephalic and atraumatic.   Neck:      Thyroid: No thyromegaly.   Pulmonary:      Effort: Pulmonary effort is normal. No respiratory distress.      Breath sounds: Normal breath sounds. No wheezing. No rales.   Cardiovascular:      Normal rate. Regular rhythm.      No gallop. No S3 and S4 gallop. No rub.   Abdominal:      General: Bowel sounds are normal. There is no distension.      Palpations: Abdomen is soft. There is no abdominal mass.      Tenderness: There is no abdominal tenderness.   Musculoskeletal: Normal range of motion.      Cervical back: Neck supple.      Comments: Left lower extremity amputation with prosthesis in place Skin:     General: Skin is warm and dry.      Findings: No erythema.   Neurological:      Mental Status: Alert and oriented to person, place, and time.       Result Review :     Common labs    Common Labsle 1/7/21 1/7/21 2/20/21 3/10/21    1041 1041     Glucose  126 (A) 127 (A) 124 (A)   BUN  12 13 13   Creatinine  0.67 (A) 0.72 " 0.73   Sodium  125 (A) 131 (A) 136   Potassium  3.4 (A) 4.2 4.1   Chloride  91 (A) 97 (A) 98 (A)   Calcium  9.2 8.8 9.1   Albumin  3.9 4.2    Total Bilirubin  0.64 0.41    Alkaline Phosphatase  60 62    AST (SGOT)  15 18    ALT (SGPT)  10 12    WBC 8.53      Hemoglobin 12.2 (A)      Hematocrit 34.6 (A)      Platelets 178      Total Cholesterol   129    Triglycerides   50    HDL Cholesterol   42    LDL Cholesterol    77    Hemoglobin A1C   5.9 (A)    (A) Abnormal value       Comments are available for some flowsheets but are not being displayed.                     Assessment and Plan    1. Coronary arteriosclerosis  Stable free of angina    2. Hx of CABG  Stable    3. Stented coronary artery  Stable    4. Ischemic cardiomyopathy  Well compensated    5. Benign essential hypertension  Controlled    6. Hyperlipidemia, unspecified hyperlipidemia type  Controlled    7. Obstructive sleep apnea syndrome  Continue CPAP    8. Palpitations  Stable    Marcello remains active free of angina euvolemic on physical examination. He continues to observe a low-cholesterol low saturated fat diet and follow his medical regimen. He is stable from a cardiovascular standpoint. His lipids are to be obtained in the near future. I will see him in follow-up in 6 months sooner if needed.        Follow Up   No follow-ups on file.  Patient was given instructions and counseling regarding his condition or for health maintenance advice. Please see specific information pulled into the AVS if appropriate.

## 2021-09-30 RX ORDER — FLUTICASONE PROPIONATE 50 MCG
SPRAY, SUSPENSION (ML) NASAL
Qty: 48 G | Refills: 2 | Status: SHIPPED | OUTPATIENT
Start: 2021-09-30 | End: 2022-08-04

## 2021-10-15 RX ORDER — CARVEDILOL 6.25 MG/1
TABLET ORAL
Qty: 180 TABLET | Refills: 3 | Status: SHIPPED | OUTPATIENT
Start: 2021-10-15 | End: 2022-09-20 | Stop reason: SDUPTHER

## 2021-10-15 RX ORDER — LOVASTATIN 40 MG/1
TABLET ORAL
Qty: 90 TABLET | Refills: 3 | Status: SHIPPED | OUTPATIENT
Start: 2021-10-15 | End: 2022-09-20 | Stop reason: SDUPTHER

## 2021-10-15 RX ORDER — SUCRALFATE 1 G/1
TABLET ORAL
Qty: 120 TABLET | Refills: 3 | Status: SHIPPED | OUTPATIENT
Start: 2021-10-15 | End: 2022-04-07

## 2021-10-22 RX ORDER — NITROGLYCERIN 0.4 MG/1
TABLET SUBLINGUAL
Qty: 25 TABLET | Refills: 0 | Status: SHIPPED | OUTPATIENT
Start: 2021-10-22 | End: 2022-09-20 | Stop reason: SDUPTHER

## 2021-11-23 ENCOUNTER — OFFICE VISIT (OUTPATIENT)
Dept: FAMILY MEDICINE CLINIC | Age: 81
End: 2021-11-23

## 2021-11-23 VITALS
HEIGHT: 66 IN | HEART RATE: 99 BPM | TEMPERATURE: 98.7 F | SYSTOLIC BLOOD PRESSURE: 160 MMHG | WEIGHT: 153.2 LBS | DIASTOLIC BLOOD PRESSURE: 63 MMHG | BODY MASS INDEX: 24.62 KG/M2

## 2021-11-23 DIAGNOSIS — E11.9 TYPE 2 DIABETES MELLITUS WITHOUT COMPLICATION, WITHOUT LONG-TERM CURRENT USE OF INSULIN (HCC): ICD-10-CM

## 2021-11-23 DIAGNOSIS — Z00.00 ANNUAL PHYSICAL EXAM: Primary | ICD-10-CM

## 2021-11-23 PROBLEM — I25.5 ISCHEMIC CARDIOMYOPATHY: Status: ACTIVE | Noted: 2019-08-13

## 2021-11-23 PROBLEM — Z92.89 H/O ECHOCARDIOGRAM: Status: RESOLVED | Noted: 2019-08-13 | Resolved: 2021-11-23

## 2021-11-23 PROBLEM — E78.2 MIXED HYPERLIPIDEMIA: Status: ACTIVE | Noted: 2019-08-13

## 2021-11-23 PROBLEM — G47.33 OBSTRUCTIVE SLEEP APNEA SYNDROME: Status: ACTIVE | Noted: 2019-08-13

## 2021-11-23 PROBLEM — R07.9 CHEST PAIN: Status: RESOLVED | Noted: 2020-09-30 | Resolved: 2021-11-23

## 2021-11-23 PROCEDURE — G0439 PPPS, SUBSEQ VISIT: HCPCS | Performed by: FAMILY MEDICINE

## 2021-11-23 PROCEDURE — 1126F AMNT PAIN NOTED NONE PRSNT: CPT | Performed by: FAMILY MEDICINE

## 2021-11-23 PROCEDURE — 1159F MED LIST DOCD IN RCRD: CPT | Performed by: FAMILY MEDICINE

## 2021-11-23 RX ORDER — POTASSIUM CHLORIDE 750 MG/1
TABLET, EXTENDED RELEASE ORAL
COMMUNITY
Start: 2021-11-08 | End: 2022-01-11

## 2021-11-23 NOTE — PROGRESS NOTES
The ABCs of the Annual Wellness Visit  Subsequent Medicare Wellness Visit    Chief Complaint   Patient presents with   • Medicare Wellness-subsequent      Subjective    History of Present Illness:  Marcello Larson is a 81 y.o. male who presents for a Subsequent Medicare Wellness Visit.    He is UTD on colonoscopy, last done 8/2019 and this showed diverticulosis.  Prostate cancer screening is no longer indicated by age and history; s/p prostatectomy d/t bladder cancer.  He is UTD on COVID (Moderna x3), Pneumovax, (10/2008), Prevnar (9/2015), Zostavax (10/2006), Shingrix (12/2019, 5/2020), Tdap (1/2013), and flu (10/2021).  He is due for routine labs including diabetes panel.  He is UTD on eye exam (2/2021).    The following portions of the patient's history were reviewed and   updated as appropriate: allergies, current medications, past family history, past medical history, past social history, past surgical history and problem list.    Compared to one year ago, the patient feels his physical   health is the same.    Compared to one year ago, the patient feels his mental   health is the same.    Recent Hospitalizations:  He was not admitted to the hospital during the last year.       Current Medical Providers:  Patient Care Team:  Zeny Corley MD as PCP - General (Family Medicine)  Kofi Campoverde MD as Consulting Physician (Cardiology)    Outpatient Medications Prior to Visit   Medication Sig Dispense Refill   • amLODIPine (NORVASC) 10 MG tablet TAKE ONE TABLET BY MOUTH EVERY DAY 90 tablet 1   • aspirin 81 MG tablet Take 162 mg by mouth Daily.     • busPIRone (BUSPAR) 5 MG tablet Take 5 mg by mouth 2 (two) times a day.     • carvedilol (COREG) 6.25 MG tablet TAKE 1 TABLET BY MOUTH TWICE DAILY 180 tablet 3   • clopidogrel (PLAVIX) 75 MG tablet TAKE 1 TABLET DAILY 90 tablet 3   • Diclofenac Sodium (VOLTAREN) 1 % gel gel APPLY FOUR TIMES DAILY 100 g 1   • ferrous sulfate 325 (65 FE) MG tablet Take 325 mg  by mouth Daily With Breakfast.     • fluticasone (FLONASE) 50 MCG/ACT nasal spray USE 1 SPRAY EACH NOSTIL DAILY 48 g 2   • fluticasone (VERAMYST) 27.5 MCG/SPRAY nasal spray 2 sprays into the nostril(s) as directed by provider Daily.     • furosemide (LASIX) 40 MG tablet Take 40 mg by mouth Daily.     • gabapentin (NEURONTIN) 300 MG capsule Take 300 mg by mouth 2 (Two) Times a Day.     • HYDROcodone-acetaminophen (NORCO) 5-325 MG per tablet Take 1 tablet by mouth Every 8 (Eight) Hours As Needed.     • isosorbide mononitrate (IMDUR) 30 MG 24 hr tablet TAKE 1 TABLET BY MOUTH EVERY MORNING 90 tablet 1   • losartan (COZAAR) 25 MG tablet TAKE 1/2 TABLET BY MOUTH EVERY DAY 45 tablet 1   • lovastatin (MEVACOR) 40 MG tablet TAKE 1 TABLET BY MOUTH EVERY EVENING 90 tablet 3   • metFORMIN (GLUCOPHAGE) 500 MG tablet Take 500 mg by mouth Daily.       • montelukast (SINGULAIR) 10 MG tablet TAKE 1 TABLET BY MOUTH EACH EVENING 90 tablet 1   • nitroglycerin (NITROSTAT) 0.4 MG SL tablet DISSOLVE 1 UNDER TONGUE  EVERY 5 MINUTES FOR 3 DOSES; IF NO RELIEF GO TO ER 25 tablet 0   • Omega-3 Fatty Acids (FISH OIL PO) Take 8 tablets by mouth Daily.     • pantoprazole (PROTONIX) 40 MG EC tablet Take 1 tablet by mouth 2 (Two) Times a Day. 180 tablet 1   • potassium chloride (K-DUR,KLOR-CON) 10 MEQ CR tablet      • potassium chloride (MICRO-K) 10 MEQ CR capsule Take 10 mEq by mouth Daily.     • prochlorperazine (COMPAZINE) 10 MG tablet TAKE 1 TABLET BY MOUTH EVERY 8 HOURS AS NEEDED 24 tablet 0   • sucralfate (CARAFATE) 1 g tablet TAKE 1 TABLET BY MOUTH FOUR TIMES DAILY 120 tablet 3   • vitamin B-6 (PYRIDOXINE) 100 MG tablet Take 100 mg by mouth Daily.     • vitamin C (ASCORBIC ACID) 500 MG tablet Take 500 mg by mouth Daily.     • ranolazine (RANEXA) 500 MG 12 hr tablet Take 1 tablet by mouth 2 (two) times a day.       No facility-administered medications prior to visit.       Opioid medication/s are on active medication list.  and I have  evaluated his active treatment plan and pain score trends (see table).  There were no vitals filed for this visit.  I have reviewed the chart for potential of high risk medication and harmful drug interactions in the elderly.            Aspirin is on active medication list. Aspirin use is indicated based on review of current medical condition/s. Pros and cons of this therapy have been discussed today. Benefits of this medication outweigh potential harm.  Patient has been encouraged to continue taking this medication.  .      Patient Active Problem List   Diagnosis   • Mixed hyperlipidemia   • Primary hypertension   • Coronary artery disease involving native coronary artery of native heart without angina pectoris   • Ischemic cardiomyopathy   • Obstructive sleep apnea syndrome   • Palpitations   • Hx of CABG   • Stented coronary artery   • Positive cardiac stress test   • Type 2 diabetes mellitus without complication, without long-term current use of insulin (HCC)   • Annual physical exam     Advance Care Planning  Advance Directive is on file.  ACP discussion was held with the patient during this visit. Patient has an advance directive in EMR which is still valid.     Review of Systems   Constitutional: Negative for chills, fatigue and fever.   HENT: Negative for congestion, hearing loss and rhinorrhea.    Eyes: Negative for pain and visual disturbance.   Respiratory: Negative for cough and shortness of breath.    Cardiovascular: Negative for chest pain and palpitations.   Gastrointestinal: Negative for abdominal pain, constipation, diarrhea, nausea and vomiting.   Genitourinary: Negative for difficulty urinating and dysuria.   Musculoskeletal: Positive for back pain. Negative for arthralgias and myalgias.   Neurological: Negative for weakness and numbness.   Psychiatric/Behavioral: Negative for dysphoric mood and sleep disturbance. The patient is not nervous/anxious.         Objective    Vitals:    11/23/21 1128  "21 1206   BP: 143/67 160/63   BP Location: Left arm Left arm   Patient Position: Sitting Sitting   Pulse: 65 99   Temp: 98.7 °F (37.1 °C)    TempSrc: Oral    Weight: 69.5 kg (153 lb 3.2 oz)    Height: 167.6 cm (66\")      BMI Readings from Last 1 Encounters:   21 24.73 kg/m²   BMI is within normal parameters. No follow-up required.    Does the patient have evidence of cognitive impairment? No    Physical Exam  Vitals reviewed.   Constitutional:       General: He is not in acute distress.     Appearance: Normal appearance. He is well-developed.   HENT:      Head: Normocephalic and atraumatic.      Right Ear: External ear normal.      Left Ear: External ear normal.      Mouth/Throat:      Mouth: Mucous membranes are moist.   Eyes:      Extraocular Movements: Extraocular movements intact.      Conjunctiva/sclera: Conjunctivae normal.      Pupils: Pupils are equal, round, and reactive to light.   Cardiovascular:      Rate and Rhythm: Normal rate and regular rhythm.      Heart sounds: No murmur heard.      Pulmonary:      Effort: Pulmonary effort is normal.      Breath sounds: Normal breath sounds. No wheezing, rhonchi or rales.   Abdominal:      General: Bowel sounds are normal. There is no distension.      Palpations: Abdomen is soft.      Tenderness: There is no abdominal tenderness.   Musculoskeletal:         General: Normal range of motion.   Skin:     General: Skin is warm and dry.   Neurological:      Mental Status: He is alert and oriented to person, place, and time.      Deep Tendon Reflexes: Reflexes normal.   Psychiatric:         Mood and Affect: Mood and affect normal.         Behavior: Behavior normal.         Thought Content: Thought content normal.         Judgment: Judgment normal.                 HEALTH RISK ASSESSMENT    Smoking Status:  Social History     Tobacco Use   Smoking Status Former Smoker   • Types: Cigarettes   • Quit date:    • Years since quittin.9   Smokeless Tobacco " Never Used     Alcohol Consumption:  Social History     Substance and Sexual Activity   Alcohol Use Never     Fall Risk Screen:    STEADI Fall Risk Assessment was completed, and patient is at LOW risk for falls.Assessment completed on:11/23/2021    Depression Screening:  PHQ-2/PHQ-9 Depression Screening 11/23/2021   Little interest or pleasure in doing things 0   Feeling down, depressed, or hopeless 0   Total Score 0       Health Habits and Functional and Cognitive Screening:  Functional & Cognitive Status 11/23/2021   Do you have difficulty preparing food and eating? No   Do you have difficulty bathing yourself, getting dressed or grooming yourself? No   Do you have difficulty using the toilet? No   Do you have difficulty moving around from place to place? No   Do you have trouble with steps or getting out of a bed or a chair? No   Current Diet Well Balanced Diet   Dental Exam Up to date   Eye Exam Up to date   Exercise (times per week) 2 times per week   Current Exercises Include Yard Work;House Cleaning;Walking   Do you need help using the phone?  No   Are you deaf or do you have serious difficulty hearing?  No   Do you need help with transportation? No   Do you need help shopping? No   Do you need help preparing meals?  No   Do you need help with housework?  No   Do you need help with laundry? No   Do you need help taking your medications? No   Do you need help managing money? No   Do you ever drive or ride in a car without wearing a seat belt? No   Have you felt unusual stress, anger or loneliness in the last month? No   Who do you live with? Alone   If you need help, do you have trouble finding someone available to you? No   Have you been bothered in the last four weeks by sexual problems? No   Do you have difficulty concentrating, remembering or making decisions? No       Age-appropriate Screening Schedule:  Refer to the list below for future screening recommendations based on patient's age, sex and/or  medical conditions. Orders for these recommended tests are listed in the plan section. The patient has been provided with a written plan.    Health Maintenance   Topic Date Due   • HEMOGLOBIN A1C  08/20/2021   • URINE MICROALBUMIN  08/25/2021   • DIABETIC EYE EXAM  02/19/2022   • LIPID PANEL  02/20/2022   • TDAP/TD VACCINES (3 - Td or Tdap) 02/04/2023   • INFLUENZA VACCINE  Completed   • ZOSTER VACCINE  Completed              Assessment/Plan   CMS Preventative Services Quick Reference  Risk Factors Identified During Encounter  Immunizations Discussed/Encouraged (specific Immunizations; Influenza and COVID19  The above risks/problems have been discussed with the patient.  Follow up actions/plans if indicated are seen below in the Assessment/Plan Section.  Pertinent information has been shared with the patient in the After Visit Summary.    Diagnoses and all orders for this visit:    1. Annual physical exam (Primary)  Assessment & Plan:  He is UTD on colonoscopy, last done 8/2019 and this showed diverticulosis.  Prostate cancer screening is no longer indicated by age and history; s/p prostatectomy d/t bladder cancer.  He is UTD on COVID (Moderna x3), Pneumovax, (10/2008), Prevnar (9/2015), Zostavax (10/2006), Shingrix (12/2019, 5/2020), Tdap (1/2013), and flu (10/2021).  He is due for routine labs including diabetes panel; ordered.  He is UTD on eye exam (2/2021).      2. Type 2 diabetes mellitus without complication, without long-term current use of insulin (HCC)  -     Comprehensive Metabolic Panel; Future  -     Lipid Panel; Future  -     Hemoglobin A1c; Future  -     Microalbumin / Creatinine Urine Ratio - Urine, Clean Catch; Future      Follow Up:   Return in about 3 months (around 2/23/2022) for Recheck.     An After Visit Summary and PPPS were made available to the patient.

## 2021-11-23 NOTE — ASSESSMENT & PLAN NOTE
He is UTD on colonoscopy, last done 8/2019 and this showed diverticulosis.  Prostate cancer screening is no longer indicated by age and history; s/p prostatectomy d/t bladder cancer.  He is UTD on COVID (Moderna x3), Pneumovax, (10/2008), Prevnar (9/2015), Zostavax (10/2006), Shingrix (12/2019, 5/2020), Tdap (1/2013), and flu (10/2021).  He is due for routine labs including diabetes panel; ordered.  He is UTD on eye exam (2/2021).

## 2021-11-30 RX ORDER — FUROSEMIDE 40 MG/1
TABLET ORAL
Qty: 90 TABLET | Refills: 1 | Status: SHIPPED | OUTPATIENT
Start: 2021-11-30 | End: 2022-03-28 | Stop reason: SDUPTHER

## 2021-11-30 RX ORDER — GABAPENTIN 300 MG/1
CAPSULE ORAL
Qty: 120 CAPSULE | Refills: 2 | OUTPATIENT
Start: 2021-11-30

## 2021-12-01 ENCOUNTER — LAB (OUTPATIENT)
Dept: LAB | Facility: HOSPITAL | Age: 81
End: 2021-12-01

## 2021-12-01 DIAGNOSIS — E11.9 TYPE 2 DIABETES MELLITUS WITHOUT COMPLICATION, WITHOUT LONG-TERM CURRENT USE OF INSULIN (HCC): ICD-10-CM

## 2021-12-01 LAB
ALBUMIN SERPL-MCNC: 4.6 G/DL (ref 3.5–5.2)
ALBUMIN UR-MCNC: 6.9 MG/DL
ALBUMIN/GLOB SERPL: 1.9 G/DL
ALP SERPL-CCNC: 65 U/L (ref 39–117)
ALT SERPL W P-5'-P-CCNC: 12 U/L (ref 1–41)
ANION GAP SERPL CALCULATED.3IONS-SCNC: 9.5 MMOL/L (ref 5–15)
AST SERPL-CCNC: 18 U/L (ref 1–40)
BILIRUB SERPL-MCNC: 0.5 MG/DL (ref 0–1.2)
BUN SERPL-MCNC: 18 MG/DL (ref 8–23)
BUN/CREAT SERPL: 22 (ref 7–25)
CALCIUM SPEC-SCNC: 9.2 MG/DL (ref 8.6–10.5)
CHLORIDE SERPL-SCNC: 97 MMOL/L (ref 98–107)
CHOLEST SERPL-MCNC: 125 MG/DL (ref 0–200)
CO2 SERPL-SCNC: 27.5 MMOL/L (ref 22–29)
CREAT SERPL-MCNC: 0.82 MG/DL (ref 0.76–1.27)
CREAT UR-MCNC: 44.4 MG/DL
GFR SERPL CREATININE-BSD FRML MDRD: 90 ML/MIN/1.73
GLOBULIN UR ELPH-MCNC: 2.4 GM/DL
GLUCOSE SERPL-MCNC: 127 MG/DL (ref 65–99)
HBA1C MFR BLD: 6 % (ref 4.8–5.6)
HDLC SERPL-MCNC: 34 MG/DL (ref 40–60)
LDLC SERPL CALC-MCNC: 76 MG/DL (ref 0–100)
LDLC/HDLC SERPL: 2.25 {RATIO}
MICROALBUMIN/CREAT UR: 155.4 MG/G
POTASSIUM SERPL-SCNC: 4.2 MMOL/L (ref 3.5–5.2)
PROT SERPL-MCNC: 7 G/DL (ref 6–8.5)
SODIUM SERPL-SCNC: 134 MMOL/L (ref 136–145)
TRIGL SERPL-MCNC: 73 MG/DL (ref 0–150)
VLDLC SERPL-MCNC: 15 MG/DL (ref 5–40)

## 2021-12-01 PROCEDURE — 82043 UR ALBUMIN QUANTITATIVE: CPT

## 2021-12-01 PROCEDURE — 83036 HEMOGLOBIN GLYCOSYLATED A1C: CPT

## 2021-12-01 PROCEDURE — 82570 ASSAY OF URINE CREATININE: CPT

## 2021-12-01 PROCEDURE — 80061 LIPID PANEL: CPT

## 2021-12-01 PROCEDURE — 36415 COLL VENOUS BLD VENIPUNCTURE: CPT

## 2021-12-01 PROCEDURE — 80053 COMPREHEN METABOLIC PANEL: CPT

## 2021-12-02 RX ORDER — RANOLAZINE 500 MG/1
TABLET, EXTENDED RELEASE ORAL
Qty: 180 TABLET | Refills: 1 | Status: SHIPPED | OUTPATIENT
Start: 2021-12-02 | End: 2022-03-09

## 2021-12-13 RX ORDER — GABAPENTIN 300 MG/1
CAPSULE ORAL
Qty: 120 CAPSULE | Refills: 2 | OUTPATIENT
Start: 2021-12-13

## 2021-12-21 RX ORDER — AMLODIPINE BESYLATE 10 MG/1
TABLET ORAL
Qty: 90 TABLET | Refills: 1 | Status: SHIPPED | OUTPATIENT
Start: 2021-12-21 | End: 2022-06-28

## 2022-01-03 RX ORDER — LOSARTAN POTASSIUM 25 MG/1
TABLET ORAL
Qty: 45 TABLET | Refills: 1 | Status: SHIPPED | OUTPATIENT
Start: 2022-01-03 | End: 2022-06-23

## 2022-01-11 RX ORDER — POTASSIUM CHLORIDE 750 MG/1
TABLET, EXTENDED RELEASE ORAL
Qty: 90 TABLET | Refills: 1 | Status: SHIPPED | OUTPATIENT
Start: 2022-01-11 | End: 2022-07-07

## 2022-01-14 ENCOUNTER — OFFICE VISIT (OUTPATIENT)
Dept: FAMILY MEDICINE CLINIC | Age: 82
End: 2022-01-14

## 2022-01-14 VITALS
BODY MASS INDEX: 24.27 KG/M2 | WEIGHT: 151 LBS | SYSTOLIC BLOOD PRESSURE: 150 MMHG | HEART RATE: 90 BPM | OXYGEN SATURATION: 95 % | DIASTOLIC BLOOD PRESSURE: 62 MMHG | TEMPERATURE: 99.7 F | HEIGHT: 66 IN

## 2022-01-14 DIAGNOSIS — I25.5 ISCHEMIC CARDIOMYOPATHY: ICD-10-CM

## 2022-01-14 DIAGNOSIS — U07.1 COVID-19: Primary | ICD-10-CM

## 2022-01-14 DIAGNOSIS — I10 PRIMARY HYPERTENSION: ICD-10-CM

## 2022-01-14 DIAGNOSIS — R05.9 COUGH: ICD-10-CM

## 2022-01-14 LAB
EXPIRATION DATE: ABNORMAL
FLUAV AG UPPER RESP QL IA.RAPID: NOT DETECTED
FLUBV AG UPPER RESP QL IA.RAPID: NOT DETECTED
INTERNAL CONTROL: ABNORMAL
Lab: ABNORMAL
SARS-COV-2 AG UPPER RESP QL IA.RAPID: DETECTED

## 2022-01-14 PROCEDURE — 99213 OFFICE O/P EST LOW 20 MIN: CPT | Performed by: NURSE PRACTITIONER

## 2022-01-14 PROCEDURE — 87428 SARSCOV & INF VIR A&B AG IA: CPT | Performed by: NURSE PRACTITIONER

## 2022-01-14 NOTE — PROGRESS NOTES
Chief Complaint  Marcello Larson presents to [facility] for Sinusitis    Subjective          Patient here today for concerns of possible covid infection or URI     Known Exposure to positive case?   none  Date of exposure?   n/a  Date of symptoms start?   Tuesday 1/11/2022  (Symptoms may appear 2-14 days after exposure )    Fever or chills?   no  Cough?   yes  Shortness of breath or difficulty breathing?  no  Fatigue?   no  Muscle or body aches?  no   Headache?   no  New loss of taste or smell? no  Sore throat?  no  Congestion or runny nose? no  Nausea or vomiting?   no  Diarrhea?   no  Any  emergency warning signs for COVID-19.   Trouble breathing?  no  Persistent pain or pressure in the chest?   no  New confusion? no  Inability to wake or stay awake?no  Pale, gray, or blue-colored skin, lips, or nail beds, depending on skin tone?   no    Taking any medications at home to help with symptoms?yes  tylenol  Any prior vaccine to covid?   Yes  And booster   Any significant health problems / existing lung / heart problems?  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)    • Allergic rhinitis due to pollen    • Anemia, unspecified    • Anxiety disorder, unspecified    • Atherosclerotic heart disease of native coronary artery without angina pectoris    • Benign essential hypertension 8/13/2019   • Bilateral primary osteoarthritis of knee    • Cardiac dysfunction 8/13/2019    Left ventricle   • Cardiomyopathy (HCC) 8/13/2019   • CHF (congestive heart failure) (HCC)    • 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  LV systolic function is moderate to severely reduced.  EF 30-35%.  The transmittal spectral doppler flow pattern is suggestive of pseudonormalization.  There is moderate to severe global hypokinesis of the LV.  The left atrium is mildly dilated.  The mitral leaflets appear thickened, but open well.  Mild MR and AR.   • Hx of CABG 8/13/2019 01/01/1991 - left internal mammary graft to the LAD, SVG to OM1, OM2 in the RCA   • Hyperlipidemia 8/13/2019   • Hypo-osmolality and hyponatremia    • Low back pain    • Other long term (current) drug therapy    • Palpitations 8/13/2019   • Personal history of malignant neoplasm of bladder    • Sleep apnea 8/13/2019   • Stented coronary artery 8/13/2019 04/11/2014 - VISION bare metal stent to the proximal stenosis of the vein graft to the right and ISION bare metal stent in the mid to distal 80% stenosis.   • Type 2 diabetes mellitus without complication (HCC)    • Unstable angina (HCC)      Current Outpatient Medications   Medication Sig Dispense Refill   • amLODIPine (NORVASC) 10 MG tablet TAKE ONE TABLET BY MOUTH EVERY DAY 90 tablet 1   • aspirin 81 MG tablet Take 162 mg by mouth Daily.     • busPIRone (BUSPAR) 5 MG tablet Take 5 mg by mouth 2 (two) times a day.     • carvedilol (COREG) 6.25 MG tablet TAKE 1 TABLET BY MOUTH TWICE DAILY 180 tablet 3   • clopidogrel (PLAVIX) 75 MG tablet TAKE 1 TABLET DAILY 90 tablet 3   • Diclofenac Sodium (VOLTAREN) 1 % gel gel APPLY TO THE AFFECTED AREA(S) FOUR TIMES DAILY 100 g 1   • ferrous sulfate 325 (65 FE) MG tablet Take 325 mg by mouth Daily With Breakfast.     • fluticasone (FLONASE) 50 MCG/ACT nasal spray USE 1 SPRAY EACH NOSTIL DAILY 48 g 2   • fluticasone (VERAMYST) 27.5 MCG/SPRAY nasal spray 2 sprays into the nostril(s) as directed by provider Daily.     • furosemide (LASIX) 40 MG tablet TAKE 1 TABLET BY MOUTH DAILY 90 tablet 1   • gabapentin (NEURONTIN)  300 MG capsule Take 300 mg by mouth 2 (Two) Times a Day.     • HYDROcodone-acetaminophen (NORCO) 5-325 MG per tablet Take 1 tablet by mouth Every 8 (Eight) Hours As Needed.     • isosorbide mononitrate (IMDUR) 30 MG 24 hr tablet TAKE 1 TABLET BY MOUTH EVERY MORNING 90 tablet 1   • losartan (COZAAR) 25 MG tablet TAKE 1/2 TABLET BY MOUTH EVERY DAY 45 tablet 1   • lovastatin (MEVACOR) 40 MG tablet TAKE 1 TABLET BY MOUTH EVERY EVENING 90 tablet 3   • metFORMIN (GLUCOPHAGE) 500 MG tablet Take 500 mg by mouth Daily.       • montelukast (SINGULAIR) 10 MG tablet TAKE 1 TABLET BY MOUTH EACH EVENING 90 tablet 1   • nitroglycerin (NITROSTAT) 0.4 MG SL tablet DISSOLVE 1 UNDER TONGUE  EVERY 5 MINUTES FOR 3 DOSES; IF NO RELIEF GO TO ER 25 tablet 0   • Omega-3 Fatty Acids (FISH OIL PO) Take 8 tablets by mouth Daily.     • pantoprazole (PROTONIX) 40 MG EC tablet Take 1 tablet by mouth 2 (Two) Times a Day. 180 tablet 1   • potassium chloride (K-DUR,KLOR-CON) 10 MEQ CR tablet TAKE 1 TABLET BY MOUTH DAILY 90 tablet 1   • potassium chloride (MICRO-K) 10 MEQ CR capsule Take 10 mEq by mouth Daily.     • prochlorperazine (COMPAZINE) 10 MG tablet TAKE 1 TABLET BY MOUTH EVERY 8 HOURS AS NEEDED 24 tablet 0   • ranolazine (RANEXA) 500 MG 12 hr tablet TAKE 1 TABLET BY MOUTH TWICE DAILY 180 tablet 1   • sucralfate (CARAFATE) 1 g tablet TAKE 1 TABLET BY MOUTH FOUR TIMES DAILY 120 tablet 3   • vitamin B-6 (PYRIDOXINE) 100 MG tablet Take 100 mg by mouth Daily.     • vitamin C (ASCORBIC ACID) 500 MG tablet Take 500 mg by mouth Daily.       No current facility-administered medications for this visit.     Past Surgical History:   Procedure Laterality Date   • AMPUTATION Left     LOWER EXTREMITY   • APPENDECTOMY     • CARDIAC CATHETERIZATION     • CARDIAC CATHETERIZATION N/A 10/13/2020    Procedure: Left Heart Cath;  Surgeon: Kofi Campoverde MD;  Location: [facility] CATH INVASIVE LOC[facility];  Service: Cardiology;  Laterality: N/A;   • CARDIAC  "CATHETERIZATION N/A 10/13/2020    Procedure: Coronary angiography;  Surgeon: Kofi Campoverde MD;  Location:  [facility] CATH INVASIVE LOCATION;  Service: Cardiology;  Laterality: N/A;   • CARDIAC CATHETERIZATION N/A 10/13/2020    Procedure: Saphenous Vein Graft;  Surgeon: Kofi Campoverde MD;  Location:  [facility] CATH INVASIVE LOCATION;  Service: Cardiology;  Laterality: N/A;   • CATARACT EXTRACTION     • CHOLECYSTECTOMY     • CORONARY ARTERY BYPASS GRAFT      ,    • OTHER SURGICAL HISTORY      UROSTOMY S/P BLADDER CA      Social History     Tobacco Use   • Smoking status: Former Smoker     Types: Cigarettes     Quit date:      Years since quittin.0   • Smokeless tobacco: Never Used   Substance Use Topics   • Alcohol use: Never   • Drug use: Never     History reviewed. No pertinent family history.  There are no preventive care reminders to display for this patient.   Immunization History   Administered Date(s) Administered   • COVID-19 (MODERNA) 1st, 2nd, 3rd Dose Only 2021, 2021, 2021, 10/18/2021   • COVID-19 (UNSPECIFIED) 2021, 2021   • Fluad Quad 65+ 2021   • Fluzone High Dose =>65 Years ([employer] ONLY) 10/03/2016, 2017   • Hepatitis A 2018, 2019   • Influenza, Unspecified 10/01/2020   • Pneumococcal Conjugate 13-Valent (PCV13) 2015   • Pneumococcal Polysaccharide (PPSV23) 10/01/2008   • Shingrix 2019, 2020   • Tdap 2013, 2013   • Zoster, Unspecified 10/01/2006, 2020        Objective     Vitals:    22 0913   BP: 150/62   BP Location: Left arm   Patient Position: Sitting   Cuff Size: Adult   Pulse: 90   Temp: 99.7 °F (37.6 °C)   TempSrc: Oral   SpO2: 95%   Weight: 68.5 kg (151 lb)   Height: 167.6 cm (66\")     Body mass index is 24.37 kg/m².     Physical Exam  Vitals reviewed.   Constitutional:       Appearance: Normal appearance. He is ill-appearing (minimal).   HENT:      Head: Normocephalic.   Eyes:      " Pupils: Pupils are equal, round, and reactive to light.   Cardiovascular:      Rate and Rhythm: Normal rate. Rhythm irregularly irregular.      Heart sounds: No murmur heard.      Pulmonary:      Effort: Pulmonary effort is normal.      Breath sounds: Normal breath sounds.   Musculoskeletal:         General: Normal range of motion.   Neurological:      Mental Status: He is alert.   Psychiatric:         Mood and Affect: Mood normal.         Behavior: Behavior normal.           Result Review :                               Assessment and Plan      Diagnoses and all orders for this visit:    1. COVID-19 (Primary)  Comments:  symptomatic treatment, mucinex OTC as labeled, follow up if new or worsening symtpoms a dn to ER if dyspnea    2. Cough  Comments:  mucinex as recommended   Orders:  -     POCT SARS-CoV-2 Antigen SAMANTHA    3. Primary hypertension  Comments:  elevated today , high risk with covid secondary to chronic conditions    4. Ischemic cardiomyopathy  Comments:  higher risk secondary to age and underlying health conditions due to covid              Follow Up     Return if symptoms worsen or fail to improve.

## 2022-01-21 ENCOUNTER — TELEPHONE (OUTPATIENT)
Dept: FAMILY MEDICINE CLINIC | Age: 82
End: 2022-01-21

## 2022-01-21 NOTE — TELEPHONE ENCOUNTER
Caller: Marcello Larson    Relationship: Self    Best call back number: [phone]    What was the call regarding: PT WOULD LIKE TO KNOW HOW LONGER HE NEEDS TO KEEP TAKING THE MUSINEX    Do you require a callback: YES

## 2022-01-21 NOTE — TELEPHONE ENCOUNTER
Caller: Marcello Larson    Relationship: Self    Best call back number: [phone]  What is the best time to reach you:ANYTIEM     What was the call regarding: STILL NOT FEELING WELL AS COUGH AND CANT EAT A LOT WITHOUT GETTING SICK

## 2022-02-08 RX ORDER — ISOSORBIDE MONONITRATE 30 MG/1
TABLET, EXTENDED RELEASE ORAL
Qty: 90 TABLET | Refills: 1 | Status: SHIPPED | OUTPATIENT
Start: 2022-02-08 | End: 2022-08-12

## 2022-02-21 ENCOUNTER — DOCUMENTATION (OUTPATIENT)
Dept: FAMILY MEDICINE CLINIC | Age: 82
End: 2022-02-21

## 2022-02-21 NOTE — PROGRESS NOTES
Pt at office with c/o soreness in upper chest when he takes deep breathe. Pt is having a little cough but its non-prod cough.   Started yesterday around 9:00 am. Pt doesn't remember hurting his self.   Pt denies any SOB, N/V and any arm pain. Vitals 151/72 pulse 60 Temp 97.3 O2 Sat 95% Lungs sound clear.   Pt was positive Covid 1-14-22.  Pt denies any distress.   Spoke with Dr. Santana and he said pt would be ok to keep appt with Dr. Corley tomorrow at 1:15. Pt was informed if his condition were to worsen to have someone to take to the ER./pr

## 2022-02-22 ENCOUNTER — TELEPHONE (OUTPATIENT)
Dept: CARDIOLOGY | Facility: CLINIC | Age: 82
End: 2022-02-22

## 2022-02-24 ENCOUNTER — READMISSION MANAGEMENT (OUTPATIENT)
Dept: CALL CENTER | Facility: HOSPITAL | Age: 82
End: 2022-02-24

## 2022-02-24 NOTE — OUTREACH NOTE
Prep Survey      Responses   [facility] facility patient discharged from? Non-BH   Is LACE score < 7 ? Non-BH Discharge   Emergency Room discharge w/ pulse ox? No   Eligibility [facility]    Date of Discharge 02/24/22   Discharge diagnosis Unavailable    Does the patient have one of the following disease processes/diagnoses(primary or secondary)? Non-BH Discharge   Prep survey completed? Yes          Mamta Corona RN

## 2022-02-25 ENCOUNTER — TELEPHONE (OUTPATIENT)
Dept: FAMILY MEDICINE CLINIC | Age: 82
End: 2022-02-25

## 2022-02-25 ENCOUNTER — TRANSITIONAL CARE MANAGEMENT TELEPHONE ENCOUNTER (OUTPATIENT)
Dept: CALL CENTER | Facility: HOSPITAL | Age: 82
End: 2022-02-25

## 2022-02-25 DIAGNOSIS — E11.9 TYPE 2 DIABETES MELLITUS WITHOUT COMPLICATION, WITHOUT LONG-TERM CURRENT USE OF INSULIN: Primary | ICD-10-CM

## 2022-02-25 NOTE — OUTREACH NOTE
Call Center TCM Note      Responses   [facility] patient discharged from? Non-  [[facility]]   Does the patient have one of the following disease processes/diagnoses(primary or secondary)? Non- Discharge   TCM attempt successful? Yes   Call start time 0858   Call end time 0902   Discharge diagnosis CHF   Meds reviewed with patient/caregiver? Yes   Is the patient having any side effects they believe may be caused by any medication additions or changes? No   Does the patient have all medications ordered at discharge? Yes   Is the patient taking all medications as directed (includes completed medication regime)? Yes   Medication comments Increased lasix dosing but pt unaware as he was not home at time of call   Does the patient have a primary care provider?  Yes   Does the patient have an appointment with their PCP within 7 days of discharge? Yes   Comments regarding PCP Hospital PCP FOLLOW UP APPOINTMENT IS 3/1/22@1130am   Has the patient kept scheduled appointments due by today? N/A   Psychosocial issues? No   Did the patient receive a copy of their discharge instructions? Yes   Nursing interventions Reviewed instructions with patient   What is the patient's perception of their health status since discharge? Improving  [Admitted for CHF--he denies edema, SOA today, pt feeling well. He had already been out for coffee this am and was driving to grocery,call kept brief as pt had pulled to side of road.  Encouraged pt to weigh daily, notify MD for 3# gain in day, 5# week-.]   Is the patient/caregiver able to teach back signs and symptoms related to disease process for when to call PCP? Yes   TCM call completed? Yes          Terese De Leon RN    2/25/2022, 09:06 EST

## 2022-02-28 RX ORDER — LANCETS
1 EACH MISCELLANEOUS DAILY
COMMUNITY
End: 2022-02-28 | Stop reason: SDUPTHER

## 2022-02-28 RX ORDER — BLOOD-GLUCOSE METER
1 KIT MISCELLANEOUS DAILY
COMMUNITY
End: 2022-02-28 | Stop reason: SDUPTHER

## 2022-02-28 RX ORDER — MONTELUKAST SODIUM 10 MG/1
TABLET ORAL
Qty: 90 TABLET | Refills: 1 | Status: SHIPPED | OUTPATIENT
Start: 2022-02-28 | End: 2022-09-09

## 2022-03-01 ENCOUNTER — OFFICE VISIT (OUTPATIENT)
Dept: FAMILY MEDICINE CLINIC | Age: 82
End: 2022-03-01

## 2022-03-01 ENCOUNTER — LAB (OUTPATIENT)
Dept: LAB | Facility: HOSPITAL | Age: 82
End: 2022-03-01

## 2022-03-01 VITALS
TEMPERATURE: 98 F | DIASTOLIC BLOOD PRESSURE: 72 MMHG | BODY MASS INDEX: 23.46 KG/M2 | HEIGHT: 66 IN | SYSTOLIC BLOOD PRESSURE: 131 MMHG | WEIGHT: 146 LBS | HEART RATE: 67 BPM

## 2022-03-01 DIAGNOSIS — I25.10 CORONARY ARTERY DISEASE INVOLVING NATIVE CORONARY ARTERY OF NATIVE HEART WITHOUT ANGINA PECTORIS: ICD-10-CM

## 2022-03-01 DIAGNOSIS — I50.42 CHRONIC COMBINED SYSTOLIC AND DIASTOLIC CONGESTIVE HEART FAILURE: Primary | ICD-10-CM

## 2022-03-01 DIAGNOSIS — I50.42 CHRONIC COMBINED SYSTOLIC AND DIASTOLIC CONGESTIVE HEART FAILURE: ICD-10-CM

## 2022-03-01 PROBLEM — Z00.00 ANNUAL PHYSICAL EXAM: Status: RESOLVED | Noted: 2021-11-23 | Resolved: 2022-03-01

## 2022-03-01 LAB
ANION GAP SERPL CALCULATED.3IONS-SCNC: 11.7 MMOL/L (ref 5–15)
BASOPHILS # BLD AUTO: 0.05 10*3/MM3 (ref 0–0.2)
BASOPHILS NFR BLD AUTO: 0.7 % (ref 0–1.5)
BUN SERPL-MCNC: 28 MG/DL (ref 8–23)
BUN/CREAT SERPL: 25 (ref 7–25)
CALCIUM SPEC-SCNC: 9.4 MG/DL (ref 8.6–10.5)
CHLORIDE SERPL-SCNC: 102 MMOL/L (ref 98–107)
CO2 SERPL-SCNC: 27.3 MMOL/L (ref 22–29)
CREAT SERPL-MCNC: 1.12 MG/DL (ref 0.76–1.27)
DEPRECATED RDW RBC AUTO: 45.7 FL (ref 37–54)
EGFRCR SERPLBLD CKD-EPI 2021: 66 ML/MIN/1.73
EOSINOPHIL # BLD AUTO: 0.16 10*3/MM3 (ref 0–0.4)
EOSINOPHIL NFR BLD AUTO: 2.3 % (ref 0.3–6.2)
ERYTHROCYTE [DISTWIDTH] IN BLOOD BY AUTOMATED COUNT: 13.3 % (ref 12.3–15.4)
GLUCOSE SERPL-MCNC: 110 MG/DL (ref 65–99)
HCT VFR BLD AUTO: 41.9 % (ref 37.5–51)
HGB BLD-MCNC: 13.2 G/DL (ref 13–17.7)
IMM GRANULOCYTES # BLD AUTO: 0.03 10*3/MM3 (ref 0–0.05)
IMM GRANULOCYTES NFR BLD AUTO: 0.4 % (ref 0–0.5)
LYMPHOCYTES # BLD AUTO: 1.12 10*3/MM3 (ref 0.7–3.1)
LYMPHOCYTES NFR BLD AUTO: 16.2 % (ref 19.6–45.3)
MCH RBC QN AUTO: 29.2 PG (ref 26.6–33)
MCHC RBC AUTO-ENTMCNC: 31.5 G/DL (ref 31.5–35.7)
MCV RBC AUTO: 92.7 FL (ref 79–97)
MONOCYTES # BLD AUTO: 0.48 10*3/MM3 (ref 0.1–0.9)
MONOCYTES NFR BLD AUTO: 6.9 % (ref 5–12)
NEUTROPHILS NFR BLD AUTO: 5.07 10*3/MM3 (ref 1.7–7)
NEUTROPHILS NFR BLD AUTO: 73.5 % (ref 42.7–76)
PLATELET # BLD AUTO: 174 10*3/MM3 (ref 140–450)
PMV BLD AUTO: 8.4 FL (ref 6–12)
POTASSIUM SERPL-SCNC: 3.9 MMOL/L (ref 3.5–5.2)
RBC # BLD AUTO: 4.52 10*6/MM3 (ref 4.14–5.8)
SODIUM SERPL-SCNC: 141 MMOL/L (ref 136–145)
WBC NRBC COR # BLD: 6.91 10*3/MM3 (ref 3.4–10.8)

## 2022-03-01 PROCEDURE — 99495 TRANSJ CARE MGMT MOD F2F 14D: CPT | Performed by: FAMILY MEDICINE

## 2022-03-01 PROCEDURE — 80048 BASIC METABOLIC PNL TOTAL CA: CPT

## 2022-03-01 PROCEDURE — 85025 COMPLETE CBC W/AUTO DIFF WBC: CPT

## 2022-03-01 PROCEDURE — 36415 COLL VENOUS BLD VENIPUNCTURE: CPT

## 2022-03-01 PROCEDURE — 1111F DSCHRG MED/CURRENT MED MERGE: CPT | Performed by: FAMILY MEDICINE

## 2022-03-01 RX ORDER — BLOOD-GLUCOSE METER
1 KIT MISCELLANEOUS DAILY
Qty: 1 EACH | Refills: 0 | Status: SHIPPED | OUTPATIENT
Start: 2022-03-01

## 2022-03-01 RX ORDER — LANCETS
1 EACH MISCELLANEOUS DAILY
Qty: 100 EACH | Refills: 3 | Status: SHIPPED | OUTPATIENT
Start: 2022-03-01

## 2022-03-01 NOTE — PROGRESS NOTES
Transitional Care Follow Up Visit  Subjective     Marcello Larson is a 81 y.o. male who presents for a transitional care management visit.    Within 48 business hours after discharge our office contacted him via telephone to coordinate his care and needs.      I reviewed and discussed the details of that call along with the discharge summary, hospital problems, inpatient lab results, inpatient diagnostic studies, and consultation reports with Marcello.     Current outpatient and discharge medications have been reconciled for the patient.  Reviewed by: Zeny Corley MD      Date of TCM Phone Call 2/24/2022   [facility]    Date of Discharge 2/24/2022     Risk for Readmission (LACE) No data recorded    History of Present Illness   Course During Hospital Stay: Marcello is here for transitional care appointment following admission to [facility] from 2/22/2022 to 2/24/2022.  He was admitted after presenting to the ED with chest pain and shortness of breath with acute on chronic heart failure and CAD.  He did have peripheral edema as well as elevated BNP and heart failure signs on chest x-ray so was started on IV Bumex and diuresed well.  He had an almost immediate improvement of his shortness of breath.  He was seen by Cardiology during admission with echo performed.  His EF was 30 to 35% with diastolic dysfunction.  He follows with Dr. Kofi Knight at baseline.    Marcello was discharged home with an increase of his Lasix to 40 mg twice daily from once daily.  He continues on amlodipine 10 mg daily, carvedilol 6.25 mg twice daily, Imdur 30 mg daily, losartan 25 mg daily.  He is also on Ranexa 500 mg twice daily, lovastatin 40 mg daily and clopidogrel 75 mg daily.    Since he is home, no complaints of chest pain or SOB.  He is still feeling generally weak, but he is gradually feeling better.  No peripheral edema.  He is weighing himself daily.  No increase in weight.      The following portions of  the patient's history were reviewed and updated as appropriate: allergies, current medications, past family history, past medical history, past social history, past surgical history and problem list.    Review of Systems   Constitutional: Positive for fatigue. Negative for chills and fever.   HENT: Negative for congestion, hearing loss and rhinorrhea.    Eyes: Negative for pain and visual disturbance.   Respiratory: Negative for cough and shortness of breath.    Cardiovascular: Negative for chest pain and palpitations.   Gastrointestinal: Negative for abdominal pain, constipation, diarrhea, nausea and vomiting.   Genitourinary: Negative for dysuria and hematuria.   Musculoskeletal: Negative for arthralgias and myalgias.   Neurological: Positive for weakness (generalized). Negative for numbness.   Psychiatric/Behavioral: Negative for dysphoric mood and sleep disturbance. The patient is not nervous/anxious.        Objective   Physical Exam  Vitals reviewed.   Constitutional:       General: He is not in acute distress.     Appearance: Normal appearance. He is well-developed.   HENT:      Head: Normocephalic and atraumatic.      Right Ear: External ear normal.      Left Ear: External ear normal.   Eyes:      Extraocular Movements: Extraocular movements intact.      Conjunctiva/sclera: Conjunctivae normal.      Pupils: Pupils are equal, round, and reactive to light.   Cardiovascular:      Rate and Rhythm: Normal rate and regular rhythm.      Heart sounds: No murmur heard.      Pulmonary:      Effort: Pulmonary effort is normal.      Breath sounds: Normal breath sounds. No wheezing, rhonchi or rales.   Abdominal:      General: Bowel sounds are normal. There is no distension.      Palpations: Abdomen is soft.      Tenderness: There is no abdominal tenderness.   Musculoskeletal:         General: Normal range of motion.   Skin:     General: Skin is warm and dry.   Neurological:      Mental Status: He is alert and oriented to  person, place, and time.      Deep Tendon Reflexes: Reflexes normal.   Psychiatric:         Mood and Affect: Mood and affect normal.         Behavior: Behavior normal.         Thought Content: Thought content normal.         Judgment: Judgment normal.         Assessment/Plan   Diagnoses and all orders for this visit:    1. Chronic combined systolic and diastolic congestive heart failure (HCC) (Primary)  Assessment & Plan:  Acute on chronic systolic and diastolic heart failure episode led to Marcello's admission for 3 days at [facility].  While there, he had an echo done that showed an EF of 30 to 35% as well as diastolic dysfunction.  His Lasix was increased to 40 mg twice daily from once daily.  No other changes to his home medications were made.  He does not need a refill on the Lasix at the increased dose, but I am going to send him down to the lab today for bloodwork including a BMP to assess potassium level and a CBC.  We may need to increase his home potassium which he is still only taking once daily.  He is feeling well since getting home and appears euvolemic on exam today.  He does have some generalized weakness but that is slowly improving as well.  Continue medications as above.  He is going to be following up with Cardiology on 10 March.    Orders:  -     Basic Metabolic Panel; Future  -     CBC & Differential; Future    2. Coronary artery disease involving native coronary artery of native heart without angina pectoris  Assessment & Plan:  As per CHF plan.

## 2022-03-09 ENCOUNTER — TELEPHONE (OUTPATIENT)
Dept: FAMILY MEDICINE CLINIC | Age: 82
End: 2022-03-09

## 2022-03-09 ENCOUNTER — OFFICE VISIT (OUTPATIENT)
Dept: FAMILY MEDICINE CLINIC | Age: 82
End: 2022-03-09

## 2022-03-09 VITALS
WEIGHT: 147.6 LBS | HEIGHT: 66 IN | BODY MASS INDEX: 23.72 KG/M2 | SYSTOLIC BLOOD PRESSURE: 134 MMHG | TEMPERATURE: 97.8 F | HEART RATE: 56 BPM | DIASTOLIC BLOOD PRESSURE: 58 MMHG

## 2022-03-09 DIAGNOSIS — E78.2 MIXED HYPERLIPIDEMIA: ICD-10-CM

## 2022-03-09 DIAGNOSIS — I25.10 CORONARY ARTERY DISEASE INVOLVING NATIVE CORONARY ARTERY OF NATIVE HEART WITHOUT ANGINA PECTORIS: ICD-10-CM

## 2022-03-09 DIAGNOSIS — E11.9 TYPE 2 DIABETES MELLITUS WITHOUT COMPLICATION, WITHOUT LONG-TERM CURRENT USE OF INSULIN: ICD-10-CM

## 2022-03-09 DIAGNOSIS — G47.33 OBSTRUCTIVE SLEEP APNEA SYNDROME: ICD-10-CM

## 2022-03-09 DIAGNOSIS — I50.42 CHRONIC COMBINED SYSTOLIC AND DIASTOLIC CONGESTIVE HEART FAILURE: Primary | ICD-10-CM

## 2022-03-09 DIAGNOSIS — I10 PRIMARY HYPERTENSION: ICD-10-CM

## 2022-03-09 PROCEDURE — 99214 OFFICE O/P EST MOD 30 MIN: CPT | Performed by: FAMILY MEDICINE

## 2022-03-09 RX ORDER — RANOLAZINE 500 MG/1
500 TABLET, EXTENDED RELEASE ORAL 2 TIMES DAILY
Qty: 180 TABLET | Refills: 1
Start: 2022-03-09 | End: 2022-06-08

## 2022-03-09 NOTE — TELEPHONE ENCOUNTER
Caller: TAMIKO CABALLERO    Relationship: SELF    Best call back number: [phone]     What is the best time to reach you: ANYTIME    Who are you requesting to speak with (clinical staff, provider,  specific staff member): CLINICAL      What was the call regarding: PATIENT WAS SEEN TODAY AND WHEN HE GOT HOME HE NOTICED THAT HE DID NOT SEE HIS NASAL SPRAY REFLECTED IN HIS MEDICATIONS THAT HE TAKES. PATIENT JUST WANTED YOU TO BE AWARE THAT HE STILL TAKES fluticasone (FLONASE) 50 MCG/ACT nasal spray    Do you require a callback: ONLY IF YOU NEED TO

## 2022-03-09 NOTE — ASSESSMENT & PLAN NOTE
He is on metformin for diabetes.  No refills needed. His diabetes has been quite well controlled.  Last A1c was 6 in December. He does adhere to a diabetic diet.  He is on a statin and ASA.  He is UTD on eye exam (last done 7/2021); no diabetic retinopathy.  Labs are reviewed and up-to-date.

## 2022-03-09 NOTE — ASSESSMENT & PLAN NOTE
Stable on medications including aspirin, Plavix, Imdur, carvedilol, lovastatin, and Ranexa.  No refills needed today.  As per CHF plan.

## 2022-03-09 NOTE — PROGRESS NOTES
"Chief Complaint  Coronary Artery Disease    Subjective    History of Present Illness      I saw Marcello Tineo today for cardiovascular care.  He is a pleasant 81-year-old male with known coronary heart disease.  He status post coronary artery bypass surgery and previous percutaneous coronary intervention in 2014.  He does not report chest pain pressure or tightness.  He is free currently of dyspnea on exertion orthopnea or PND and no lower extremity edema.  He was hospitalized at [facility] 2/22/2022 through 2/24/2022 who is found to have acute decompensated congestive heart failure.  He underwent diuresis with significant improvement.  Echocardiogram was obtained which revealed left ventricular ejection fraction 30 to 35% with diastolic dysfunction.  He is free of syncope near syncope or palpitations.  Nuclear stress test 9/17/2020 revealed left ventricular ejection fraction of 20% with evidence of anterior lateral wall ischemia.  He underwent coronary angiography 10/13/2020 which revealed significant three-vessel disease.  Saphenous vein graft to OM1 and OM2 were occluded.  OMA was of large caliber not use for bypass surgery.  LIMA to the LAD was widely patent the LAD wrapped around the apex with collaterals to the circumflex and to the right coronary artery.  The right coronary artery had a proximal total occlusion with a patent saphenous vein graft.  There stents in the proximal and mid region with 30% luminal irregularities.  Echocardiogram from 2/14/2019 reveals left ventricular ejection fraction 40 to 45% mild aortic regurgitation.    Objective   Vital Signs:   /60   Pulse 55   Ht 167.6 cm (66\")   Wt 68 kg (150 lb)   BMI 24.21 kg/m²     Constitutional:       Appearance: Well-developed.   Eyes:      Conjunctiva/sclera: Conjunctivae normal.      Pupils: Pupils are equal, round, and reactive to light.   HENT:      Head: Normocephalic and atraumatic.   Neck:      Thyroid: No thyromegaly. "   Pulmonary:      Effort: Pulmonary effort is normal. No respiratory distress.      Breath sounds: Normal breath sounds. No wheezing. No rales.   Cardiovascular:      Normal rate. Regular rhythm.      No gallop. No S3 and S4 gallop. No rub.   Edema:     Peripheral edema absent.   Abdominal:      General: Bowel sounds are normal. There is no distension.      Palpations: Abdomen is soft. There is no abdominal mass.      Tenderness: There is no abdominal tenderness.   Musculoskeletal: Normal range of motion.      Cervical back: Neck supple.      Comments: Left lower extremity prosthesis Skin:     General: Skin is warm and dry.      Findings: No erythema.   Neurological:      Mental Status: Alert and oriented to person, place, and time.         Result Review :     Common labs    Common Labsle 12/1/21 12/1/21 12/1/21 12/1/21 3/1/22 3/1/22    0713 0713 0713 0713 1221 1221   Glucose    127 (A)  110 (A)   BUN    18  28 (A)   Creatinine    0.82  1.12   eGFR Non African Am    90     Sodium    134 (A)  141   Potassium    4.2  3.9   Chloride    97 (A)  102   Calcium    9.2  9.4   Albumin    4.60     Total Bilirubin    0.5     Alkaline Phosphatase    65     AST (SGOT)    18     ALT (SGPT)    12     WBC     6.91    Hemoglobin     13.2    Hematocrit     41.9    Platelets     174    Total Cholesterol  125       Triglycerides  73       HDL Cholesterol  34 (A)       LDL Cholesterol   76       Hemoglobin A1C   6.00 (A)      Microalbumin, Urine 6.9        (A) Abnormal value                      Assessment and Plan    1. Coronary artery disease involving native coronary artery of native heart without angina pectoris  No significant angina    2. Hx of CABG  Stable    3. Stented coronary artery  Stable    4. Ischemic cardiomyopathy  Presently compensated    5. Primary hypertension  Controlled    6. Mixed hyperlipidemia  Controlled    7. Type 2 diabetes mellitus without complication, without long-term current use of insulin  (HCC)  Controlled    8. Palpitations  Stable    Marcello currently is free of angina and euvolemic on exam.  He does not report any significant respiratory insufficiency.  He has a significant ischemic cardiomyopathy.  I will discuss his case with Dr. Terrence Terry, electrophysiologist in regards to AICD placement.  We will continue his current medical regimen.  I have counseled him on the importance of salt restriction is close to 2000 mg a day as possible and fluid restriction under 64 ounces daily.  I will arrange for follow-up in 4 weeks.  I discussed the interim use of a LifeVest as well.      I spent 45 minutes caring for Marcello on this date of service. This time includes time spent by me in the following activities:preparing for the visit, reviewing tests, obtaining and/or reviewing a separately obtained history, performing a medically appropriate examination and/or evaluation , counseling and educating the patient/family/caregiver, ordering medications, tests, or procedures, referring and communicating with other health care professionals , documenting information in the medical record, independently interpreting results and communicating that information with the patient/family/caregiver and care coordination  Follow Up   No follow-ups on file.  Patient was given instructions and counseling regarding his condition or for health maintenance advice. Please see specific information pulled into the AVS if appropriate.

## 2022-03-09 NOTE — ASSESSMENT & PLAN NOTE
Marcello is doing much better since his recent admission for acute on chronic systolic and diastolic heart failure.  He is euvolemic today on exam and continues taking his Lasix 40 mg twice daily.  He does not appear dry.  His BMP last week showed normal creatinine and potassium levels.  He is taking potassium supplementation at 10 mEq daily.  He has an appointment scheduled with his cardiologist tomorrow, so we will see if Dr. Campoverde wants to continue him on the furosemide twice daily or go back to once daily.  Consider repeat labs in another week or 2 if the furosemide continues to twice daily dosing, pending Cardiology's plans for monitoring.

## 2022-03-09 NOTE — PROGRESS NOTES
Chief Complaint  Congestive Heart Failure    Subjective          Marcello Larson presents to [facility] today for follow-up on routine issues.    He is on metformin for diabetes.  His diabetes has been quite well controlled.  Last A1c was 6 in December. He does adhere to a diabetic diet.  He is on a statin and ASA.  He is UTD on eye exam (last done 7/2021); no diabetic retinopathy.      He is on DAPT with aspirin/Plavix, Imdur and Ranexa for CAD.  He is on Lasix with K+.  He is on amlodipine and carvedilol for hypertension.  He is on lovastatin for hyperlipidemia. He is s/p 2v. CABG.  He has nitro for prn use but rarely uses them.  He follows with Dr. Kofi Campoverde.  I did just see him last week after he was admitted to [facility] for a few days for acute on chronic systolic and diastolic heart failure.  He had his Lasix increased to 40 mg twice daily.  We checked a BMP last week to monitor his potassium and kidney function, and those were both doing well.  He has a follow-up with his cardiologist tomorrow.  No chest pain, shortness of breath, palpitations, leg swelling.  He is walking 15-20 minutes daily.        He is on pantoprazole for GERD and h/o bleeding ulcer thought to be NSAID-induced.  He has sucralfate that he for prn use.  Does still take iron supplementation OTC.      He was on gabapentin for longstanding history chronic low back pain and leg pain through [facility] but is no longer requiring that.  He has been on hydrocodone in the past but is not currently using any opiates. S/p physical therapy.  Status post RFA.      He is on montelukast for allergies.       Current Outpatient Medications:   •  amLODIPine (NORVASC) 10 MG tablet, TAKE ONE TABLET BY MOUTH EVERY DAY, Disp: 90 tablet, Rfl: 1  •  aspirin 81 MG tablet, Take 162 mg by mouth Daily., Disp: , Rfl:   •  carvedilol (COREG) 6.25 MG tablet, TAKE 1 TABLET BY MOUTH TWICE DAILY, Disp: 180 tablet, Rfl: 3  •   clopidogrel (PLAVIX) 75 MG tablet, TAKE 1 TABLET DAILY, Disp: 90 tablet, Rfl: 3  •  Diclofenac Sodium (VOLTAREN) 1 % gel gel, APPLY TO THE AFFECTED AREA(S) FOUR TIMES DAILY, Disp: 100 g, Rfl: 1  •  ferrous sulfate 325 (65 FE) MG tablet, Take 325 mg by mouth Daily With Breakfast., Disp: , Rfl:   •  fluticasone (FLONASE) 50 MCG/ACT nasal spray, USE 1 SPRAY EACH NOSTIL DAILY, Disp: 48 g, Rfl: 2  •  furosemide (LASIX) 40 MG tablet, TAKE 1 TABLET BY MOUTH DAILY (Patient taking differently: Take 40 mg by mouth 2 (Two) Times a Day.), Disp: 90 tablet, Rfl: 1  •  glucose blood test strip, Check BS daily.  DX E11.9, Disp: 100 each, Rfl: 3  •  glucose monitor monitoring kit, 1 each Daily. E11.9, Disp: 1 each, Rfl: 0  •  HYDROcodone-acetaminophen (NORCO) 5-325 MG per tablet, Take 1 tablet by mouth Every 8 (Eight) Hours As Needed., Disp: , Rfl:   •  isosorbide mononitrate (IMDUR) 30 MG 24 hr tablet, TAKE 1 TABLET BY MOUTH EVERY MORNING, Disp: 90 tablet, Rfl: 1  •  Lancets Thin misc, 1 each Daily. DX E11.9, Disp: 100 each, Rfl: 3  •  losartan (COZAAR) 25 MG tablet, TAKE 1/2 TABLET BY MOUTH EVERY DAY, Disp: 45 tablet, Rfl: 1  •  lovastatin (MEVACOR) 40 MG tablet, TAKE 1 TABLET BY MOUTH EVERY EVENING, Disp: 90 tablet, Rfl: 3  •  metFORMIN (GLUCOPHAGE) 500 MG tablet, TAKE 1 TABLET BY MOUTH EVERY DAY, Disp: 90 tablet, Rfl: 1  •  montelukast (SINGULAIR) 10 MG tablet, TAKE 1 TABLET BY MOUTH EACH EVENING, Disp: 90 tablet, Rfl: 1  •  nitroglycerin (NITROSTAT) 0.4 MG SL tablet, DISSOLVE 1 UNDER TONGUE  EVERY 5 MINUTES FOR 3 DOSES; IF NO RELIEF GO TO ER, Disp: 25 tablet, Rfl: 0  •  Omega-3 Fatty Acids (FISH OIL PO), Take 8 tablets by mouth Daily., Disp: , Rfl:   •  pantoprazole (PROTONIX) 40 MG EC tablet, Take 1 tablet by mouth 2 (Two) Times a Day., Disp: 180 tablet, Rfl: 1  •  potassium chloride (K-DUR,KLOR-CON) 10 MEQ CR tablet, TAKE 1 TABLET BY MOUTH DAILY, Disp: 90 tablet, Rfl: 1  •  prochlorperazine (COMPAZINE) 10 MG tablet, TAKE 1  "TABLET BY MOUTH EVERY 8 HOURS AS NEEDED, Disp: 24 tablet, Rfl: 0  •  ranolazine (RANEXA) 500 MG 12 hr tablet, Take 1 tablet by mouth 2 (Two) Times a Day., Disp: 180 tablet, Rfl: 1  •  sucralfate (CARAFATE) 1 g tablet, TAKE 1 TABLET BY MOUTH FOUR TIMES DAILY, Disp: 120 tablet, Rfl: 3  •  vitamin B-6 (PYRIDOXINE) 100 MG tablet, Take 100 mg by mouth Daily., Disp: , Rfl:   •  vitamin C (ASCORBIC ACID) 500 MG tablet, Take 500 mg by mouth Daily., Disp: , Rfl:     Allergies:  Iodinated diagnostic agents and Prednisone      Objective   Vital Signs:   Vitals:    03/09/22 0829   BP: 134/58   BP Location: Right arm   Patient Position: Sitting   Cuff Size: Adult   Pulse: 56   Temp: 97.8 °F (36.6 °C)   TempSrc: Oral   Weight: 67 kg (147 lb 9.6 oz)   Height: 167.6 cm (65.98\")       Physical Exam  Vitals reviewed.   Constitutional:       General: He is not in acute distress.     Appearance: Normal appearance. He is well-developed.   HENT:      Head: Normocephalic and atraumatic.      Right Ear: External ear normal.      Left Ear: External ear normal.   Eyes:      Extraocular Movements: Extraocular movements intact.      Conjunctiva/sclera: Conjunctivae normal.      Pupils: Pupils are equal, round, and reactive to light.   Cardiovascular:      Rate and Rhythm: Normal rate and regular rhythm.      Heart sounds: No murmur heard.  Pulmonary:      Effort: Pulmonary effort is normal.      Breath sounds: Normal breath sounds. No wheezing, rhonchi or rales.   Abdominal:      General: Bowel sounds are normal. There is no distension.      Palpations: Abdomen is soft.      Tenderness: There is no abdominal tenderness.   Musculoskeletal:         General: Normal range of motion.      Right lower leg: No edema.      Comments: +L leg prosthesis, BKA   Neurological:      Mental Status: He is alert and oriented to person, place, and time.      Deep Tendon Reflexes: Reflexes normal.   Psychiatric:         Mood and Affect: Mood and affect normal.    "      Behavior: Behavior normal.         Thought Content: Thought content normal.         Judgment: Judgment normal.        Lab Results   Component Value Date    HGBA1C 6.00 (H) 12/01/2021     Lab Results   Component Value Date    GLUCOSE 110 (H) 03/01/2022    CALCIUM 9.4 03/01/2022     03/01/2022    K 3.9 03/01/2022    CO2 27.3 03/01/2022     03/01/2022    BUN 28 (H) 03/01/2022    CREATININE 1.12 03/01/2022    EGFRIFAFRI 96 03/20/2018    EGFRIFNONA 90 12/01/2021    BCR 25.0 03/01/2022    ANIONGAP 11.7 03/01/2022            Assessment and Plan    Diagnoses and all orders for this visit:    1. Chronic combined systolic and diastolic congestive heart failure (HCC) (Primary)  Assessment & Plan:  Marcello is doing much better since his recent admission for acute on chronic systolic and diastolic heart failure.  He is euvolemic today on exam and continues taking his Lasix 40 mg twice daily.  He does not appear dry.  His BMP last week showed normal creatinine and potassium levels.  He is taking potassium supplementation at 10 mEq daily.  He has an appointment scheduled with his cardiologist tomorrow, so we will see if Dr. Campoverde wants to continue him on the furosemide twice daily or go back to once daily.  Consider repeat labs in another week or 2 if the furosemide continues to twice daily dosing, pending Cardiology's plans for monitoring.      2. Type 2 diabetes mellitus without complication, without long-term current use of insulin ([facility])  Assessment & Plan:  He is on metformin for diabetes.  No refills needed. His diabetes has been quite well controlled.  Last A1c was 6 in December. He does adhere to a diabetic diet.  He is on a statin and ASA.  He is UTD on eye exam (last done 7/2021); no diabetic retinopathy.  Labs are reviewed and up-to-date.      3. Primary hypertension  Assessment & Plan:  Blood pressure goal.  No refills needed.  Labs reviewed and up-to-date.      4. Mixed hyperlipidemia  Assessment &  Plan:  Stable on lovastatin 40 mg daily.  No refills needed.  Labs reviewed and up-to-date.      5. Coronary artery disease involving native coronary artery of native heart without angina pectoris  Assessment & Plan:  Stable on medications including aspirin, Plavix, Imdur, carvedilol, lovastatin, and Ranexa.  No refills needed today.  As per CHF plan.    Orders:  -     ranolazine (RANEXA) 500 MG 12 hr tablet; Take 1 tablet by mouth 2 (Two) Times a Day.  Dispense: 180 tablet; Refill: 1    6. Obstructive sleep apnea syndrome  Assessment & Plan:  Stable on CPAP.        Follow Up   Return in about 2 months (around 5/9/2022) for Recheck.  Patient was given instructions and counseling regarding his condition or for health maintenance advice. Please see specific information pulled into the AVS if appropriate.

## 2022-03-10 ENCOUNTER — OFFICE VISIT (OUTPATIENT)
Dept: CARDIOLOGY | Facility: CLINIC | Age: 82
End: 2022-03-10

## 2022-03-10 VITALS
HEIGHT: 66 IN | HEART RATE: 55 BPM | BODY MASS INDEX: 24.11 KG/M2 | WEIGHT: 150 LBS | DIASTOLIC BLOOD PRESSURE: 60 MMHG | SYSTOLIC BLOOD PRESSURE: 114 MMHG

## 2022-03-10 DIAGNOSIS — Z95.1 HX OF CABG: ICD-10-CM

## 2022-03-10 DIAGNOSIS — I10 PRIMARY HYPERTENSION: ICD-10-CM

## 2022-03-10 DIAGNOSIS — E11.9 TYPE 2 DIABETES MELLITUS WITHOUT COMPLICATION, WITHOUT LONG-TERM CURRENT USE OF INSULIN: ICD-10-CM

## 2022-03-10 DIAGNOSIS — I25.10 CORONARY ARTERY DISEASE INVOLVING NATIVE CORONARY ARTERY OF NATIVE HEART WITHOUT ANGINA PECTORIS: Primary | ICD-10-CM

## 2022-03-10 DIAGNOSIS — Z95.5 STENTED CORONARY ARTERY: ICD-10-CM

## 2022-03-10 DIAGNOSIS — I25.5 ISCHEMIC CARDIOMYOPATHY: ICD-10-CM

## 2022-03-10 DIAGNOSIS — R00.2 PALPITATIONS: ICD-10-CM

## 2022-03-10 DIAGNOSIS — E78.2 MIXED HYPERLIPIDEMIA: ICD-10-CM

## 2022-03-10 PROCEDURE — 99215 OFFICE O/P EST HI 40 MIN: CPT | Performed by: INTERNAL MEDICINE

## 2022-03-17 ENCOUNTER — TELEPHONE (OUTPATIENT)
Dept: FAMILY MEDICINE CLINIC | Age: 82
End: 2022-03-17

## 2022-03-17 ENCOUNTER — OFFICE VISIT (OUTPATIENT)
Dept: CARDIOLOGY | Facility: CLINIC | Age: 82
End: 2022-03-17

## 2022-03-17 VITALS
SYSTOLIC BLOOD PRESSURE: 172 MMHG | WEIGHT: 144 LBS | HEIGHT: 66 IN | DIASTOLIC BLOOD PRESSURE: 84 MMHG | HEART RATE: 74 BPM | BODY MASS INDEX: 23.14 KG/M2

## 2022-03-17 DIAGNOSIS — I25.5 ISCHEMIC CARDIOMYOPATHY: ICD-10-CM

## 2022-03-17 DIAGNOSIS — I25.10 CORONARY ARTERY DISEASE INVOLVING NATIVE CORONARY ARTERY OF NATIVE HEART WITHOUT ANGINA PECTORIS: Primary | ICD-10-CM

## 2022-03-17 DIAGNOSIS — Z95.1 HX OF CABG: Chronic | ICD-10-CM

## 2022-03-17 DIAGNOSIS — I50.42 CHRONIC COMBINED SYSTOLIC AND DIASTOLIC CONGESTIVE HEART FAILURE: ICD-10-CM

## 2022-03-17 PROCEDURE — 99214 OFFICE O/P EST MOD 30 MIN: CPT | Performed by: INTERNAL MEDICINE

## 2022-03-17 NOTE — TELEPHONE ENCOUNTER
Caller: Marcello Larson A    Relationship to patient: Self    Best call back number: [phone]    Patient is needing: PATIENT CALLED STATING HE WENT TO HIS CARDIOLOGIST AND HE WANTED TO LET HIS PCP KNOW THAT HE HAS BEEN APPROVED FOR A PACE MAKER. PLEASE ADVISE THANK YOU.

## 2022-03-17 NOTE — PROGRESS NOTES
Subjective:     Encounter Date:03/17/2022      Patient ID: Marcello Larson is a 81 y.o. male.    Chief Complaint:  Chief Complaint   Patient presents with   • Cardiomyopathy       HPI:  Mr Larson is an 80 yo patient of Dr. Kofi Campoverde who is referred for consideration of an ICD. His past medical history is significant for HTN, HLD, CAD with prior stent and CABG.  He has an ischemic cardiomyopathy with an EF of 30-35%.  He was admitted to [facility] 2/2022 with an acute heart failure exacerbation and diuresed.  Since then he has been doing relatively well and denies any swelling, PND or orthopnea.  He is not having any angina, palpitations or syncope.    A nuclear stress test 9/2020 showed a dilated LV with an EF of 20% with fixed inferior and apical defects and ischemia in the anterolateral segments.    A cath 10/13/2020 which revealed significant three-vessel disease.  Saphenous vein graft to OM1 and OM2 were occluded. OMA was of large caliber not use for bypass surgery.  LIMA to the LAD was widely patent the LAD wrapped around the apex with collaterals to the circumflex and to the right coronary artery.  The right coronary artery had a proximal total occlusion with a patent saphenous vein graft.  There stents in the proximal and mid region with 30% luminal irregularities     An echo 2/2022 showed an EF of 30%.      The following portions of the patient's history were reviewed and updated as appropriate: allergies, current medications, past family history, past medical history, past social history, past surgical history and problem list.    Problem List:  Patient Active Problem List   Diagnosis   • Mixed hyperlipidemia   • Primary hypertension   • Coronary artery disease involving native coronary artery of native heart without angina pectoris   • Ischemic cardiomyopathy   • Obstructive sleep apnea syndrome   • Palpitations   • Hx of CABG   • Stented coronary artery   • Positive cardiac stress  test   • Type 2 diabetes mellitus without complication, without long-term current use of insulin (HCC)   • Chronic combined systolic and diastolic congestive heart failure (HCC)       Active Med List:    Current Outpatient Medications:   •  amLODIPine (NORVASC) 10 MG tablet, TAKE ONE TABLET BY MOUTH EVERY DAY, Disp: 90 tablet, Rfl: 1  •  aspirin 81 MG tablet, Take 162 mg by mouth Daily., Disp: , Rfl:   •  carvedilol (COREG) 6.25 MG tablet, TAKE 1 TABLET BY MOUTH TWICE DAILY, Disp: 180 tablet, Rfl: 3  •  clopidogrel (PLAVIX) 75 MG tablet, TAKE 1 TABLET DAILY, Disp: 90 tablet, Rfl: 3  •  Diclofenac Sodium (VOLTAREN) 1 % gel gel, APPLY TO THE AFFECTED AREA(S) FOUR TIMES DAILY, Disp: 100 g, Rfl: 1  •  ferrous sulfate 325 (65 FE) MG tablet, Take 325 mg by mouth Daily With Breakfast., Disp: , Rfl:   •  fluticasone (FLONASE) 50 MCG/ACT nasal spray, USE 1 SPRAY EACH NOSTIL DAILY, Disp: 48 g, Rfl: 2  •  furosemide (LASIX) 40 MG tablet, TAKE 1 TABLET BY MOUTH DAILY (Patient taking differently: Take 40 mg by mouth 2 (Two) Times a Day.), Disp: 90 tablet, Rfl: 1  •  glucose blood test strip, Check BS daily.  DX E11.9, Disp: 100 each, Rfl: 3  •  glucose monitor monitoring kit, 1 each Daily. E11.9, Disp: 1 each, Rfl: 0  •  HYDROcodone-acetaminophen (NORCO) 5-325 MG per tablet, Take 1 tablet by mouth Every 8 (Eight) Hours As Needed., Disp: , Rfl:   •  isosorbide mononitrate (IMDUR) 30 MG 24 hr tablet, TAKE 1 TABLET BY MOUTH EVERY MORNING, Disp: 90 tablet, Rfl: 1  •  Lancets Thin misc, 1 each Daily. DX E11.9, Disp: 100 each, Rfl: 3  •  losartan (COZAAR) 25 MG tablet, TAKE 1/2 TABLET BY MOUTH EVERY DAY, Disp: 45 tablet, Rfl: 1  •  lovastatin (MEVACOR) 40 MG tablet, TAKE 1 TABLET BY MOUTH EVERY EVENING, Disp: 90 tablet, Rfl: 3  •  metFORMIN (GLUCOPHAGE) 500 MG tablet, TAKE 1 TABLET BY MOUTH EVERY DAY, Disp: 90 tablet, Rfl: 1  •  montelukast (SINGULAIR) 10 MG tablet, TAKE 1 TABLET BY MOUTH EACH EVENING, Disp: 90 tablet, Rfl: 1  •   nitroglycerin (NITROSTAT) 0.4 MG SL tablet, DISSOLVE 1 UNDER TONGUE  EVERY 5 MINUTES FOR 3 DOSES; IF NO RELIEF GO TO ER, Disp: 25 tablet, Rfl: 0  •  Omega-3 Fatty Acids (FISH OIL PO), Take 8 tablets by mouth Daily., Disp: , Rfl:   •  pantoprazole (PROTONIX) 40 MG EC tablet, Take 1 tablet by mouth 2 (Two) Times a Day., Disp: 180 tablet, Rfl: 1  •  potassium chloride (K-DUR,KLOR-CON) 10 MEQ CR tablet, TAKE 1 TABLET BY MOUTH DAILY, Disp: 90 tablet, Rfl: 1  •  prochlorperazine (COMPAZINE) 10 MG tablet, TAKE 1 TABLET BY MOUTH EVERY 8 HOURS AS NEEDED, Disp: 24 tablet, Rfl: 0  •  ranolazine (RANEXA) 500 MG 12 hr tablet, Take 1 tablet by mouth 2 (Two) Times a Day., Disp: 180 tablet, Rfl: 1  •  sucralfate (CARAFATE) 1 g tablet, TAKE 1 TABLET BY MOUTH FOUR TIMES DAILY, Disp: 120 tablet, Rfl: 3  •  vitamin B-6 (PYRIDOXINE) 100 MG tablet, Take 100 mg by mouth Daily., Disp: , Rfl:   •  vitamin C (ASCORBIC ACID) 500 MG tablet, Take 500 mg by mouth Daily., Disp: , Rfl:      Past Medical History:  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)    • Allergic rhinitis due to pollen    • Anemia, unspecified    • Anxiety disorder, unspecified    • Atherosclerotic heart disease of native coronary artery without angina pectoris    • Benign essential hypertension 8/13/2019   • Bilateral primary osteoarthritis of knee    • Cardiac dysfunction 8/13/2019    Left ventricle   • Cardiomyopathy (HCC) 8/13/2019   • CHF (congestive heart failure) (HCC)    • 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  LV systolic function is moderate to severely reduced.  EF 30-35%.  The transmittal spectral doppler flow pattern is suggestive of pseudonormalization.   There is moderate to severe global hypokinesis of the LV.  The left atrium is mildly dilated.  The mitral leaflets appear thickened, but open well.  Mild MR and AR.   • Hx of CABG 1991 - left internal mammary graft to the LAD, SVG to OM1, OM2 in the RCA   • Hyperlipidemia 2019   • Hypo-osmolality and hyponatremia    • Low back pain    • Other long term (current) drug therapy    • Palpitations 2019   • Personal history of malignant neoplasm of bladder    • Sleep apnea 2019   • Stented coronary artery 2014 - VISION bare metal stent to the proximal stenosis of the vein graft to the right and ISION bare metal stent in the mid to distal 80% stenosis.   • Type 2 diabetes mellitus without complication (HCC)    • Unstable angina (HCC)        Past Surgical History:  Past Surgical History:   Procedure Laterality Date   • AMPUTATION Left     LOWER EXTREMITY   • APPENDECTOMY     • CARDIAC CATHETERIZATION     • CARDIAC CATHETERIZATION N/A 10/13/2020    Procedure: Left Heart Cath;  Surgeon: Kofi Campoverde MD;  Location:  [facility];  Service: Cardiology;  Laterality: N/A;   • CARDIAC CATHETERIZATION N/A 10/13/2020    Procedure: Coronary angiography;  Surgeon: Kofi Campoverde MD;  Location:  [facility];  Service: Cardiology;  Laterality: N/A;   • CARDIAC CATHETERIZATION N/A 10/13/2020    Procedure: Saphenous Vein Graft;  Surgeon: Kofi Campoverde MD;  Location:  [facility];  Service: Cardiology;  Laterality: N/A;   • CATARACT EXTRACTION     • CHOLECYSTECTOMY     • CORONARY ARTERY BYPASS GRAFT      1991   • OTHER SURGICAL HISTORY      UROSTOMY S/P BLADDER CA       Social History:  Social History     Socioeconomic History   • Marital status:    • Number of children: 2   Tobacco Use   • Smoking status: Former Smoker     Types: Cigarettes     Quit date:      Years since quittin.2   • Smokeless tobacco:  "Never Used   Substance and Sexual Activity   • Alcohol use: Never   • Drug use: Never   • Sexual activity: Defer       Allergies:  Allergies   Allergen Reactions   • Iodinated Diagnostic Agents Anaphylaxis   • Prednisone Cough       Immunizations:  Immunization History   Administered Date(s) Administered   • COVID-19 (MODERNA) 1st, 2nd, 3rd Dose Only 01/22/2021, 02/25/2021, 09/20/2021, 10/18/2021   • COVID-19 (UNSPECIFIED) 01/19/2021, 02/19/2021   • Fluad Quad 65+ 09/20/2021   • Fluzone High Dose =>65 Years ([employer]axcare ONLY) 10/03/2016, 09/19/2017   • Hepatitis A 06/14/2018, 11/20/2019   • Influenza, Unspecified 10/01/2020   • Pneumococcal Conjugate 13-Valent (PCV13) 09/01/2015   • Pneumococcal Polysaccharide (PPSV23) 10/01/2008   • Shingrix 12/11/2019, 05/06/2020   • Tdap 01/01/2013, 02/04/2013   • Zoster, Unspecified 10/01/2006, 05/06/2020          Objective:         Review of Systems   Constitutional: Positive for fatigue.   Respiratory: Positive for shortness of breath.    Cardiovascular: Negative for chest pain, palpitations and leg swelling.   All other systems reviewed and are negative.       /84   Pulse 74   Ht 167.6 cm (66\")   Wt 65.3 kg (144 lb)   BMI 23.24 kg/m²     Constitutional:       General: Not in acute distress.     Appearance: Well-developed.   Eyes:      General: No scleral icterus.     Conjunctiva/sclera: Conjunctivae normal.      Pupils: Pupils are equal, round, and reactive to light.   HENT:      Head: Normocephalic and atraumatic.   Neck:      Thyroid: No thyromegaly.   Pulmonary:      Effort: Pulmonary effort is normal.      Breath sounds: Normal breath sounds.   Cardiovascular:      Normal rate. Regular rhythm.   Abdominal:      General: Bowel sounds are normal.      Palpations: Abdomen is soft.   Musculoskeletal: Normal range of motion.      Cervical back: Normal range of motion. Skin:     General: Skin is warm and dry.   Neurological:      Mental Status: Alert and oriented to " person, place, and time.         In-Office Procedure(s):  Procedures  No orders to display        ASCVD RIsk Score::  The ASCVD Risk score ([place] ABBY Jr., et al., 2013) failed to calculate for the following reasons:    The 2013 ASCVD risk score is only valid for ages 40 to 79    Recent Radiology:          Lab Review:       Recent labs reviewed and interpreted for clinical significance and application          Assessment:          Diagnosis Plan   1. Coronary artery disease involving native coronary artery of native heart without angina pectoris     2. Ischemic cardiomyopathy     3. Chronic combined systolic and diastolic congestive heart failure (HCC)     4. Hx of CABG            Plan:      1. CAD with ischemic cardiomyopathy and chronic systolic heart failure - He is on appropriate therapy with Coreg, losartan, Lasix, statin, ASA and Plavix.  NYHA class 2 at present.Discussed indications, risks and benefits of primary prevention ICD using the Medical Decision Making Tool and he would like to proceed.    2. HTN - controlled    3. HLD - statin    4. DM - oral agents      Level of Care:                 Mamadou Terry MD  03/17/22

## 2022-03-18 ENCOUNTER — PATIENT ROUNDING (BHMG ONLY) (OUTPATIENT)
Dept: CARDIOLOGY | Facility: CLINIC | Age: 82
End: 2022-03-18

## 2022-03-18 NOTE — PROGRESS NOTES
"March 18, 2022    Hello, may I speak with Marcello Larson?    My name is Mohit      I am  with [facility]  [address]  [facility] [address] [phone].    Before we get started may I verify your date of birth? 1940    I am calling to officially welcome you to our practice and ask about your recent visit. Is this a good time to talk? yes    Tell me about your visit with us. What things went well?  \"He explained everything very good and I felt very tense at first but he put me right at ease. He was very nice and patient and I got in and out all before my appointment time and I really liked that. You all couldn't have done better, you all are very nice.\"        We're always looking for ways to make our patients' experiences even better. Do you have recommendations on ways we may improve?  no    Overall were you satisfied with your first visit to our practice? yes       I appreciate you taking the time to speak with me today. Is there anything else I can do for you? no      Thank you, and have a great day.      "

## 2022-03-22 DIAGNOSIS — I50.42 CHRONIC COMBINED SYSTOLIC AND DIASTOLIC CONGESTIVE HEART FAILURE: ICD-10-CM

## 2022-03-22 DIAGNOSIS — Z01.818 PREOP TESTING: Primary | ICD-10-CM

## 2022-03-22 DIAGNOSIS — Z01.818 OTHER SPECIFIED PRE-OPERATIVE EXAMINATION: Primary | ICD-10-CM

## 2022-03-22 DIAGNOSIS — I25.5 ISCHEMIC CARDIOMYOPATHY: Primary | ICD-10-CM

## 2022-03-29 RX ORDER — FUROSEMIDE 40 MG/1
40 TABLET ORAL 2 TIMES DAILY
Qty: 60 TABLET | Refills: 2 | Status: SHIPPED | OUTPATIENT
Start: 2022-03-29 | End: 2022-07-21 | Stop reason: SDUPTHER

## 2022-03-29 RX ORDER — FUROSEMIDE 40 MG/1
40 TABLET ORAL 2 TIMES DAILY
Qty: 60 TABLET | Refills: 2 | Status: SHIPPED | OUTPATIENT
Start: 2022-03-29 | End: 2022-03-29 | Stop reason: SDUPTHER

## 2022-04-01 ENCOUNTER — LAB (OUTPATIENT)
Dept: LAB | Facility: HOSPITAL | Age: 82
End: 2022-04-01

## 2022-04-01 ENCOUNTER — TRANSCRIBE ORDERS (OUTPATIENT)
Dept: ADMINISTRATIVE | Facility: HOSPITAL | Age: 82
End: 2022-04-01

## 2022-04-01 ENCOUNTER — APPOINTMENT (OUTPATIENT)
Dept: LAB | Facility: HOSPITAL | Age: 82
End: 2022-04-01

## 2022-04-01 DIAGNOSIS — Z01.818 OTHER SPECIFIED PRE-OPERATIVE EXAMINATION: Primary | ICD-10-CM

## 2022-04-01 DIAGNOSIS — I50.42 CHRONIC COMBINED SYSTOLIC AND DIASTOLIC CONGESTIVE HEART FAILURE: ICD-10-CM

## 2022-04-01 DIAGNOSIS — Z01.818 PREOP TESTING: ICD-10-CM

## 2022-04-01 LAB
ALBUMIN SERPL-MCNC: 4.8 G/DL (ref 3.5–5.2)
ALBUMIN/GLOB SERPL: 2 G/DL
ALP SERPL-CCNC: 66 U/L (ref 39–117)
ALT SERPL W P-5'-P-CCNC: 12 U/L (ref 1–41)
ANION GAP SERPL CALCULATED.3IONS-SCNC: 13 MMOL/L (ref 5–15)
AST SERPL-CCNC: 17 U/L (ref 1–40)
BASOPHILS # BLD AUTO: 0.03 10*3/MM3 (ref 0–0.2)
BASOPHILS NFR BLD AUTO: 0.5 % (ref 0–1.5)
BILIRUB SERPL-MCNC: 0.4 MG/DL (ref 0–1.2)
BUN SERPL-MCNC: 24 MG/DL (ref 8–23)
BUN/CREAT SERPL: 20.5 (ref 7–25)
CALCIUM SPEC-SCNC: 9.2 MG/DL (ref 8.6–10.5)
CHLORIDE SERPL-SCNC: 102 MMOL/L (ref 98–107)
CO2 SERPL-SCNC: 24 MMOL/L (ref 22–29)
CREAT SERPL-MCNC: 1.17 MG/DL (ref 0.76–1.27)
DEPRECATED RDW RBC AUTO: 43.5 FL (ref 37–54)
EGFRCR SERPLBLD CKD-EPI 2021: 62.6 ML/MIN/1.73
EOSINOPHIL # BLD AUTO: 0.09 10*3/MM3 (ref 0–0.4)
EOSINOPHIL NFR BLD AUTO: 1.5 % (ref 0.3–6.2)
ERYTHROCYTE [DISTWIDTH] IN BLOOD BY AUTOMATED COUNT: 13.4 % (ref 12.3–15.4)
GLOBULIN UR ELPH-MCNC: 2.4 GM/DL
GLUCOSE SERPL-MCNC: 132 MG/DL (ref 65–99)
HCT VFR BLD AUTO: 40.5 % (ref 37.5–51)
HGB BLD-MCNC: 13.1 G/DL (ref 13–17.7)
INR PPP: 0.99 (ref 0.9–1.1)
LYMPHOCYTES # BLD AUTO: 1.2 10*3/MM3 (ref 0.7–3.1)
LYMPHOCYTES NFR BLD AUTO: 19.9 % (ref 19.6–45.3)
MCH RBC QN AUTO: 29 PG (ref 26.6–33)
MCHC RBC AUTO-ENTMCNC: 32.3 G/DL (ref 31.5–35.7)
MCV RBC AUTO: 89.8 FL (ref 79–97)
MONOCYTES # BLD AUTO: 0.39 10*3/MM3 (ref 0.1–0.9)
MONOCYTES NFR BLD AUTO: 6.5 % (ref 5–12)
NEUTROPHILS NFR BLD AUTO: 4.3 10*3/MM3 (ref 1.7–7)
NEUTROPHILS NFR BLD AUTO: 71.4 % (ref 42.7–76)
PLATELET # BLD AUTO: 137 10*3/MM3 (ref 140–450)
PMV BLD AUTO: 10 FL (ref 6–12)
POTASSIUM SERPL-SCNC: 3.8 MMOL/L (ref 3.5–5.2)
PROT SERPL-MCNC: 7.2 G/DL (ref 6–8.5)
PROTHROMBIN TIME: 13 SECONDS (ref 11.7–14.2)
RBC # BLD AUTO: 4.51 10*6/MM3 (ref 4.14–5.8)
SARS-COV-2 ORF1AB RESP QL NAA+PROBE: NOT DETECTED
SODIUM SERPL-SCNC: 139 MMOL/L (ref 136–145)
WBC NRBC COR # BLD: 6.02 10*3/MM3 (ref 3.4–10.8)

## 2022-04-01 PROCEDURE — 80053 COMPREHEN METABOLIC PANEL: CPT

## 2022-04-01 PROCEDURE — C9803 HOPD COVID-19 SPEC COLLECT: HCPCS | Performed by: INTERNAL MEDICINE

## 2022-04-01 PROCEDURE — U0005 INFEC AGEN DETEC AMPLI PROBE: HCPCS | Performed by: INTERNAL MEDICINE

## 2022-04-01 PROCEDURE — U0004 COV-19 TEST NON-CDC HGH THRU: HCPCS | Performed by: INTERNAL MEDICINE

## 2022-04-01 PROCEDURE — 85025 COMPLETE CBC W/AUTO DIFF WBC: CPT

## 2022-04-01 PROCEDURE — 36415 COLL VENOUS BLD VENIPUNCTURE: CPT

## 2022-04-01 PROCEDURE — 85610 PROTHROMBIN TIME: CPT

## 2022-04-04 ENCOUNTER — APPOINTMENT (OUTPATIENT)
Dept: GENERAL RADIOLOGY | Facility: HOSPITAL | Age: 82
End: 2022-04-04

## 2022-04-04 ENCOUNTER — HOSPITAL ENCOUNTER (OUTPATIENT)
Facility: HOSPITAL | Age: 82
Setting detail: HOSPITAL OUTPATIENT SURGERY
Discharge: HOME | End: 2022-04-04
Attending: INTERNAL MEDICINE | Admitting: INTERNAL MEDICINE

## 2022-04-04 VITALS
RESPIRATION RATE: 16 BRPM | HEIGHT: 67 IN | SYSTOLIC BLOOD PRESSURE: 136 MMHG | WEIGHT: 146 LBS | HEART RATE: 60 BPM | BODY MASS INDEX: 22.91 KG/M2 | OXYGEN SATURATION: 95 % | DIASTOLIC BLOOD PRESSURE: 67 MMHG | TEMPERATURE: 98.1 F

## 2022-04-04 DIAGNOSIS — I50.42 CHRONIC COMBINED SYSTOLIC AND DIASTOLIC CONGESTIVE HEART FAILURE: ICD-10-CM

## 2022-04-04 DIAGNOSIS — I25.5 ISCHEMIC CARDIOMYOPATHY: ICD-10-CM

## 2022-04-04 LAB — GLUCOSE BLDC GLUCOMTR-MCNC: 136 MG/DL (ref 70–130)

## 2022-04-04 PROCEDURE — C1894 INTRO/SHEATH, NON-LASER: HCPCS | Performed by: INTERNAL MEDICINE

## 2022-04-04 PROCEDURE — 82962 GLUCOSE BLOOD TEST: CPT

## 2022-04-04 PROCEDURE — 25010000002 MIDAZOLAM PER 1 MG: Performed by: INTERNAL MEDICINE

## 2022-04-04 PROCEDURE — 33249 INSJ/RPLCMT DEFIB W/LEAD(S): CPT | Performed by: INTERNAL MEDICINE

## 2022-04-04 PROCEDURE — C1898 LEAD, PMKR, OTHER THAN TRANS: HCPCS | Performed by: INTERNAL MEDICINE

## 2022-04-04 PROCEDURE — 99152 MOD SED SAME PHYS/QHP 5/>YRS: CPT | Performed by: INTERNAL MEDICINE

## 2022-04-04 PROCEDURE — C1889 IMPLANT/INSERT DEVICE, NOC: HCPCS | Performed by: INTERNAL MEDICINE

## 2022-04-04 PROCEDURE — 25010000002 CEFAZOLIN 1-4 GM/50ML-% SOLUTION: Performed by: INTERNAL MEDICINE

## 2022-04-04 PROCEDURE — 71045 X-RAY EXAM CHEST 1 VIEW: CPT

## 2022-04-04 PROCEDURE — C1777 LEAD, AICD, ENDO SINGLE COIL: HCPCS | Performed by: INTERNAL MEDICINE

## 2022-04-04 PROCEDURE — 99153 MOD SED SAME PHYS/QHP EA: CPT | Performed by: INTERNAL MEDICINE

## 2022-04-04 PROCEDURE — 25010000002 FENTANYL CITRATE (PF) 50 MCG/ML SOLUTION: Performed by: INTERNAL MEDICINE

## 2022-04-04 PROCEDURE — C1721 AICD, DUAL CHAMBER: HCPCS | Performed by: INTERNAL MEDICINE

## 2022-04-04 PROCEDURE — 0 LIDOCAINE 1 % SOLUTION: Performed by: INTERNAL MEDICINE

## 2022-04-04 DEVICE — LD PM TENDRIL STS 6F46CM 2088TC46: Type: IMPLANTABLE DEVICE | Status: FUNCTIONAL

## 2022-04-04 DEVICE — LD DEFIB OPTISURE DF4 1COIL 8F 58CM: Type: IMPLANTABLE DEVICE | Status: FUNCTIONAL

## 2022-04-04 DEVICE — ICD GALLANT DR DF4/IS1 69X51X12MM: Type: IMPLANTABLE DEVICE | Status: FUNCTIONAL

## 2022-04-04 DEVICE — FLOSEAL HEMOSTATIC MATRIX, 10ML
Type: IMPLANTABLE DEVICE | Status: FUNCTIONAL
Brand: FLOSEAL HEMOSTATIC MATRIX

## 2022-04-04 RX ORDER — MIDAZOLAM HYDROCHLORIDE 1 MG/ML
INJECTION INTRAMUSCULAR; INTRAVENOUS AS NEEDED
Status: DISCONTINUED | OUTPATIENT
Start: 2022-04-04 | End: 2022-04-04 | Stop reason: HOSPADM

## 2022-04-04 RX ORDER — SODIUM CHLORIDE 0.9 % (FLUSH) 0.9 %
10 SYRINGE (ML) INJECTION AS NEEDED
Status: DISCONTINUED | OUTPATIENT
Start: 2022-04-04 | End: 2022-04-04 | Stop reason: HOSPADM

## 2022-04-04 RX ORDER — FENTANYL CITRATE 50 UG/ML
INJECTION, SOLUTION INTRAMUSCULAR; INTRAVENOUS AS NEEDED
Status: DISCONTINUED | OUTPATIENT
Start: 2022-04-04 | End: 2022-04-04 | Stop reason: HOSPADM

## 2022-04-04 RX ORDER — LIDOCAINE HYDROCHLORIDE 10 MG/ML
INJECTION, SOLUTION INFILTRATION; PERINEURAL AS NEEDED
Status: DISCONTINUED | OUTPATIENT
Start: 2022-04-04 | End: 2022-04-04 | Stop reason: HOSPADM

## 2022-04-04 RX ORDER — CEFAZOLIN SODIUM 2 G/100ML
2 INJECTION, SOLUTION INTRAVENOUS ONCE
Status: DISCONTINUED | OUTPATIENT
Start: 2022-04-04 | End: 2022-04-04 | Stop reason: HOSPADM

## 2022-04-04 RX ORDER — SODIUM CHLORIDE 0.9 % (FLUSH) 0.9 %
10 SYRINGE (ML) INJECTION EVERY 12 HOURS SCHEDULED
Status: DISCONTINUED | OUTPATIENT
Start: 2022-04-04 | End: 2022-04-04 | Stop reason: HOSPADM

## 2022-04-04 RX ORDER — CEPHALEXIN 500 MG/1
500 CAPSULE ORAL 3 TIMES DAILY
Qty: 9 CAPSULE | Refills: 0 | Status: SHIPPED | OUTPATIENT
Start: 2022-04-04 | End: 2022-04-07

## 2022-04-04 RX ORDER — CEFAZOLIN SODIUM 1 G/50ML
INJECTION, SOLUTION INTRAVENOUS CONTINUOUS PRN
Status: COMPLETED | OUTPATIENT
Start: 2022-04-04 | End: 2022-04-04

## 2022-04-04 RX ORDER — SODIUM CHLORIDE 9 MG/ML
75 INJECTION, SOLUTION INTRAVENOUS CONTINUOUS
Status: DISCONTINUED | OUTPATIENT
Start: 2022-04-04 | End: 2022-04-04 | Stop reason: HOSPADM

## 2022-04-04 RX ADMIN — SODIUM CHLORIDE 75 ML/HR: 9 INJECTION, SOLUTION INTRAVENOUS at 10:29

## 2022-04-04 NOTE — DISCHARGE SUMMARY
[facility]    DISCHARGE SUMMARY      Patient Name: Marcello Larson  :1940  81 y.o.    Date of Admit: 2022  Date of Discharge:  2022    Discharge Diagnosis:  Problems Addressed this Visit        Cardiac and Vasculature    Ischemic cardiomyopathy    Relevant Orders    EP/CRM Study    Chronic combined systolic and diastolic congestive heart failure (HCC)    Relevant Orders    EP/CRM Study      Diagnoses       Codes Comments    Ischemic cardiomyopathy     ICD-10-CM: I25.5  ICD-9-CM: 414.8     Chronic combined systolic and diastolic congestive heart failure (HCC)     ICD-10-CM: I50.42  ICD-9-CM: 428.42, 428.0           Hospital Course:  Patient underwent uneventful ICD implant.      Procedures Performed  Procedure(s):  ICD DC new/SJM       Consults     No orders found from 3/6/2022 to 2022.          Pertinent Test Results:   Results from last 7 days   Lab Units 22  1453   SODIUM mmol/L 139   POTASSIUM mmol/L 3.8   CHLORIDE mmol/L 102   CO2 mmol/L 24.0   BUN mg/dL 24*   CREATININE mg/dL 1.17   CALCIUM mg/dL 9.2   BILIRUBIN mg/dL 0.4   ALK PHOS U/L 66   ALT (SGPT) U/L 12   AST (SGOT) U/L 17   GLUCOSE mg/dL 132*         @LABRCNT(bnp)@  Results from last 7 days   Lab Units 22  1453   WBC 10*3/mm3 6.02   HEMOGLOBIN g/dL 13.1   HEMATOCRIT % 40.5   PLATELETS 10*3/mm3 137*     Results from last 7 days   Lab Units 22  1453   INR  0.99               Condition on Discharge: stable    Discharge Medications     Discharge Medications      New Medications      Instructions Start Date   cephalexin 500 MG capsule  Commonly known as: Keflex   500 mg, Oral, 3 Times Daily         Continue These Medications      Instructions Start Date   glucose monitor monitoring kit   1 each, Does not apply, Daily, E11.9      Lancets Thin misc   1 each, Does not apply, Daily, DX E11.9         ASK your doctor about these medications      Instructions Start Date   amLODIPine 10 MG tablet  Commonly  known as: NORVASC   TAKE ONE TABLET BY MOUTH EVERY DAY      aspirin 81 MG tablet   162 mg, Oral, Daily      carvedilol 6.25 MG tablet  Commonly known as: COREG   TAKE 1 TABLET BY MOUTH TWICE DAILY      clopidogrel 75 MG tablet  Commonly known as: PLAVIX   TAKE 1 TABLET DAILY      Diclofenac Sodium 1 % gel gel  Commonly known as: VOLTAREN   APPLY TO THE AFFECTED AREA(S) FOUR TIMES DAILY      ferrous sulfate 325 (65 FE) MG tablet   325 mg, Oral, Daily With Breakfast      FISH OIL PO   8 tablets, Oral, Daily      fluticasone 50 MCG/ACT nasal spray  Commonly known as: FLONASE   USE 1 SPRAY EACH NOSTIL DAILY      furosemide 40 MG tablet  Commonly known as: LASIX   40 mg, Oral, 2 Times Daily      glucose blood test strip   Check BS daily.  DX E11.9      HYDROcodone-acetaminophen 5-325 MG per tablet  Commonly known as: NORCO   1 tablet, Oral, Every 8 Hours PRN      isosorbide mononitrate 30 MG 24 hr tablet  Commonly known as: IMDUR   TAKE 1 TABLET BY MOUTH EVERY MORNING      losartan 25 MG tablet  Commonly known as: COZAAR   TAKE 1/2 TABLET BY MOUTH EVERY DAY      lovastatin 40 MG tablet  Commonly known as: MEVACOR   TAKE 1 TABLET BY MOUTH EVERY EVENING      metFORMIN 500 MG tablet  Commonly known as: GLUCOPHAGE   TAKE 1 TABLET BY MOUTH EVERY DAY      montelukast 10 MG tablet  Commonly known as: SINGULAIR   TAKE 1 TABLET BY MOUTH EACH EVENING      nitroglycerin 0.4 MG SL tablet  Commonly known as: NITROSTAT   DISSOLVE 1 UNDER TONGUE  EVERY 5 MINUTES FOR 3 DOSES; IF NO RELIEF GO TO ER      pantoprazole 40 MG EC tablet  Commonly known as: PROTONIX   40 mg, Oral, 2 Times Daily      potassium chloride 10 MEQ CR tablet  Commonly known as: K-DUR,KLOR-CON   TAKE 1 TABLET BY MOUTH DAILY      prochlorperazine 10 MG tablet  Commonly known as: COMPAZINE   TAKE 1 TABLET BY MOUTH EVERY 8 HOURS AS NEEDED      ranolazine 500 MG 12 hr tablet  Commonly known as: RANEXA   500 mg, Oral, 2 Times Daily      sucralfate 1 g tablet  Commonly  known as: CARAFATE   TAKE 1 TABLET BY MOUTH FOUR TIMES DAILY      vitamin B-6 100 MG tablet  Commonly known as: PYRIDOXINE   100 mg, Oral, Daily      vitamin C 500 MG tablet  Commonly known as: ASCORBIC ACID   500 mg, Oral, Daily             Discharge Diet:     Activity at Discharge:     Discharge disposition: home    Follow-up Appointments  Future Appointments   Date Time Provider Department Center   4/14/2022  9:00 AM Kofi Campoverde MD [facility]   4/21/2022  9:45 AM Mamadou Terry [facility]   5/10/2022  8:00 AM Zeny Corley MD [facility]         Test Results Pending at Discharge       [facility]    04/04/22  11:39 EDT

## 2022-04-04 NOTE — PERIOPERATIVE NURSING NOTE
Phone call to Dr. Terry to clarify orders.  Instructed to tell patient the following:  He may remove the dressing tomorrow, shower in 3 days, drive in 3 days.  Patient and daughter informed, verbalized understanding.  Included in d/c instruction paperwork.

## 2022-04-04 NOTE — H&P
Patient Care Team:  Zeny Corley MD as PCP - General (Family Medicine)  Kofi Campoverde MD as Consulting Physician (Cardiology)    Chief complaint Presents for ICD implant    Subjective     History of Present Illness   Mr Larson is an 82 yo patient of Dr. Kofi Campoverde who is referred for consideration of an ICD. His past medical history is significant for HTN, HLD, CAD with prior stent and CABG.  He has an ischemic cardiomyopathy with an EF of 30-35%.  He was admitted to [facility] 2/2022 with an acute heart failure exacerbation and diuresed.  Since then he has been doing relatively well and denies any swelling, PND or orthopnea.  He is not having any angina, palpitations or syncope.     A nuclear stress test 9/2020 showed a dilated LV with an EF of 20% with fixed inferior and apical defects and ischemia in the anterolateral segments.     A cath 10/13/2020 which revealed significant three-vessel disease.  Saphenous vein graft to OM1 and OM2 were occluded. OMA was of large caliber not use for bypass surgery.  LIMA to the LAD was widely patent the LAD wrapped around the apex with collaterals to the circumflex and to the right coronary artery.  The right coronary artery had a proximal total occlusion with a patent saphenous vein graft.  There stents in the proximal and mid region with 30% luminal irregularities      An echo 2/2022 showed an EF of 30%.       Review of Systems   Constitutional: Positive for fatigue.   Respiratory: Positive for shortness of breath.    Cardiovascular: Negative for chest pain, palpitations and leg swelling.   All other systems reviewed and are negative.         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  LV systolic function is moderate to severely reduced.  EF 30-35%.  The transmittal spectral doppler flow pattern is suggestive of pseudonormalization.  There is moderate to severe global hypokinesis of the LV.  The left atrium is mildly dilated.  The mitral leaflets appear thickened, but open well.  Mild MR and AR.   • Hx of CABG 8/13/2019 01/01/1991 - left internal mammary graft to the LAD, SVG to OM1, OM2 in the RCA   • Hyperlipidemia 8/13/2019   • Hypo-osmolality and hyponatremia    • Low back pain    • Other long term (current) drug therapy    • Palpitations 8/13/2019   • Personal history of malignant neoplasm of bladder    • Sleep apnea 8/13/2019   • Stented coronary artery 8/13/2019 04/11/2014 - VISION bare metal stent to the proximal stenosis of the vein graft to the right and ISION bare metal stent in the mid to distal 80% stenosis.   • Type 2 diabetes mellitus without complication (HCC)    • Unstable angina ([facility])        Objective      Vital Signs       Physical Exam  Constitutional:       Appearance: Normal appearance.   HENT:      Head: Normocephalic and atraumatic.      Nose: Nose normal.      Mouth/Throat:      Mouth: Mucous membranes are moist.   Eyes:      Extraocular Movements: Extraocular movements intact.      Pupils: Pupils are equal, round, and reactive to light.   Cardiovascular:      Rate and Rhythm: Regular rhythm.      Heart sounds: Normal heart sounds.   Pulmonary:      Breath sounds: Normal  breath sounds.   Abdominal:      Palpations: Abdomen is soft.   Musculoskeletal:         General: Normal range of motion.      Cervical back: Neck supple.   Skin:     General: Skin is warm and dry.   Neurological:      General: No focal deficit present.      Mental Status: He is alert and oriented to person, place, and time.   Psychiatric:         Mood and Affect: Mood normal.         Results Review:    I reviewed the patient's new clinical results.      Assessment/Plan       Ischemic cardiomyopathy    Chronic combined systolic and diastolic congestive heart failure (HCC)      Assessment & Plan  1. CAD with ischemic cardiomyopathy and chronic systolic heart failure - He is on appropriate therapy with Coreg, losartan, Lasix, statin, ASA and Plavix.  NYHA class 2 at present.Discussed indications, risks and benefits of primary prevention ICD using the Medical Decision Making Tool and he would like to proceed.     2. HTN - controlled     3. HLD - statin     4. DM - oral agents    I discussed the patients findings and my recommendations with patient    Mamadou Tab Terry MD  04/04/22  05:26 EDT

## 2022-04-05 ENCOUNTER — APPOINTMENT (OUTPATIENT)
Dept: LAB | Facility: HOSPITAL | Age: 82
End: 2022-04-05

## 2022-04-07 RX ORDER — SUCRALFATE 1 G/1
TABLET ORAL
Qty: 120 TABLET | Refills: 0 | Status: SHIPPED | OUTPATIENT
Start: 2022-04-07 | End: 2022-05-17

## 2022-04-13 NOTE — PROGRESS NOTES
"Chief Complaint  Coronary Artery Disease    Subjective    History of Present Illness      I saw Marcello Rider today for cardiovascular care.  He is a very pleasant 81-year-old male accompanied by his son.  Marcello has a history of coronary heart disease and an ischemic cardiomyopathy.  He recently underwent implantation of dual-chamber ICD on 4/4/2020.  He feels well and is in good spirits.  He does not experience chest pain pressure tightness.  His respiratory status is stable.  He sleeps with 1 pillow free of orthopnea or PND no significant lower extremity edema.  He denies syncope near syncope or palpitation.  His blood pressure is well controlled    Objective   Vital Signs:   /64   Pulse 61   Ht 167.6 cm (66\")   Wt 66.2 kg (146 lb)   BMI 23.57 kg/m²     Constitutional:       Appearance: Well-developed.   Eyes:      Conjunctiva/sclera: Conjunctivae normal.      Pupils: Pupils are equal, round, and reactive to light.   HENT:      Head: Normocephalic and atraumatic.   Neck:      Thyroid: No thyromegaly.   Pulmonary:      Effort: Pulmonary effort is normal. No respiratory distress.      Breath sounds: Normal breath sounds. No wheezing. No rales.   Chest:          Comments: Well healed pacemaker incision, no significant ecchymosis or erythema.  Cardiovascular:      Normal rate. Regular rhythm.      No gallop. No S3 and S4 gallop. No rub.   Edema:     Peripheral edema absent.   Abdominal:      General: Bowel sounds are normal. There is no distension.      Palpations: Abdomen is soft. There is no abdominal mass.      Tenderness: There is no abdominal tenderness.   Musculoskeletal: Normal range of motion.      Cervical back: Neck supple.      Comments: Status post left lower extremity prosthesis Skin:     General: Skin is warm and dry.      Findings: No erythema.   Neurological:      Mental Status: Alert and oriented to person, place, and time.         Result Review :     Common labs    Common Labsle 12/1/21 " 12/1/21 12/1/21 12/1/21 3/1/22 3/1/22 4/1/22 4/1/22    0713 0713 0713 0713 1221 1221 1453 1453   Glucose    127 (A)  110 (A) 132 (A)    BUN    18  28 (A) 24 (A)    Creatinine    0.82  1.12 1.17    eGFR Non African Am    90       Sodium    134 (A)  141 139    Potassium    4.2  3.9 3.8    Chloride    97 (A)  102 102    Calcium    9.2  9.4 9.2    Albumin    4.60   4.80    Total Bilirubin    0.5   0.4    Alkaline Phosphatase    65   66    AST (SGOT)    18   17    ALT (SGPT)    12   12    WBC     6.91   6.02   Hemoglobin     13.2   13.1   Hematocrit     41.9   40.5   Platelets     174   137 (A)   Total Cholesterol  125         Triglycerides  73         HDL Cholesterol  34 (A)         LDL Cholesterol   76         Hemoglobin A1C   6.00 (A)        Microalbumin, Urine 6.9          (A) Abnormal value                      Assessment and Plan    1. Coronary artery disease involving native coronary artery of native heart without angina pectoris  Stable free of angina    2. Hx of CABG  Stable    3. Stented coronary artery  Stable    4. Ischemic cardiomyopathy  Well compensated    5. Chronic combined systolic and diastolic congestive heart failure (HCC)  Compensated     6. Primary hypertension  Controlled    7. Mixed hyperlipidemia  Controlled    8. Type 2 diabetes mellitus without complication, without long-term current use of insulin (HCC)  Stable    9.  Status post [facility] dual-chamber ICD 4/4/2022  Well-tolerated    Marcello feels well free of angina and is euvolemic.  He will continue his current medical regimen.  I have encouraged him to continue observing a low-cholesterol low saturated fat diet and remaining as active as possible.  He will follow up with Dr. Terry, electrophysiologist for 2122.  I will see him in follow-up in 6 months.  We will continue to monitor his ICD function.        Follow Up   No follow-ups on file.  Patient was given instructions and counseling regarding his condition or for health maintenance  advice. Please see specific information pulled into the AVS if appropriate.

## 2022-04-14 ENCOUNTER — OFFICE VISIT (OUTPATIENT)
Dept: CARDIOLOGY | Facility: CLINIC | Age: 82
End: 2022-04-14

## 2022-04-14 VITALS
DIASTOLIC BLOOD PRESSURE: 64 MMHG | BODY MASS INDEX: 23.46 KG/M2 | HEART RATE: 61 BPM | WEIGHT: 146 LBS | SYSTOLIC BLOOD PRESSURE: 122 MMHG | HEIGHT: 66 IN

## 2022-04-14 DIAGNOSIS — Z95.5 STENTED CORONARY ARTERY: ICD-10-CM

## 2022-04-14 DIAGNOSIS — Z95.1 HX OF CABG: ICD-10-CM

## 2022-04-14 DIAGNOSIS — I25.10 CORONARY ARTERY DISEASE INVOLVING NATIVE CORONARY ARTERY OF NATIVE HEART WITHOUT ANGINA PECTORIS: Primary | ICD-10-CM

## 2022-04-14 DIAGNOSIS — I25.5 ISCHEMIC CARDIOMYOPATHY: ICD-10-CM

## 2022-04-14 DIAGNOSIS — I50.42 CHRONIC COMBINED SYSTOLIC AND DIASTOLIC CONGESTIVE HEART FAILURE: ICD-10-CM

## 2022-04-14 DIAGNOSIS — E78.2 MIXED HYPERLIPIDEMIA: ICD-10-CM

## 2022-04-14 DIAGNOSIS — G47.33 OBSTRUCTIVE SLEEP APNEA SYNDROME: ICD-10-CM

## 2022-04-14 DIAGNOSIS — E11.9 TYPE 2 DIABETES MELLITUS WITHOUT COMPLICATION, WITHOUT LONG-TERM CURRENT USE OF INSULIN: ICD-10-CM

## 2022-04-14 DIAGNOSIS — I10 PRIMARY HYPERTENSION: ICD-10-CM

## 2022-04-14 PROCEDURE — 99214 OFFICE O/P EST MOD 30 MIN: CPT | Performed by: INTERNAL MEDICINE

## 2022-04-21 ENCOUNTER — TELEPHONE (OUTPATIENT)
Dept: CARDIOLOGY | Facility: CLINIC | Age: 82
End: 2022-04-21

## 2022-04-21 ENCOUNTER — OFFICE VISIT (OUTPATIENT)
Dept: CARDIOLOGY | Facility: CLINIC | Age: 82
End: 2022-04-21

## 2022-04-21 VITALS
SYSTOLIC BLOOD PRESSURE: 132 MMHG | DIASTOLIC BLOOD PRESSURE: 70 MMHG | RESPIRATION RATE: 18 BRPM | BODY MASS INDEX: 22.91 KG/M2 | WEIGHT: 146 LBS | HEART RATE: 63 BPM | HEIGHT: 67 IN

## 2022-04-21 DIAGNOSIS — I50.42 CHRONIC COMBINED SYSTOLIC AND DIASTOLIC CONGESTIVE HEART FAILURE: ICD-10-CM

## 2022-04-21 DIAGNOSIS — I25.10 CORONARY ARTERY DISEASE INVOLVING NATIVE CORONARY ARTERY OF NATIVE HEART WITHOUT ANGINA PECTORIS: Primary | ICD-10-CM

## 2022-04-21 DIAGNOSIS — I25.5 ISCHEMIC CARDIOMYOPATHY: ICD-10-CM

## 2022-04-21 DIAGNOSIS — Z95.1 HX OF CABG: Chronic | ICD-10-CM

## 2022-04-21 DIAGNOSIS — Z95.810 ICD (IMPLANTABLE CARDIOVERTER-DEFIBRILLATOR), DUAL, IN SITU: ICD-10-CM

## 2022-04-21 PROCEDURE — 93289 INTERROG DEVICE EVAL HEART: CPT | Performed by: INTERNAL MEDICINE

## 2022-04-21 PROCEDURE — 99024 POSTOP FOLLOW-UP VISIT: CPT | Performed by: INTERNAL MEDICINE

## 2022-04-21 NOTE — TELEPHONE ENCOUNTER
Deam pt    Patient called states Eliquis has been denied by his insurance company. Would like to know if a different medication can be sent into pharmacy.

## 2022-04-21 NOTE — PROGRESS NOTES
Subjective:     Encounter Date:04/21/2022      Patient ID: Marcello Larson is a 82 y.o. male.    Chief Complaint:  Chief Complaint   Patient presents with   • AICD Problem     Implant 4/4/2022       HPI:  Mr Larson is an 80 yo patient of Dr. Kofi Campoverde who is referred for consideration of an ICD. His past medical history is significant for HTN, HLD, CAD with prior stent and CABG.  He has an ischemic cardiomyopathy with an EF of 30-35%.  He was admitted to [facility] 2/2022 with an acute heart failure exacerbation and diuresed.  Since then he has been doing relatively well and denies any swelling, PND or orthopnea.  He is not having any angina, palpitations or syncope.     A nuclear stress test 9/2020 showed a dilated LV with an EF of 20% with fixed inferior and apical defects and ischemia in the anterolateral segments.     A cath 10/13/2020 which revealed significant three-vessel disease.  Saphenous vein graft to OM1 and OM2 were occluded. OMA was of large caliber not use for bypass surgery.  LIMA to the LAD was widely patent the LAD wrapped around the apex with collaterals to the circumflex and to the right coronary artery.  The right coronary artery had a proximal total occlusion with a patent saphenous vein graft.  There stents in the proximal and mid region with 30% luminal irregularities      An echo 2/2022 showed an EF of 30%.     He underwent implantation of a dual chamber ICD 4/2022.  Since then, he has been doing well and is without complaints.      The following portions of the patient's history were reviewed and updated as appropriate: allergies, current medications, past family history, past medical history, past social history, past surgical history and problem list.    Problem List:  Patient Active Problem List   Diagnosis   • Mixed hyperlipidemia   • Primary hypertension   • Coronary artery disease involving native coronary artery of native heart without angina pectoris   •  Ischemic cardiomyopathy   • Obstructive sleep apnea syndrome   • Palpitations   • Hx of CABG   • Stented coronary artery   • Positive cardiac stress test   • Type 2 diabetes mellitus without complication, without long-term current use of insulin (HCC)   • Chronic combined systolic and diastolic congestive heart failure (HCC)   • ICD (implantable cardioverter-defibrillator), dual, in situ       Active Med List:    Current Outpatient Medications:   •  amLODIPine (NORVASC) 10 MG tablet, TAKE ONE TABLET BY MOUTH EVERY DAY, Disp: 90 tablet, Rfl: 1  •  carvedilol (COREG) 6.25 MG tablet, TAKE 1 TABLET BY MOUTH TWICE DAILY, Disp: 180 tablet, Rfl: 3  •  clopidogrel (PLAVIX) 75 MG tablet, TAKE 1 TABLET DAILY, Disp: 90 tablet, Rfl: 3  •  Diclofenac Sodium (VOLTAREN) 1 % gel gel, APPLY TO THE AFFECTED AREA(S) FOUR TIMES DAILY, Disp: 100 g, Rfl: 1  •  ferrous sulfate 325 (65 FE) MG tablet, Take 325 mg by mouth Daily With Breakfast., Disp: , Rfl:   •  fluticasone (FLONASE) 50 MCG/ACT nasal spray, USE 1 SPRAY EACH NOSTIL DAILY, Disp: 48 g, Rfl: 2  •  furosemide (LASIX) 40 MG tablet, Take 1 tablet by mouth 2 (Two) Times a Day., Disp: 60 tablet, Rfl: 2  •  glucose blood test strip, Check BS daily.  DX E11.9, Disp: 100 each, Rfl: 3  •  glucose monitor monitoring kit, 1 each Daily. E11.9, Disp: 1 each, Rfl: 0  •  HYDROcodone-acetaminophen (NORCO) 5-325 MG per tablet, Take 1 tablet by mouth Every 8 (Eight) Hours As Needed., Disp: , Rfl:   •  isosorbide mononitrate (IMDUR) 30 MG 24 hr tablet, TAKE 1 TABLET BY MOUTH EVERY MORNING, Disp: 90 tablet, Rfl: 1  •  Lancets Thin misc, 1 each Daily. DX E11.9, Disp: 100 each, Rfl: 3  •  losartan (COZAAR) 25 MG tablet, TAKE 1/2 TABLET BY MOUTH EVERY DAY, Disp: 45 tablet, Rfl: 1  •  lovastatin (MEVACOR) 40 MG tablet, TAKE 1 TABLET BY MOUTH EVERY EVENING, Disp: 90 tablet, Rfl: 3  •  metFORMIN (GLUCOPHAGE) 500 MG tablet, TAKE 1 TABLET BY MOUTH EVERY DAY, Disp: 90 tablet, Rfl: 1  •  montelukast  (SINGULAIR) 10 MG tablet, TAKE 1 TABLET BY MOUTH EACH EVENING, Disp: 90 tablet, Rfl: 1  •  nitroglycerin (NITROSTAT) 0.4 MG SL tablet, DISSOLVE 1 UNDER TONGUE  EVERY 5 MINUTES FOR 3 DOSES; IF NO RELIEF GO TO ER, Disp: 25 tablet, Rfl: 0  •  Omega-3 Fatty Acids (FISH OIL PO), Take 8 tablets by mouth Daily., Disp: , Rfl:   •  pantoprazole (PROTONIX) 40 MG EC tablet, Take 1 tablet by mouth 2 (Two) Times a Day., Disp: 180 tablet, Rfl: 1  •  prochlorperazine (COMPAZINE) 10 MG tablet, TAKE 1 TABLET BY MOUTH EVERY 8 HOURS AS NEEDED, Disp: 24 tablet, Rfl: 0  •  ranolazine (RANEXA) 500 MG 12 hr tablet, Take 1 tablet by mouth 2 (Two) Times a Day., Disp: 180 tablet, Rfl: 1  •  sucralfate (CARAFATE) 1 g tablet, TAKE 1 TABLET BY MOUTH FOUR TIMES DAILY, Disp: 120 tablet, Rfl: 0  •  vitamin B-6 (PYRIDOXINE) 100 MG tablet, Take 100 mg by mouth Daily., Disp: , Rfl:   •  vitamin C (ASCORBIC ACID) 500 MG tablet, Take 500 mg by mouth Daily., Disp: , Rfl:   •  apixaban (ELIQUIS) 5 MG tablet tablet, Take 1 tablet by mouth 2 (Two) Times a Day., Disp: 180 tablet, Rfl: 3  •  potassium chloride (K-DUR,KLOR-CON) 10 MEQ CR tablet, TAKE 1 TABLET BY MOUTH DAILY, Disp: 90 tablet, Rfl: 1     Past Medical History:  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)    • Allergic rhinitis due to pollen    • Anemia, unspecified    • Anxiety disorder, unspecified    • Atherosclerotic heart disease of native coronary artery without angina pectoris    • Benign essential hypertension 08/13/2019   • Bilateral primary osteoarthritis of knee    • Bladder cancer (HCC)    • Cancer (HCC)    • Cardiac dysfunction 08/13/2019    Left ventricle   • Cardiomyopathy (HCC) 08/13/2019   • CHF (congestive heart failure) (HCC)    • 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  LV systolic function is moderate to severely reduced.  EF 30-35%.  The transmittal spectral doppler flow pattern is suggestive of pseudonormalization.  There is moderate to severe global hypokinesis of the LV.  The left atrium is mildly dilated.  The mitral leaflets appear thickened, but open well.  Mild MR and AR.   • Hx of CABG 08/13/2019 01/01/1991 - left internal mammary graft to the LAD, SVG to OM1, OM2 in the RCA   • Hyperlipidemia 08/13/2019   • Hypo-osmolality and hyponatremia    • Low back pain    • Myocardial infarct (HCC)    • Other long term (current) drug therapy    • Palpitations 08/13/2019   • Personal history of malignant neoplasm of bladder    • Sleep apnea 08/13/2019   • Stented coronary artery 08/13/2019 04/11/2014 - VISION bare metal stent to the proximal stenosis of the vein graft to the right and ISION bare metal stent in the mid to distal 80% stenosis.   • Type 2 diabetes mellitus without complication (HCC)    • Unstable angina (HCC)        Past Surgical History:  Past Surgical History:   Procedure Laterality Date   • AMPUTATION Left     LOWER EXTREMITY   • APPENDECTOMY     • CARDIAC CATHETERIZATION     • CARDIAC CATHETERIZATION N/A 10/13/2020    Procedure: Left Heart Cath;  Surgeon: Kofi Campoverde MD;  [facility];  Service: Cardiology;  Laterality: N/A;   • CARDIAC CATHETERIZATION N/A 10/13/2020    Procedure: Coronary angiography;  Surgeon: Kofi Campoverde MD;  Location:  [facility] CATH INVASIVE LOCATION;  Service: Cardiology;  Laterality: N/A;   • CARDIAC CATHETERIZATION N/A 10/13/2020    Procedure: Saphenous Vein Graft;  Surgeon: Kofi Campoverde MD;  Location:  [facility] CATH INVASIVE LOCATION;  Service: Cardiology;  Laterality: N/A;   • CARDIAC ELECTROPHYSIOLOGY PROCEDURE N/A 4/4/2022    Procedure: ICD DC new/SJM;  Surgeon: Mamadou Terry  "MD;  Location: [facility] CATH INVASIVE LOCATION;  Service: Cardiology;  Laterality: N/A;   • CATARACT EXTRACTION     • CHOLECYSTECTOMY     • CORONARY ARTERY BYPASS GRAFT      1991   • OTHER SURGICAL HISTORY      UROSTOMY S/P BLADDER CA       Social History:  Social History     Socioeconomic History   • Marital status:    • Number of children: 2   Tobacco Use   • Smoking status: Former Smoker     Types: Cigarettes     Quit date:      Years since quittin.3   • Smokeless tobacco: Never Used   Substance and Sexual Activity   • Alcohol use: Never   • Drug use: Never   • Sexual activity: Defer       Allergies:  Allergies   Allergen Reactions   • Iodinated Diagnostic Agents Anaphylaxis   • Prednisone Cough       Immunizations:  Immunization History   Administered Date(s) Administered   • COVID-19 (MODERNA) 1st, 2nd, 3rd Dose Only 2021, 2021, 2021, 10/18/2021   • COVID-19 (UNSPECIFIED) 2021, 2021   • Fluad Quad 65+ 2021   • Fluzone High Dose =>65 Years (Vaxcare ONLY) 10/03/2016, 2017   • Hepatitis A 2018, 2019   • Influenza, Unspecified 10/01/2020   • Pneumococcal Conjugate 13-Valent (PCV13) 2015   • Pneumococcal Polysaccharide (PPSV23) 10/01/2008   • Shingrix 2019, 2020   • Tdap 2013, 2013   • Zoster, Unspecified 10/01/2006, 2020          Objective:         Review of Systems   Constitutional: Negative for fatigue.   Respiratory: Negative for shortness of breath.    Cardiovascular: Negative for chest pain, palpitations and leg swelling.   All other systems reviewed and are negative.       /70   Pulse 63   Resp 18   Ht 170.2 cm (67\")   Wt 66.2 kg (146 lb)   BMI 22.87 kg/m²     Constitutional:       General: Not in acute distress.     Appearance: Well-developed.   Eyes:      General: No scleral icterus.     Conjunctiva/sclera: Conjunctivae normal.      Pupils: Pupils are equal, round, and reactive to light. "   HENT:      Head: Normocephalic and atraumatic.   Neck:      Thyroid: No thyromegaly.   Pulmonary:      Effort: Pulmonary effort is normal.      Breath sounds: Normal breath sounds.   Cardiovascular:      Normal rate. Regular rhythm.   Abdominal:      General: Bowel sounds are normal.      Palpations: Abdomen is soft.   Musculoskeletal: Normal range of motion.      Cervical back: Normal range of motion. Skin:     General: Skin is warm and dry.   Neurological:      Mental Status: Alert and oriented to person, place, and time.         In-Office Procedure(s):  Procedures  No orders to display      ICD eval interpreted by Health system [id number]  Battery SHARIFA    P wave 4mv  Threshold AF  Impedance 450 ohms    R wave 12m   Threshold 0.5V  Impedance 540 ohms    HV 54 ohms    Events - AF continuous  ASCVD RIsk Score::  The ASCVD Risk score (Strandquistdagmar MORA Jr., et al., 2013) failed to calculate for the following reasons:    The 2013 ASCVD risk score is only valid for ages 40 to 79    Recent Radiology:          Lab Review:       Recent labs reviewed and interpreted for clinical significance and application          Assessment:          Diagnosis Plan   1. Coronary artery disease involving native coronary artery of native heart without angina pectoris     2. Ischemic cardiomyopathy     3. Hx of CABG     4. Chronic combined systolic and diastolic congestive heart failure (HCC)     5. ICD (implantable cardioverter-defibrillator), dual, in situ            Plan:      1. CAD with ischemic cardiomyopathy and chronic systolic heart failure - He is on appropriate therapy with Coreg, losartan, Lasix, statin, ASA and Plavix.  NYHA class 2 at present.Discussed indications, risks and benefits of primary prevention ICD using the Medical Decision Making Tool and he would like to proceed.     2. HTN - controlled     3. HLD - statin     4. DM - oral agents    5. ICD followup - normal device function, wound well healed, enrolled in remote  monitoring.    RTC 6 months         Level of Care:                 Mamadou Terry MD  04/21/22

## 2022-04-22 DIAGNOSIS — Z01.818 OTHER SPECIFIED PRE-OPERATIVE EXAMINATION: Primary | ICD-10-CM

## 2022-04-25 NOTE — ASSESSMENT & PLAN NOTE
Acute on chronic systolic and diastolic heart failure episode led to Marcello's admission for 3 days at [facility].  While there, he had an echo done that showed an EF of 30 to 35% as well as diastolic dysfunction.  His Lasix was increased to 40 mg twice daily from once daily.  No other changes to his home medications were made.  He does not need a refill on the Lasix at the increased dose, but I am going to send him down to the lab today for bloodwork including a BMP to assess potassium level and a CBC.  We may need to increase his home potassium which he is still only taking once daily.  He is feeling well since getting home and appears euvolemic on exam today.  He does have some generalized weakness but that is slowly improving as well.  Continue medications as above.  He is going to be following up with Cardiology on 10 March.   Patient's last script of Dapsone was for TID dosing and he received 90 tabs 4/7/2022, so he should not be out of pills yet.     Dr. Luna started insulin 10 units at bedtime.       Next appt with Dr. Almendarez is 4/28/2022. Msg routed to Dr. Almendarez.

## 2022-04-26 NOTE — TELEPHONE ENCOUNTER
Spoke with the patient's son, [employer] patient assistance plan does not get the medications for free, it's still $80/month. Eliquis however does have an assistance plan & that is what Dr. Terry wanted him on in the first place. Emailed the blank forms that the patient needs to fill out to his son & I will fill out our part and get it faxed to [facility]

## 2022-04-26 NOTE — TELEPHONE ENCOUNTER
Patient called, states out of pocket cost for Xarelto is $480.00. Would like to know if pharmaceutical company can be called for financial assistance?    [employer] Tel# [phone]

## 2022-04-27 PROBLEM — Z95.810 ICD (IMPLANTABLE CARDIOVERTER-DEFIBRILLATOR), DUAL, IN SITU: Status: ACTIVE | Noted: 2022-04-27

## 2022-04-28 DIAGNOSIS — I48.19 PERSISTENT ATRIAL FIBRILLATION: Primary | ICD-10-CM

## 2022-04-29 DIAGNOSIS — Z01.818 PREOP TESTING: ICD-10-CM

## 2022-04-29 DIAGNOSIS — I48.0 PAF (PAROXYSMAL ATRIAL FIBRILLATION): Primary | ICD-10-CM

## 2022-05-10 ENCOUNTER — OFFICE VISIT (OUTPATIENT)
Dept: FAMILY MEDICINE CLINIC | Age: 82
End: 2022-05-10

## 2022-05-10 VITALS
TEMPERATURE: 98 F | DIASTOLIC BLOOD PRESSURE: 76 MMHG | WEIGHT: 147.2 LBS | HEART RATE: 60 BPM | SYSTOLIC BLOOD PRESSURE: 133 MMHG | HEIGHT: 67 IN | BODY MASS INDEX: 23.1 KG/M2

## 2022-05-10 DIAGNOSIS — I48.0 PAROXYSMAL ATRIAL FIBRILLATION: ICD-10-CM

## 2022-05-10 DIAGNOSIS — Z95.810 ICD (IMPLANTABLE CARDIOVERTER-DEFIBRILLATOR), DUAL, IN SITU: ICD-10-CM

## 2022-05-10 DIAGNOSIS — Z93.6 OTHER ARTIFICIAL OPENINGS OF URINARY TRACT STATUS: ICD-10-CM

## 2022-05-10 DIAGNOSIS — I73.9 PERIPHERAL VASCULAR DISEASE, UNSPECIFIED: ICD-10-CM

## 2022-05-10 DIAGNOSIS — E11.9 TYPE 2 DIABETES MELLITUS WITHOUT COMPLICATION, WITHOUT LONG-TERM CURRENT USE OF INSULIN: ICD-10-CM

## 2022-05-10 DIAGNOSIS — M46.97 UNSPECIFIED INFLAMMATORY SPONDYLOPATHY, LUMBOSACRAL REGION: ICD-10-CM

## 2022-05-10 DIAGNOSIS — Z89.512 ACQUIRED ABSENCE OF LEFT LEG BELOW KNEE: ICD-10-CM

## 2022-05-10 DIAGNOSIS — I50.42 CHRONIC COMBINED SYSTOLIC AND DIASTOLIC CONGESTIVE HEART FAILURE: Primary | ICD-10-CM

## 2022-05-10 DIAGNOSIS — I25.118 CORONARY ARTERY DISEASE OF NATIVE ARTERY OF NATIVE HEART WITH STABLE ANGINA PECTORIS: ICD-10-CM

## 2022-05-10 PROCEDURE — 99214 OFFICE O/P EST MOD 30 MIN: CPT | Performed by: FAMILY MEDICINE

## 2022-05-10 NOTE — PROGRESS NOTES
Chief Complaint  Congestive Heart Failure (2 month f/u)    Subjective          Marcello Larson presents to [facility] today for routine f/u of chronic issues.    He is on aspirin/Plavix, Imdur and Ranexa for CAD.  He is on Lasix with potassium supplementation (has had hypokalemia in the past).  However, he is going to be stopping ASA/Plavix and transitioning to Eliquis.  They have him on a patient assistance program.   He is on amlodipine and carvedilol for HTN.  He is on lovastatin for HLD. He is s/p 2v. CABG.  He has nitro for prn use but rarely uses them.  He follows with Dr. Terry.  He has been diagnosed with chronic systolic heart failure and is on a beta-blocker and, ACEI statin and loop diuretic.  Status post ICD placement.    He is on metformin for diabetes.  A1c has been well controlled, last at 6.  He does adhere to a diabetic diet.  He is on a statin and ASA.  He is UTD on eye exam (last done 7/2021); no diabetic retinopathy.      He is on  Protonix for GERD and h/o bleeding ulcer thought to be NSAID-induced.  He has sucralfate that he for prn use.  Does still take iron supplementation OTC.      He was on gabapentin for longstanding history chronic low back pain and leg pain through [facility] but is no longer requiring that.  He has been on hydrocodone in the past but is not currently using any opiates. S/p physical therapy.  Status post RFA.      He is on montelukast for allergies.       Current Outpatient Medications:   •  amLODIPine (NORVASC) 10 MG tablet, TAKE ONE TABLET BY MOUTH EVERY DAY, Disp: 90 tablet, Rfl: 1  •  apixaban (ELIQUIS) 5 MG tablet tablet, Take 1 tablet by mouth 2 (Two) Times a Day., Disp: 180 tablet, Rfl: 3  •  carvedilol (COREG) 6.25 MG tablet, TAKE 1 TABLET BY MOUTH TWICE DAILY, Disp: 180 tablet, Rfl: 3  •  Diclofenac Sodium (VOLTAREN) 1 % gel gel, APPLY TO THE AFFECTED AREA(S) FOUR TIMES DAILY, Disp: 100 g, Rfl: 1  •  ferrous sulfate  325 (65 FE) MG tablet, Take 325 mg by mouth Daily With Breakfast., Disp: , Rfl:   •  fluticasone (FLONASE) 50 MCG/ACT nasal spray, USE 1 SPRAY EACH NOSTIL DAILY, Disp: 48 g, Rfl: 2  •  furosemide (LASIX) 40 MG tablet, Take 1 tablet by mouth 2 (Two) Times a Day., Disp: 60 tablet, Rfl: 2  •  glucose blood test strip, Check BS daily.  DX E11.9, Disp: 100 each, Rfl: 3  •  glucose monitor monitoring kit, 1 each Daily. E11.9, Disp: 1 each, Rfl: 0  •  HYDROcodone-acetaminophen (NORCO) 5-325 MG per tablet, Take 1 tablet by mouth Every 8 (Eight) Hours As Needed., Disp: , Rfl:   •  isosorbide mononitrate (IMDUR) 30 MG 24 hr tablet, TAKE 1 TABLET BY MOUTH EVERY MORNING, Disp: 90 tablet, Rfl: 1  •  Lancets Thin misc, 1 each Daily. DX E11.9, Disp: 100 each, Rfl: 3  •  losartan (COZAAR) 25 MG tablet, TAKE 1/2 TABLET BY MOUTH EVERY DAY, Disp: 45 tablet, Rfl: 1  •  lovastatin (MEVACOR) 40 MG tablet, TAKE 1 TABLET BY MOUTH EVERY EVENING, Disp: 90 tablet, Rfl: 3  •  metFORMIN (GLUCOPHAGE) 500 MG tablet, TAKE 1 TABLET BY MOUTH EVERY DAY, Disp: 90 tablet, Rfl: 1  •  montelukast (SINGULAIR) 10 MG tablet, TAKE 1 TABLET BY MOUTH EACH EVENING, Disp: 90 tablet, Rfl: 1  •  nitroglycerin (NITROSTAT) 0.4 MG SL tablet, DISSOLVE 1 UNDER TONGUE  EVERY 5 MINUTES FOR 3 DOSES; IF NO RELIEF GO TO ER, Disp: 25 tablet, Rfl: 0  •  Omega-3 Fatty Acids (FISH OIL PO), Take 8 tablets by mouth Daily., Disp: , Rfl:   •  pantoprazole (PROTONIX) 40 MG EC tablet, Take 1 tablet by mouth 2 (Two) Times a Day., Disp: 180 tablet, Rfl: 1  •  potassium chloride (K-DUR,KLOR-CON) 10 MEQ CR tablet, TAKE 1 TABLET BY MOUTH DAILY, Disp: 90 tablet, Rfl: 1  •  prochlorperazine (COMPAZINE) 10 MG tablet, TAKE 1 TABLET BY MOUTH EVERY 8 HOURS AS NEEDED, Disp: 24 tablet, Rfl: 0  •  ranolazine (RANEXA) 500 MG 12 hr tablet, Take 1 tablet by mouth 2 (Two) Times a Day., Disp: 180 tablet, Rfl: 1  •  sucralfate (CARAFATE) 1 g tablet, TAKE 1 TABLET BY MOUTH FOUR TIMES DAILY, Disp: 120  "tablet, Rfl: 0  •  vitamin B-6 (PYRIDOXINE) 100 MG tablet, Take 100 mg by mouth Daily., Disp: , Rfl:   •  vitamin C (ASCORBIC ACID) 500 MG tablet, Take 500 mg by mouth Daily., Disp: , Rfl:     Allergies:  Iodinated diagnostic agents and Prednisone      Objective   Vital Signs:   Vitals:    05/10/22 0751   BP: 133/76   BP Location: Left arm   Patient Position: Sitting   Cuff Size: Adult   Pulse: 60   Temp: 98 °F (36.7 °C)   TempSrc: Oral   Weight: 66.8 kg (147 lb 3.2 oz)   Height: 170.2 cm (67.01\")       Physical Exam  Vitals reviewed.   Constitutional:       General: He is not in acute distress.     Appearance: Normal appearance. He is well-developed.   HENT:      Head: Normocephalic and atraumatic.      Right Ear: External ear normal.      Left Ear: External ear normal.   Eyes:      Extraocular Movements: Extraocular movements intact.      Conjunctiva/sclera: Conjunctivae normal.      Pupils: Pupils are equal, round, and reactive to light.   Cardiovascular:      Rate and Rhythm: Normal rate and regular rhythm.      Heart sounds: No murmur heard.  Pulmonary:      Effort: Pulmonary effort is normal.      Breath sounds: Normal breath sounds. No wheezing, rhonchi or rales.   Abdominal:      General: Bowel sounds are normal. There is no distension.      Palpations: Abdomen is soft.      Tenderness: There is no abdominal tenderness.   Genitourinary:     Comments: Urostomy bag in place filled with clear urine, stoma appears normal  Musculoskeletal:         General: Normal range of motion.      Comments: +L BKA prosthesis   Skin:     Comments: Fading yellow and purple ecchymosis over the mid and left anterior chest wall   Neurological:      Mental Status: He is alert.   Psychiatric:         Mood and Affect: Affect normal.             Assessment and Plan {CC Problem List  Visit Diagnosis   ROS  Review (Popup)  Health Maintenance  Quality  BestPractice  Medications  SmartSets  SnapShot Encounters  Media :23} "   Diagnoses and all orders for this visit:    1. Chronic combined systolic and diastolic congestive heart failure (HCC) (Primary)  Assessment & Plan:  Stable.  Following with Cardiology.  He is on beta-blocker, ACEI, statin.  No refills needed today.      2. ICD (implantable cardioverter-defibrillator), dual, in situ  Assessment & Plan:  Recovering well from his recent ICD placement.  Still has some bruising over the anterior chest wall but this is fading.      3. Type 2 diabetes mellitus without complication, without long-term current use of insulin (HCC)  Assessment & Plan:  He is on metformin for diabetes.  No refills needed.  A1c has been well controlled, last at 6.  Checking labs.  He does adhere to a diabetic diet.  He is on a statin and ASA.  He is UTD on eye exam (last done 7/2021); no diabetic retinopathy.    Orders:  -     Lipid Panel; Future  -     Hemoglobin A1c; Future    4. Coronary artery disease of native artery of native heart with stable angina pectoris (HCC)  Assessment & Plan:  Stable.  Following with Cardiology.      5. 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      6. 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      7. Peripheral vascular disease, unspecified (HCC)  Assessment & Plan:  On statin and now blood thinner.  Following with Cardiology.      8. Paroxysmal atrial fibrillation (HCC)  Assessment & Plan:  No on Eliquis.  He is on a patient assistance program.  Following with Cardiology.      9. Unspecified inflammatory spondylopathy, lumbosacral region (HCC)  Assessment & Plan:  Follows with Pain Management.        Follow Up   Return in about 2 months (around 7/10/2022) for Recheck.  Patient was given instructions and counseling regarding his condition or for health maintenance advice. Please see specific information pulled into the AVS if appropriate.

## 2022-05-10 NOTE — ASSESSMENT & PLAN NOTE
He is on metformin for diabetes.  No refills needed.  A1c has been well controlled, last at 6.  Checking labs.  He does adhere to a diabetic diet.  He is on a statin and ASA.  He is UTD on eye exam (last done 7/2021); no diabetic retinopathy.

## 2022-05-10 NOTE — ASSESSMENT & PLAN NOTE
Stable.  Following with Cardiology.  He is on beta-blocker, ACEI, statin.  No refills needed today.

## 2022-05-10 NOTE — ASSESSMENT & PLAN NOTE
Recovering well from his recent ICD placement.  Still has some bruising over the anterior chest wall but this is fading.

## 2022-05-16 RX ORDER — PANTOPRAZOLE SODIUM 40 MG/1
40 TABLET, DELAYED RELEASE ORAL 2 TIMES DAILY
Qty: 180 TABLET | Refills: 1 | Status: SHIPPED | OUTPATIENT
Start: 2022-05-16 | End: 2022-09-20 | Stop reason: SDUPTHER

## 2022-05-17 ENCOUNTER — LAB (OUTPATIENT)
Dept: LAB | Facility: HOSPITAL | Age: 82
End: 2022-05-17

## 2022-05-17 ENCOUNTER — APPOINTMENT (OUTPATIENT)
Dept: LAB | Facility: HOSPITAL | Age: 82
End: 2022-05-17

## 2022-05-17 DIAGNOSIS — E11.9 TYPE 2 DIABETES MELLITUS WITHOUT COMPLICATION, WITHOUT LONG-TERM CURRENT USE OF INSULIN: ICD-10-CM

## 2022-05-17 DIAGNOSIS — Z01.818 PREOP TESTING: ICD-10-CM

## 2022-05-17 DIAGNOSIS — I48.0 PAF (PAROXYSMAL ATRIAL FIBRILLATION): ICD-10-CM

## 2022-05-17 LAB
ALBUMIN SERPL-MCNC: 4.6 G/DL (ref 3.5–5.2)
ALBUMIN/GLOB SERPL: 2.2 G/DL
ALP SERPL-CCNC: 61 U/L (ref 39–117)
ALT SERPL W P-5'-P-CCNC: 15 U/L (ref 1–41)
ANION GAP SERPL CALCULATED.3IONS-SCNC: 10.5 MMOL/L (ref 5–15)
AST SERPL-CCNC: 18 U/L (ref 1–40)
BASOPHILS # BLD AUTO: 0.03 10*3/MM3 (ref 0–0.2)
BASOPHILS NFR BLD AUTO: 0.5 % (ref 0–1.5)
BILIRUB SERPL-MCNC: 0.6 MG/DL (ref 0–1.2)
BUN SERPL-MCNC: 16 MG/DL (ref 8–23)
BUN/CREAT SERPL: 19 (ref 7–25)
CALCIUM SPEC-SCNC: 9.2 MG/DL (ref 8.6–10.5)
CHLORIDE SERPL-SCNC: 103 MMOL/L (ref 98–107)
CO2 SERPL-SCNC: 24.5 MMOL/L (ref 22–29)
CREAT SERPL-MCNC: 0.84 MG/DL (ref 0.76–1.27)
DEPRECATED RDW RBC AUTO: 42.5 FL (ref 37–54)
EGFRCR SERPLBLD CKD-EPI 2021: 87.1 ML/MIN/1.73
EOSINOPHIL # BLD AUTO: 0.15 10*3/MM3 (ref 0–0.4)
EOSINOPHIL NFR BLD AUTO: 2.7 % (ref 0.3–6.2)
ERYTHROCYTE [DISTWIDTH] IN BLOOD BY AUTOMATED COUNT: 13.4 % (ref 12.3–15.4)
GLOBULIN UR ELPH-MCNC: 2.1 GM/DL
GLUCOSE SERPL-MCNC: 136 MG/DL (ref 65–99)
HCT VFR BLD AUTO: 41.2 % (ref 37.5–51)
HGB BLD-MCNC: 14.1 G/DL (ref 13–17.7)
IMM GRANULOCYTES # BLD AUTO: 0.02 10*3/MM3 (ref 0–0.05)
IMM GRANULOCYTES NFR BLD AUTO: 0.4 % (ref 0–0.5)
INR PPP: 1.3 (ref 0.9–1.1)
LYMPHOCYTES # BLD AUTO: 1.26 10*3/MM3 (ref 0.7–3.1)
LYMPHOCYTES NFR BLD AUTO: 22.5 % (ref 19.6–45.3)
MCH RBC QN AUTO: 30.3 PG (ref 26.6–33)
MCHC RBC AUTO-ENTMCNC: 34.2 G/DL (ref 31.5–35.7)
MCV RBC AUTO: 88.4 FL (ref 79–97)
MONOCYTES # BLD AUTO: 0.52 10*3/MM3 (ref 0.1–0.9)
MONOCYTES NFR BLD AUTO: 9.3 % (ref 5–12)
NEUTROPHILS NFR BLD AUTO: 3.62 10*3/MM3 (ref 1.7–7)
NEUTROPHILS NFR BLD AUTO: 64.6 % (ref 42.7–76)
NRBC BLD AUTO-RTO: 0 /100 WBC (ref 0–0.2)
PLATELET # BLD AUTO: 151 10*3/MM3 (ref 140–450)
PMV BLD AUTO: 9.6 FL (ref 6–12)
POTASSIUM SERPL-SCNC: 3.6 MMOL/L (ref 3.5–5.2)
PROT SERPL-MCNC: 6.7 G/DL (ref 6–8.5)
PROTHROMBIN TIME: 16.1 SECONDS (ref 11.7–14.2)
RBC # BLD AUTO: 4.66 10*6/MM3 (ref 4.14–5.8)
SARS-COV-2 ORF1AB RESP QL NAA+PROBE: NOT DETECTED
SODIUM SERPL-SCNC: 138 MMOL/L (ref 136–145)
WBC NRBC COR # BLD: 5.6 10*3/MM3 (ref 3.4–10.8)

## 2022-05-17 PROCEDURE — 36415 COLL VENOUS BLD VENIPUNCTURE: CPT

## 2022-05-17 PROCEDURE — 85025 COMPLETE CBC W/AUTO DIFF WBC: CPT

## 2022-05-17 PROCEDURE — C9803 HOPD COVID-19 SPEC COLLECT: HCPCS

## 2022-05-17 PROCEDURE — 85610 PROTHROMBIN TIME: CPT

## 2022-05-17 PROCEDURE — U0005 INFEC AGEN DETEC AMPLI PROBE: HCPCS

## 2022-05-17 PROCEDURE — U0004 COV-19 TEST NON-CDC HGH THRU: HCPCS

## 2022-05-17 PROCEDURE — 80053 COMPREHEN METABOLIC PANEL: CPT

## 2022-05-17 RX ORDER — SUCRALFATE 1 G/1
TABLET ORAL
Qty: 120 TABLET | Refills: 5 | Status: SHIPPED | OUTPATIENT
Start: 2022-05-17 | End: 2022-12-27

## 2022-05-18 ENCOUNTER — HOSPITAL ENCOUNTER (OUTPATIENT)
Dept: CARDIOLOGY | Facility: HOSPITAL | Age: 82
Discharge: HOME | End: 2022-05-18

## 2022-05-18 DIAGNOSIS — Z01.818 OTHER SPECIFIED PRE-OPERATIVE EXAMINATION: Primary | ICD-10-CM

## 2022-05-23 ENCOUNTER — TELEPHONE (OUTPATIENT)
Dept: FAMILY MEDICINE CLINIC | Age: 82
End: 2022-05-23

## 2022-05-24 ENCOUNTER — LAB (OUTPATIENT)
Dept: LAB | Facility: HOSPITAL | Age: 82
End: 2022-05-24

## 2022-05-24 DIAGNOSIS — E11.9 TYPE 2 DIABETES MELLITUS WITHOUT COMPLICATION, WITHOUT LONG-TERM CURRENT USE OF INSULIN: ICD-10-CM

## 2022-05-24 LAB
CHOLEST SERPL-MCNC: 145 MG/DL (ref 0–200)
HBA1C MFR BLD: 6.9 % (ref 4.8–5.6)
HDLC SERPL-MCNC: 28 MG/DL (ref 40–60)
LDLC SERPL CALC-MCNC: 101 MG/DL (ref 0–100)
LDLC/HDLC SERPL: 3.57 {RATIO}
TRIGL SERPL-MCNC: 85 MG/DL (ref 0–150)
VLDLC SERPL-MCNC: 16 MG/DL (ref 5–40)

## 2022-05-24 PROCEDURE — 83036 HEMOGLOBIN GLYCOSYLATED A1C: CPT

## 2022-05-24 PROCEDURE — 80061 LIPID PANEL: CPT

## 2022-05-24 PROCEDURE — 36415 COLL VENOUS BLD VENIPUNCTURE: CPT

## 2022-06-06 ENCOUNTER — LAB (OUTPATIENT)
Dept: LAB | Facility: HOSPITAL | Age: 82
End: 2022-06-06

## 2022-06-06 DIAGNOSIS — Z01.818 OTHER SPECIFIED PRE-OPERATIVE EXAMINATION: Primary | ICD-10-CM

## 2022-06-06 LAB — SARS-COV-2 ORF1AB RESP QL NAA+PROBE: NOT DETECTED

## 2022-06-06 PROCEDURE — U0005 INFEC AGEN DETEC AMPLI PROBE: HCPCS

## 2022-06-06 PROCEDURE — C9803 HOPD COVID-19 SPEC COLLECT: HCPCS

## 2022-06-06 PROCEDURE — U0004 COV-19 TEST NON-CDC HGH THRU: HCPCS

## 2022-06-07 ENCOUNTER — APPOINTMENT (OUTPATIENT)
Dept: LAB | Facility: HOSPITAL | Age: 82
End: 2022-06-07

## 2022-06-07 NOTE — H&P
Patient Care Team:  Zeny Corley MD as PCP - General (Family Medicine)  Kofi Campoverde MD as Consulting Physician (Cardiology)    Chief complaint Presents for cardioversion    Subjective     History of Present Illness  Mr Larson is an 82 yo patient of Dr. Kofi Campoverde who is referred for consideration of an ICD. His past medical history is significant for HTN, HLD, CAD with prior stent and CABG.  He has an ischemic cardiomyopathy with an EF of 30-35%.  He was admitted to [facility] 2/2022 with an acute heart failure exacerbation and diuresed.  Since then he has been doing relatively well and denies any swelling, PND or orthopnea.  He is not having any angina, palpitations or syncope.     A nuclear stress test 9/2020 showed a dilated LV with an EF of 20% with fixed inferior and apical defects and ischemia in the anterolateral segments.     A cath 10/13/2020 which revealed significant three-vessel disease.  Saphenous vein graft to OM1 and OM2 were occluded. OMA was of large caliber not use for bypass surgery.  LIMA to the LAD was widely patent the LAD wrapped around the apex with collaterals to the circumflex and to the right coronary artery.  The right coronary artery had a proximal total occlusion with a patent saphenous vein graft.  There stents in the proximal and mid region with 30% luminal irregularities      An echo 2/2022 showed an EF of 30%.     He underwent implantation of a dual chamber ICD 4/2022.  Since then, he has been doing well and is without complaints.     Review of Systems   Constitutional: Positive for fatigue.   Respiratory: Positive for shortness of breath.    Cardiovascular: Positive for palpitations. Negative for chest pain and leg swelling.   All other systems reviewed and are negative.         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)    • Allergic rhinitis due to pollen    • Anemia, unspecified    •  Anxiety disorder, unspecified    • Atherosclerotic heart disease of native coronary artery without angina pectoris    • Benign essential hypertension 08/13/2019   • Bilateral primary osteoarthritis of knee    • Bladder cancer ([facility])    • Cancer ([facility])    • Cardiac dysfunction 08/13/2019    Left ventricle   • Cardiomyopathy ([facility]) 08/13/2019   • CHF (congestive heart failure) (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  LV systolic function is moderate to severely reduced.  EF 30-35%.  The transmittal spectral doppler flow pattern is suggestive of pseudonormalization.  There is moderate to severe global hypokinesis of the LV.  The left atrium is mildly dilated.  The mitral leaflets appear thickened, but open well.  Mild MR and AR.   • Hx of CABG 08/13/2019 01/01/1991 - left internal mammary graft to the LAD, SVG to OM1, OM2 in the RCA   • Hyperlipidemia 08/13/2019   • Hypo-osmolality and hyponatremia    • Low back pain    • Myocardial infarct ([facility])    • Other long term (current) drug therapy    • Palpitations 08/13/2019   • Personal history of malignant neoplasm of bladder    • Sleep apnea 08/13/2019   • Stented coronary artery 08/13/2019 04/11/2014 - VISION bare metal stent to the proximal stenosis of the vein graft to the right and ISION bare metal stent in the mid to distal 80% stenosis.   • Type 2 diabetes mellitus without complication ([facility])    • Unstable angina ([facility])        Objective      Vital Signs       Physical Exam  Constitutional:       Appearance: Normal appearance.   HENT:      Head: Normocephalic and atraumatic.      Mouth/Throat:      Mouth: Mucous membranes are moist.   Eyes:      Extraocular Movements: Extraocular movements intact.       Pupils: Pupils are equal, round, and reactive to light.   Cardiovascular:      Rate and Rhythm: Rhythm irregular.   Pulmonary:      Effort: Pulmonary effort is normal.      Breath sounds: Normal breath sounds.   Abdominal:      Palpations: Abdomen is soft.   Musculoskeletal:         General: Normal range of motion.      Cervical back: Neck supple.   Skin:     General: Skin is warm and dry.   Neurological:      General: No focal deficit present.      Mental Status: He is oriented to person, place, and time.   Psychiatric:         Mood and Affect: Mood normal.         Results Review:    I reviewed the patient's new clinical results.      Assessment & Plan       * No active hospital problems. *      Assessment & Plan  1. CAD with ischemic cardiomyopathy and chronic systolic heart failure - He is on appropriate therapy with Coreg, losartan, Lasix, statin, ASA and Plavix.  NYHA class 2 at present.Discussed indications, risks and benefits of primary prevention ICD using the Medical Decision Making Tool and he would like to proceed.     2. HTN - controlled     3. HLD - statin     4. DM - oral agents     5. ICD followup - normal device function, wound well healed, enrolled in remote monitoring.     I discussed the patients findings and my recommendations with patient    Mamadou Terry MD  06/07/22  16:53 EDT

## 2022-06-08 ENCOUNTER — HOSPITAL ENCOUNTER (OUTPATIENT)
Dept: CARDIOLOGY | Facility: HOSPITAL | Age: 82
Discharge: HOME | End: 2022-06-08
Admitting: INTERNAL MEDICINE

## 2022-06-08 VITALS
WEIGHT: 146 LBS | BODY MASS INDEX: 22.91 KG/M2 | DIASTOLIC BLOOD PRESSURE: 62 MMHG | HEART RATE: 62 BPM | TEMPERATURE: 98 F | RESPIRATION RATE: 16 BRPM | SYSTOLIC BLOOD PRESSURE: 127 MMHG | OXYGEN SATURATION: 95 % | HEIGHT: 67 IN

## 2022-06-08 DIAGNOSIS — I48.19 PERSISTENT ATRIAL FIBRILLATION: ICD-10-CM

## 2022-06-08 LAB
GLUCOSE BLDC GLUCOMTR-MCNC: 134 MG/DL (ref 70–130)
MAXIMAL PREDICTED HEART RATE: 138 BPM
STRESS TARGET HR: 117 BPM

## 2022-06-08 PROCEDURE — 92960 CARDIOVERSION ELECTRIC EXT: CPT | Performed by: INTERNAL MEDICINE

## 2022-06-08 PROCEDURE — 92960 CARDIOVERSION ELECTRIC EXT: CPT

## 2022-06-08 PROCEDURE — 93005 ELECTROCARDIOGRAM TRACING: CPT | Performed by: INTERNAL MEDICINE

## 2022-06-08 PROCEDURE — 82962 GLUCOSE BLOOD TEST: CPT

## 2022-06-08 PROCEDURE — 25010000002 MIDAZOLAM PER 1 MG: Performed by: INTERNAL MEDICINE

## 2022-06-08 PROCEDURE — 93010 ELECTROCARDIOGRAM REPORT: CPT | Performed by: INTERNAL MEDICINE

## 2022-06-08 RX ORDER — SODIUM CHLORIDE 9 MG/ML
75 INJECTION, SOLUTION INTRAVENOUS CONTINUOUS
Status: DISCONTINUED | OUTPATIENT
Start: 2022-06-08 | End: 2022-06-09 | Stop reason: HOSPADM

## 2022-06-08 RX ORDER — SODIUM CHLORIDE 0.9 % (FLUSH) 0.9 %
10 SYRINGE (ML) INJECTION AS NEEDED
Status: DISCONTINUED | OUTPATIENT
Start: 2022-06-08 | End: 2022-06-09 | Stop reason: HOSPADM

## 2022-06-08 RX ORDER — SODIUM CHLORIDE 0.9 % (FLUSH) 0.9 %
10 SYRINGE (ML) INJECTION EVERY 12 HOURS SCHEDULED
Status: DISCONTINUED | OUTPATIENT
Start: 2022-06-08 | End: 2022-06-09 | Stop reason: HOSPADM

## 2022-06-08 RX ORDER — MIDAZOLAM HYDROCHLORIDE 1 MG/ML
INJECTION INTRAMUSCULAR; INTRAVENOUS
Status: COMPLETED | OUTPATIENT
Start: 2022-06-08 | End: 2022-06-08

## 2022-06-08 RX ADMIN — MIDAZOLAM HYDROCHLORIDE 2 MG: 1 INJECTION, SOLUTION INTRAMUSCULAR; INTRAVENOUS at 13:16

## 2022-06-08 RX ADMIN — Medication 30 MG: at 13:17

## 2022-06-08 RX ADMIN — SODIUM CHLORIDE 75 ML/HR: 9 INJECTION, SOLUTION INTRAVENOUS at 11:22

## 2022-06-08 NOTE — PROCEDURES
Pre Op Diagnosis - atrial fibrillation    Post Op Diagnosis - same    Procedure - cardioversion    Anesthesia - moderate sedation    Description - After informed consent was obtained the patient was brought to the EP lab in the fasting state.  He was sedated with Versed and Brevital and cardioverted with 200J which was successful in restoring sinus rhythm.    The patient was given moderate sedation by an RN with me in constant attendance.  He was monitored with EKG, BP and continuous oximetry.  Total duration of sedation was 5 minutes.

## 2022-06-08 NOTE — DISCHARGE SUMMARY
[facility]    DISCHARGE SUMMARY      Patient Name: Marcello Larson  :1940  82 y.o.    Date of Admit: 2022  Date of Discharge:  2022    Discharge Diagnosis:  Problems Addressed this Visit    None     Visit Diagnoses     Persistent atrial fibrillation (HCC)        Relevant Orders    Cardioversion External in Cardiology Department      Diagnoses       Codes Comments    Persistent atrial fibrillation (HCC)     ICD-10-CM: I48.19  ICD-9-CM: 427.31           Hospital Course: Patient had successful uneventful cardioversion      Procedures Performed  * No procedures listed *       Consults     No orders found from 5/10/2022 to 2022.          Pertinent Test Results:         Invalid input(s): LABALBU, PROT      @LABRCNT(bnp)@                    Condition on Discharge: stable    Discharge Medications     Discharge Medications      Continue These Medications      Instructions Start Date   amLODIPine 10 MG tablet  Commonly known as: NORVASC   TAKE ONE TABLET BY MOUTH EVERY DAY      apixaban 5 MG tablet tablet  Commonly known as: ELIQUIS   5 mg, Oral, 2 Times Daily      carvedilol 6.25 MG tablet  Commonly known as: COREG   TAKE 1 TABLET BY MOUTH TWICE DAILY      Diclofenac Sodium 1 % gel gel  Commonly known as: VOLTAREN   APPLY TO THE AFFECTED AREA(S) FOUR TIMES DAILY      ferrous sulfate 325 (65 FE) MG tablet   325 mg, Oral, Daily With Breakfast      FISH OIL PO   8 tablets, Oral, Daily      fluticasone 50 MCG/ACT nasal spray  Commonly known as: FLONASE   USE 1 SPRAY EACH NOSTIL DAILY      furosemide 40 MG tablet  Commonly known as: LASIX   40 mg, Oral, 2 Times Daily      glucose blood test strip   Check BS daily.  DX E11.9      glucose monitor monitoring kit   1 each, Does not apply, Daily, E11.9      HYDROcodone-acetaminophen 5-325 MG per tablet  Commonly known as: NORCO   1 tablet, Oral, Every 8 Hours PRN      isosorbide mononitrate 30 MG 24 hr tablet  Commonly known as: IMDUR   TAKE 1  TABLET BY MOUTH EVERY MORNING      Lancets Thin misc   1 each, Does not apply, Daily, DX E11.9      losartan 25 MG tablet  Commonly known as: COZAAR   TAKE 1/2 TABLET BY MOUTH EVERY DAY      lovastatin 40 MG tablet  Commonly known as: MEVACOR   TAKE 1 TABLET BY MOUTH EVERY EVENING      metFORMIN 500 MG tablet  Commonly known as: GLUCOPHAGE   TAKE 1 TABLET BY MOUTH EVERY DAY      montelukast 10 MG tablet  Commonly known as: SINGULAIR   TAKE 1 TABLET BY MOUTH EACH EVENING      nitroglycerin 0.4 MG SL tablet  Commonly known as: NITROSTAT   DISSOLVE 1 UNDER TONGUE  EVERY 5 MINUTES FOR 3 DOSES; IF NO RELIEF GO TO ER      pantoprazole 40 MG EC tablet  Commonly known as: PROTONIX   40 mg, Oral, 2 Times Daily      potassium chloride 10 MEQ CR tablet  Commonly known as: K-DUR,KLOR-CON   TAKE 1 TABLET BY MOUTH DAILY      prochlorperazine 10 MG tablet  Commonly known as: COMPAZINE   TAKE 1 TABLET BY MOUTH EVERY 8 HOURS AS NEEDED      sucralfate 1 g tablet  Commonly known as: CARAFATE   TAKE 1 TABLET BY MOUTH FOUR TIMES DAILY      vitamin B-6 100 MG tablet  Commonly known as: PYRIDOXINE   100 mg, Oral, Daily      vitamin C 500 MG tablet  Commonly known as: ASCORBIC ACID   500 mg, Oral, Daily             Discharge Diet:     Activity at Discharge:     Discharge disposition: home    Follow-up Appointments  Future Appointments   Date Time Provider Department Center   6/23/2022  8:30 AM Mamadou Terry MD [facility]   7/21/2022 11:15 AM Zeny Corley MD [facility]   10/27/2022  8:30 AM Kofi Campoverde MD [facility]   11/29/2022  9:30 AM Zeny Corley MD [facility]         Test Results Pending at Discharge       Mamadou Terry MD, [facility]    06/08/22  13:22 EDT

## 2022-06-09 LAB — QT INTERVAL: 413 MS

## 2022-06-23 ENCOUNTER — OFFICE VISIT (OUTPATIENT)
Dept: CARDIOLOGY | Facility: CLINIC | Age: 82
End: 2022-06-23

## 2022-06-23 VITALS
RESPIRATION RATE: 18 BRPM | DIASTOLIC BLOOD PRESSURE: 70 MMHG | HEIGHT: 67 IN | HEART RATE: 60 BPM | WEIGHT: 145 LBS | BODY MASS INDEX: 22.76 KG/M2 | SYSTOLIC BLOOD PRESSURE: 122 MMHG | OXYGEN SATURATION: 96 %

## 2022-06-23 DIAGNOSIS — I25.118 CORONARY ARTERY DISEASE OF NATIVE ARTERY OF NATIVE HEART WITH STABLE ANGINA PECTORIS: ICD-10-CM

## 2022-06-23 DIAGNOSIS — I48.0 PAROXYSMAL ATRIAL FIBRILLATION: Primary | ICD-10-CM

## 2022-06-23 DIAGNOSIS — Z95.810 ICD (IMPLANTABLE CARDIOVERTER-DEFIBRILLATOR), DUAL, IN SITU: ICD-10-CM

## 2022-06-23 DIAGNOSIS — Z95.1 HX OF CABG: Chronic | ICD-10-CM

## 2022-06-23 DIAGNOSIS — I25.5 ISCHEMIC CARDIOMYOPATHY: ICD-10-CM

## 2022-06-23 PROCEDURE — 99024 POSTOP FOLLOW-UP VISIT: CPT | Performed by: INTERNAL MEDICINE

## 2022-06-23 RX ORDER — LOSARTAN POTASSIUM 25 MG/1
TABLET ORAL
Qty: 45 TABLET | Refills: 0 | Status: SHIPPED | OUTPATIENT
Start: 2022-06-23 | End: 2022-09-20 | Stop reason: SDUPTHER

## 2022-06-23 NOTE — PROGRESS NOTES
Subjective:     Encounter Date:06/23/2022      Patient ID: Marcello Larson is a 82 y.o. male.    Chief Complaint:  Chief Complaint   Patient presents with   • Atrial Fibrillation     DCCV 6/8/2022       HPI:  Mr Larson is an 82 yo patient of Dr. Kofi Campoverde who is referred for consideration of an ICD. His past medical history is significant for HTN, HLD, CAD with prior stent and CABG.  He has an ischemic cardiomyopathy with an EF of 30-35%.  He was admitted to [facility] 2/2022 with an acute heart failure exacerbation and diuresed.  Since then he has been doing relatively well and denies any swelling, PND or orthopnea.  He is not having any angina, palpitations or syncope.     A nuclear stress test 9/2020 showed a dilated LV with an EF of 20% with fixed inferior and apical defects and ischemia in the anterolateral segments.     A cath 10/13/2020 which revealed significant three-vessel disease.  Saphenous vein graft to OM1 and OM2 were occluded. OMA was of large caliber not use for bypass surgery.  LIMA to the LAD was widely patent the LAD wrapped around the apex with collaterals to the circumflex and to the right coronary artery.  The right coronary artery had a proximal total occlusion with a patent saphenous vein graft.  There stents in the proximal and mid region with 30% luminal irregularities      An echo 2/2022 showed an EF of 30%.     He underwent implantation of a dual chamber ICD 4/2022.  In followup, he was found to have persistent AF and was symptomatic with fatigue and dyspnea.  He was placed on apixaban and cardioverted 5/2022.  He noted a significant improvement in his fatigue and dyspnea.            The following portions of the patient's history were reviewed and updated as appropriate: allergies, current medications, past family history, past medical history, past social history, past surgical history and problem list.    Problem List:  Patient Active Problem List   Diagnosis    • Mixed hyperlipidemia   • Primary hypertension   • Coronary artery disease of native artery of native heart with stable angina pectoris (HCC)   • Ischemic cardiomyopathy   • Obstructive sleep apnea syndrome   • Palpitations   • Hx of CABG   • Stented coronary artery   • Positive cardiac stress test   • Type 2 diabetes mellitus without complication, without long-term current use of insulin (HCC)   • Chronic combined systolic and diastolic congestive heart failure (HCC)   • ICD (implantable cardioverter-defibrillator), dual, in situ   • 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)   • 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)   • Peripheral vascular disease, unspecified (HCC)   • Paroxysmal atrial fibrillation (HCC)   • Unspecified inflammatory spondylopathy, lumbosacral region ([facility])       Active Med List:    Current Outpatient Medications:   •  amLODIPine (NORVASC) 10 MG tablet, TAKE ONE TABLET BY MOUTH EVERY DAY, Disp: 90 tablet, Rfl: 1  •  apixaban (ELIQUIS) 5 MG tablet tablet, Take 1 tablet by mouth 2 (Two) Times a Day., Disp: 180 tablet, Rfl: 3  •  carvedilol (COREG) 6.25 MG tablet, TAKE 1 TABLET BY MOUTH TWICE DAILY, Disp: 180 tablet, Rfl: 3  •  Diclofenac Sodium (VOLTAREN) 1 % gel gel, APPLY TO THE AFFECTED AREA(S) FOUR TIMES DAILY, Disp: 100 g, Rfl: 1  •  ferrous sulfate 325 (65 FE) MG tablet, Take 325 mg by mouth Daily With Breakfast., Disp: , Rfl:   •  fluticasone (FLONASE) 50 MCG/ACT nasal spray, USE 1 SPRAY EACH NOSTIL DAILY, Disp: 48 g, Rfl: 2  •  furosemide (LASIX) 40 MG tablet, Take 1 tablet by mouth 2 (Two) Times a Day., Disp: 60 tablet, Rfl: 2  •  glucose blood test strip, Check BS daily.  DX E11.9, Disp: 100 each, Rfl: 3  •  glucose monitor monitoring kit, 1 each Daily. E11.9, Disp: 1 each, Rfl: 0  •  HYDROcodone-acetaminophen (NORCO) 5-325 MG per tablet, Take 1 tablet by mouth Every 8 (Eight) Hours As Needed., Disp: , Rfl:   •  isosorbide mononitrate (IMDUR) 30 MG 24 hr tablet,  TAKE 1 TABLET BY MOUTH EVERY MORNING, Disp: 90 tablet, Rfl: 1  •  Lancets Thin misc, 1 each Daily. DX E11.9, Disp: 100 each, Rfl: 3  •  lovastatin (MEVACOR) 40 MG tablet, TAKE 1 TABLET BY MOUTH EVERY EVENING, Disp: 90 tablet, Rfl: 3  •  metFORMIN (GLUCOPHAGE) 500 MG tablet, TAKE 1 TABLET BY MOUTH EVERY DAY, Disp: 90 tablet, Rfl: 1  •  montelukast (SINGULAIR) 10 MG tablet, TAKE 1 TABLET BY MOUTH EACH EVENING, Disp: 90 tablet, Rfl: 1  •  nitroglycerin (NITROSTAT) 0.4 MG SL tablet, DISSOLVE 1 UNDER TONGUE  EVERY 5 MINUTES FOR 3 DOSES; IF NO RELIEF GO TO ER, Disp: 25 tablet, Rfl: 0  •  Omega-3 Fatty Acids (FISH OIL PO), Take 8 tablets by mouth Daily., Disp: , Rfl:   •  pantoprazole (PROTONIX) 40 MG EC tablet, TAKE 1 TABLET BY MOUTH 2 (TWO) TIMES A DAY., Disp: 180 tablet, Rfl: 1  •  potassium chloride (K-DUR,KLOR-CON) 10 MEQ CR tablet, TAKE 1 TABLET BY MOUTH DAILY, Disp: 90 tablet, Rfl: 1  •  prochlorperazine (COMPAZINE) 10 MG tablet, TAKE 1 TABLET BY MOUTH EVERY 8 HOURS AS NEEDED, Disp: 24 tablet, Rfl: 0  •  sucralfate (CARAFATE) 1 g tablet, TAKE 1 TABLET BY MOUTH FOUR TIMES DAILY, Disp: 120 tablet, Rfl: 5  •  vitamin B-6 (PYRIDOXINE) 100 MG tablet, Take 100 mg by mouth Daily., Disp: , Rfl:   •  vitamin C (ASCORBIC ACID) 500 MG tablet, Take 500 mg by mouth Daily., Disp: , Rfl:   •  losartan (COZAAR) 25 MG tablet, TAKE 1/2 TABLET BY MOUTH EVERY DAY, Disp: 45 tablet, Rfl: 0     Past Medical History:  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)    • Allergic rhinitis due to pollen    • Anemia, unspecified    • Anxiety disorder, unspecified    • Atherosclerotic heart disease of native coronary artery without angina pectoris    • Benign essential hypertension 08/13/2019   • Bilateral primary osteoarthritis of knee    • Bladder cancer (HCC)    • Cancer (HCC)    • Cardiac dysfunction 08/13/2019    Left ventricle   • Cardiomyopathy (HCC) 08/13/2019   • CHF (congestive heart  failure) ([facility])    • 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  LV systolic function is moderate to severely reduced.  EF 30-35%.  The transmittal spectral doppler flow pattern is suggestive of pseudonormalization.  There is moderate to severe global hypokinesis of the LV.  The left atrium is mildly dilated.  The mitral leaflets appear thickened, but open well.  Mild MR and AR.   • Hx of CABG 08/13/2019 01/01/1991 - left internal mammary graft to the LAD, SVG to OM1, OM2 in the RCA   • Hyperlipidemia 08/13/2019   • Hypo-osmolality and hyponatremia    • Low back pain    • Myocardial infarct ([facility])    • Other long term (current) drug therapy    • Palpitations 08/13/2019   • Personal history of malignant neoplasm of bladder    • Sleep apnea 08/13/2019   • Stented coronary artery 08/13/2019 04/11/2014 - VISION bare metal stent to the proximal stenosis of the vein graft to the right and ISION bare metal stent in the mid to distal 80% stenosis.   • Type 2 diabetes mellitus without complication ([facility])    • Unstable angina ([facility])        Past Surgical History:  Past Surgical History:   Procedure Laterality Date   • AMPUTATION Left     LOWER EXTREMITY BKA   • APPENDECTOMY     • CARDIAC CATHETERIZATION     • CARDIAC CATHETERIZATION N/A 10/13/2020    Procedure: Left Heart Cath;  Surgeon: Kofi Campoverde MD;  Location: [facility] CATH INVASIVE LOCATION;  Service: Cardiology;  Laterality: N/A;   • CARDIAC CATHETERIZATION N/A 10/13/2020    Procedure: Coronary angiography;  Surgeon: Kofi Campoverde MD;  Location: [facility] CATH INVASIVE LOCATION;  Service: Cardiology;  Laterality: N/A;   • CARDIAC CATHETERIZATION N/A 10/13/2020    Procedure: Saphenous Vein Graft;  Surgeon: Nirali  MD Kofi;  Location:  [facility] CATH INVASIVE LOCATION;  Service: Cardiology;  Laterality: N/A;   • CARDIAC ELECTROPHYSIOLOGY PROCEDURE N/A 2022    Procedure: ICD DC new/SJM;  Surgeon: Mamadou Terry MD;  Location:  [facility] CATH INVASIVE LOCATION;  Service: Cardiology;  Laterality: N/A;   • CATARACT EXTRACTION     • CHOLECYSTECTOMY     • CORONARY ARTERY BYPASS GRAFT      ,  4 VESSEL   • INTERNAL CARDIAC DEFIBRILLATOR INSERTION  2022   • OTHER SURGICAL HISTORY      UROSTOMY S/P BLADDER CA       Social History:  Social History     Socioeconomic History   • Marital status:    • Number of children: 2   Tobacco Use   • Smoking status: Former Smoker     Types: Cigarettes     Quit date:      Years since quittin.5   • Smokeless tobacco: Never Used   Substance and Sexual Activity   • Alcohol use: Never   • Drug use: Never   • Sexual activity: Defer       Allergies:  Allergies   Allergen Reactions   • Iodinated Diagnostic Agents Anaphylaxis   • Prednisone Cough       Immunizations:  Immunization History   Administered Date(s) Administered   • COVID-19 (MODERNA) 1st, 2nd, 3rd Dose Only 2021, 2021, 2021, 10/18/2021, 2022   • COVID-19 (UNSPECIFIED) 2021, 2021   • Fluad Quad 65+ 2021   • Fluzone High Dose =>65 Years ([employer] ONLY) 10/03/2016, 2017   • Hepatitis A 2018, 2019   • Influenza, Unspecified 10/01/2020   • Pneumococcal Conjugate 13-Valent (PCV13) 2015   • Pneumococcal Polysaccharide (PPSV23) 10/01/2008   • Shingrix 2019, 2020   • Tdap 2013, 2013   • Zoster, Unspecified 10/01/2006, 2020          Objective:         Review of Systems   Constitutional: Negative for fatigue.   Respiratory: Negative for shortness of breath.    Cardiovascular: Negative for chest pain, palpitations and leg swelling.   All other systems reviewed and are negative.       /70   Pulse 60   Resp 18   Ht 170.2 cm  "(67\")   Wt 65.8 kg (145 lb)   SpO2 96%   BMI 22.71 kg/m²     Constitutional:       General: Not in acute distress.     Appearance: Well-developed.   Eyes:      General: No scleral icterus.     Conjunctiva/sclera: Conjunctivae normal.      Pupils: Pupils are equal, round, and reactive to light.   HENT:      Head: Normocephalic and atraumatic.   Neck:      Thyroid: No thyromegaly.   Pulmonary:      Effort: Pulmonary effort is normal.      Breath sounds: Normal breath sounds.   Cardiovascular:      Normal rate. Regular rhythm.   Abdominal:      General: Bowel sounds are normal.      Palpations: Abdomen is soft.   Musculoskeletal: Normal range of motion.      Cervical back: Normal range of motion. Skin:     General: Skin is warm and dry.   Neurological:      Mental Status: Alert and oriented to person, place, and time.         In-Office Procedure(s):  Procedures  No orders to display      ICD eval interpretred by me  ROSELINE [id number]  Battery SHARIFA    P wave 5mv  Threshold 0.6V  Impedance 534 ohms    R wave 12mv  Threshold 0.6V  Impedance 610 ohms    HV 69 ohms    Events - no AF or VT      ASCVD RIsk Score::  The ASCVD Risk score (Alexsander DC Jr., et al., 2013) failed to calculate for the following reasons:    The 2013 ASCVD risk score is only valid for ages 40 to 79    Recent Radiology:          Lab Review:       Recent labs reviewed and interpreted for clinical significance and application          Assessment:          Diagnosis Plan   1. Paroxysmal atrial fibrillation (HCC)     2. ICD (implantable cardioverter-defibrillator), dual, in situ     3. Hx of CABG     4. Coronary artery disease of native artery of native heart with stable angina pectoris (HCC)     5. Ischemic cardiomyopathy            Plan:      1. CAD with ischemic cardiomyopathy and chronic systolic heart failure - He is on appropriate therapy with Coreg, losartan, Lasix, statin, ASA and Plavix.     2. Persistent AF - doing well in NSR and symptomatically " improved, continue apixaban and B blocker     3. HTN - controlled     34 HLD - statin     5. DM - oral agents     6. ICD followup - normal device function, wound well healed, enrolled in remote monitoring.     RTC 6 months            Level of Care:                 Mamadou Terry MD  06/23/22

## 2022-06-28 RX ORDER — AMLODIPINE BESYLATE 10 MG/1
TABLET ORAL
Qty: 90 TABLET | Refills: 1 | Status: SHIPPED | OUTPATIENT
Start: 2022-06-28 | End: 2022-09-23

## 2022-07-05 ENCOUNTER — TELEPHONE (OUTPATIENT)
Dept: CARDIOLOGY | Facility: CLINIC | Age: 82
End: 2022-07-05

## 2022-07-05 NOTE — TELEPHONE ENCOUNTER
FYI: From 6/8/22-7/5/22, patient appears to have had 60 PMT events.  PVARP: 325ms  PMT detection rate: 110 bpm

## 2022-07-07 RX ORDER — POTASSIUM CHLORIDE 750 MG/1
TABLET, EXTENDED RELEASE ORAL
Qty: 90 TABLET | Refills: 1 | Status: SHIPPED | OUTPATIENT
Start: 2022-07-07 | End: 2023-01-03

## 2022-07-19 RX ORDER — CLOPIDOGREL BISULFATE 75 MG/1
TABLET ORAL
Qty: 90 TABLET | Refills: 3 | Status: SHIPPED | OUTPATIENT
Start: 2022-07-19 | End: 2023-02-01

## 2022-07-21 ENCOUNTER — OFFICE VISIT (OUTPATIENT)
Dept: FAMILY MEDICINE CLINIC | Age: 82
End: 2022-07-21

## 2022-07-21 VITALS
WEIGHT: 146.8 LBS | DIASTOLIC BLOOD PRESSURE: 64 MMHG | BODY MASS INDEX: 23.04 KG/M2 | SYSTOLIC BLOOD PRESSURE: 127 MMHG | HEART RATE: 73 BPM | HEIGHT: 67 IN

## 2022-07-21 DIAGNOSIS — I48.0 PAROXYSMAL ATRIAL FIBRILLATION: ICD-10-CM

## 2022-07-21 DIAGNOSIS — I25.118 CORONARY ARTERY DISEASE OF NATIVE ARTERY OF NATIVE HEART WITH STABLE ANGINA PECTORIS: ICD-10-CM

## 2022-07-21 DIAGNOSIS — I10 PRIMARY HYPERTENSION: ICD-10-CM

## 2022-07-21 DIAGNOSIS — E78.2 MIXED HYPERLIPIDEMIA: ICD-10-CM

## 2022-07-21 DIAGNOSIS — I50.42 CHRONIC COMBINED SYSTOLIC AND DIASTOLIC CONGESTIVE HEART FAILURE: ICD-10-CM

## 2022-07-21 DIAGNOSIS — E11.9 TYPE 2 DIABETES MELLITUS WITHOUT COMPLICATION, WITHOUT LONG-TERM CURRENT USE OF INSULIN: Primary | ICD-10-CM

## 2022-07-21 DIAGNOSIS — L89.152 PRESSURE ULCER OF SACRAL REGION, STAGE 2: ICD-10-CM

## 2022-07-21 PROCEDURE — 99214 OFFICE O/P EST MOD 30 MIN: CPT | Performed by: FAMILY MEDICINE

## 2022-07-21 RX ORDER — FUROSEMIDE 40 MG/1
40 TABLET ORAL 2 TIMES DAILY
Qty: 60 TABLET | Refills: 2 | Status: SHIPPED | OUTPATIENT
Start: 2022-07-21 | End: 2022-10-27

## 2022-07-21 NOTE — ASSESSMENT & PLAN NOTE
He has an irregular rhythm today.  Continues on Eliquis for anticoagulation.  Following with Cardiology.  Labs reviewed and up-to-date.  No refills needed today.

## 2022-07-21 NOTE — PROGRESS NOTES
"Chief Complaint  Congestive Heart Failure (Follow up)    Subjective          Marcello Larson presents to [facility] today for follow-up of chronic issues.    He is on Plavix, Imdur and Ranexa for CAD status post stenting.  He is on furosemide with K+ (has had hypokalemia in the past).  He is on Eliquis for atrial fibrillation.  He is getting that through the patient assistance program.  He is on losartan, amlodipine and Coreg for hypertension.  He is on lovastatin for hyperlipidemia. He is s/p 2 vessel CABG.  He has NTG for prn use but rarely uses them.  He follows with Dr. Terry Cardiology.  He has chronic systolic heart failure and is on a beta-blocker, ACEI, statin and loop diuretic.  Status post ICD placement.  \"I've got more energy than I have in a long time.\"      He is on metformin for diabetes.  He has been well controlled, last A1c 6.9 in May.  He does adhere to a diabetic diet.  He is on a statin and ASA.  He is UTD on eye exam (last done 7/2021); no diabetic retinopathy.  He is on an ARB and statin.      He is on pantoprazole for GERD and h/o bleeding ulcer thought to be NSAID-induced.  He has sucralfate that he for prn use.  Does still take iron supplementation OTC.      He is on Norco for longstanding history chronic low back pain and leg pain through [facility] .  He has used gabapentin in the past but not currently.  S/p physical therapy.  Status post RFA.      He is on montelukast for allergies.     He notes an ulcer over the tailbone for the past 3-4 weeks.  It was slightly worse.  He has been doctoring it with peroxide.  It is slightly better than it was but still painful and open.      Current Outpatient Medications:   •  amLODIPine (NORVASC) 10 MG tablet, TAKE ONE TABLET BY MOUTH EVERY DAY, Disp: 90 tablet, Rfl: 1  •  apixaban (ELIQUIS) 5 MG tablet tablet, Take 1 tablet by mouth 2 (Two) Times a Day., Disp: 180 tablet, Rfl: 3  •  carvedilol (COREG) 6.25 " MG tablet, TAKE 1 TABLET BY MOUTH TWICE DAILY, Disp: 180 tablet, Rfl: 3  •  clopidogrel (PLAVIX) 75 MG tablet, TAKE 1 TABLET DAILY, Disp: 90 tablet, Rfl: 3  •  Diclofenac Sodium (VOLTAREN) 1 % gel gel, Apply  topically to the appropriate area as directed 4 (Four) Times a Day., Disp: 100 g, Rfl: 0  •  ferrous sulfate 325 (65 FE) MG tablet, Take 325 mg by mouth Daily With Breakfast., Disp: , Rfl:   •  fluticasone (FLONASE) 50 MCG/ACT nasal spray, USE 1 SPRAY EACH NOSTIL DAILY, Disp: 48 g, Rfl: 2  •  furosemide (LASIX) 40 MG tablet, Take 1 tablet by mouth 2 (Two) Times a Day., Disp: 60 tablet, Rfl: 2  •  glucose blood test strip, Check BS daily.  DX E11.9, Disp: 100 each, Rfl: 3  •  glucose monitor monitoring kit, 1 each Daily. E11.9, Disp: 1 each, Rfl: 0  •  HYDROcodone-acetaminophen (NORCO) 5-325 MG per tablet, Take 1 tablet by mouth Every 8 (Eight) Hours As Needed., Disp: , Rfl:   •  isosorbide mononitrate (IMDUR) 30 MG 24 hr tablet, TAKE 1 TABLET BY MOUTH EVERY MORNING, Disp: 90 tablet, Rfl: 1  •  Lancets Thin misc, 1 each Daily. DX E11.9, Disp: 100 each, Rfl: 3  •  losartan (COZAAR) 25 MG tablet, TAKE 1/2 TABLET BY MOUTH EVERY DAY, Disp: 45 tablet, Rfl: 0  •  lovastatin (MEVACOR) 40 MG tablet, TAKE 1 TABLET BY MOUTH EVERY EVENING, Disp: 90 tablet, Rfl: 3  •  metFORMIN (GLUCOPHAGE) 500 MG tablet, Take 1 tablet by mouth Daily., Disp: 90 tablet, Rfl: 1  •  montelukast (SINGULAIR) 10 MG tablet, TAKE 1 TABLET BY MOUTH EACH EVENING, Disp: 90 tablet, Rfl: 1  •  nitroglycerin (NITROSTAT) 0.4 MG SL tablet, DISSOLVE 1 UNDER TONGUE  EVERY 5 MINUTES FOR 3 DOSES; IF NO RELIEF GO TO ER, Disp: 25 tablet, Rfl: 0  •  Omega-3 Fatty Acids (FISH OIL PO), Take 8 tablets by mouth Daily., Disp: , Rfl:   •  pantoprazole (PROTONIX) 40 MG EC tablet, TAKE 1 TABLET BY MOUTH 2 (TWO) TIMES A DAY., Disp: 180 tablet, Rfl: 1  •  potassium chloride (K-DUR,KLOR-CON) 10 MEQ CR tablet, TAKE 1 TABLET BY MOUTH DAILY, Disp: 90 tablet, Rfl: 1  •   "prochlorperazine (COMPAZINE) 10 MG tablet, TAKE 1 TABLET BY MOUTH EVERY 8 HOURS AS NEEDED, Disp: 24 tablet, Rfl: 0  •  sucralfate (CARAFATE) 1 g tablet, TAKE 1 TABLET BY MOUTH FOUR TIMES DAILY, Disp: 120 tablet, Rfl: 5  •  vitamin B-6 (PYRIDOXINE) 100 MG tablet, Take 100 mg by mouth Daily., Disp: , Rfl:   •  vitamin C (ASCORBIC ACID) 500 MG tablet, Take 500 mg by mouth Daily., Disp: , Rfl:   •  silver sulfadiazine (Silvadene) 1 % cream, Apply 1 application topically to the appropriate area as directed 2 (Two) Times a Day., Disp: 50 g, Rfl: 0    Allergies:  Iodinated diagnostic agents and Prednisone      Objective   Vital Signs:   Vitals:    07/21/22 1059   BP: 127/64   BP Location: Left arm   Patient Position: Sitting   Pulse: 73   Weight: 66.6 kg (146 lb 12.8 oz)   Height: 170.2 cm (67.01\")       Physical Exam  Vitals reviewed.   Constitutional:       General: He is not in acute distress.     Appearance: Normal appearance. He is well-developed.   HENT:      Head: Normocephalic and atraumatic.      Right Ear: External ear normal.      Left Ear: External ear normal.   Eyes:      Extraocular Movements: Extraocular movements intact.      Conjunctiva/sclera: Conjunctivae normal.      Pupils: Pupils are equal, round, and reactive to light.   Cardiovascular:      Rate and Rhythm: Normal rate and regular rhythm.      Heart sounds: No murmur heard.  Pulmonary:      Effort: Pulmonary effort is normal.      Breath sounds: Normal breath sounds. No wheezing, rhonchi or rales.   Abdominal:      General: Bowel sounds are normal. There is no distension.      Palpations: Abdomen is soft.      Tenderness: There is no abdominal tenderness.   Genitourinary:     Comments: Urostomy bag in place filled with clear urine, stoma appears normal  Musculoskeletal:         General: Normal range of motion.      Comments: +L BKA prosthesis   Skin:     General: Skin is warm and dry.      Comments: Stage 2 sacral pressure ulcer, no drainage, " +erythema   Neurological:      Mental Status: He is alert.   Psychiatric:         Mood and Affect: Affect normal.             Assessment and Plan    Diagnoses and all orders for this visit:    1. Type 2 diabetes mellitus without complication, without long-term current use of insulin (HCC) (Primary)  Assessment & Plan:  He is on metformin for diabetes.  Refills sent.  He has been well controlled, last A1c 6.9 in May.  He does adhere to a diabetic diet.  He is on a statin and ASA.  He is UTD on eye exam (last done 7/2021); no diabetic retinopathy.    Orders:  -     metFORMIN (GLUCOPHAGE) 500 MG tablet; Take 1 tablet by mouth Daily.  Dispense: 90 tablet; Refill: 1    2. Primary hypertension  Assessment & Plan:  Blood pressure at goal.  Continue losartan 25 mg daily, carvedilol 6.25 mg twice daily and amlodipine 10 mg daily.  No refills needed today.  Labs reviewed and up-to-date.  Continue to monitor.  Following with Cardiology.      3. Mixed hyperlipidemia  Assessment & Plan:  Stable on lovastatin 40 mg daily.  No refills needed.  Labs reviewed and up-to-date.      4. Paroxysmal atrial fibrillation (HCC)  Assessment & Plan:  He has an irregular rhythm today.  Continues on Eliquis for anticoagulation.  Following with Cardiology.  Labs reviewed and up-to-date.  No refills needed today.      5. Coronary artery disease of native artery of native heart with stable angina pectoris (HCC)  Assessment & Plan:  Stable on beta-blocker, ARB and statin.  Following with Cardiology      6. Chronic combined systolic and diastolic congestive heart failure (HCC)  Assessment & Plan:  Refill sent on Lasix as below.  Following with Cardiology.  He is status post ICD placement and brings his card in today so that we can scan a copy of that into the record.  Labs reviewed and up-to-date.    Orders:  -     furosemide (LASIX) 40 MG tablet; Take 1 tablet by mouth 2 (Two) Times a Day.  Dispense: 60 tablet; Refill: 2    7. Pressure ulcer of  sacral region, stage 2 (HCC)  Assessment & Plan:  Prescription sent for Silvadene to be used twice daily.  Marcello thinks that the pressure ulcer is likely coming from his exercise bike which has a hard seat.  Otherwise, he is not really sitting down much during the day as he is quite active.  He is going to see about trying to apply some foam to the bicycle seat and hopefully that will offload some of the pressure.  Continue to monitor closely and let me know if things do not improve.    Orders:  -     silver sulfadiazine (Silvadene) 1 % cream; Apply 1 application topically to the appropriate area as directed 2 (Two) Times a Day.  Dispense: 50 g; Refill: 0      Follow Up   Return in about 2 months (around 9/21/2022) for Recheck.  Patient was given instructions and counseling regarding his condition or for health maintenance advice. Please see specific information pulled into the AVS if appropriate.

## 2022-07-21 NOTE — ASSESSMENT & PLAN NOTE
He is on metformin for diabetes.  Refills sent.  He has been well controlled, last A1c 6.9 in May.  He does adhere to a diabetic diet.  He is on a statin and ASA.  He is UTD on eye exam (last done 7/2021); no diabetic retinopathy.

## 2022-07-21 NOTE — ASSESSMENT & PLAN NOTE
Refill sent on LasOsen as below.  Following with Cardiology.  He is status post ICD placement and brings his card in today so that we can scan a copy of that into the record.  Labs reviewed and up-to-date.

## 2022-07-21 NOTE — ASSESSMENT & PLAN NOTE
Prescription sent for Silvadene to be used twice daily.  Marcello thinks that the pressure ulcer is likely coming from his exercise bike which has a hard seat.  Otherwise, he is not really sitting down much during the day as he is quite active.  He is going to see about trying to apply some foam to the bicycle seat and hopefully that will offload some of the pressure.  Continue to monitor closely and let me know if things do not improve.

## 2022-07-21 NOTE — ASSESSMENT & PLAN NOTE
Blood pressure at goal.  Continue losartan 25 mg daily, carvedilol 6.25 mg twice daily and amlodipine 10 mg daily.  No refills needed today.  Labs reviewed and up-to-date.  Continue to monitor.  Following with Cardiology.

## 2022-08-04 RX ORDER — FLUTICASONE PROPIONATE 50 MCG
SPRAY, SUSPENSION (ML) NASAL
Qty: 48 G | Refills: 2 | Status: SHIPPED | OUTPATIENT
Start: 2022-08-04

## 2022-08-12 RX ORDER — ISOSORBIDE MONONITRATE 30 MG/1
TABLET, EXTENDED RELEASE ORAL
Qty: 90 TABLET | Refills: 0 | Status: SHIPPED | OUTPATIENT
Start: 2022-08-12 | End: 2022-09-20 | Stop reason: SDUPTHER

## 2022-09-09 RX ORDER — MONTELUKAST SODIUM 10 MG/1
TABLET ORAL
Qty: 90 TABLET | Refills: 1 | Status: SHIPPED | OUTPATIENT
Start: 2022-09-09 | End: 2023-02-01 | Stop reason: SDUPTHER

## 2022-09-15 PROCEDURE — 93295 DEV INTERROG REMOTE 1/2/MLT: CPT | Performed by: INTERNAL MEDICINE

## 2022-09-15 PROCEDURE — 93296 REM INTERROG EVL PM/IDS: CPT | Performed by: INTERNAL MEDICINE

## 2022-09-20 ENCOUNTER — OFFICE VISIT (OUTPATIENT)
Dept: FAMILY MEDICINE CLINIC | Age: 82
End: 2022-09-20

## 2022-09-20 ENCOUNTER — LAB (OUTPATIENT)
Dept: LAB | Facility: HOSPITAL | Age: 82
End: 2022-09-20

## 2022-09-20 VITALS
BODY MASS INDEX: 22.76 KG/M2 | HEIGHT: 67 IN | SYSTOLIC BLOOD PRESSURE: 124 MMHG | WEIGHT: 145 LBS | TEMPERATURE: 98.2 F | HEART RATE: 59 BPM | DIASTOLIC BLOOD PRESSURE: 65 MMHG

## 2022-09-20 DIAGNOSIS — M46.97 UNSPECIFIED INFLAMMATORY SPONDYLOPATHY, LUMBOSACRAL REGION: ICD-10-CM

## 2022-09-20 DIAGNOSIS — E11.9 TYPE 2 DIABETES MELLITUS WITHOUT COMPLICATION, WITHOUT LONG-TERM CURRENT USE OF INSULIN: ICD-10-CM

## 2022-09-20 DIAGNOSIS — M54.50 ACUTE BILATERAL LOW BACK PAIN WITHOUT SCIATICA: ICD-10-CM

## 2022-09-20 DIAGNOSIS — K29.50 CHRONIC GASTRITIS WITHOUT BLEEDING, UNSPECIFIED GASTRITIS TYPE: ICD-10-CM

## 2022-09-20 DIAGNOSIS — I48.0 PAROXYSMAL ATRIAL FIBRILLATION: ICD-10-CM

## 2022-09-20 DIAGNOSIS — E78.2 MIXED HYPERLIPIDEMIA: ICD-10-CM

## 2022-09-20 DIAGNOSIS — I10 PRIMARY HYPERTENSION: ICD-10-CM

## 2022-09-20 DIAGNOSIS — I25.118 CORONARY ARTERY DISEASE OF NATIVE ARTERY OF NATIVE HEART WITH STABLE ANGINA PECTORIS: ICD-10-CM

## 2022-09-20 DIAGNOSIS — E11.9 TYPE 2 DIABETES MELLITUS WITHOUT COMPLICATION, WITHOUT LONG-TERM CURRENT USE OF INSULIN: Primary | ICD-10-CM

## 2022-09-20 DIAGNOSIS — I50.42 CHRONIC COMBINED SYSTOLIC AND DIASTOLIC CONGESTIVE HEART FAILURE: ICD-10-CM

## 2022-09-20 PROBLEM — L89.152 PRESSURE ULCER OF SACRAL REGION, STAGE 2: Status: RESOLVED | Noted: 2022-07-21 | Resolved: 2022-09-20

## 2022-09-20 LAB
ALBUMIN SERPL-MCNC: 4.3 G/DL (ref 3.5–5.2)
ALBUMIN UR-MCNC: <1.2 MG/DL
ALBUMIN/GLOB SERPL: 1.7 G/DL
ALP SERPL-CCNC: 60 U/L (ref 39–117)
ALT SERPL W P-5'-P-CCNC: 10 U/L (ref 1–41)
ANION GAP SERPL CALCULATED.3IONS-SCNC: 6.2 MMOL/L (ref 5–15)
AST SERPL-CCNC: 16 U/L (ref 1–40)
BACTERIA UR QL AUTO: ABNORMAL /HPF
BILIRUB SERPL-MCNC: 0.7 MG/DL (ref 0–1.2)
BILIRUB UR QL STRIP: NEGATIVE
BUN SERPL-MCNC: 24 MG/DL (ref 8–23)
BUN/CREAT SERPL: 29.3 (ref 7–25)
CALCIUM SPEC-SCNC: 9.1 MG/DL (ref 8.6–10.5)
CHLORIDE SERPL-SCNC: 105 MMOL/L (ref 98–107)
CHOLEST SERPL-MCNC: 117 MG/DL (ref 0–200)
CLARITY UR: CLEAR
CO2 SERPL-SCNC: 24.8 MMOL/L (ref 22–29)
COLOR UR: YELLOW
CREAT SERPL-MCNC: 0.82 MG/DL (ref 0.76–1.27)
CREAT UR-MCNC: 15.2 MG/DL
EGFRCR SERPLBLD CKD-EPI 2021: 87.7 ML/MIN/1.73
GLOBULIN UR ELPH-MCNC: 2.5 GM/DL
GLUCOSE SERPL-MCNC: 128 MG/DL (ref 65–99)
GLUCOSE UR STRIP-MCNC: NEGATIVE MG/DL
HBA1C MFR BLD: 6.1 % (ref 4.8–5.6)
HDLC SERPL-MCNC: 29 MG/DL (ref 40–60)
HGB UR QL STRIP.AUTO: ABNORMAL
KETONES UR QL STRIP: NEGATIVE
LDLC SERPL CALC-MCNC: 68 MG/DL (ref 0–100)
LDLC/HDLC SERPL: 2.3 {RATIO}
LEUKOCYTE ESTERASE UR QL STRIP.AUTO: ABNORMAL
MICROALBUMIN/CREAT UR: NORMAL MG/G{CREAT}
NITRITE UR QL STRIP: NEGATIVE
PH UR STRIP.AUTO: 7 [PH] (ref 5–8)
POTASSIUM SERPL-SCNC: 4 MMOL/L (ref 3.5–5.2)
PROT SERPL-MCNC: 6.8 G/DL (ref 6–8.5)
PROT UR QL STRIP: NEGATIVE
RBC # UR STRIP: ABNORMAL /HPF
REF LAB TEST METHOD: ABNORMAL
SODIUM SERPL-SCNC: 136 MMOL/L (ref 136–145)
SP GR UR STRIP: 1.02 (ref 1–1.03)
SQUAMOUS #/AREA URNS HPF: ABNORMAL /HPF
TRIGL SERPL-MCNC: 107 MG/DL (ref 0–150)
UROBILINOGEN UR QL STRIP: ABNORMAL
VLDLC SERPL-MCNC: 20 MG/DL (ref 5–40)
WBC # UR STRIP: ABNORMAL /HPF

## 2022-09-20 PROCEDURE — 80053 COMPREHEN METABOLIC PANEL: CPT

## 2022-09-20 PROCEDURE — 82570 ASSAY OF URINE CREATININE: CPT

## 2022-09-20 PROCEDURE — 83036 HEMOGLOBIN GLYCOSYLATED A1C: CPT

## 2022-09-20 PROCEDURE — 80061 LIPID PANEL: CPT

## 2022-09-20 PROCEDURE — 36415 COLL VENOUS BLD VENIPUNCTURE: CPT

## 2022-09-20 PROCEDURE — 82043 UR ALBUMIN QUANTITATIVE: CPT

## 2022-09-20 PROCEDURE — 81001 URINALYSIS AUTO W/SCOPE: CPT

## 2022-09-20 PROCEDURE — 99214 OFFICE O/P EST MOD 30 MIN: CPT | Performed by: FAMILY MEDICINE

## 2022-09-20 RX ORDER — ISOSORBIDE MONONITRATE 30 MG/1
30 TABLET, EXTENDED RELEASE ORAL EVERY MORNING
Qty: 90 TABLET | Refills: 0 | Status: SHIPPED | OUTPATIENT
Start: 2022-09-20 | End: 2022-11-29 | Stop reason: SDUPTHER

## 2022-09-20 RX ORDER — PANTOPRAZOLE SODIUM 40 MG/1
40 TABLET, DELAYED RELEASE ORAL 2 TIMES DAILY
Qty: 180 TABLET | Refills: 1 | Status: SHIPPED | OUTPATIENT
Start: 2022-09-20 | End: 2022-11-29 | Stop reason: SDUPTHER

## 2022-09-20 RX ORDER — LOSARTAN POTASSIUM 25 MG/1
12.5 TABLET ORAL DAILY
Qty: 45 TABLET | Refills: 1 | Status: SHIPPED | OUTPATIENT
Start: 2022-09-20 | End: 2022-11-29 | Stop reason: SDUPTHER

## 2022-09-20 RX ORDER — CARVEDILOL 6.25 MG/1
6.25 TABLET ORAL 2 TIMES DAILY
Qty: 180 TABLET | Refills: 1 | Status: SHIPPED | OUTPATIENT
Start: 2022-09-20 | End: 2022-12-21

## 2022-09-20 RX ORDER — NITROGLYCERIN 0.4 MG/1
0.4 TABLET SUBLINGUAL
Qty: 25 TABLET | Refills: 0 | Status: SHIPPED | OUTPATIENT
Start: 2022-09-20

## 2022-09-20 RX ORDER — LOVASTATIN 40 MG/1
40 TABLET ORAL EVERY EVENING
Qty: 90 TABLET | Refills: 1 | Status: SHIPPED | OUTPATIENT
Start: 2022-09-20 | End: 2022-11-29 | Stop reason: SDUPTHER

## 2022-09-20 RX ORDER — PROCHLORPERAZINE MALEATE 10 MG
10 TABLET ORAL EVERY 8 HOURS PRN
Qty: 24 TABLET | Refills: 0 | Status: SHIPPED | OUTPATIENT
Start: 2022-09-20 | End: 2022-11-29 | Stop reason: SDUPTHER

## 2022-09-20 RX ORDER — ERYTHROMYCIN 5 MG/G
OINTMENT OPHTHALMIC AS NEEDED
COMMUNITY
Start: 2022-08-22

## 2022-09-20 NOTE — ASSESSMENT & PLAN NOTE
He is on Eliquis 5 mg twice daily for anticoagulation and carvedilol for rate control.  Checking labs.  Following with Cardiology.
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
## 2022-09-20 NOTE — ASSESSMENT & PLAN NOTE
He is on metformin for DM.  No refills needed today.  He has been well controlled, last A1c 6.9 in May.  He does adhere to a diabetic diet.  He is UTD on eye exam (last done 8/2022); no diabetic retinopathy.  He is on an ARB and statin.  Checking labs.

## 2022-09-20 NOTE — ASSESSMENT & PLAN NOTE
He is feeling much better since his ICD placement.  Following with Cardiology.  He is on Plavix, beta-blocker, ARB, statin.  Also on Imdur and Ranexa.  Refill sent as below.  Checking labs.

## 2022-09-20 NOTE — ASSESSMENT & PLAN NOTE
New recent back pain.  He does follow with Pain Management for his chronic low back pain but this feels different.  He would like to check to see if he has a UTI today.  Orders placed as below.

## 2022-09-20 NOTE — ASSESSMENT & PLAN NOTE
Blood pressure at goal.  Continue amlodipine 10 mg daily, carvedilol 6.25 mg twice daily and losartan 12.5 mg daily.  Refill sent as below.  Checking labs.

## 2022-09-23 RX ORDER — AMLODIPINE BESYLATE 10 MG/1
TABLET ORAL
Qty: 90 TABLET | Refills: 1 | Status: SHIPPED | OUTPATIENT
Start: 2022-09-23 | End: 2022-12-21

## 2022-10-05 PROBLEM — Z79.01 LONG TERM CURRENT USE OF ANTICOAGULANT THERAPY: Status: ACTIVE | Noted: 2022-10-05

## 2022-10-05 NOTE — PROGRESS NOTES
"Chief Complaint  Coronary Artery Disease and Atrial Fibrillation    Subjective    History of Present Illness      I saw Marcello Duran today for cardiovascular care.  He is a very pleasant 82-year-old male who remains active.  He does not report chest pain pressure or tightness.  His respiratory status remained stable.  He is free of orthopnea or PND and no lower extremity edema.  Marcello tolerates his medications well and his blood pressure is controlled.  He does not experience syncope near syncope or palpitation.  ICD interrogation today reveals normal device function 9 years remaining on generator life no significant dysrhythmias.  Lab data from September 2022 reveals triglycerides 107, HDL 29 and LDL 68.    Objective   Vital Signs:   /60   Pulse 58   Ht 170.2 cm (67\")   Wt 65.8 kg (145 lb)   BMI 22.71 kg/m²     Constitutional:       Appearance: Well-developed.   Eyes:      Conjunctiva/sclera: Conjunctivae normal.      Pupils: Pupils are equal, round, and reactive to light.   HENT:      Head: Normocephalic and atraumatic.   Neck:      Thyroid: No thyromegaly.   Pulmonary:      Effort: Pulmonary effort is normal. No respiratory distress.      Breath sounds: Normal breath sounds. No wheezing. No rales.   Cardiovascular:      Normal rate. Regular rhythm.      No gallop. No S3 and S4 gallop. No rub.   Edema:     Peripheral edema absent.   Abdominal:      General: Bowel sounds are normal. There is no distension.      Palpations: Abdomen is soft. There is no abdominal mass.      Tenderness: There is no abdominal tenderness.   Musculoskeletal: Normal range of motion.      Cervical back: Neck supple.      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      Findings: No erythema.   Neurological:      Mental Status: Alert and oriented to person, place, and time.         Result Review :     Common labs    Common Labs 5/17/22 5/17/22 5/24/22 5/24/22 " 9/20/22 9/20/22 9/20/22 9/20/22    [id number]   Glucose  136 (A)     128 (A)    BUN  16     24 (A)    Creatinine  0.84     0.82    Sodium  138     136    Potassium  3.6     4.0    Chloride  103     105    Calcium  9.2     9.1    Albumin  4.60     4.30    Total Bilirubin  0.6     0.7    Alkaline Phosphatase  61     60    AST (SGOT)  18     16    ALT (SGPT)  15     10    WBC 5.60          Hemoglobin 14.1          Hematocrit 41.2          Platelets 151          Total Cholesterol   145     117   Triglycerides   85     107   HDL Cholesterol   28 (A)     29 (A)   LDL Cholesterol    101 (A)     68   Hemoglobin A1C    6.90 (A) 6.10 (A)      Microalbumin, Urine      <1.2     (A) Abnormal value                      Assessment and Plan    1. Coronary artery disease of native artery of native heart with stable angina pectoris (HCC)  Stable free of angina    2. Hx of CABG  Stable    3. Stented coronary artery  Stable    4. Ischemic cardiomyopathy  Compensated    5. ICD (implantable cardioverter-defibrillator), dual, in situ  Normal device function    6. Chronic combined systolic and diastolic congestive heart failure (HCC)  Stable    7. Paroxysmal atrial fibrillation (HCC)  Stable     8. Long term current use of anticoagulant therapy  Well-tolerated    9. Primary hypertension  Controlled    10. Mixed hyperlipidemia  Controlled    11. Type 2 diabetes mellitus without complication, without long-term current use of insulin (HCC)  Stable    12. Obstructive sleep apnea syndrome  Stable    13. 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  He is free of angina and euvolemic.  He will continue his current medical regimen.  I am encouraging maintain his activity level.  I will arrange for follow-up in 6 months sooner if needed.        Follow Up   No follow-ups on file.  Patient was given instructions and counseling regarding his condition or for health maintenance  advice. Please see specific information pulled into the AVS if appropriate.

## 2022-10-06 ENCOUNTER — OFFICE VISIT (OUTPATIENT)
Dept: CARDIOLOGY | Facility: CLINIC | Age: 82
End: 2022-10-06

## 2022-10-06 VITALS
HEART RATE: 58 BPM | DIASTOLIC BLOOD PRESSURE: 60 MMHG | SYSTOLIC BLOOD PRESSURE: 104 MMHG | HEIGHT: 67 IN | WEIGHT: 145 LBS | BODY MASS INDEX: 22.76 KG/M2

## 2022-10-06 DIAGNOSIS — I50.42 CHRONIC COMBINED SYSTOLIC AND DIASTOLIC CONGESTIVE HEART FAILURE: ICD-10-CM

## 2022-10-06 DIAGNOSIS — Z79.01 LONG TERM CURRENT USE OF ANTICOAGULANT THERAPY: ICD-10-CM

## 2022-10-06 DIAGNOSIS — I25.118 CORONARY ARTERY DISEASE OF NATIVE ARTERY OF NATIVE HEART WITH STABLE ANGINA PECTORIS: Primary | ICD-10-CM

## 2022-10-06 DIAGNOSIS — E78.2 MIXED HYPERLIPIDEMIA: ICD-10-CM

## 2022-10-06 DIAGNOSIS — Z95.5 STENTED CORONARY ARTERY: ICD-10-CM

## 2022-10-06 DIAGNOSIS — Z95.810 ICD (IMPLANTABLE CARDIOVERTER-DEFIBRILLATOR), DUAL, IN SITU: ICD-10-CM

## 2022-10-06 DIAGNOSIS — I10 PRIMARY HYPERTENSION: ICD-10-CM

## 2022-10-06 DIAGNOSIS — G47.33 OBSTRUCTIVE SLEEP APNEA SYNDROME: ICD-10-CM

## 2022-10-06 DIAGNOSIS — I48.0 PAROXYSMAL ATRIAL FIBRILLATION: ICD-10-CM

## 2022-10-06 DIAGNOSIS — I25.5 ISCHEMIC CARDIOMYOPATHY: ICD-10-CM

## 2022-10-06 DIAGNOSIS — Z89.512 ACQUIRED ABSENCE OF LEFT LEG BELOW KNEE: ICD-10-CM

## 2022-10-06 DIAGNOSIS — Z95.1 HX OF CABG: ICD-10-CM

## 2022-10-06 DIAGNOSIS — E11.9 TYPE 2 DIABETES MELLITUS WITHOUT COMPLICATION, WITHOUT LONG-TERM CURRENT USE OF INSULIN: ICD-10-CM

## 2022-10-06 PROCEDURE — 99214 OFFICE O/P EST MOD 30 MIN: CPT | Performed by: INTERNAL MEDICINE

## 2022-10-27 DIAGNOSIS — I50.42 CHRONIC COMBINED SYSTOLIC AND DIASTOLIC CONGESTIVE HEART FAILURE: ICD-10-CM

## 2022-10-27 RX ORDER — FUROSEMIDE 40 MG/1
TABLET ORAL
Qty: 60 TABLET | Refills: 2 | Status: SHIPPED | OUTPATIENT
Start: 2022-10-27 | End: 2023-02-01 | Stop reason: SDUPTHER

## 2022-11-29 ENCOUNTER — OFFICE VISIT (OUTPATIENT)
Dept: FAMILY MEDICINE CLINIC | Age: 82
End: 2022-11-29

## 2022-11-29 VITALS
HEART RATE: 58 BPM | DIASTOLIC BLOOD PRESSURE: 52 MMHG | SYSTOLIC BLOOD PRESSURE: 122 MMHG | BODY MASS INDEX: 22.6 KG/M2 | WEIGHT: 144 LBS | HEIGHT: 67 IN

## 2022-11-29 DIAGNOSIS — Z00.00 ANNUAL PHYSICAL EXAM: Primary | ICD-10-CM

## 2022-11-29 DIAGNOSIS — K29.50 CHRONIC GASTRITIS WITHOUT BLEEDING, UNSPECIFIED GASTRITIS TYPE: ICD-10-CM

## 2022-11-29 DIAGNOSIS — I25.118 CORONARY ARTERY DISEASE OF NATIVE ARTERY OF NATIVE HEART WITH STABLE ANGINA PECTORIS: ICD-10-CM

## 2022-11-29 DIAGNOSIS — I10 PRIMARY HYPERTENSION: ICD-10-CM

## 2022-11-29 DIAGNOSIS — E78.2 MIXED HYPERLIPIDEMIA: ICD-10-CM

## 2022-11-29 PROCEDURE — 1170F FXNL STATUS ASSESSED: CPT | Performed by: FAMILY MEDICINE

## 2022-11-29 PROCEDURE — G0439 PPPS, SUBSEQ VISIT: HCPCS | Performed by: FAMILY MEDICINE

## 2022-11-29 PROCEDURE — 99214 OFFICE O/P EST MOD 30 MIN: CPT | Performed by: FAMILY MEDICINE

## 2022-11-29 PROCEDURE — 1159F MED LIST DOCD IN RCRD: CPT | Performed by: FAMILY MEDICINE

## 2022-11-29 RX ORDER — ISOSORBIDE MONONITRATE 30 MG/1
30 TABLET, EXTENDED RELEASE ORAL EVERY MORNING
Qty: 90 TABLET | Refills: 0 | Status: SHIPPED | OUTPATIENT
Start: 2022-11-29 | End: 2023-02-01 | Stop reason: SDUPTHER

## 2022-11-29 RX ORDER — LOVASTATIN 40 MG/1
40 TABLET ORAL EVERY EVENING
Qty: 90 TABLET | Refills: 1 | Status: SHIPPED | OUTPATIENT
Start: 2022-11-29 | End: 2023-03-28 | Stop reason: SDUPTHER

## 2022-11-29 RX ORDER — PROCHLORPERAZINE MALEATE 10 MG
10 TABLET ORAL EVERY 8 HOURS PRN
Qty: 24 TABLET | Refills: 0 | Status: SHIPPED | OUTPATIENT
Start: 2022-11-29

## 2022-11-29 RX ORDER — LOSARTAN POTASSIUM 25 MG/1
12.5 TABLET ORAL DAILY
Qty: 45 TABLET | Refills: 1 | Status: SHIPPED | OUTPATIENT
Start: 2022-11-29 | End: 2023-02-01 | Stop reason: SDUPTHER

## 2022-11-29 RX ORDER — PANTOPRAZOLE SODIUM 40 MG/1
40 TABLET, DELAYED RELEASE ORAL DAILY
Qty: 90 TABLET | Refills: 1 | Status: SHIPPED | OUTPATIENT
Start: 2022-11-29

## 2022-11-29 NOTE — ASSESSMENT & PLAN NOTE
Marcello has been on Protonix twice daily ever since his bleeding ulcer.  He has been doing great and has not required use of sucralfate in quite some time.  I am going to decrease him from Protonix 40 mg twice daily to once daily at this time in keeping with a desire to wean him off the PPI altogether if able.  We will follow-up again in 2 months and see how he is doing with the decrease.  At that time, if he is doing fairly well, we will plan to switch him from Protonix to Pepcid.

## 2022-11-29 NOTE — PROGRESS NOTES
The ABCs of the Annual Wellness Visit  Subsequent Medicare Wellness Visit    Chief Complaint   Patient presents with   • Medicare Wellness-subsequent      Subjective    History of Present Illness:  Marcello Larson is a 82 y.o. male who presents for a Subsequent Medicare Wellness Visit.  He is doing really well!    Colonoscopy is no longer indicated by age and history (last done 8/2019 and this showed diverticulosis).  Prostate cancer screening is no longer indicated by age and history; s/p prostatectomy d/t bladder cancer.  He is UTD on COVID, Pneumovax, (10/2008), Drchdrc13 (9/2015), Zostavax (10/2006), Shingrix (12/2019, 5/2020), Tdap (1/2013), and flu (10/2022).  He is UTD on routine labs including diabetes panel.    He is on Plavix, Imdur and Ranexa for CAD status post stenting.  He is on furosemide with potassium supplementation due to history of hyperkalemia.  He is on Eliquis for anticoagulation of atrial fibrillation (obtains through patient assistance).  He is on amlodipine, carvedilol and losartan for hypertension.  He is on lovastatin for hyperlipidemia. He is s/p 2 vessel CABG.  He has NTG for prn use but rarely uses them.  Following with Dr. [facility].  He has chronic systolic heart failure and is on a beta-blocker, ACEI, statin and loop diuretic.  Status post ICD placement.     He is on metformin for DM.  He has been well controlled, last A1c 6.9 in May.  He does adhere to a diabetic diet.  He is UTD on eye exam (last done 8/2022); no diabetic retinopathy.  He is on an ARB and statin.      He is on Protonix BID for GERD and h/o bleeding ulcer thought to be NSAID-induced.  He has sucralfate for prn use.  Does still take iron supplementation OTC.      He is on Norco for longstanding history chronic low back pain and leg pain through [facility] .  He has used gabapentin in the past but not currently.  S/p physical therapy.  Status post RFA.      He is on montelukast for allergies.      The following portions of the patient's history were reviewed and   updated as appropriate: allergies, current medications, past family history, past medical history, past social history, past surgical history and problem list.    Compared to one year ago, the patient feels his physical   health is better.    Compared to one year ago, the patient feels his mental   health is better.    Recent Hospitalizations:  He was admitted to [facility] during the last year.       Current Medical Providers:  Patient Care Team:  Zeny Corley MD as PCP - General (Family Medicine)  Kofi Campoverde MD as Consulting Physician (Cardiology)    Outpatient Medications Prior to Visit   Medication Sig Dispense Refill   • amLODIPine (NORVASC) 10 MG tablet TAKE ONE TABLET BY MOUTH EVERY DAY 90 tablet 1   • apixaban (ELIQUIS) 5 MG tablet tablet Take 1 tablet by mouth 2 (Two) Times a Day. 180 tablet 3   • carvedilol (COREG) 6.25 MG tablet Take 1 tablet by mouth 2 (Two) Times a Day. 180 tablet 1   • clopidogrel (PLAVIX) 75 MG tablet TAKE 1 TABLET DAILY 90 tablet 3   • Diclofenac Sodium (VOLTAREN) 1 % gel gel Apply  topically to the appropriate area as directed 2 (Two) Times a Day. 100 g 3   • erythromycin (ROMYCIN) 5 MG/GM ophthalmic ointment Administer  into the left eye As Needed.     • ferrous sulfate 325 (65 FE) MG tablet Take 325 mg by mouth Daily With Breakfast.     • fluticasone (FLONASE) 50 MCG/ACT nasal spray USE 1 SPRAY EACH NOSTIL DAILY 48 g 2   • furosemide (LASIX) 40 MG tablet Take 1 tablet BY MOUTH TWICE DAILY 60 tablet 2   • glucose blood test strip Check BS daily.  DX E11.9 100 each 3   • glucose monitor monitoring kit 1 each Daily. E11.9 1 each 0   • HYDROcodone-acetaminophen (NORCO) 5-325 MG per tablet Take 1 tablet by mouth Every 8 (Eight) Hours As Needed.     • Lancets Thin misc 1 each Daily. DX E11.9 100 each 3   • metFORMIN (GLUCOPHAGE) 500 MG tablet Take 1 tablet by mouth Daily. 90 tablet 1   •  montelukast (SINGULAIR) 10 MG tablet TAKE 1 TABLET BY MOUTH EACH EVENING 90 tablet 1   • nitroglycerin (NITROSTAT) 0.4 MG SL tablet Place 1 tablet under the tongue Every 5 (Five) Minutes As Needed for Chest Pain. Take no more than 3 doses in 15 minutes. 25 tablet 0   • Omega-3 Fatty Acids (FISH OIL PO) Take 8 tablets by mouth Daily.     • potassium chloride (K-DUR,KLOR-CON) 10 MEQ CR tablet TAKE 1 TABLET BY MOUTH DAILY 90 tablet 1   • silver sulfadiazine (Silvadene) 1 % cream Apply 1 application topically to the appropriate area as directed 2 (Two) Times a Day. 50 g 0   • sucralfate (CARAFATE) 1 g tablet TAKE 1 TABLET BY MOUTH FOUR TIMES DAILY 120 tablet 5   • vitamin B-6 (PYRIDOXINE) 100 MG tablet Take 100 mg by mouth Daily.     • vitamin C (ASCORBIC ACID) 500 MG tablet Take 500 mg by mouth Daily.     • isosorbide mononitrate (IMDUR) 30 MG 24 hr tablet Take 1 tablet by mouth Every Morning. 90 tablet 0   • losartan (COZAAR) 25 MG tablet Take 0.5 tablets by mouth Daily. 45 tablet 1   • lovastatin (MEVACOR) 40 MG tablet Take 1 tablet by mouth Every Evening. 90 tablet 1   • pantoprazole (PROTONIX) 40 MG EC tablet Take 1 tablet by mouth 2 (Two) Times a Day. 180 tablet 1   • prochlorperazine (COMPAZINE) 10 MG tablet Take 1 tablet by mouth Every 8 (Eight) Hours As Needed for Nausea or Vomiting. 24 tablet 0     No facility-administered medications prior to visit.       Opioid medication/s are on active medication list.  and I have evaluated his active treatment plan and pain score trends (see table).  There were no vitals filed for this visit.  I have reviewed the chart for potential of high risk medication and harmful drug interactions in the elderly.            Aspirin is not on active medication list.  Aspirin use is not indicated based on review of current medical condition/s. Risk of harm outweighs potential benefits.  .    Patient Active Problem List   Diagnosis   • Mixed hyperlipidemia   • Primary hypertension   •  Coronary artery disease of native artery of native heart with stable angina pectoris (HCC)   • Ischemic cardiomyopathy   • Obstructive sleep apnea syndrome   • Palpitations   • Hx of CABG   • Stented coronary artery   • Positive cardiac stress test   • Type 2 diabetes mellitus without complication, without long-term current use of insulin (HCC)   • Annual physical exam   • Chronic combined systolic and diastolic congestive heart failure (HCC)   • ICD (implantable cardioverter-defibrillator), dual, in situ   • 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  ACP discussion was held with the patient during this visit. Patient has an advance directive in EMR which is still valid.     Review of Systems   Constitutional: Negative for chills, fatigue and fever.   HENT: Negative for congestion, hearing loss and rhinorrhea.    Eyes: Negative for pain and visual disturbance.   Respiratory: Negative for cough and shortness of breath.    Cardiovascular: Negative for chest pain and palpitations.   Gastrointestinal: Negative for abdominal pain, constipation, diarrhea, nausea and vomiting.   Genitourinary: Negative for difficulty urinating and dysuria.   Musculoskeletal: Positive for back pain. Negative for arthralgias and myalgias.   Neurological: Negative for weakness and numbness.   Psychiatric/Behavioral: Negative for dysphoric mood and sleep disturbance. The patient is not nervous/anxious.         Objective    Vitals:    11/29/22 0923   BP: 122/52   BP Location: Left arm   Patient Position: Sitting   Pulse: 58  "  Weight: 65.3 kg (144 lb)   Height: 170.2 cm (67.01\")     Estimated body mass index is 22.55 kg/m² as calculated from the following:    Height as of this encounter: 170.2 cm (67.01\").    Weight as of this encounter: 65.3 kg (144 lb).    BMI is within normal parameters. No other follow-up for BMI required.      Does the patient have evidence of cognitive impairment? No    Physical Exam  Vitals reviewed.   Constitutional:       General: He is not in acute distress.     Appearance: Normal appearance. He is well-developed.   HENT:      Head: Normocephalic and atraumatic.      Right Ear: External ear normal.      Left Ear: External ear normal.      Mouth/Throat:      Mouth: Mucous membranes are moist.   Eyes:      Extraocular Movements: Extraocular movements intact.      Conjunctiva/sclera: Conjunctivae normal.      Pupils: Pupils are equal, round, and reactive to light.   Cardiovascular:      Rate and Rhythm: Normal rate. Rhythm irregularly irregular.      Heart sounds: No murmur heard.  Pulmonary:      Effort: Pulmonary effort is normal.      Breath sounds: Normal breath sounds. No wheezing, rhonchi or rales.   Abdominal:      General: Bowel sounds are normal. There is no distension.      Palpations: Abdomen is soft.      Tenderness: There is no abdominal tenderness.   Genitourinary:     Comments: Urostomy bag in place filled with clear urine, stoma appears normal and pink  Musculoskeletal:         General: Normal range of motion.      Comments: +L BKA prosthesis   Skin:     General: Skin is warm and dry.   Neurological:      Mental Status: He is alert and oriented to person, place, and time.      Deep Tendon Reflexes: Reflexes normal.   Psychiatric:         Mood and Affect: Mood and affect normal.         Behavior: Behavior normal.         Thought Content: Thought content normal.         Judgment: Judgment normal.       Lab Results   Component Value Date    TRIG 107 09/20/2022    HDL 29 (L) 09/20/2022    LDL 68 " 2022    VLDL 20 2022    HGBA1C 6.10 (H) 2022            HEALTH RISK ASSESSMENT    Smoking Status:  Social History     Tobacco Use   Smoking Status Former   • Types: Cigarettes   • Quit date:    • Years since quittin.9   Smokeless Tobacco Never     Alcohol Consumption:  Social History     Substance and Sexual Activity   Alcohol Use Never     Fall Risk Screen:    CLARA Fall Risk Assessment was completed, and patient is at LOW risk for falls.Assessment completed on:2022    Depression Screening:  PHQ-2/PHQ-9 Depression Screening 2022   Retired PHQ-9 Total Score -   Retired Total Score -   Little Interest or Pleasure in Doing Things 0-->not at all   Feeling Down, Depressed or Hopeless 0-->not at all   PHQ-9: Brief Depression Severity Measure Score 0       Health Habits and Functional and Cognitive Screening:  Functional & Cognitive Status 2022   Do you have difficulty preparing food and eating? No   Do you have difficulty bathing yourself, getting dressed or grooming yourself? No   Do you have difficulty using the toilet? No   Do you have difficulty moving around from place to place? No   Do you have trouble with steps or getting out of a bed or a chair? No   Current Diet Well Balanced Diet   Dental Exam Up to date   Eye Exam Up to date   Exercise (times per week) 3 times per week   Current Exercises Include Other   Do you need help using the phone?  No   Are you deaf or do you have serious difficulty hearing?  Yes   Do you need help with transportation? No   Do you need help shopping? No   Do you need help preparing meals?  No   Do you need help with housework?  No   Do you need help with laundry? No   Do you need help taking your medications? No   Do you need help managing money? No   Do you ever drive or ride in a car without wearing a seat belt? No   Have you felt unusual stress, anger or loneliness in the last month? No   Who do you live with? Alone   If you need help,  do you have trouble finding someone available to you? No   Have you been bothered in the last four weeks by sexual problems? No   Do you have difficulty concentrating, remembering or making decisions? No       Age-appropriate Screening Schedule:  Refer to the list below for future screening recommendations based on patient's age, sex and/or medical conditions. Orders for these recommended tests are listed in the plan section. The patient has been provided with a written plan.    Health Maintenance   Topic Date Due   • TDAP/TD VACCINES (3 - Td or Tdap) 02/04/2023   • HEMOGLOBIN A1C  03/20/2023   • DIABETIC EYE EXAM  08/15/2023   • LIPID PANEL  09/20/2023   • URINE MICROALBUMIN  09/20/2023   • INFLUENZA VACCINE  Completed   • ZOSTER VACCINE  Completed              Assessment & Plan   CMS Preventative Services Quick Reference  Risk Factors Identified During Encounter  Cardiovascular Disease  The above risks/problems have been discussed with the patient.  Follow up actions/plans if indicated are seen below in the Assessment/Plan Section.  Pertinent information has been shared with the patient in the After Visit Summary.    Diagnoses and all orders for this visit:    1. Annual physical exam (Primary)  Assessment & Plan:  Colonoscopy is no longer indicated by age and history (last done 8/2019 and this showed diverticulosis).  Prostate cancer screening is no longer indicated by age and history; s/p prostatectomy d/t bladder cancer.  He is UTD on COVID, Pneumovax, (10/2008), Hfmxtqu15 (9/2015), Zostavax (10/2006), Shingrix (12/2019, 5/2020), Tdap (1/2013), and flu (10/2022).  He is UTD on routine labs including diabetes panel; reviewed.      2. Chronic gastritis without bleeding, unspecified gastritis type  Assessment & Plan:  Marcello has been on Protonix twice daily ever since his bleeding ulcer.  He has been doing great and has not required use of sucralfate in quite some time.  I am going to decrease him from Protonix 40  mg twice daily to once daily at this time in keeping with a desire to wean him off the PPI altogether if able.  We will follow-up again in 2 months and see how he is doing with the decrease.  At that time, if he is doing fairly well, we will plan to switch him from Protonix to Pepcid.    Orders:  -     pantoprazole (PROTONIX) 40 MG EC tablet; Take 1 tablet by mouth Daily.  Dispense: 90 tablet; Refill: 1  -     prochlorperazine (COMPAZINE) 10 MG tablet; Take 1 tablet by mouth Every 8 (Eight) Hours As Needed for Nausea or Vomiting.  Dispense: 24 tablet; Refill: 0    3. Primary hypertension  Assessment & Plan:  Blood pressure at goal.  Continue amlodipine, losartan, carvedilol.  Refills sent.  Labs reviewed and up-to-date.    Orders:  -     losartan (COZAAR) 25 MG tablet; Take 0.5 tablets by mouth Daily.  Dispense: 45 tablet; Refill: 1    4. Mixed hyperlipidemia  Assessment & Plan:  Stable on lovastatin 40 mg daily.  Labs reviewed and up-to-date.  Refills sent.    Orders:  -     lovastatin (MEVACOR) 40 MG tablet; Take 1 tablet by mouth Every Evening.  Dispense: 90 tablet; Refill: 1    5. Coronary artery disease of native artery of native heart with stable angina pectoris (HCC)  Overview:  Follows with Dr. [facility].  He is on Plavix as well as Eliquis for the combination of CAD, atrial fibrillation and systolic heart failure.  Status post ICD placement.  He is on statin, beta-blocker and ARB.      Assessment & Plan:  Refill sent.  Labs are reviewed and up-to-date.    Orders:  -     isosorbide mononitrate (IMDUR) 30 MG 24 hr tablet; Take 1 tablet by mouth Every Morning.  Dispense: 90 tablet; Refill: 0        Follow Up:   Return in about 2 months (around 1/29/2023) for Recheck.     An After Visit Summary and PPPS were made available to the patient.

## 2022-11-29 NOTE — ASSESSMENT & PLAN NOTE
Colonoscopy is no longer indicated by age and history (last done 8/2019 and this showed diverticulosis).  Prostate cancer screening is no longer indicated by age and history; s/p prostatectomy d/t bladder cancer.  He is UTD on COVID, Pneumovax, (10/2008), Cckqibs33 (9/2015), Zostavax (10/2006), Shingrix (12/2019, 5/2020), Tdap (1/2013), and flu (10/2022).  He is UTD on routine labs including diabetes panel; reviewed.

## 2022-11-29 NOTE — ASSESSMENT & PLAN NOTE
Blood pressure at goal.  Continue amlodipine, losartan, carvedilol.  Refills sent.  Labs reviewed and up-to-date.

## 2022-12-15 PROCEDURE — 93295 DEV INTERROG REMOTE 1/2/MLT: CPT | Performed by: INTERNAL MEDICINE

## 2022-12-15 PROCEDURE — 93296 REM INTERROG EVL PM/IDS: CPT | Performed by: INTERNAL MEDICINE

## 2022-12-21 ENCOUNTER — OFFICE VISIT (OUTPATIENT)
Dept: CARDIOLOGY | Facility: CLINIC | Age: 82
End: 2022-12-21

## 2022-12-21 VITALS
SYSTOLIC BLOOD PRESSURE: 160 MMHG | HEART RATE: 74 BPM | OXYGEN SATURATION: 98 % | HEIGHT: 67 IN | WEIGHT: 144.6 LBS | BODY MASS INDEX: 22.7 KG/M2 | DIASTOLIC BLOOD PRESSURE: 84 MMHG

## 2022-12-21 DIAGNOSIS — I48.21 PERMANENT ATRIAL FIBRILLATION: ICD-10-CM

## 2022-12-21 DIAGNOSIS — I50.20 HFREF (HEART FAILURE WITH REDUCED EJECTION FRACTION): Primary | ICD-10-CM

## 2022-12-21 DIAGNOSIS — I10 PRIMARY HYPERTENSION: ICD-10-CM

## 2022-12-21 DIAGNOSIS — I25.810 CORONARY ARTERY DISEASE INVOLVING CORONARY BYPASS GRAFT OF NATIVE HEART WITHOUT ANGINA PECTORIS: ICD-10-CM

## 2022-12-21 PROCEDURE — 93000 ELECTROCARDIOGRAM COMPLETE: CPT | Performed by: STUDENT IN AN ORGANIZED HEALTH CARE EDUCATION/TRAINING PROGRAM

## 2022-12-21 PROCEDURE — 99214 OFFICE O/P EST MOD 30 MIN: CPT | Performed by: STUDENT IN AN ORGANIZED HEALTH CARE EDUCATION/TRAINING PROGRAM

## 2022-12-21 RX ORDER — AMLODIPINE BESYLATE 5 MG/1
5 TABLET ORAL DAILY
Qty: 30 TABLET | Refills: 11 | Status: SHIPPED | OUTPATIENT
Start: 2022-12-21 | End: 2023-03-28

## 2022-12-21 RX ORDER — CARVEDILOL 12.5 MG/1
12.5 TABLET ORAL 2 TIMES DAILY
Qty: 180 TABLET | Refills: 3 | Status: SHIPPED | OUTPATIENT
Start: 2022-12-21

## 2022-12-21 NOTE — PROGRESS NOTES
Subjective:     Encounter Date:12/21/2022      Patient ID: Marcello Larson is a 82 y.o. male.    Chief Complaint:  Management of HFrEF, CAD    HPI:   82 y.o. male with CAD status post CABG, HFrEF secondary to ischemic cardiomyopathy (EF 30 to 35%) status post ICD, permanent atrial fibrillation on Eliquis, hypertension, hyperlipidemia who presents for follow-up and management of his chronic conditions.  Patient has been in his usual state of health, feels well.  He denies any dyspnea on exertion, chest pain, palpitations, lower extremity edema, orthopnea, PND.  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 He is adherent to his current medication regimen.     Echo 2/22/2022 with an EF 30 to 35%, grade 3 diastolic dysfunction, moderately dilated LA, mildly dilated RA.    The following portions of the patient's history were reviewed and updated as appropriate: allergies, current medications, past family history, past medical history, past social history, past surgical history and problem list.     REVIEW OF SYSTEMS:   All systems reviewed.  Pertinent positives identified in HPI.  All other systems are negative.    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)    • Allergic rhinitis due to pollen    • Anemia, unspecified    • Anxiety disorder, unspecified    • Atherosclerotic heart disease of native coronary artery without angina pectoris    • Benign essential hypertension 08/13/2019   • Bilateral primary osteoarthritis of knee    • Bladder cancer ([facility])    • Cancer (HCC)    • Cardiac dysfunction 08/13/2019    Left ventricle   • Cardiomyopathy (HCC) 08/13/2019   • CHF (congestive heart failure) ([facility])    • 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  LV systolic function is moderate to severely reduced.  EF 30-35%.  The transmittal spectral doppler flow pattern is suggestive of pseudonormalization.  There is moderate to severe global hypokinesis of the LV.  The left atrium is mildly dilated.  The mitral leaflets appear thickened, but open well.  Mild MR and AR.   • Hx of CABG 2019 - left internal mammary graft to the LAD, SVG to OM1, OM2 in the RCA   • Hyperlipidemia 2019   • Hypo-osmolality and hyponatremia    • Low back pain    • Myocardial infarct (HCC)    • Other long term (current) drug therapy    • Palpitations 2019   • Personal history of malignant neoplasm of bladder    • Sleep apnea 2019   • Stented coronary artery 2019 - VISION bare metal stent to the proximal stenosis of the vein graft to the right and ISION bare metal stent in the mid to distal 80% stenosis.   • Type 2 diabetes mellitus without complication (HCC)    • Unstable angina (HCC)        Family History   Problem Relation Age of Onset   • No Known Problems Mother    • No Known Problems Father        Social History     Socioeconomic History   • Marital status:    • Number of children: 2   Tobacco Use   • Smoking status: Former     Types: Cigarettes     Quit date:      Years since quittin.9   • Smokeless tobacco: Never   Substance and Sexual Activity   • Alcohol use: Never   • Drug use: Never   • Sexual activity: Defer       Allergies   Allergen Reactions   • Iodinated Diagnostic Agents Anaphylaxis   • Lisinopril Other (See Comments)   • Prednisone Cough       Past Surgical History:   Procedure Laterality Date   • AMPUTATION Left     LOWER EXTREMITY BKA   • APPENDECTOMY     • CARDIAC CATHETERIZATION     • CARDIAC CATHETERIZATION N/A 10/13/2020    Procedure: Left Heart Cath;  Surgeon: Kofi Campoverde MD;  Location: [facility] CATH INVASIVE LOCATION;   Service: Cardiology;  Laterality: N/A;   • CARDIAC CATHETERIZATION N/A 10/13/2020    Procedure: Coronary angiography;  Surgeon: Kofi Campoverde MD;  Location:  [facility] CATH INVASIVE LOCATION;  Service: Cardiology;  Laterality: N/A;   • CARDIAC CATHETERIZATION N/A 10/13/2020    Procedure: Saphenous Vein Graft;  Surgeon: Kofi Campoverde MD;  Location:  [facility] CATH INVASIVE LOCATION;  Service: Cardiology;  Laterality: N/A;   • CARDIAC ELECTROPHYSIOLOGY PROCEDURE N/A 04/04/2022    Procedure: ICD DC new/SJM;  Surgeon: Mamadou Terry MD;  Location:  [facility] CATH INVASIVE LOCATION;  Service: Cardiology;  Laterality: N/A;   • CATARACT EXTRACTION     • CHOLECYSTECTOMY     • CORONARY ARTERY BYPASS GRAFT      1984, 1991 4 VESSEL   • INTERNAL CARDIAC DEFIBRILLATOR INSERTION  04/2022   • OTHER SURGICAL HISTORY      UROSTOMY S/P BLADDER CA         ECG 12 Lead    Date/Time: 12/21/2022 11:23 AM  Performed by: Andrew Castañeda MD  Authorized by: Andrew Castañeda MD   Comparison: compared with previous ECG from 6/8/2022  Similar to previous ECG  Rhythm: atrial fibrillation and paced  Other findings: left ventricular hypertrophy    Clinical impression: abnormal EKG               Objective:         Vitals:    12/21/22 1043   BP: 160/84   Pulse: 74   SpO2: 98%       PHYSICAL EXAM:  GEN: well appearing, in NAD   HEENT: NCAT, EOMI, moist mucus membranes   Respiratory: CTAB, no wheezes, rales or rhonchi  CV: normal rate, regular rhythm, normal S1, S2, no murmurs, rubs, gallops, +2 radial pulses b/l  GI: soft, nontender, nondistended  MSK: no edema  Skin: no rash, warm, dry  Heme/Lymph: no bruising or bleeding  Psych: organized thought, normal behavior and affect  Neuro: Alert and Oriented x 3, grossly normal motor function        Assessment:         (I50.20) HFrEF (heart failure with reduced ejection fraction) ([facility])    (I25.810) Coronary artery disease involving coronary bypass graft of native heart without angina pectoris    (I48.21) Permanent  atrial fibrillation (HCC)    (I10) Primary hypertension    82 year old man with CAD status post CABG, HFrEF secondary to ischemic cardiomyopathy (EF 30 to 35%) status post ICD, permanent atrial fibrillation on Eliquis, hypertension, hyperlipidemia who presents for follow-up and management of his chronic conditions.      Plan:       #HFrEF  Well compensated.  Will uptitrate GDMT.  NYHA class I  - Increase Coreg to 12.5 mg twice daily, decrease amlodipine to 5 mg daily.  - He is to check his blood pressure 2-3 times a week and call me in a month with his log.  If blood pressures are stable then at that time we will discontinue amlodipine and increase Coreg/losartan  - Continue losartan 25 mg daily for now  - Continue Lasix 40 mg twice daily, potassium 40 meq daily.  Will reevaluate need for twice daily diuretic at next visit to simplify patient's medication regimen.    #CAD status post CABG  Patient without any symptoms of angina.  - Continue Plavix 75 mg daily, Imdur 30 mg daily  - Patient currently without angina and unclear as to when the Imdur was started.  As above, in order to simplify patient's medication regimen may trial him off Imdur and uptitrate GDMT for his HFrEF and reassess.  -Continue lovastatin 40 mg daily    #Permanent atrial ablation  [id number]  - Increase Coreg as above  - Continue Eliquis 5 mg twice daily    Dr Corley, thank you very much for referring this kind patient to me. Please call me with any questions or concerns. I will see the patient again in the office in 3 months.         Andrew Castañeda MD  12/21/22  [facility]    Outpatient Encounter Medications as of 12/21/2022   Medication Sig Dispense Refill   • apixaban (ELIQUIS) 5 MG tablet tablet Take 1 tablet by mouth 2 (Two) Times a Day. 180 tablet 3   • clopidogrel (PLAVIX) 75 MG tablet TAKE 1 TABLET DAILY 90 tablet 3   • Diclofenac Sodium (VOLTAREN) 1 % gel gel Apply  topically to the appropriate area as directed 2 (Two)  Times a Day. 100 g 3   • erythromycin (ROMYCIN) 5 MG/GM ophthalmic ointment Administer  into the left eye As Needed.     • ferrous sulfate 325 (65 FE) MG tablet Take 325 mg by mouth Daily With Breakfast.     • fluticasone (FLONASE) 50 MCG/ACT nasal spray USE 1 SPRAY EACH NOSTIL DAILY 48 g 2   • furosemide (LASIX) 40 MG tablet Take 1 tablet BY MOUTH TWICE DAILY 60 tablet 2   • glucose blood test strip Check BS daily.  DX E11.9 100 each 3   • glucose monitor monitoring kit 1 each Daily. E11.9 1 each 0   • HYDROcodone-acetaminophen (NORCO) 5-325 MG per tablet Take 1 tablet by mouth Every 8 (Eight) Hours As Needed.     • isosorbide mononitrate (IMDUR) 30 MG 24 hr tablet Take 1 tablet by mouth Every Morning. 90 tablet 0   • Lancets Thin misc 1 each Daily. DX E11.9 100 each 3   • losartan (COZAAR) 25 MG tablet Take 0.5 tablets by mouth Daily. 45 tablet 1   • lovastatin (MEVACOR) 40 MG tablet Take 1 tablet by mouth Every Evening. 90 tablet 1   • metFORMIN (GLUCOPHAGE) 500 MG tablet Take 1 tablet by mouth Daily. 90 tablet 1   • montelukast (SINGULAIR) 10 MG tablet TAKE 1 TABLET BY MOUTH EACH EVENING 90 tablet 1   • nitroglycerin (NITROSTAT) 0.4 MG SL tablet Place 1 tablet under the tongue Every 5 (Five) Minutes As Needed for Chest Pain. Take no more than 3 doses in 15 minutes. 25 tablet 0   • Omega-3 Fatty Acids (FISH OIL PO) Take 8 tablets by mouth Daily.     • pantoprazole (PROTONIX) 40 MG EC tablet Take 1 tablet by mouth Daily. 90 tablet 1   • potassium chloride (K-DUR,KLOR-CON) 10 MEQ CR tablet TAKE 1 TABLET BY MOUTH DAILY 90 tablet 1   • prochlorperazine (COMPAZINE) 10 MG tablet Take 1 tablet by mouth Every 8 (Eight) Hours As Needed for Nausea or Vomiting. 24 tablet 0   • silver sulfadiazine (Silvadene) 1 % cream Apply 1 application topically to the appropriate area as directed 2 (Two) Times a Day. 50 g 0   • sucralfate (CARAFATE) 1 g tablet TAKE 1 TABLET BY MOUTH FOUR TIMES DAILY 120 tablet 5   • vitamin B-6  (PYRIDOXINE) 100 MG tablet Take 100 mg by mouth Daily.     • vitamin C (ASCORBIC ACID) 500 MG tablet Take 500 mg by mouth Daily.     • [DISCONTINUED] amLODIPine (NORVASC) 10 MG tablet TAKE ONE TABLET BY MOUTH EVERY DAY 90 tablet 1   • [DISCONTINUED] carvedilol (COREG) 6.25 MG tablet Take 1 tablet by mouth 2 (Two) Times a Day. 180 tablet 1   • amLODIPine (NORVASC) 5 MG tablet Take 1 tablet by mouth Daily. 30 tablet 11   • carvedilol (COREG) 12.5 MG tablet Take 1 tablet by mouth 2 (Two) Times a Day. 180 tablet 3     No facility-administered encounter medications on file as of 12/21/2022.

## 2022-12-27 RX ORDER — SUCRALFATE 1 G/1
TABLET ORAL
Qty: 120 TABLET | Refills: 5 | Status: SHIPPED | OUTPATIENT
Start: 2022-12-27

## 2023-01-03 RX ORDER — POTASSIUM CHLORIDE 750 MG/1
TABLET, EXTENDED RELEASE ORAL
Qty: 90 TABLET | Refills: 1 | Status: SHIPPED | OUTPATIENT
Start: 2023-01-03

## 2023-01-13 DIAGNOSIS — E11.9 TYPE 2 DIABETES MELLITUS WITHOUT COMPLICATION, WITHOUT LONG-TERM CURRENT USE OF INSULIN: ICD-10-CM

## 2023-01-27 DIAGNOSIS — M46.97 UNSPECIFIED INFLAMMATORY SPONDYLOPATHY, LUMBOSACRAL REGION: ICD-10-CM

## 2023-02-01 ENCOUNTER — OFFICE VISIT (OUTPATIENT)
Dept: FAMILY MEDICINE CLINIC | Age: 83
End: 2023-02-01
Payer: MEDICARE

## 2023-02-01 VITALS
HEART RATE: 52 BPM | DIASTOLIC BLOOD PRESSURE: 61 MMHG | WEIGHT: 147.4 LBS | TEMPERATURE: 97.8 F | BODY MASS INDEX: 23.13 KG/M2 | HEIGHT: 67 IN | SYSTOLIC BLOOD PRESSURE: 125 MMHG

## 2023-02-01 DIAGNOSIS — I10 PRIMARY HYPERTENSION: ICD-10-CM

## 2023-02-01 DIAGNOSIS — I48.0 PAROXYSMAL ATRIAL FIBRILLATION: ICD-10-CM

## 2023-02-01 DIAGNOSIS — I25.118 CORONARY ARTERY DISEASE OF NATIVE ARTERY OF NATIVE HEART WITH STABLE ANGINA PECTORIS: ICD-10-CM

## 2023-02-01 DIAGNOSIS — J30.1 SEASONAL ALLERGIC RHINITIS DUE TO POLLEN: ICD-10-CM

## 2023-02-01 DIAGNOSIS — Z93.6 OTHER ARTIFICIAL OPENINGS OF URINARY TRACT STATUS: ICD-10-CM

## 2023-02-01 DIAGNOSIS — E11.59 TYPE 2 DIABETES MELLITUS WITH OTHER CIRCULATORY COMPLICATIONS: Primary | ICD-10-CM

## 2023-02-01 DIAGNOSIS — Z89.512 ACQUIRED ABSENCE OF LEFT LEG BELOW KNEE: ICD-10-CM

## 2023-02-01 DIAGNOSIS — M46.97 UNSPECIFIED INFLAMMATORY SPONDYLOPATHY, LUMBOSACRAL REGION: ICD-10-CM

## 2023-02-01 DIAGNOSIS — I73.9 PERIPHERAL VASCULAR DISEASE, UNSPECIFIED: ICD-10-CM

## 2023-02-01 DIAGNOSIS — I50.42 CHRONIC COMBINED SYSTOLIC AND DIASTOLIC CONGESTIVE HEART FAILURE: ICD-10-CM

## 2023-02-01 PROBLEM — E11.9 TYPE 2 DIABETES MELLITUS WITHOUT COMPLICATION, WITHOUT LONG-TERM CURRENT USE OF INSULIN: Status: RESOLVED | Noted: 2021-11-23 | Resolved: 2023-02-01

## 2023-02-01 PROBLEM — Z00.00 ANNUAL PHYSICAL EXAM: Status: RESOLVED | Noted: 2021-11-23 | Resolved: 2023-02-01

## 2023-02-01 PROCEDURE — 99214 OFFICE O/P EST MOD 30 MIN: CPT | Performed by: FAMILY MEDICINE

## 2023-02-01 RX ORDER — MONTELUKAST SODIUM 10 MG/1
10 TABLET ORAL NIGHTLY
Qty: 90 TABLET | Refills: 1 | Status: SHIPPED | OUTPATIENT
Start: 2023-02-01

## 2023-02-01 RX ORDER — LOSARTAN POTASSIUM 25 MG/1
12.5 TABLET ORAL DAILY
Qty: 45 TABLET | Refills: 1 | Status: SHIPPED | OUTPATIENT
Start: 2023-02-01 | End: 2023-03-22 | Stop reason: SDUPTHER

## 2023-02-01 RX ORDER — ISOSORBIDE MONONITRATE 30 MG/1
30 TABLET, EXTENDED RELEASE ORAL EVERY MORNING
Qty: 90 TABLET | Refills: 0 | Status: SHIPPED | OUTPATIENT
Start: 2023-02-01

## 2023-02-01 RX ORDER — FUROSEMIDE 40 MG/1
40 TABLET ORAL 2 TIMES DAILY
Qty: 60 TABLET | Refills: 2 | Status: SHIPPED | OUTPATIENT
Start: 2023-02-01

## 2023-02-01 NOTE — ASSESSMENT & PLAN NOTE
Stable on losartan 2.5 mg daily and carvedilol 12.5 mg twice daily.  Status post ICD placement about a year ago.  Also on Eliquis 5 mg twice daily.

## 2023-02-01 NOTE — PROGRESS NOTES
Chief Complaint  Hypertension (Follow up)    Subjective          Marcello Larson presents to [facility] today for routine follow-up on chronic issues.     He is on Imdur and Ranexa for CAD status post stenting and 2v. CABG.  Dr. Castañeda discontinued his ASA and Plavix at last visit.  He is on furosemide with potassium supplementation due to history of hyperkalemia.  He is on Eliquis for anticoagulation of atrial fibrillation (obtains through patient assistance).  He is on amlodipine, carvedilol and losartan for HTN.  He is on lovastatin for HLD.  He has NTG for prn use but rarely uses them.  Following with Dr. Castañeda Cardiology.  He has chronic systolic heart failure and is on a beta-blocker, ACEI, statin and loop diuretic.  Status post ICD placement about a year ago.  He is going back to the clinic next month to have that checked.  He feels quite good!      He is on metformin for diabetes.  He has been well controlled, last A1c 6.1 in Sept.  He does adhere to a diabetic diet.  He is UTD on eye exam (last done 8/2022); no diabetic retinopathy.  He is on an ARB and statin.      He is on pantoprazole for GERD and h/o bleeding ulcer thought to be NSAID-induced.  He has sucralfate for prn use.  Does still take iron supplementation OTC.      He is on Norco for longstanding history chronic low back pain and leg pain through [facility].  He has used gabapentin in the past but not currently.  S/p physical therapy.  Status post RFA.      He is on montelukast for allergies.       Current Outpatient Medications:   •  amLODIPine (NORVASC) 5 MG tablet, Take 1 tablet by mouth Daily., Disp: 30 tablet, Rfl: 11  •  apixaban (ELIQUIS) 5 MG tablet tablet, Take 1 tablet by mouth 2 (Two) Times a Day., Disp: 180 tablet, Rfl: 3  •  carvedilol (COREG) 12.5 MG tablet, Take 1 tablet by mouth 2 (Two) Times a Day., Disp: 180 tablet, Rfl: 3  •  Diclofenac Sodium (VOLTAREN) 1 % gel gel, APPLY TO THE  AFFECTED AREA(S) TWICE DAILY, Disp: 100 g, Rfl: 3  •  erythromycin (ROMYCIN) 5 MG/GM ophthalmic ointment, Administer  into the left eye As Needed., Disp: , Rfl:   •  ferrous sulfate 325 (65 FE) MG tablet, Take 325 mg by mouth Daily With Breakfast., Disp: , Rfl:   •  fluticasone (FLONASE) 50 MCG/ACT nasal spray, USE 1 SPRAY EACH NOSTIL DAILY, Disp: 48 g, Rfl: 2  •  furosemide (LASIX) 40 MG tablet, Take 1 tablet by mouth 2 (Two) Times a Day., Disp: 60 tablet, Rfl: 2  •  glucose blood test strip, Check BS daily.  DX E11.9, Disp: 100 each, Rfl: 3  •  glucose monitor monitoring kit, 1 each Daily. E11.9, Disp: 1 each, Rfl: 0  •  HYDROcodone-acetaminophen (NORCO) 5-325 MG per tablet, Take 1 tablet by mouth Every 8 (Eight) Hours As Needed., Disp: , Rfl:   •  isosorbide mononitrate (IMDUR) 30 MG 24 hr tablet, Take 1 tablet by mouth Every Morning., Disp: 90 tablet, Rfl: 0  •  Lancets Thin misc, 1 each Daily. DX E11.9, Disp: 100 each, Rfl: 3  •  losartan (COZAAR) 25 MG tablet, Take 0.5 tablets by mouth Daily., Disp: 45 tablet, Rfl: 1  •  lovastatin (MEVACOR) 40 MG tablet, Take 1 tablet by mouth Every Evening., Disp: 90 tablet, Rfl: 1  •  metFORMIN (GLUCOPHAGE) 500 MG tablet, Take 1 tablet BY MOUTH EVERY DAY, Disp: 90 tablet, Rfl: 1  •  montelukast (SINGULAIR) 10 MG tablet, Take 1 tablet by mouth Every Night., Disp: 90 tablet, Rfl: 1  •  nitroglycerin (NITROSTAT) 0.4 MG SL tablet, Place 1 tablet under the tongue Every 5 (Five) Minutes As Needed for Chest Pain. Take no more than 3 doses in 15 minutes., Disp: 25 tablet, Rfl: 0  •  Omega-3 Fatty Acids (FISH OIL PO), Take 8 tablets by mouth Daily., Disp: , Rfl:   •  pantoprazole (PROTONIX) 40 MG EC tablet, Take 1 tablet by mouth Daily., Disp: 90 tablet, Rfl: 1  •  potassium chloride (K-DUR,KLOR-CON) 10 MEQ CR tablet, TAKE 1 TABLET BY MOUTH DAILY, Disp: 90 tablet, Rfl: 1  •  prochlorperazine (COMPAZINE) 10 MG tablet, Take 1 tablet by mouth Every 8 (Eight) Hours As Needed for Nausea  "or Vomiting., Disp: 24 tablet, Rfl: 0  •  silver sulfadiazine (Silvadene) 1 % cream, Apply 1 application topically to the appropriate area as directed 2 (Two) Times a Day., Disp: 50 g, Rfl: 0  •  sucralfate (CARAFATE) 1 g tablet, TAKE 1 TABLET BY MOUTH FOUR TIMES DAILY, Disp: 120 tablet, Rfl: 5  •  vitamin B-6 (PYRIDOXINE) 100 MG tablet, Take 100 mg by mouth Daily., Disp: , Rfl:   •  vitamin C (ASCORBIC ACID) 500 MG tablet, Take 500 mg by mouth Daily., Disp: , Rfl:     Allergies:  Iodinated contrast media, Lisinopril, and Prednisone      Objective   Vital Signs:   Vitals:    02/01/23 0813   BP: 125/61   BP Location: Right arm   Patient Position: Sitting   Cuff Size: Adult   Pulse: 52   Temp: 97.8 °F (36.6 °C)   TempSrc: Oral   Weight: 66.9 kg (147 lb 6.4 oz)   Height: 170.2 cm (67.01\")       Physical Exam  Vitals reviewed.   Constitutional:       General: He is not in acute distress.     Appearance: Normal appearance. He is well-developed.   HENT:      Head: Normocephalic and atraumatic.      Right Ear: External ear normal.      Left Ear: External ear normal.   Eyes:      Extraocular Movements: Extraocular movements intact.      Conjunctiva/sclera: Conjunctivae normal.      Pupils: Pupils are equal, round, and reactive to light.   Cardiovascular:      Rate and Rhythm: Normal rate and regular rhythm.      Heart sounds: No murmur heard.  Pulmonary:      Effort: Pulmonary effort is normal.      Breath sounds: Normal breath sounds. No wheezing, rhonchi or rales.   Abdominal:      General: Bowel sounds are normal. There is no distension.      Palpations: Abdomen is soft.      Tenderness: There is no abdominal tenderness.   Genitourinary:     Comments: Urostomy bag in place filled with clear urine, stoma appears normal  Musculoskeletal:         General: Normal range of motion.      Comments: +L BKA prosthesis   Skin:     General: Skin is warm and dry.   Neurological:      Mental Status: He is alert.   Psychiatric:         " Mood and Affect: Affect normal.             Assessment and Plan    Diagnoses and all orders for this visit:    1. Type 2 diabetes mellitus with other circulatory complications (HCC) (Primary)  Assessment & Plan:  He is on metformin for diabetes.  No refills needed.  He has been well controlled, last A1c 6.1 in Sept.  He does adhere to a diabetic diet.  He is UTD on eye exam (last done 8/2022); no diabetic retinopathy.  He is on an ARB and statin.  Labs reviewed and up-to-date.      2. Primary hypertension  Assessment & Plan:  Blood pressure at goal.  Stable on amlodipine 5 mg daily, losartan 12.5 mg daily and carvedilol 12.5 mg twice daily.  Refill sent on losartan as below.  Labs are reviewed and up-to-date.    Orders:  -     losartan (COZAAR) 25 MG tablet; Take 0.5 tablets by mouth Daily.  Dispense: 45 tablet; Refill: 1    3. Paroxysmal atrial fibrillation (HCC)  Assessment & Plan:  Stable on carvedilol 12.5 mg twice daily for rate control and Eliquis 5 mg twice daily for anticoagulation.  No bleeding complaints.  No refills needed.  Labs are reviewed and up-to-date.  Following with Cardiology.      4. Coronary artery disease of native artery of native heart with stable angina pectoris (HCC)  Overview:  Follows with Dr. Castañeda Cardiology.  He is on Eliquis for the combination of CAD, atrial fibrillation and systolic heart failure.  No longer on aspirin/Plavix per Cardiology (Marcello reports was discontinued at last visit).  Status post ICD placement.  He is on statin, beta-blocker and ARB.      Orders:  -     isosorbide mononitrate (IMDUR) 30 MG 24 hr tablet; Take 1 tablet by mouth Every Morning.  Dispense: 90 tablet; Refill: 0    5. Chronic combined systolic and diastolic congestive heart failure (HCC)  Assessment & Plan:  Stable on losartan 2.5 mg daily and carvedilol 12.5 mg twice daily.  Status post ICD placement about a year ago.  Also on Eliquis 5 mg twice daily.    Orders:  -     furosemide (LASIX) 40 MG  tablet; Take 1 tablet by mouth 2 (Two) Times a Day.  Dispense: 60 tablet; Refill: 2    6. Peripheral vascular disease, unspecified (HCC)  Overview:  Status post left BKA.    Assessment & Plan:  Stable on statin and DOAC.  No refills needed.  Labs reviewed and up-to-date.      7. Seasonal allergic rhinitis due to pollen  -     montelukast (SINGULAIR) 10 MG tablet; Take 1 tablet by mouth Every Night.  Dispense: 90 tablet; Refill: 1    8. 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      9. 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  Functioning well.      10. Unspecified inflammatory spondylopathy, lumbosacral region ([facility])  Overview:  Following with Dr. Charlette Rascon at [facility].  On hydrocodone.  They are monitoring.        Follow Up   Return in about 2 months (around 4/1/2023) for Recheck.  Patient was given instructions and counseling regarding his condition or for health maintenance advice. Please see specific information pulled into the AVS if appropriate.

## 2023-02-01 NOTE — ASSESSMENT & PLAN NOTE
He is on metformin for diabetes.  No refills needed.  He has been well controlled, last A1c 6.1 in Sept.  He does adhere to a diabetic diet.  He is UTD on eye exam (last done 8/2022); no diabetic retinopathy.  He is on an ARB and statin.  Labs reviewed and up-to-date.

## 2023-02-01 NOTE — ASSESSMENT & PLAN NOTE
Stable on carvedilol 12.5 mg twice daily for rate control and Eliquis 5 mg twice daily for anticoagulation.  No bleeding complaints.  No refills needed.  Labs are reviewed and up-to-date.  Following with Cardiology.

## 2023-02-01 NOTE — ASSESSMENT & PLAN NOTE
Blood pressure at goal.  Stable on amlodipine 5 mg daily, losartan 12.5 mg daily and carvedilol 12.5 mg twice daily.  Refill sent on losartan as below.  Labs are reviewed and up-to-date.

## 2023-02-20 ENCOUNTER — OFFICE VISIT (OUTPATIENT)
Dept: FAMILY MEDICINE CLINIC | Age: 83
End: 2023-02-20
Payer: MEDICARE

## 2023-02-20 VITALS
WEIGHT: 150.2 LBS | BODY MASS INDEX: 23.57 KG/M2 | HEART RATE: 62 BPM | OXYGEN SATURATION: 96 % | DIASTOLIC BLOOD PRESSURE: 70 MMHG | HEIGHT: 67 IN | TEMPERATURE: 97.7 F | SYSTOLIC BLOOD PRESSURE: 148 MMHG

## 2023-02-20 DIAGNOSIS — N30.01 ACUTE CYSTITIS WITH HEMATURIA: Primary | ICD-10-CM

## 2023-02-20 DIAGNOSIS — R53.83 OTHER FATIGUE: ICD-10-CM

## 2023-02-20 DIAGNOSIS — I10 PRIMARY HYPERTENSION: ICD-10-CM

## 2023-02-20 DIAGNOSIS — R31.9 HEMATURIA, UNSPECIFIED TYPE: ICD-10-CM

## 2023-02-20 LAB
BILIRUB BLD-MCNC: NEGATIVE MG/DL
CLARITY, POC: CLEAR
COLOR UR: YELLOW
EXPIRATION DATE: ABNORMAL
GLUCOSE UR STRIP-MCNC: NEGATIVE MG/DL
KETONES UR QL: NEGATIVE
LEUKOCYTE EST, POC: ABNORMAL
Lab: ABNORMAL
NITRITE UR-MCNC: POSITIVE MG/ML
PH UR: 7 [PH] (ref 5–8)
PROT UR STRIP-MCNC: NEGATIVE MG/DL
RBC # UR STRIP: ABNORMAL /UL
SP GR UR: 1.01 (ref 1–1.03)
UROBILINOGEN UR QL: NORMAL

## 2023-02-20 PROCEDURE — 99214 OFFICE O/P EST MOD 30 MIN: CPT | Performed by: NURSE PRACTITIONER

## 2023-02-20 PROCEDURE — 81003 URINALYSIS AUTO W/O SCOPE: CPT | Performed by: NURSE PRACTITIONER

## 2023-02-20 PROCEDURE — 87086 URINE CULTURE/COLONY COUNT: CPT | Performed by: NURSE PRACTITIONER

## 2023-02-20 RX ORDER — CIPROFLOXACIN 500 MG/1
500 TABLET, FILM COATED ORAL 2 TIMES DAILY
Qty: 10 TABLET | Refills: 0 | Status: SHIPPED | OUTPATIENT
Start: 2023-02-20 | End: 2023-02-25

## 2023-02-20 NOTE — ASSESSMENT & PLAN NOTE
Hypertension is elevated due to current illness.  Continue current treatment regimen.  Dietary sodium restriction.  Regular aerobic exercise.  Continue current medications.  Ambulatory blood pressure monitoring.  Blood pressure will be reassessed in 4 weeks.

## 2023-02-20 NOTE — PROGRESS NOTES
"Chief Complaint  Hypertension (Sx started friday) and Fatigue    Subjective        Marcello Larson presents to [facility]  Hypertension  This is a recurrent problem. The current episode started 1 to 4 weeks ago. The problem is controlled. Associated symptoms include malaise/fatigue and shortness of breath. Pertinent negatives include no blurred vision, chest pain, headaches or peripheral edema. Risk factors for coronary artery disease include male gender, family history and diabetes mellitus. Past treatments include diuretics, beta blockers and angiotensin blockers. Current antihypertension treatment includes angiotensin blockers, diuretics and beta blockers. The current treatment provides mild improvement.   Fatigue  This is a new problem. The current episode started 1 to 4 weeks ago. The problem occurs intermittently. The problem has been gradually worsening. Associated symptoms include fatigue. Pertinent negatives include no chest pain, chills, fever or headaches. Nothing aggravates the symptoms. He has tried rest for the symptoms. The treatment provided no relief.       Objective   Vital Signs:  /70 (BP Location: Right arm, Patient Position: Sitting, Cuff Size: Adult)   Pulse 62   Temp 97.7 °F (36.5 °C) (Oral)   Ht 170.2 cm (67.01\")   Wt 68.1 kg (150 lb 3.2 oz)   SpO2 96% Comment: room air  BMI 23.52 kg/m²   Estimated body mass index is 23.52 kg/m² as calculated from the following:    Height as of this encounter: 170.2 cm (67.01\").    Weight as of this encounter: 68.1 kg (150 lb 3.2 oz).       BMI is within normal parameters. No other follow-up for BMI required.      Physical Exam  HENT:      Head: Normocephalic.   Cardiovascular:      Rate and Rhythm: Normal rate.   Pulmonary:      Effort: Pulmonary effort is normal. No respiratory distress.      Breath sounds: Normal breath sounds. No stridor. No wheezing, rhonchi or rales.   Musculoskeletal:      Right lower leg: " No edema.      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   Skin:     General: Skin is warm and dry.   Neurological:      Mental Status: He is alert and oriented to person, place, and time.   Psychiatric:         Mood and Affect: Mood normal.        Result Review :          Results for orders placed or performed in visit on 02/20/23   POCT urinalysis dipstick, automated    Specimen: Urine   Result Value Ref Range    Color Yellow Yellow, Straw, Dark Yellow, [place]    Clarity, UA Clear Clear    Specific Gravity  1.015 1.005 - 1.030    pH, Urine 7.0 5.0 - 8.0    Leukocytes Small (1+) (A) Negative    Nitrite, UA Positive (A) Negative    Protein, POC Negative Negative mg/dL    Glucose, UA Negative Negative mg/dL    Ketones, UA Negative Negative    Urobilinogen, UA Normal Normal, 0.2 E.U./dL    Bilirubin Negative Negative    Blood, UA Large (A) Negative    Lot Number [id number]     Expiration Date 08-30-23               Assessment and Plan   Diagnoses and all orders for this visit:    1. Acute cystitis with hematuria (Primary)  -     ciprofloxacin (Cipro) 500 MG tablet; Take 1 tablet by mouth 2 (Two) Times a Day for 5 days.  Dispense: 10 tablet; Refill: 0    2. Other fatigue  -     POCT urinalysis dipstick, automated    3. Hematuria, unspecified type  -     Urine Culture - Urine, Urine, Clean Catch; Future  -     Urine Culture - Urine, Urine, Clean Catch    4. Primary hypertension  Assessment & Plan:  Hypertension is elevated due to current illness.  Continue current treatment regimen.  Dietary sodium restriction.  Regular aerobic exercise.  Continue current medications.  Ambulatory blood pressure monitoring.  Blood pressure will be reassessed in 4 weeks.             Follow Up   Return in about 1 month (around 3/20/2023), or if symptoms worsen or fail to improve, for Next scheduled follow up .  Patient was given instructions and counseling regarding his condition or for health maintenance advice. Please see  specific information pulled into the AVS if appropriate.

## 2023-02-22 LAB — BACTERIA SPEC AEROBE CULT: NORMAL

## 2023-03-22 ENCOUNTER — CLINICAL SUPPORT NO REQUIREMENTS (OUTPATIENT)
Dept: CARDIOLOGY | Facility: CLINIC | Age: 83
End: 2023-03-22
Payer: MEDICARE

## 2023-03-22 ENCOUNTER — OFFICE VISIT (OUTPATIENT)
Dept: CARDIOLOGY | Facility: CLINIC | Age: 83
End: 2023-03-22
Payer: MEDICARE

## 2023-03-22 VITALS
BODY MASS INDEX: 22.76 KG/M2 | SYSTOLIC BLOOD PRESSURE: 142 MMHG | OXYGEN SATURATION: 97 % | HEIGHT: 67 IN | HEART RATE: 60 BPM | DIASTOLIC BLOOD PRESSURE: 66 MMHG | WEIGHT: 145 LBS

## 2023-03-22 DIAGNOSIS — I25.810 CORONARY ARTERY DISEASE INVOLVING CORONARY BYPASS GRAFT OF NATIVE HEART WITHOUT ANGINA PECTORIS: ICD-10-CM

## 2023-03-22 DIAGNOSIS — I25.5 ISCHEMIC CARDIOMYOPATHY: Primary | ICD-10-CM

## 2023-03-22 DIAGNOSIS — I48.21 PERMANENT ATRIAL FIBRILLATION: ICD-10-CM

## 2023-03-22 DIAGNOSIS — I50.20 HFREF (HEART FAILURE WITH REDUCED EJECTION FRACTION): Primary | ICD-10-CM

## 2023-03-22 DIAGNOSIS — I10 PRIMARY HYPERTENSION: ICD-10-CM

## 2023-03-22 PROCEDURE — 99214 OFFICE O/P EST MOD 30 MIN: CPT | Performed by: STUDENT IN AN ORGANIZED HEALTH CARE EDUCATION/TRAINING PROGRAM

## 2023-03-22 PROCEDURE — 93283 PRGRMG EVAL IMPLANTABLE DFB: CPT | Performed by: INTERNAL MEDICINE

## 2023-03-22 PROCEDURE — 3077F SYST BP >= 140 MM HG: CPT | Performed by: STUDENT IN AN ORGANIZED HEALTH CARE EDUCATION/TRAINING PROGRAM

## 2023-03-22 PROCEDURE — 3078F DIAST BP <80 MM HG: CPT | Performed by: STUDENT IN AN ORGANIZED HEALTH CARE EDUCATION/TRAINING PROGRAM

## 2023-03-22 RX ORDER — CLOPIDOGREL BISULFATE 75 MG/1
75 TABLET ORAL DAILY
Qty: 90 TABLET | Refills: 3 | Status: SHIPPED | OUTPATIENT
Start: 2023-03-22

## 2023-03-22 RX ORDER — LOSARTAN POTASSIUM 50 MG/1
50 TABLET ORAL DAILY
Qty: 90 TABLET | Refills: 3 | Status: SHIPPED | OUTPATIENT
Start: 2023-03-22

## 2023-03-22 NOTE — PROGRESS NOTES
Subjective:     Encounter Date:12/21/2022      Patient ID: Marcello Larson is a 82 y.o. male.    Chief Complaint:  Management of HFrEF, CAD    HPI:   82 y.o. male with CAD status post CABG, HFrEF secondary to ischemic cardiomyopathy (EF 30 to 35%) status post ICD, permanent atrial fibrillation on Eliquis, hypertension, hyperlipidemia who presents for follow-up and management of his chronic conditions.  Patient was last seen on 12/21/2022 and felt well and was well compensated at that time.  It appears that he discontinued his clopidogrel at that time and upon further questioning he notes to Dr. Terry had discontinued it as he was on Eliquis.  At last visit, we also decrease his amlodipine and increase his carvedilol which she has tolerated well.  He brought in his blood pressure log today and his blood pressures have ranged 120s-130s/50s-60s with pulse in the 50s to 60s.  He currently has no complaints.     Echo 2/22/2022 with an EF 30 to 35%, grade 3 diastolic dysfunction, moderately dilated LA, mildly dilated RA.    The following portions of the patient's history were reviewed and updated as appropriate: allergies, current medications, past family history, past medical history, past social history, past surgical history and problem list.     REVIEW OF SYSTEMS:   All systems reviewed.  Pertinent positives identified in HPI.  All other systems are negative.    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  LV systolic function is moderate to severely reduced.  EF 30-35%.  The transmittal spectral doppler flow pattern is suggestive of pseudonormalization.  There is moderate to severe global hypokinesis of the LV.  The left atrium is mildly dilated.  The mitral leaflets appear thickened, but open well.  Mild MR and AR.   • Hx of CABG 2019 - left internal mammary graft to the LAD, SVG to OM1, OM2 in the RCA   • Hyperlipidemia 2019   • Hypo-osmolality and hyponatremia    • Low back pain    • Myocardial infarct (HCC)    • Other long term (current) drug therapy    • Palpitations 2019   • Personal history of malignant neoplasm of bladder    • Sleep apnea 2019   • Stented coronary artery 2019 - VISION bare metal stent to the proximal stenosis of the vein graft to the right and ISION bare metal stent in the mid to distal 80% stenosis.   • Type 2 diabetes mellitus without complication (HCC)    • Unstable angina (HCC)        Family History   Problem Relation Age of Onset   • No Known Problems Mother    • No Known Problems Father        Social History     Socioeconomic History   • Marital status:    • Number of children: 2   Tobacco Use   • Smoking status: Former     Types: Cigarettes     Quit date:      Years since quittin.2   • Smokeless tobacco: Never   Vaping Use   • Vaping Use: Never used   Substance and Sexual Activity   • Alcohol use: Never   • Drug use: Never   • Sexual activity: Defer       Allergies   Allergen Reactions   • Iodinated Contrast Media Anaphylaxis   • Lisinopril Other (See Comments)   • Prednisone Cough       Past Surgical History:   Procedure  Laterality Date   • AMPUTATION Left     LOWER EXTREMITY BKA   • APPENDECTOMY     • CARDIAC CATHETERIZATION     • CARDIAC CATHETERIZATION N/A 10/13/2020    Procedure: Left Heart Cath;  Surgeon: Kofi Campoverde MD;  Location:  [facility];  Service: Cardiology;  Laterality: N/A;   • CARDIAC CATHETERIZATION N/A 10/13/2020    Procedure: Coronary angiography;  Surgeon: Kofi Campoverde MD;  Location:  [facility];  Service: Cardiology;  Laterality: N/A;   • CARDIAC CATHETERIZATION N/A 10/13/2020    Procedure: Saphenous Vein Graft;  Surgeon: Kofi Campoverde MD;  Location:  [facility];  Service: Cardiology;  Laterality: N/A;   • CARDIAC ELECTROPHYSIOLOGY PROCEDURE N/A 04/04/2022    Procedure: ICD DC new/SJM;  Surgeon: Mamadou Terry MD;  Location:  [facility];  Service: Cardiology;  Laterality: N/A;   • CATARACT EXTRACTION     • CHOLECYSTECTOMY     • CORONARY ARTERY BYPASS GRAFT      1984, 1991 4 VESSEL   • INTERNAL CARDIAC DEFIBRILLATOR INSERTION  04/2022   • OTHER SURGICAL HISTORY      UROSTOMY S/P BLADDER CA       Procedures       Objective:         Vitals:    03/22/23 1103   BP: 142/66   Pulse: 60   SpO2: 97%       PHYSICAL EXAM:  GEN: well appearing, in NAD   HEENT: NCAT, EOMI, moist mucus membranes   Respiratory: CTAB, no wheezes, rales or rhonchi  CV: normal rate, regular rhythm, normal S1, S2, no murmurs, rubs, gallops, +2 radial pulses b/l  GI: soft, nontender, nondistended  MSK: Left leg BKA  Skin: no rash, warm, dry  Heme/Lymph: no bruising or bleeding  Psych: organized thought, normal behavior and affect  Neuro: Alert and Oriented x 3, grossly normal motor function        Assessment:         (I50.20) HFrEF (heart failure with reduced ejection fraction) ([facility])    (I10) Primary hypertension - Plan: losartan (COZAAR) 50 MG tablet    (I25.810) Coronary artery disease involving coronary bypass graft of native heart without angina  pectoris    (I48.21) Permanent atrial fibrillation (HCC)    82 year old man with CAD status post CABG, HFrEF secondary to ischemic cardiomyopathy (EF 30 to 35%) status post ICD, permanent atrial fibrillation on Eliquis, hypertension, hyperlipidemia who presents for follow-up and management of his chronic conditions.      Plan:       #HFrEF  Well compensated.  Will uptitrate GDMT.  NYHA class I. Echo 2/22/2022 with EF 30 to 35%, grade 3 diastolic dysfunction, moderately dilated LA, mildly dilated RA.  - continue Coreg to 12.5 mg twice daily  - discontinue amlodipine  - increase losartan to 50mg  - Continue Lasix 40 mg twice daily, potassium 40 meq daily.  Will reevaluate need for twice daily diuretic at next visit to simplify patient's medication regimen.    #CAD status post CABG  Patient without any symptoms of angina.  - restart plavix 75 mg daily, continue Imdur 30 mg daily  - Patient currently without angina and unclear as to when the Imdur was started.  As above, in order to simplify patient's medication regimen may trial him off Imdur and uptitrate GDMT for his HFrEF and reassess.  Will follow-up in 1 month  - Continue lovastatin 40 mg daily    #Permanent atrial fibrillation  [id number]  -Continue Coreg as above, Eliquis 5 mg twice daily    Dr Corley, thank you very much for referring this kind patient to me. Please call me with any questions or concerns. I will see the patient again in the office in 1 month.         Andrew Castañeda MD  03/22/23  [facility]    Outpatient Encounter Medications as of 3/22/2023   Medication Sig Dispense Refill   • amLODIPine (NORVASC) 5 MG tablet Take 1 tablet by mouth Daily. 30 tablet 11   • apixaban (ELIQUIS) 5 MG tablet tablet Take 1 tablet by mouth 2 (Two) Times a Day. 180 tablet 3   • carvedilol (COREG) 12.5 MG tablet Take 1 tablet by mouth 2 (Two) Times a Day. 180 tablet 3   • Diclofenac Sodium (VOLTAREN) 1 % gel gel APPLY TO THE AFFECTED AREA(S) TWICE DAILY  100 g 3   • erythromycin (ROMYCIN) 5 MG/GM ophthalmic ointment Administer  into the left eye As Needed.     • ferrous sulfate 325 (65 FE) MG tablet Take 1 tablet by mouth Daily With Breakfast.     • fluticasone (FLONASE) 50 MCG/ACT nasal spray USE 1 SPRAY EACH NOSTIL DAILY 48 g 2   • furosemide (LASIX) 40 MG tablet Take 1 tablet by mouth 2 (Two) Times a Day. 60 tablet 2   • glucose blood test strip Check BS daily.  DX E11.9 100 each 3   • glucose monitor monitoring kit 1 each Daily. E11.9 1 each 0   • HYDROcodone-acetaminophen (NORCO) 5-325 MG per tablet Take 1 tablet by mouth Every 8 (Eight) Hours As Needed.     • isosorbide mononitrate (IMDUR) 30 MG 24 hr tablet Take 1 tablet by mouth Every Morning. 90 tablet 0   • Lancets Thin misc 1 each Daily. DX E11.9 100 each 3   • losartan (COZAAR) 50 MG tablet Take 1 tablet by mouth Daily. 90 tablet 3   • lovastatin (MEVACOR) 40 MG tablet Take 1 tablet by mouth Every Evening. 90 tablet 1   • metFORMIN (GLUCOPHAGE) 500 MG tablet Take 1 tablet BY MOUTH EVERY DAY 90 tablet 1   • montelukast (SINGULAIR) 10 MG tablet Take 1 tablet by mouth Every Night. 90 tablet 1   • nitroglycerin (NITROSTAT) 0.4 MG SL tablet Place 1 tablet under the tongue Every 5 (Five) Minutes As Needed for Chest Pain. Take no more than 3 doses in 15 minutes. 25 tablet 0   • Omega-3 Fatty Acids (FISH OIL PO) Take 8 tablets by mouth Daily.     • pantoprazole (PROTONIX) 40 MG EC tablet Take 1 tablet by mouth Daily. 90 tablet 1   • potassium chloride (K-DUR,KLOR-CON) 10 MEQ CR tablet TAKE 1 TABLET BY MOUTH DAILY 90 tablet 1   • prochlorperazine (COMPAZINE) 10 MG tablet Take 1 tablet by mouth Every 8 (Eight) Hours As Needed for Nausea or Vomiting. 24 tablet 0   • silver sulfadiazine (Silvadene) 1 % cream Apply 1 application topically to the appropriate area as directed 2 (Two) Times a Day. 50 g 0   • sucralfate (CARAFATE) 1 g tablet TAKE 1 TABLET BY MOUTH FOUR TIMES DAILY 120 tablet 5   • vitamin B-6  (PYRIDOXINE) 100 MG tablet Take 1 tablet by mouth Daily.     • vitamin C (ASCORBIC ACID) 500 MG tablet Take 1 tablet by mouth Daily.     • [DISCONTINUED] losartan (COZAAR) 25 MG tablet Take 0.5 tablets by mouth Daily. 45 tablet 1   • clopidogrel (PLAVIX) 75 MG tablet Take 1 tablet by mouth Daily. 90 tablet 3     No facility-administered encounter medications on file as of 3/22/2023.

## 2023-03-23 ENCOUNTER — TELEPHONE (OUTPATIENT)
Dept: CARDIOLOGY | Facility: CLINIC | Age: 83
End: 2023-03-23

## 2023-03-23 NOTE — TELEPHONE ENCOUNTER
Pt with [facility] ICD who previously followed with Dr. Terry and recently established with Dr. Castañeda, we saw him in device clinic to establish follow up 3/22/23. His device has been assigned to be followed by Dr. Neumann.    Pt AP 0%  31% lead trends and testing WNL. Pt has been in AF since Oct' 22, burden is 100% and is on apixaban. Rates well controlled per histograms.     We are getting daily remote transmissions d/t his AF exceeding burden. Since he is % can we turn on AF alerts off his remote transmissions to reduce reports? We will still be notified of any HVR or other events and cont to receive standard Q3 month reports.

## 2023-03-24 DIAGNOSIS — I10 PRIMARY HYPERTENSION: ICD-10-CM

## 2023-03-24 RX ORDER — CARVEDILOL 6.25 MG/1
TABLET ORAL
Qty: 180 TABLET | Refills: 1 | OUTPATIENT
Start: 2023-03-24

## 2023-03-28 ENCOUNTER — OFFICE VISIT (OUTPATIENT)
Dept: FAMILY MEDICINE CLINIC | Age: 83
End: 2023-03-28
Payer: MEDICARE

## 2023-03-28 VITALS
DIASTOLIC BLOOD PRESSURE: 70 MMHG | BODY MASS INDEX: 23.07 KG/M2 | OXYGEN SATURATION: 96 % | HEIGHT: 67 IN | HEART RATE: 55 BPM | SYSTOLIC BLOOD PRESSURE: 134 MMHG | WEIGHT: 147 LBS

## 2023-03-28 DIAGNOSIS — R31.21 ASYMPTOMATIC MICROSCOPIC HEMATURIA: ICD-10-CM

## 2023-03-28 DIAGNOSIS — I10 PRIMARY HYPERTENSION: ICD-10-CM

## 2023-03-28 DIAGNOSIS — E11.59 TYPE 2 DIABETES MELLITUS WITH OTHER CIRCULATORY COMPLICATIONS: Primary | ICD-10-CM

## 2023-03-28 DIAGNOSIS — I25.118 CORONARY ARTERY DISEASE OF NATIVE ARTERY OF NATIVE HEART WITH STABLE ANGINA PECTORIS: ICD-10-CM

## 2023-03-28 DIAGNOSIS — M46.97 UNSPECIFIED INFLAMMATORY SPONDYLOPATHY, LUMBOSACRAL REGION: ICD-10-CM

## 2023-03-28 DIAGNOSIS — E78.2 MIXED HYPERLIPIDEMIA: ICD-10-CM

## 2023-03-28 DIAGNOSIS — I50.42 CHRONIC COMBINED SYSTOLIC AND DIASTOLIC CONGESTIVE HEART FAILURE: ICD-10-CM

## 2023-03-28 DIAGNOSIS — I48.0 PAROXYSMAL ATRIAL FIBRILLATION: ICD-10-CM

## 2023-03-28 RX ORDER — LOVASTATIN 40 MG/1
40 TABLET ORAL EVERY EVENING
Qty: 90 TABLET | Refills: 1 | Status: SHIPPED | OUTPATIENT
Start: 2023-03-28

## 2023-03-28 NOTE — ASSESSMENT & PLAN NOTE
Blood pressure running at goal.  Continue carvedilol 12.5 mg twice daily and losartan 50 mg daily.  No refills needed today.  Continue to monitor.  Checking labs.

## 2023-03-28 NOTE — PROGRESS NOTES
Chief Complaint  Diabetes (2 month follow up)    Subjective          Marcello Larson presents to [facility] today for f/u of chronic issues.    He is on Imdur and Ranexa for CAD status post stenting and 2v. CABG. no longer taking antiplatelet agents but now just the Eliquis.  He is on Eliquis for anticoagulation of atrial fibrillation, diagnosed in November (obtains through patient assistance).  He is on furosemide with potassium supplementation due to history of hyperkalemia.  He is on carvedilol and losartan for  hypertension.  He has been monitoring closely at home, and BP has been very well controlled (a bit lower even than it is today).  He is on lovastatin for hyperlipidemia.  He has NTG for prn use but rarely uses them.  He follows with Dr. Garry Betts.  He has chronic systolic heart failure and is on a beta-blocker, ACEI, statin and loop diuretic.  S/p ICD placement.    He is on metformin for DM.  He has been well controlled, last A1c 6.1 in Sept.  Due for labs today.  He does adhere to a diabetic diet.  He is UTD on eye exam (last done 8/2022); no diabetic retinopathy.  He is on an ARB and statin.      He is on pantoprazole for GERD and h/o bleeding ulcer thought to be NSAID-induced.  He has sucralfate for prn use.  Continues his daily iron supplement.    He is on Norco for longstanding history chronic low back pain and leg pain through [facility].  He has used gabapentin in the past but not currently.  S/p physical therapy.  Status post RFA.      He is on montelukast for allergies.       Current Outpatient Medications:   •  apixaban (ELIQUIS) 5 MG tablet tablet, Take 1 tablet by mouth 2 (Two) Times a Day., Disp: 180 tablet, Rfl: 3  •  carvedilol (COREG) 12.5 MG tablet, Take 1 tablet by mouth 2 (Two) Times a Day., Disp: 180 tablet, Rfl: 3  •  clopidogrel (PLAVIX) 75 MG tablet, Take 1 tablet by mouth Daily., Disp: 90 tablet, Rfl: 3  •  Diclofenac Sodium (VOLTAREN)  1 % gel gel, APPLY TO THE AFFECTED AREA(S) TWICE DAILY, Disp: 100 g, Rfl: 3  •  erythromycin (ROMYCIN) 5 MG/GM ophthalmic ointment, Administer  into the left eye As Needed., Disp: , Rfl:   •  ferrous sulfate 325 (65 FE) MG tablet, Take 1 tablet by mouth Daily With Breakfast., Disp: , Rfl:   •  fluticasone (FLONASE) 50 MCG/ACT nasal spray, USE 1 SPRAY EACH NOSTIL DAILY, Disp: 48 g, Rfl: 2  •  furosemide (LASIX) 40 MG tablet, Take 1 tablet by mouth 2 (Two) Times a Day., Disp: 60 tablet, Rfl: 2  •  glucose blood test strip, Check BS daily.  DX E11.9, Disp: 100 each, Rfl: 3  •  glucose monitor monitoring kit, 1 each Daily. E11.9, Disp: 1 each, Rfl: 0  •  HYDROcodone-acetaminophen (NORCO) 5-325 MG per tablet, Take 1 tablet by mouth Every 8 (Eight) Hours As Needed., Disp: , Rfl:   •  isosorbide mononitrate (IMDUR) 30 MG 24 hr tablet, Take 1 tablet by mouth Every Morning., Disp: 90 tablet, Rfl: 0  •  Lancets Thin misc, 1 each Daily. DX E11.9, Disp: 100 each, Rfl: 3  •  losartan (COZAAR) 50 MG tablet, Take 1 tablet by mouth Daily., Disp: 90 tablet, Rfl: 3  •  lovastatin (MEVACOR) 40 MG tablet, Take 1 tablet by mouth Every Evening., Disp: 90 tablet, Rfl: 1  •  metFORMIN (GLUCOPHAGE) 500 MG tablet, Take 1 tablet BY MOUTH EVERY DAY, Disp: 90 tablet, Rfl: 1  •  montelukast (SINGULAIR) 10 MG tablet, Take 1 tablet by mouth Every Night., Disp: 90 tablet, Rfl: 1  •  nitroglycerin (NITROSTAT) 0.4 MG SL tablet, Place 1 tablet under the tongue Every 5 (Five) Minutes As Needed for Chest Pain. Take no more than 3 doses in 15 minutes., Disp: 25 tablet, Rfl: 0  •  Omega-3 Fatty Acids (FISH OIL PO), Take 8 tablets by mouth Daily., Disp: , Rfl:   •  pantoprazole (PROTONIX) 40 MG EC tablet, Take 1 tablet by mouth Daily., Disp: 90 tablet, Rfl: 1  •  potassium chloride (K-DUR,KLOR-CON) 10 MEQ CR tablet, TAKE 1 TABLET BY MOUTH DAILY, Disp: 90 tablet, Rfl: 1  •  prochlorperazine (COMPAZINE) 10 MG tablet, Take 1 tablet by mouth Every 8 (Eight)  "Hours As Needed for Nausea or Vomiting., Disp: 24 tablet, Rfl: 0  •  silver sulfadiazine (Silvadene) 1 % cream, Apply 1 application topically to the appropriate area as directed 2 (Two) Times a Day., Disp: 50 g, Rfl: 0  •  sucralfate (CARAFATE) 1 g tablet, TAKE 1 TABLET BY MOUTH FOUR TIMES DAILY, Disp: 120 tablet, Rfl: 5  •  vitamin B-6 (PYRIDOXINE) 100 MG tablet, Take 1 tablet by mouth Daily., Disp: , Rfl:   •  vitamin C (ASCORBIC ACID) 500 MG tablet, Take 1 tablet by mouth Daily., Disp: , Rfl:     Allergies:  Iodinated contrast media, Lisinopril, and Prednisone      Objective   Vital Signs:   Vitals:    03/28/23 0855   BP: 134/70   BP Location: Left arm   Patient Position: Sitting   Cuff Size: Adult   Pulse: 55   SpO2: 96%   Weight: 66.7 kg (147 lb)   Height: 170.2 cm (67.01\")       Physical Exam  Vitals reviewed.   Constitutional:       General: He is not in acute distress.     Appearance: Normal appearance. He is well-developed.   HENT:      Head: Normocephalic and atraumatic.      Right Ear: External ear normal.      Left Ear: External ear normal.   Eyes:      Extraocular Movements: Extraocular movements intact.      Conjunctiva/sclera: Conjunctivae normal.      Pupils: Pupils are equal, round, and reactive to light.   Cardiovascular:      Rate and Rhythm: Normal rate and regular rhythm.      Heart sounds: No murmur heard.  Pulmonary:      Effort: Pulmonary effort is normal.      Breath sounds: Normal breath sounds. No wheezing, rhonchi or rales.   Abdominal:      General: Bowel sounds are normal. There is no distension.      Palpations: Abdomen is soft.      Tenderness: There is no abdominal tenderness.   Genitourinary:     Comments: Urostomy bag in place filled with clear urine, stoma appears normal  Musculoskeletal:         General: Normal range of motion.      Comments: +L BKA prosthesis   Skin:     General: Skin is warm and dry.   Neurological:      Mental Status: He is alert.   Psychiatric:         Mood " and Affect: Affect normal.             Assessment and Plan    Diagnoses and all orders for this visit:    1. Type 2 diabetes mellitus with other circulatory complications (HCC) (Primary)  Assessment & Plan:  He is on metformin for DM.  He has been well controlled, last A1c 6.1 in Sept.  Due for labs today; ordered as below.  He does adhere to a diabetic diet.  He is UTD on eye exam (last done 8/2022); no diabetic retinopathy.  He is on an ARB and statin.    Orders:  -     Comprehensive Metabolic Panel; Future  -     Lipid Panel; Future  -     Hemoglobin A1c; Future    2. Primary hypertension  Assessment & Plan:  Blood pressure running at goal.  Continue carvedilol 12.5 mg twice daily and losartan 50 mg daily.  No refills needed today.  Continue to monitor.  Checking labs.      3. Mixed hyperlipidemia  Assessment & Plan:  Stable on lovastatin 40 mg nightly.  Refills sent today.  Checking labs.    Orders:  -     lovastatin (MEVACOR) 40 MG tablet; Take 1 tablet by mouth Every Evening.  Dispense: 90 tablet; Refill: 1    4. Coronary artery disease of native artery of native heart with stable angina pectoris (HCC)  Overview:  Follows with Dr. Castañeda Cardiology.  He is on Eliquis for the combination of CAD, atrial fibrillation and systolic heart failure.  No longer on aspirin/Plavix per Cardiology.  Status post ICD placement.  He is on statin, beta-blocker and ARB.        5. Chronic combined systolic and diastolic congestive heart failure (HCC)  Overview:  Follows with Dr. Castañeda [facility].  Stable on ARB and beta-blocker.  Status post ICD placement.      6. Unspecified inflammatory spondylopathy, lumbosacral region (HCC)  Overview:  Following with Dr. Charlette Rascon at [facility].  On hydrocodone.  They are monitoring.      7. Asymptomatic microscopic hematuria  Assessment & Plan:  Document clearance of hematuria from UTI last month.    Orders:  -     Urinalysis With Microscopic - Urine, Clean Catch; Future    8.  Paroxysmal atrial fibrillation (HCC)  Overview:  Following with Dr. Castañeda Cardiology.  On Eliquis 5 mg twice daily for anticoagulation and carvedilol 12.5 mg twice daily for rate control.    Assessment & Plan:  No refills needed today.  Checking labs.  Continue to monitor.        Follow Up   Return in about 2 months (around 5/28/2023) for Recheck.  Patient was given instructions and counseling regarding his condition or for health maintenance advice. Please see specific information pulled into the AVS if appropriate.

## 2023-03-28 NOTE — ASSESSMENT & PLAN NOTE
He is on metformin for DM.  He has been well controlled, last A1c 6.1 in Sept.  Due for labs today; ordered as below.  He does adhere to a diabetic diet.  He is UTD on eye exam (last done 8/2022); no diabetic retinopathy.  He is on an ARB and statin.

## 2023-03-29 ENCOUNTER — LAB (OUTPATIENT)
Dept: LAB | Facility: HOSPITAL | Age: 83
End: 2023-03-29
Payer: MEDICARE

## 2023-03-29 DIAGNOSIS — R31.21 ASYMPTOMATIC MICROSCOPIC HEMATURIA: ICD-10-CM

## 2023-03-29 DIAGNOSIS — E11.59 TYPE 2 DIABETES MELLITUS WITH OTHER CIRCULATORY COMPLICATIONS: ICD-10-CM

## 2023-03-29 LAB
ALBUMIN SERPL-MCNC: 4.4 G/DL (ref 3.5–5.2)
ALBUMIN/GLOB SERPL: 1.8 G/DL
ALP SERPL-CCNC: 61 U/L (ref 39–117)
ALT SERPL W P-5'-P-CCNC: 12 U/L (ref 1–41)
ANION GAP SERPL CALCULATED.3IONS-SCNC: 12.9 MMOL/L (ref 5–15)
AST SERPL-CCNC: 23 U/L (ref 1–40)
BACTERIA UR QL AUTO: ABNORMAL /HPF
BILIRUB SERPL-MCNC: 0.5 MG/DL (ref 0–1.2)
BILIRUB UR QL STRIP: NEGATIVE
BUN SERPL-MCNC: 29 MG/DL (ref 8–23)
BUN/CREAT SERPL: 25.9 (ref 7–25)
CALCIUM SPEC-SCNC: 9.6 MG/DL (ref 8.6–10.5)
CHLORIDE SERPL-SCNC: 103 MMOL/L (ref 98–107)
CHOLEST SERPL-MCNC: 110 MG/DL (ref 0–200)
CLARITY UR: CLEAR
CO2 SERPL-SCNC: 23.1 MMOL/L (ref 22–29)
COLOR UR: YELLOW
CREAT SERPL-MCNC: 1.12 MG/DL (ref 0.76–1.27)
EGFRCR SERPLBLD CKD-EPI 2021: 65.6 ML/MIN/1.73
GLOBULIN UR ELPH-MCNC: 2.4 GM/DL
GLUCOSE SERPL-MCNC: 135 MG/DL (ref 65–99)
GLUCOSE UR STRIP-MCNC: NEGATIVE MG/DL
HBA1C MFR BLD: 7.1 % (ref 4.8–5.6)
HDLC SERPL-MCNC: 32 MG/DL (ref 40–60)
HGB UR QL STRIP.AUTO: ABNORMAL
KETONES UR QL STRIP: NEGATIVE
LDLC SERPL CALC-MCNC: 61 MG/DL (ref 0–100)
LDLC/HDLC SERPL: 1.91 {RATIO}
LEUKOCYTE ESTERASE UR QL STRIP.AUTO: ABNORMAL
NITRITE UR QL STRIP: NEGATIVE
PH UR STRIP.AUTO: 7 [PH] (ref 5–8)
POTASSIUM SERPL-SCNC: 4.5 MMOL/L (ref 3.5–5.2)
PROT SERPL-MCNC: 6.8 G/DL (ref 6–8.5)
PROT UR QL STRIP: NEGATIVE
RBC # UR STRIP: ABNORMAL /HPF
REF LAB TEST METHOD: ABNORMAL
SODIUM SERPL-SCNC: 139 MMOL/L (ref 136–145)
SP GR UR STRIP: 1.01 (ref 1–1.03)
SQUAMOUS #/AREA URNS HPF: ABNORMAL /HPF
TRIGL SERPL-MCNC: 84 MG/DL (ref 0–150)
UROBILINOGEN UR QL STRIP: ABNORMAL
VLDLC SERPL-MCNC: 17 MG/DL (ref 5–40)
WBC # UR STRIP: ABNORMAL /HPF

## 2023-03-29 PROCEDURE — 81001 URINALYSIS AUTO W/SCOPE: CPT

## 2023-03-29 PROCEDURE — 36415 COLL VENOUS BLD VENIPUNCTURE: CPT

## 2023-03-29 PROCEDURE — 80061 LIPID PANEL: CPT

## 2023-03-29 PROCEDURE — 80053 COMPREHEN METABOLIC PANEL: CPT

## 2023-03-29 PROCEDURE — 83036 HEMOGLOBIN GLYCOSYLATED A1C: CPT

## 2023-04-26 ENCOUNTER — OFFICE VISIT (OUTPATIENT)
Dept: CARDIOLOGY | Facility: CLINIC | Age: 83
End: 2023-04-26
Payer: MEDICARE

## 2023-04-26 VITALS
DIASTOLIC BLOOD PRESSURE: 70 MMHG | SYSTOLIC BLOOD PRESSURE: 120 MMHG | BODY MASS INDEX: 22.91 KG/M2 | WEIGHT: 146 LBS | HEIGHT: 67 IN | HEART RATE: 50 BPM

## 2023-04-26 DIAGNOSIS — Z95.1 S/P CABG (CORONARY ARTERY BYPASS GRAFT): ICD-10-CM

## 2023-04-26 DIAGNOSIS — I25.10 CORONARY ARTERY DISEASE INVOLVING NATIVE CORONARY ARTERY OF NATIVE HEART WITHOUT ANGINA PECTORIS: ICD-10-CM

## 2023-04-26 DIAGNOSIS — I50.20 HFREF (HEART FAILURE WITH REDUCED EJECTION FRACTION): Primary | ICD-10-CM

## 2023-04-26 DIAGNOSIS — I48.21 PERMANENT ATRIAL FIBRILLATION: ICD-10-CM

## 2023-04-26 DIAGNOSIS — I50.42 CHRONIC COMBINED SYSTOLIC AND DIASTOLIC CONGESTIVE HEART FAILURE: ICD-10-CM

## 2023-04-26 RX ORDER — FUROSEMIDE 40 MG/1
40 TABLET ORAL DAILY
Qty: 60 TABLET | Refills: 2 | Status: SHIPPED | OUTPATIENT
Start: 2023-04-26

## 2023-04-26 NOTE — PROGRESS NOTES
Subjective:     Encounter Date:12/21/2022      Patient ID: Marcello Larson is a 83 y.o. male.    Chief Complaint:  Management of HFrEF, CAD    HPI:   83 y.o. male with CAD status post CABG, HFrEF secondary to ischemic cardiomyopathy (EF 30 to 35%) status post ICD, permanent atrial fibrillation on Eliquis, hypertension, hyperlipidemia who presents for follow-up and management of his chronic conditions.  Patient was last seen on 3/23/23 and at that time we restarted his clopidogrel.  We also increase his losartan to 50 mg daily.  He has tolerated these medication changes well and remains well compensated at this time.    Echo 2/22/2022 with an EF 30 to 35%, grade 3 diastolic dysfunction, moderately dilated LA, mildly dilated RA.    The following portions of the patient's history were reviewed and updated as appropriate: allergies, current medications, past family history, past medical history, past social history, past surgical history and problem list.     REVIEW OF SYSTEMS:   All systems reviewed.  Pertinent positives identified in HPI.  All other systems are negative.    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  LV systolic function is moderate to  severely reduced.  EF 30-35%.  The transmittal spectral doppler flow pattern is suggestive of pseudonormalization.  There is moderate to severe global hypokinesis of the LV.  The left atrium is mildly dilated.  The mitral leaflets appear thickened, but open well.  Mild MR and AR.   • Hx of CABG 2019 - left internal mammary graft to the LAD, SVG to OM1, OM2 in the RCA   • Hyperlipidemia 2019   • Hypo-osmolality and hyponatremia    • Low back pain    • Myocardial infarct    • Other long term (current) drug therapy    • Palpitations 2019   • Personal history of malignant neoplasm of bladder    • Sleep apnea 2019   • Stented coronary artery 2019 - VISION bare metal stent to the proximal stenosis of the vein graft to the right and ISION bare metal stent in the mid to distal 80% stenosis.   • Type 2 diabetes mellitus without complication    • Unstable angina        Family History   Problem Relation Age of Onset   • No Known Problems Mother    • No Known Problems Father        Social History     Socioeconomic History   • Marital status:    • Number of children: 2   Tobacco Use   • Smoking status: Former     Types: Cigarettes     Quit date:      Years since quittin.3   • Smokeless tobacco: Never   Vaping Use   • Vaping Use: Never used   Substance and Sexual Activity   • Alcohol use: Never   • Drug use: Never   • Sexual activity: Defer       Allergies   Allergen Reactions   • Iodinated Contrast Media Anaphylaxis   • Lisinopril Other (See Comments)   • Prednisone Cough       Past Surgical History:   Procedure Laterality Date   • AMPUTATION Left     LOWER EXTREMITY BKA   • APPENDECTOMY     • CARDIAC CATHETERIZATION     • CARDIAC CATHETERIZATION N/A 10/13/2020    Procedure: Left Heart Cath;  Surgeon: Kofi Campoverde MD;  Location: [facility] CATH INVASIVE LOCATION;  Service: Cardiology;  Laterality: N/A;   • CARDIAC CATHETERIZATION N/A 10/13/2020     Procedure: Coronary angiography;  Surgeon: Kofi Campoverde MD;  Location:  [facility] CATH INVASIVE LOCATION;  Service: Cardiology;  Laterality: N/A;   • CARDIAC CATHETERIZATION N/A 10/13/2020    Procedure: Saphenous Vein Graft;  Surgeon: Kofi Campoverde MD;  Location:  [facility] CATH INVASIVE LOCATION;  Service: Cardiology;  Laterality: N/A;   • CARDIAC ELECTROPHYSIOLOGY PROCEDURE N/A 04/04/2022    Procedure: ICD DC new/SJM;  Surgeon: Mamadou Terry MD;  Location:  [facility] CATH INVASIVE LOCATION;  Service: Cardiology;  Laterality: N/A;   • CATARACT EXTRACTION     • CHOLECYSTECTOMY     • CORONARY ARTERY BYPASS GRAFT      1984, 1991 4 VESSEL   • INTERNAL CARDIAC DEFIBRILLATOR INSERTION  04/2022   • OTHER SURGICAL HISTORY      UROSTOMY S/P BLADDER CA         ECG 12 Lead    Date/Time: 4/26/2023 11:53 AM  Performed by: Andrew Castañeda MD  Authorized by: Andrew Castañeda MD   Comparison: compared with previous ECG from 12/21/2022  Similar to previous ECG  Rhythm: atrial fibrillation and paced  Rate: bradycardic    Clinical impression: abnormal EKG               Objective:         Vitals:    04/26/23 1121   BP: 120/70   Pulse: 50       PHYSICAL EXAM:  GEN: well appearing, in NAD   HEENT: NCAT, EOMI, moist mucus membranes   Respiratory: CTAB, no wheezes, rales or rhonchi  CV: normal rate, regular rhythm, normal S1, S2, no murmurs, rubs, gallops, +2 radial pulses b/l  GI: soft, nontender, nondistended  MSK: Left leg BKA, no edema of right leg  Skin: no rash, warm, dry  Heme/Lymph: no bruising or bleeding  Psych: organized thought, normal behavior and affect  Neuro: Alert and Oriented x 3, grossly normal motor function        Assessment:         (I50.20) HFrEF (heart failure with reduced ejection fraction)    (I50.42) Chronic combined systolic and diastolic congestive heart failure - Plan: furosemide (LASIX) 40 MG tablet    (I25.10) Coronary artery disease involving native coronary artery of native heart without angina  pectoris    (Z95.1) S/P CABG (coronary artery bypass graft)    (I48.21) Permanent atrial fibrillation    82 year old man with CAD status post CABG, HFrEF secondary to ischemic cardiomyopathy (EF 30 to 35%) status post ICD, permanent atrial fibrillation on Eliquis, hypertension, hyperlipidemia who presents for follow-up and management of his chronic conditions.       Plan:       #HFrEF  Well compensated.  Will uptitrate GDMT.  NYHA class I. Echo 2/22/2022 with EF 30 to 35%, grade 3 diastolic dysfunction, moderately dilated LA, mildly dilated RA.  - continue Coreg 12.5 mg twice daily, losartan to 50mg  - decrease lasix to daily, discontinue potassium  - decrease Lasix 40 mg to once daily  - discontinue Imdur    #CAD status post CABG  Patient without any symptoms of angina.  - continue plavix 75 mg daily, lovastatin 40 mg daily  - discontinue Imdur 30 mg daily  - d/c fish oil    #Permanent atrial fibrillation  [id number]  -Continue Coreg as above, Eliquis 5 mg twice daily    Dr Corley, thank you very much for referring this kind patient to me. Please call me with any questions or concerns. I will see the patient again in the office in 1 month.         Andrew Castañeda MD  04/26/23  [facility]    Outpatient Encounter Medications as of 4/26/2023   Medication Sig Dispense Refill   • apixaban (ELIQUIS) 5 MG tablet tablet Take 1 tablet by mouth 2 (Two) Times a Day. 180 tablet 3   • carvedilol (COREG) 12.5 MG tablet Take 1 tablet by mouth 2 (Two) Times a Day. 180 tablet 3   • clopidogrel (PLAVIX) 75 MG tablet Take 1 tablet by mouth Daily. 90 tablet 3   • Diclofenac Sodium (VOLTAREN) 1 % gel gel APPLY TO THE AFFECTED AREA(S) TWICE DAILY 100 g 3   • erythromycin (ROMYCIN) 5 MG/GM ophthalmic ointment Administer  into the left eye As Needed.     • ferrous sulfate 325 (65 FE) MG tablet Take 1 tablet by mouth Daily With Breakfast.     • fluticasone (FLONASE) 50 MCG/ACT nasal spray USE 1 SPRAY EACH NOSTIL DAILY 48 g  2   • furosemide (LASIX) 40 MG tablet Take 1 tablet by mouth Daily. 60 tablet 2   • glucose blood test strip Check BS daily.  DX E11.9 100 each 3   • glucose monitor monitoring kit 1 each Daily. E11.9 1 each 0   • HYDROcodone-acetaminophen (NORCO) 5-325 MG per tablet Take 1 tablet by mouth Every 8 (Eight) Hours As Needed.     • Lancets Thin misc 1 each Daily. DX E11.9 100 each 3   • losartan (COZAAR) 50 MG tablet Take 1 tablet by mouth Daily. 90 tablet 3   • lovastatin (MEVACOR) 40 MG tablet Take 1 tablet by mouth Every Evening. 90 tablet 1   • metFORMIN (GLUCOPHAGE) 500 MG tablet Take 1 tablet BY MOUTH EVERY DAY 90 tablet 1   • montelukast (SINGULAIR) 10 MG tablet Take 1 tablet by mouth Every Night. 90 tablet 1   • nitroglycerin (NITROSTAT) 0.4 MG SL tablet Place 1 tablet under the tongue Every 5 (Five) Minutes As Needed for Chest Pain. Take no more than 3 doses in 15 minutes. 25 tablet 0   • pantoprazole (PROTONIX) 40 MG EC tablet Take 1 tablet by mouth Daily. 90 tablet 1   • prochlorperazine (COMPAZINE) 10 MG tablet Take 1 tablet by mouth Every 8 (Eight) Hours As Needed for Nausea or Vomiting. 24 tablet 0   • silver sulfadiazine (Silvadene) 1 % cream Apply 1 application topically to the appropriate area as directed 2 (Two) Times a Day. 50 g 0   • sucralfate (CARAFATE) 1 g tablet TAKE 1 TABLET BY MOUTH FOUR TIMES DAILY 120 tablet 5   • vitamin B-6 (PYRIDOXINE) 100 MG tablet Take 1 tablet by mouth Daily.     • vitamin C (ASCORBIC ACID) 500 MG tablet Take 1 tablet by mouth Daily.     • [DISCONTINUED] furosemide (LASIX) 40 MG tablet Take 1 tablet by mouth 2 (Two) Times a Day. 60 tablet 2   • [DISCONTINUED] isosorbide mononitrate (IMDUR) 30 MG 24 hr tablet Take 1 tablet by mouth Every Morning. 90 tablet 0   • [DISCONTINUED] Omega-3 Fatty Acids (FISH OIL PO) Take 8 tablets by mouth Daily.     • [DISCONTINUED] potassium chloride (K-DUR,KLOR-CON) 10 MEQ CR tablet TAKE 1 TABLET BY MOUTH DAILY 90 tablet 1     No  facility-administered encounter medications on file as of 4/26/2023.

## 2023-05-02 ENCOUNTER — TELEPHONE (OUTPATIENT)
Dept: FAMILY MEDICINE CLINIC | Age: 83
End: 2023-05-02
Payer: MEDICARE

## 2023-05-02 ENCOUNTER — LAB (OUTPATIENT)
Dept: LAB | Facility: HOSPITAL | Age: 83
End: 2023-05-02
Payer: MEDICARE

## 2023-05-02 DIAGNOSIS — R31.29 MICROSCOPIC HEMATURIA: ICD-10-CM

## 2023-05-02 DIAGNOSIS — R31.29 MICROSCOPIC HEMATURIA: Primary | ICD-10-CM

## 2023-05-02 LAB
BACTERIA UR QL AUTO: ABNORMAL /HPF
BILIRUB UR QL STRIP: NEGATIVE
CLARITY UR: CLEAR
COLOR UR: YELLOW
GLUCOSE UR STRIP-MCNC: NEGATIVE MG/DL
HGB UR QL STRIP.AUTO: ABNORMAL
KETONES UR QL STRIP: NEGATIVE
LEUKOCYTE ESTERASE UR QL STRIP.AUTO: NEGATIVE
NITRITE UR QL STRIP: POSITIVE
PH UR STRIP.AUTO: 7 [PH] (ref 5–8)
PROT UR QL STRIP: NEGATIVE
RBC # UR STRIP: ABNORMAL /HPF
REF LAB TEST METHOD: ABNORMAL
SP GR UR STRIP: 1.01 (ref 1–1.03)
SQUAMOUS #/AREA URNS HPF: ABNORMAL /HPF
UROBILINOGEN UR QL STRIP: ABNORMAL
WBC # UR STRIP: ABNORMAL /HPF

## 2023-05-02 PROCEDURE — 81001 URINALYSIS AUTO W/SCOPE: CPT

## 2023-05-02 NOTE — TELEPHONE ENCOUNTER
----- Message from Zeny Corley MD sent at 3/31/2023  8:52 AM EDT -----  Please call to have pt come in for labs (UA w/ micro, dx microscopic hematuria, document clearance).  Please pend order to me.  Thanks, MARISSA

## 2023-05-03 DIAGNOSIS — Z85.51 HISTORY OF BLADDER CANCER: ICD-10-CM

## 2023-05-03 DIAGNOSIS — Z93.6 OTHER ARTIFICIAL OPENINGS OF URINARY TRACT STATUS: ICD-10-CM

## 2023-05-03 DIAGNOSIS — R31.29 MICROSCOPIC HEMATURIA: Primary | ICD-10-CM

## 2023-05-08 DIAGNOSIS — I50.42 CHRONIC COMBINED SYSTOLIC AND DIASTOLIC CONGESTIVE HEART FAILURE: ICD-10-CM

## 2023-05-08 RX ORDER — FUROSEMIDE 40 MG/1
TABLET ORAL
Qty: 60 TABLET | Refills: 2 | OUTPATIENT
Start: 2023-05-08

## 2023-05-21 DIAGNOSIS — E78.2 MIXED HYPERLIPIDEMIA: ICD-10-CM

## 2023-05-22 RX ORDER — LOVASTATIN 40 MG/1
40 TABLET ORAL EVERY EVENING
Qty: 90 TABLET | Refills: 1 | OUTPATIENT
Start: 2023-05-22

## 2023-05-30 ENCOUNTER — OFFICE VISIT (OUTPATIENT)
Dept: FAMILY MEDICINE CLINIC | Age: 83
End: 2023-05-30

## 2023-05-30 VITALS
WEIGHT: 145 LBS | OXYGEN SATURATION: 96 % | SYSTOLIC BLOOD PRESSURE: 137 MMHG | DIASTOLIC BLOOD PRESSURE: 69 MMHG | HEART RATE: 50 BPM | HEIGHT: 67 IN | BODY MASS INDEX: 22.76 KG/M2

## 2023-05-30 DIAGNOSIS — J30.1 SEASONAL ALLERGIC RHINITIS DUE TO POLLEN: ICD-10-CM

## 2023-05-30 DIAGNOSIS — E11.59 TYPE 2 DIABETES MELLITUS WITH OTHER CIRCULATORY COMPLICATIONS: Primary | ICD-10-CM

## 2023-05-30 DIAGNOSIS — Z95.810 ICD (IMPLANTABLE CARDIOVERTER-DEFIBRILLATOR), DUAL, IN SITU: ICD-10-CM

## 2023-05-30 DIAGNOSIS — Z89.512 ACQUIRED ABSENCE OF LEFT LEG BELOW KNEE: ICD-10-CM

## 2023-05-30 DIAGNOSIS — I10 PRIMARY HYPERTENSION: ICD-10-CM

## 2023-05-30 DIAGNOSIS — I25.5 ISCHEMIC CARDIOMYOPATHY: ICD-10-CM

## 2023-05-30 DIAGNOSIS — I48.0 PAROXYSMAL ATRIAL FIBRILLATION: ICD-10-CM

## 2023-05-30 DIAGNOSIS — K29.50 CHRONIC GASTRITIS WITHOUT BLEEDING, UNSPECIFIED GASTRITIS TYPE: ICD-10-CM

## 2023-05-30 DIAGNOSIS — I73.9 PERIPHERAL VASCULAR DISEASE, UNSPECIFIED: ICD-10-CM

## 2023-05-30 DIAGNOSIS — I25.118 CORONARY ARTERY DISEASE OF NATIVE ARTERY OF NATIVE HEART WITH STABLE ANGINA PECTORIS: ICD-10-CM

## 2023-05-30 DIAGNOSIS — E78.2 MIXED HYPERLIPIDEMIA: ICD-10-CM

## 2023-05-30 DIAGNOSIS — I50.42 CHRONIC COMBINED SYSTOLIC AND DIASTOLIC CONGESTIVE HEART FAILURE: ICD-10-CM

## 2023-05-30 RX ORDER — LOVASTATIN 40 MG/1
40 TABLET ORAL EVERY EVENING
Qty: 90 TABLET | Refills: 1 | Status: SHIPPED | OUTPATIENT
Start: 2023-05-30

## 2023-05-30 RX ORDER — PANTOPRAZOLE SODIUM 40 MG/1
40 TABLET, DELAYED RELEASE ORAL DAILY
Qty: 90 TABLET | Refills: 1 | Status: SHIPPED | OUTPATIENT
Start: 2023-05-30

## 2023-05-30 RX ORDER — FLUTICASONE PROPIONATE 50 MCG
2 SPRAY, SUSPENSION (ML) NASAL DAILY
Qty: 48 G | Refills: 3 | Status: SHIPPED | OUTPATIENT
Start: 2023-05-30

## 2023-05-30 RX ORDER — POTASSIUM CHLORIDE 750 MG/1
10 TABLET, FILM COATED, EXTENDED RELEASE ORAL 2 TIMES DAILY
COMMUNITY
End: 2023-05-31

## 2023-05-30 NOTE — ASSESSMENT & PLAN NOTE
Stable on lovastatin 40 mg nightly.  No refills needed.  Labs reviewed and up-to-date.  Continue to monitor.

## 2023-05-30 NOTE — ASSESSMENT & PLAN NOTE
He is on metformin for diabetes.  Refills sent.  Last A1c was 7.1 in March.  He does adhere to a diabetic diet.  He is UTD on eye exam (last done 8/2022); no diabetic retinopathy.  He is on an ARB and statin.  Labs reviewed and UTD.

## 2023-05-30 NOTE — ASSESSMENT & PLAN NOTE
Blood pressure running at goal.  Continue carvedilol 12.5 mg twice daily, losartan 50 mg daily.  No refills needed.  Labs reviewed and up-to-date.  Continue to monitor.

## 2023-05-30 NOTE — PROGRESS NOTES
Chief Complaint  Diabetes (2 month f/u)    Subjective          Marcello Larson presents to [facility] today for f/u of routine problems.    He has acute complaint of congestion for the past 2-3 days.  He reports positive fatigue and malaise, negative fevers, negative chills, negative headaches, positive rhinorrhea, positive congestion, negative sore throat, negative changes in taste or smell.  He reports positive cough, productive of no sputum, negative chest pain, positive shortness of breath when he bends over only.  He has negative abdominal pain, negative nausea, negative vomiting, negative diarrhea, negative body aches.  Sick contacts:  None.      He is on Imdur and Ranexa for CAD s/p and 2v. CABG. he is on Plavix for CAD and Eliquis for anticoagulation of atrial fibrillation (through patient assistance).  He is on Lasix with potassium supplementation.  He has had some troubles with hypokalemia intermittently.  He is on carvedilol and losartan for HTN.  BP has been well controlled.  He is on lovastatin for HLD.  He has NTG for prn use but rarely uses them.  Following with Dr. Castañeda Cardiology.  He has chronic systolic heart failure for which he is on a beta-blocker, ACEI, statin and loop diuretic.  S/p ICD placement.    He is on metformin for diabetes.  Last A1c was 7.1 in March.  He does adhere to a diabetic diet.  He is UTD on eye exam (last done 8/2022); no diabetic retinopathy.  He is on an ARB and statin.      He is on pantoprazole for GERD and h/o bleeding ulcer thought to be NSAID-induced.  He has sucralfate for prn use.  Continues his daily iron supplement.    He is on Norco for longstanding history chronic low back pain and leg pain.  Following with Dr. [facility].  He has used gabapentin in the past but not currently.  S/p physical therapy and RFA.      He is on Flonase and montelukast for allergies.  These have been worse lately.      Current  Outpatient Medications:   •  apixaban (ELIQUIS) 5 MG tablet tablet, Take 1 tablet by mouth 2 (Two) Times a Day., Disp: 180 tablet, Rfl: 3  •  carvedilol (COREG) 12.5 MG tablet, Take 1 tablet by mouth 2 (Two) Times a Day., Disp: 180 tablet, Rfl: 3  •  clopidogrel (PLAVIX) 75 MG tablet, Take 1 tablet by mouth Daily., Disp: 90 tablet, Rfl: 3  •  Diclofenac Sodium (VOLTAREN) 1 % gel gel, APPLY TO THE AFFECTED AREA(S) TWICE DAILY, Disp: 100 g, Rfl: 3  •  erythromycin (ROMYCIN) 5 MG/GM ophthalmic ointment, Administer  into the left eye As Needed., Disp: , Rfl:   •  ferrous sulfate 325 (65 FE) MG tablet, Take 1 tablet by mouth Daily With Breakfast., Disp: , Rfl:   •  fluticasone (FLONASE) 50 MCG/ACT nasal spray, 2 sprays into the nostril(s) as directed by provider Daily., Disp: 48 g, Rfl: 3  •  furosemide (LASIX) 40 MG tablet, Take 1 tablet by mouth Daily., Disp: 60 tablet, Rfl: 2  •  glucose blood test strip, Check BS daily.  DX E11.9, Disp: 100 each, Rfl: 3  •  glucose monitor monitoring kit, 1 each Daily. E11.9, Disp: 1 each, Rfl: 0  •  HYDROcodone-acetaminophen (NORCO) 5-325 MG per tablet, Take 1 tablet by mouth Every 8 (Eight) Hours As Needed., Disp: , Rfl:   •  Lancets Thin misc, 1 each Daily. DX E11.9, Disp: 100 each, Rfl: 3  •  losartan (COZAAR) 50 MG tablet, Take 1 tablet by mouth Daily., Disp: 90 tablet, Rfl: 3  •  lovastatin (MEVACOR) 40 MG tablet, Take 1 tablet by mouth Every Evening., Disp: 90 tablet, Rfl: 1  •  metFORMIN (GLUCOPHAGE) 500 MG tablet, Take 1 tablet by mouth Daily., Disp: 90 tablet, Rfl: 1  •  montelukast (SINGULAIR) 10 MG tablet, Take 1 tablet by mouth Every Night., Disp: 90 tablet, Rfl: 1  •  nitroglycerin (NITROSTAT) 0.4 MG SL tablet, Place 1 tablet under the tongue Every 5 (Five) Minutes As Needed for Chest Pain. Take no more than 3 doses in 15 minutes., Disp: 25 tablet, Rfl: 0  •  pantoprazole (PROTONIX) 40 MG EC tablet, Take 1 tablet by mouth Daily., Disp: 90 tablet, Rfl: 1  •  potassium  "chloride 10 MEQ CR tablet, Take 1 tablet by mouth 2 (Two) Times a Day., Disp: , Rfl:   •  prochlorperazine (COMPAZINE) 10 MG tablet, Take 1 tablet by mouth Every 8 (Eight) Hours As Needed for Nausea or Vomiting., Disp: 24 tablet, Rfl: 0  •  silver sulfadiazine (Silvadene) 1 % cream, Apply 1 application topically to the appropriate area as directed 2 (Two) Times a Day., Disp: 50 g, Rfl: 0  •  sucralfate (CARAFATE) 1 g tablet, TAKE 1 TABLET BY MOUTH FOUR TIMES DAILY, Disp: 120 tablet, Rfl: 5  •  vitamin B-6 (PYRIDOXINE) 100 MG tablet, Take 1 tablet by mouth Daily., Disp: , Rfl:   •  vitamin C (ASCORBIC ACID) 500 MG tablet, Take 1 tablet by mouth Daily., Disp: , Rfl:     Allergies:  Iodinated contrast media, Lisinopril, and Prednisone      Objective   Vital Signs:   Vitals:    05/30/23 0858   BP: 137/69   BP Location: Left arm   Patient Position: Sitting   Cuff Size: Adult   Pulse: 50   SpO2: 96%   Weight: 65.8 kg (145 lb)   Height: 170.2 cm (67.01\")       Physical Exam  Vitals reviewed.   Constitutional:       General: He is not in acute distress.     Appearance: Normal appearance. He is well-developed.   HENT:      Head: Normocephalic and atraumatic.      Right Ear: External ear normal.      Left Ear: External ear normal.   Eyes:      Extraocular Movements: Extraocular movements intact.      Conjunctiva/sclera: Conjunctivae normal.      Pupils: Pupils are equal, round, and reactive to light.   Cardiovascular:      Rate and Rhythm: Normal rate and regular rhythm.      Heart sounds: No murmur heard.  Pulmonary:      Effort: Pulmonary effort is normal.      Breath sounds: Normal breath sounds. No wheezing, rhonchi or rales.   Abdominal:      General: Bowel sounds are normal. There is no distension.      Palpations: Abdomen is soft.      Tenderness: There is no abdominal tenderness.   Genitourinary:     Comments: Urostomy bag in place filled with clear urine, stoma appears normal  Musculoskeletal:         General: Normal " range of motion.      Comments: +L BKA prosthesis   Skin:     General: Skin is warm and dry.   Neurological:      Mental Status: He is alert.   Psychiatric:         Mood and Affect: Affect normal.             Assessment and Plan    Diagnoses and all orders for this visit:    1. 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  Refills sent.  Last A1c was 7.1 in March.  He does adhere to a diabetic diet.  He is UTD on eye exam (last done 8/2022); no diabetic retinopathy.  He is on an ARB and statin.  Labs reviewed and UTD.    Orders:  -     metFORMIN (GLUCOPHAGE) 500 MG tablet; Take 1 tablet by mouth Daily.  Dispense: 90 tablet; Refill: 1    2. Primary hypertension  Assessment & Plan:  Blood pressure running at goal.  Continue carvedilol 12.5 mg twice daily, losartan 50 mg daily.  No refills needed.  Labs reviewed and up-to-date.  Continue to monitor.      3. Mixed hyperlipidemia  Assessment & Plan:  Stable on lovastatin 40 mg nightly.  No refills needed.  Labs reviewed and up-to-date.  Continue to monitor.    Orders:  -     lovastatin (MEVACOR) 40 MG tablet; Take 1 tablet by mouth Every Evening.  Dispense: 90 tablet; Refill: 1    4. Coronary artery disease of native artery of native heart with stable angina pectoris (HCC)  Overview:  Follows with Dr. Castañeda Cardiology.  He is on Eliquis for the combination of CAD, atrial fibrillation and systolic heart failure.  No longer on aspirin/Plavix per Cardiology.  Status post ICD placement.  He is on statin, beta-blocker and ARB.        5. Paroxysmal atrial fibrillation  Overview:  Following with Dr. Castañeda Cardiology.  On Eliquis 5 mg twice daily for anticoagulation and carvedilol 12.5 mg twice daily for rate control.      6. Ischemic cardiomyopathy  Overview:  Status post ICD placement.  Following with Dr. Castañeda Cardiology.      7. Chronic combined systolic and diastolic congestive heart  failure  Overview:  Follows with Dr. Garry Betts.  Stable on ARB and beta-blocker.  Status post ICD placement.      8. ICD (implantable cardioverter-defibrillator), dual, in situ  Overview:  For CHF.      9. Peripheral vascular disease, unspecified  Overview:  Status post left BKA.  On Plavix and statin.      10. 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      11. Chronic gastritis without bleeding, unspecified gastritis type  -     pantoprazole (PROTONIX) 40 MG EC tablet; Take 1 tablet by mouth Daily.  Dispense: 90 tablet; Refill: 1    12. Seasonal allergic rhinitis due to pollen  -     fluticasone (FLONASE) 50 MCG/ACT nasal spray; 2 sprays into the nostril(s) as directed by provider Daily.  Dispense: 48 g; Refill: 3      Follow Up   Return in about 2 months (around 7/30/2023) for Recheck.  Patient was given instructions and counseling regarding his condition or for health maintenance advice. Please see specific information pulled into the AVS if appropriate.

## 2023-05-31 ENCOUNTER — OFFICE VISIT (OUTPATIENT)
Dept: CARDIOLOGY | Facility: CLINIC | Age: 83
End: 2023-05-31

## 2023-05-31 VITALS
HEIGHT: 67 IN | WEIGHT: 143 LBS | BODY MASS INDEX: 22.44 KG/M2 | SYSTOLIC BLOOD PRESSURE: 148 MMHG | HEART RATE: 51 BPM | DIASTOLIC BLOOD PRESSURE: 80 MMHG

## 2023-05-31 DIAGNOSIS — I50.22 CHRONIC HFREF (HEART FAILURE WITH REDUCED EJECTION FRACTION): Primary | ICD-10-CM

## 2023-05-31 PROCEDURE — 3079F DIAST BP 80-89 MM HG: CPT | Performed by: STUDENT IN AN ORGANIZED HEALTH CARE EDUCATION/TRAINING PROGRAM

## 2023-05-31 PROCEDURE — 99213 OFFICE O/P EST LOW 20 MIN: CPT | Performed by: STUDENT IN AN ORGANIZED HEALTH CARE EDUCATION/TRAINING PROGRAM

## 2023-05-31 PROCEDURE — 1160F RVW MEDS BY RX/DR IN RCRD: CPT | Performed by: STUDENT IN AN ORGANIZED HEALTH CARE EDUCATION/TRAINING PROGRAM

## 2023-05-31 PROCEDURE — 1159F MED LIST DOCD IN RCRD: CPT | Performed by: STUDENT IN AN ORGANIZED HEALTH CARE EDUCATION/TRAINING PROGRAM

## 2023-05-31 PROCEDURE — 3077F SYST BP >= 140 MM HG: CPT | Performed by: STUDENT IN AN ORGANIZED HEALTH CARE EDUCATION/TRAINING PROGRAM

## 2023-05-31 RX ORDER — SPIRONOLACTONE 25 MG/1
25 TABLET ORAL DAILY
Qty: 90 TABLET | Refills: 3 | Status: SHIPPED | OUTPATIENT
Start: 2023-05-31

## 2023-05-31 NOTE — PROGRESS NOTES
.      Subjective:     Encounter Date:12/21/2022      Patient ID: Marcello Larson is a 83 y.o. male.    Chief Complaint:  Management of HFrEF, CAD    HPI:   83 y.o. male with CAD status post CABG, HFrEF secondary to ischemic cardiomyopathy (EF 30 to 35%) status post ICD, permanent atrial fibrillation on Eliquis, hypertension, hyperlipidemia who presents for follow-up and management of his chronic conditions.  Patient was last seen on 4/26/23 and at that time he felt well.  Since then, he has been doing well and has no complaints.      Echo 2/22/2022 with an EF 30 to 35%, grade 3 diastolic dysfunction, moderately dilated LA, mildly dilated RA.    The following portions of the patient's history were reviewed and updated as appropriate: allergies, current medications, past family history, past medical history, past social history, past surgical history and problem list.     REVIEW OF SYSTEMS:   All systems reviewed.  Pertinent positives identified in HPI.  All other systems are negative.    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  LV systolic function is moderate to severely reduced.  EF 30-35%.  The transmittal spectral doppler flow pattern is suggestive  of pseudonormalization.  There is moderate to severe global hypokinesis of the LV.  The left atrium is mildly dilated.  The mitral leaflets appear thickened, but open well.  Mild MR and AR.   • Hx of CABG 2019 - left internal mammary graft to the LAD, SVG to OM1, OM2 in the RCA   • Hyperlipidemia 2019   • Hypo-osmolality and hyponatremia    • Low back pain    • Myocardial infarct    • Other long term (current) drug therapy    • Palpitations 2019   • Personal history of malignant neoplasm of bladder    • Sleep apnea 2019   • Stented coronary artery 2019 - VISION bare metal stent to the proximal stenosis of the vein graft to the right and ISION bare metal stent in the mid to distal 80% stenosis.   • Type 2 diabetes mellitus without complication    • Unstable angina        Family History   Problem Relation Age of Onset   • No Known Problems Mother    • No Known Problems Father        Social History     Socioeconomic History   • Marital status:    • Number of children: 2   Tobacco Use   • Smoking status: Former     Types: Cigarettes     Quit date: 1984     Years since quittin.4   • Smokeless tobacco: Never   Vaping Use   • Vaping Use: Never used   Substance and Sexual Activity   • Alcohol use: Never   • Drug use: Never   • Sexual activity: Not Currently       Allergies   Allergen Reactions   • Iodinated Contrast Media Anaphylaxis   • Lisinopril Other (See Comments)   • Prednisone Cough       Past Surgical History:   Procedure Laterality Date   • AMPUTATION Left     LOWER EXTREMITY BKA   • APPENDECTOMY     • CARDIAC CATHETERIZATION     • CARDIAC CATHETERIZATION N/A 10/13/2020    Procedure: Left Heart Cath;  Surgeon: Kofi Campoverde MD;  Location: [facility] INVASIVE LOCATION;  Service: Cardiology;  Laterality: N/A;   • CARDIAC CATHETERIZATION N/A 10/13/2020    Procedure: Coronary angiography;  Surgeon: Kofi Campoverde MD;  Location: [facility]  INVASIVE LOCATION;  Service: Cardiology;  Laterality: N/A;   • CARDIAC CATHETERIZATION N/A 10/13/2020    Procedure: Saphenous Vein Graft;  Surgeon: Kofi Campoverde MD;  Location:  [facility] INVASIVE LOCATION;  Service: Cardiology;  Laterality: N/A;   • CARDIAC ELECTROPHYSIOLOGY PROCEDURE N/A 04/04/2022    Procedure: ICD DC new/SJM;  Surgeon: Mamaduo Terry MD;  Location:  [facility] INVASIVE LOCATION;  Service: Cardiology;  Laterality: N/A;   • CATARACT EXTRACTION     • CHOLECYSTECTOMY     • CORONARY ARTERY BYPASS GRAFT      1984, 1991 4 VESSEL   • INTERNAL CARDIAC DEFIBRILLATOR INSERTION  04/2022   • OTHER SURGICAL HISTORY      UROSTOMY S/P BLADDER CA       Procedures       Objective:         Vitals:    05/31/23 1123   BP: 148/80   Pulse: 51       PHYSICAL EXAM:  GEN: well appearing, in NAD   HEENT: NCAT, EOMI, moist mucus membranes   Respiratory: CTAB, no wheezes, rales or rhonchi  CV: normal rate, regular rhythm, normal S1, S2, no murmurs, rubs, gallops, +2 radial pulses b/l  GI: soft, nontender, nondistended  MSK: Left leg BKA, no edema of right leg  Skin: no rash, warm, dry  Heme/Lymph: no bruising or bleeding  Psych: organized thought, normal behavior and affect  Neuro: Alert and Oriented x 3, grossly normal motor function        Assessment:         (I50.22) Chronic HFrEF (heart failure with reduced ejection fraction) - Plan: Basic Metabolic Panel, Magnesium    82 year old man with CAD status post CABG, HFrEF secondary to ischemic cardiomyopathy (EF 30 to 35%) status post ICD, permanent atrial fibrillation on Eliquis, hypertension, hyperlipidemia who presents for follow-up and management of his chronic conditions.       Plan:       #HFrEF  Well compensated.  Will uptitrate GDMT.  NYHA class I. Echo 2/22/2022 with EF 30 to 35%, grade 3 diastolic dysfunction, moderately dilated LA, mildly dilated RA.  - continue Coreg 12.5 mg twice daily, losartan 50mg  - BMP, Mg  - start spironolactone 25mg daily  -  start Jardiance 10mg daily    #CAD status post CABG  Patient without any symptoms of angina.  - continue plavix 75 mg daily, lovastatin 40 mg daily    #Permanent atrial fibrillation  [id number] 5  -Continue Coreg as above, Eliquis 5 mg twice daily    Dr Corley, thank you very much for referring this kind patient to me. Please call me with any questions or concerns. I will see the patient again in the office in 6 weeks.         Andrew Castañeda MD  05/31/23  [facility]    Outpatient Encounter Medications as of 5/31/2023   Medication Sig Dispense Refill   • apixaban (ELIQUIS) 5 MG tablet tablet Take 1 tablet by mouth 2 (Two) Times a Day. 180 tablet 3   • carvedilol (COREG) 12.5 MG tablet Take 1 tablet by mouth 2 (Two) Times a Day. 180 tablet 3   • clopidogrel (PLAVIX) 75 MG tablet Take 1 tablet by mouth Daily. 90 tablet 3   • Diclofenac Sodium (VOLTAREN) 1 % gel gel APPLY TO THE AFFECTED AREA(S) TWICE DAILY 100 g 3   • erythromycin (ROMYCIN) 5 MG/GM ophthalmic ointment Administer  into the left eye As Needed.     • ferrous sulfate 325 (65 FE) MG tablet Take 1 tablet by mouth Daily With Breakfast.     • fluticasone (FLONASE) 50 MCG/ACT nasal spray 2 sprays into the nostril(s) as directed by provider Daily. 48 g 3   • furosemide (LASIX) 40 MG tablet Take 1 tablet by mouth Daily. 60 tablet 2   • glucose blood test strip Check BS daily.  DX E11.9 100 each 3   • glucose monitor monitoring kit 1 each Daily. E11.9 1 each 0   • HYDROcodone-acetaminophen (NORCO) 5-325 MG per tablet Take 1 tablet by mouth Every 8 (Eight) Hours As Needed.     • Lancets Thin misc 1 each Daily. DX E11.9 100 each 3   • losartan (COZAAR) 50 MG tablet Take 1 tablet by mouth Daily. 90 tablet 3   • lovastatin (MEVACOR) 40 MG tablet Take 1 tablet by mouth Every Evening. 90 tablet 1   • metFORMIN (GLUCOPHAGE) 500 MG tablet Take 1 tablet by mouth Daily. 90 tablet 1   • montelukast (SINGULAIR) 10 MG tablet Take 1 tablet by mouth Every Night.  90 tablet 1   • nitroglycerin (NITROSTAT) 0.4 MG SL tablet Place 1 tablet under the tongue Every 5 (Five) Minutes As Needed for Chest Pain. Take no more than 3 doses in 15 minutes. 25 tablet 0   • pantoprazole (PROTONIX) 40 MG EC tablet Take 1 tablet by mouth Daily. 90 tablet 1   • prochlorperazine (COMPAZINE) 10 MG tablet Take 1 tablet by mouth Every 8 (Eight) Hours As Needed for Nausea or Vomiting. 24 tablet 0   • silver sulfadiazine (Silvadene) 1 % cream Apply 1 application topically to the appropriate area as directed 2 (Two) Times a Day. 50 g 0   • sucralfate (CARAFATE) 1 g tablet TAKE 1 TABLET BY MOUTH FOUR TIMES DAILY 120 tablet 5   • vitamin B-6 (PYRIDOXINE) 100 MG tablet Take 1 tablet by mouth Daily.     • vitamin C (ASCORBIC ACID) 500 MG tablet Take 1 tablet by mouth Daily.     • [DISCONTINUED] potassium chloride 10 MEQ CR tablet Take 1 tablet by mouth 2 (Two) Times a Day.     • empagliflozin (Jardiance) 10 MG tablet tablet Take 1 tablet by mouth Daily. 90 tablet 3   • spironolactone (ALDACTONE) 25 MG tablet Take 1 tablet by mouth Daily. 90 tablet 3     No facility-administered encounter medications on file as of 5/31/2023.

## 2023-06-01 ENCOUNTER — TELEPHONE (OUTPATIENT)
Dept: CARDIOLOGY | Facility: CLINIC | Age: 83
End: 2023-06-01

## 2023-06-01 NOTE — TELEPHONE ENCOUNTER
Pt's daughter called and stated that when the pt went to  his jardiance that it was over $500.  He cannot afford that.       for pharmacy to call me back to discuss options. [facility]    Spoke rob Crawford at pharmacy and she states the cost is $418 x 3 mo coinsurance/copay and $126.23 for 1 mos.     Tried to do a tier exception and they won't take it.  They will not pay for this medication.      Pt has [employer] supplement   RX BIN [id number]  RX PCN [id number]  Group     Can you advise?    [facility]

## 2023-06-01 NOTE — TELEPHONE ENCOUNTER
Ailin notified and voiced understanding.  She is wondering if he should continue the potassium then?  [facility]

## 2023-06-07 DIAGNOSIS — J30.1 SEASONAL ALLERGIC RHINITIS DUE TO POLLEN: ICD-10-CM

## 2023-06-07 RX ORDER — FLUTICASONE PROPIONATE 50 MCG
SPRAY, SUSPENSION (ML) NASAL
Qty: 48 G | Refills: 2 | OUTPATIENT
Start: 2023-06-07

## 2023-06-09 ENCOUNTER — OFFICE VISIT (OUTPATIENT)
Dept: FAMILY MEDICINE CLINIC | Age: 83
End: 2023-06-09
Payer: MEDICARE

## 2023-06-09 ENCOUNTER — DOCUMENTATION (OUTPATIENT)
Dept: FAMILY MEDICINE CLINIC | Age: 83
End: 2023-06-09
Payer: MEDICARE

## 2023-06-09 VITALS
TEMPERATURE: 98.1 F | BODY MASS INDEX: 23.2 KG/M2 | HEART RATE: 56 BPM | OXYGEN SATURATION: 96 % | DIASTOLIC BLOOD PRESSURE: 59 MMHG | HEIGHT: 67 IN | SYSTOLIC BLOOD PRESSURE: 154 MMHG | WEIGHT: 147.8 LBS

## 2023-06-09 DIAGNOSIS — J01.90 ACUTE NON-RECURRENT SINUSITIS, UNSPECIFIED LOCATION: Primary | ICD-10-CM

## 2023-06-09 PROCEDURE — 1160F RVW MEDS BY RX/DR IN RCRD: CPT | Performed by: PHYSICIAN ASSISTANT

## 2023-06-09 PROCEDURE — 3077F SYST BP >= 140 MM HG: CPT | Performed by: PHYSICIAN ASSISTANT

## 2023-06-09 PROCEDURE — 1159F MED LIST DOCD IN RCRD: CPT | Performed by: PHYSICIAN ASSISTANT

## 2023-06-09 PROCEDURE — 99213 OFFICE O/P EST LOW 20 MIN: CPT | Performed by: PHYSICIAN ASSISTANT

## 2023-06-09 PROCEDURE — 3078F DIAST BP <80 MM HG: CPT | Performed by: PHYSICIAN ASSISTANT

## 2023-06-09 RX ORDER — AMOXICILLIN 875 MG/1
875 TABLET, COATED ORAL 2 TIMES DAILY
Qty: 20 TABLET | Refills: 0 | Status: SHIPPED | OUTPATIENT
Start: 2023-06-09 | End: 2023-06-19

## 2023-06-09 NOTE — PROGRESS NOTES
Diagnoses and all orders for this visit:    1. Acute non-recurrent sinusitis, unspecified location (Primary)  Comments:  Evidence of bacterial infection given purulent postnasal drainage and duration of symptoms.  Will treat with amoxicillin.  Follow-up if symptoms do not improve.  Orders:  -     amoxicillin (AMOXIL) 875 MG tablet; Take 1 tablet by mouth 2 (Two) Times a Day for 10 days.  Dispense: 20 tablet; Refill: 0            Subjective     CHIEF COMPLAINT    Chief Complaint   Patient presents with    Cough     Pt is spitting up green phlegm. X 2 weeks. Pt declines covid/flu testing.             History of Present Illness  This is a 89-year-old male presenting to the clinic with complaint of productive cough for the last 1 to 2 weeks.  He has also had some ear pain/pressure especially in his right ear.  He has some sinus pain and pressure and some runny nose with postnasal drainage.  Denies any fevers, chills, chest pain or shortness of breath.  He has not had any known sick contacts recently.            Review of Systems   Constitutional:  Negative for chills, fatigue and fever.   HENT:  Positive for ear pain, rhinorrhea, sinus pressure and sinus pain. Negative for sore throat.    Respiratory:  Positive for cough. Negative for shortness of breath and wheezing.    Cardiovascular:  Negative for chest pain.   Musculoskeletal:  Negative for myalgias.   Skin:  Negative for rash.   Neurological:  Negative for headaches.          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  LV systolic function is moderate to severely reduced.  EF 30-35%.  The transmittal spectral doppler flow pattern is suggestive of pseudonormalization.  There is moderate to severe global hypokinesis of the LV.  The left atrium is mildly dilated.  The mitral leaflets appear thickened, but open well.  Mild MR and AR.    Hx of CABG 08/13/2019 01/01/1991 - left internal mammary graft to the LAD, SVG to OM1, OM2 in the RCA    Hyperlipidemia 08/13/2019    Hypo-osmolality and hyponatremia     Low back pain     Myocardial infarct     Other long term (current) drug therapy     Palpitations 08/13/2019    Personal history of malignant neoplasm of bladder     Sleep apnea 08/13/2019    Stented coronary artery 08/13/2019 04/11/2014 - VISION bare metal stent to the proximal stenosis of the vein graft to the right and ISION bare metal stent in the mid to distal 80% stenosis.    Type 2 diabetes mellitus without complication     Unstable angina             Past Surgical History:   Procedure Laterality Date    AMPUTATION Left     LOWER EXTREMITY BKA    APPENDECTOMY      CARDIAC CATHETERIZATION      CARDIAC CATHETERIZATION N/A 10/13/2020    Procedure: Left Heart Cath;  Surgeon: Kofi Campoverde MD;  Location: [facility] CATH INVASIVE LOCATION;  Service: Cardiology;  Laterality: N/A;    CARDIAC CATHETERIZATION N/A 10/13/2020    Procedure: Coronary angiography;  Surgeon: Kofi Campoverde MD;  Location: [facility] CATH INVASIVE LOCATION;  Service: Cardiology;  Laterality: N/A;    CARDIAC CATHETERIZATION N/A 10/13/2020    Procedure: Saphenous Vein Graft;  Surgeon: Kofi Campoverde MD;  Location: [facility] CATH INVASIVE LOCATION;  Service:  Cardiology;  Laterality: N/A;    CARDIAC ELECTROPHYSIOLOGY PROCEDURE N/A 2022    Procedure: ICD DC new/SJM;  Surgeon: Mamadou Terry MD;  Location: [facility] INVASIVE LOCATION;  Service: Cardiology;  Laterality: N/A;    CATARACT EXTRACTION      CHOLECYSTECTOMY      CORONARY ARTERY BYPASS GRAFT      ,  4 VESSEL    INTERNAL CARDIAC DEFIBRILLATOR INSERTION  2022    OTHER SURGICAL HISTORY      UROSTOMY S/P BLADDER CA            Family History   Problem Relation Age of Onset    No Known Problems Mother     No Known Problems Father             Social History     Socioeconomic History    Marital status:     Number of children: 2   Tobacco Use    Smoking status: Former     Types: Cigarettes     Quit date: 1984     Years since quittin.4    Smokeless tobacco: Never   Vaping Use    Vaping Use: Never used   Substance and Sexual Activity    Alcohol use: Never    Drug use: Never    Sexual activity: Not Currently            Allergies   Allergen Reactions    Iodinated Contrast Media Anaphylaxis    Lisinopril Other (See Comments)    Prednisone Cough            Current Outpatient Medications on File Prior to Visit   Medication Sig Dispense Refill    apixaban (ELIQUIS) 5 MG tablet tablet Take 1 tablet by mouth 2 (Two) Times a Day. 180 tablet 3    carvedilol (COREG) 12.5 MG tablet Take 1 tablet by mouth 2 (Two) Times a Day. 180 tablet 3    clopidogrel (PLAVIX) 75 MG tablet Take 1 tablet by mouth Daily. 90 tablet 3    Diclofenac Sodium (VOLTAREN) 1 % gel gel APPLY TO THE AFFECTED AREA(S) TWICE DAILY 100 g 3    empagliflozin (Jardiance) 10 MG tablet tablet Take 1 tablet by mouth Daily. 90 tablet 3    erythromycin (ROMYCIN) 5 MG/GM ophthalmic ointment Administer  into the left eye As Needed.      ferrous sulfate 325 (65 FE) MG tablet Take 1 tablet by mouth Daily With Breakfast.      fluticasone (FLONASE) 50 MCG/ACT nasal spray 2 sprays into the nostril(s) as directed by provider Daily. 48 g 3     "furosemide (LASIX) 40 MG tablet Take 1 tablet by mouth Daily. 60 tablet 2    glucose blood test strip Check BS daily.  DX E11.9 100 each 3    glucose monitor monitoring kit 1 each Daily. E11.9 1 each 0    HYDROcodone-acetaminophen (NORCO) 5-325 MG per tablet Take 1 tablet by mouth Every 8 (Eight) Hours As Needed.      Lancets Thin misc 1 each Daily. DX E11.9 100 each 3    losartan (COZAAR) 50 MG tablet Take 1 tablet by mouth Daily. 90 tablet 3    lovastatin (MEVACOR) 40 MG tablet Take 1 tablet by mouth Every Evening. 90 tablet 1    metFORMIN (GLUCOPHAGE) 500 MG tablet Take 1 tablet by mouth Daily. 90 tablet 1    montelukast (SINGULAIR) 10 MG tablet Take 1 tablet by mouth Every Night. 90 tablet 1    nitroglycerin (NITROSTAT) 0.4 MG SL tablet Place 1 tablet under the tongue Every 5 (Five) Minutes As Needed for Chest Pain. Take no more than 3 doses in 15 minutes. 25 tablet 0    pantoprazole (PROTONIX) 40 MG EC tablet Take 1 tablet by mouth Daily. 90 tablet 1    prochlorperazine (COMPAZINE) 10 MG tablet Take 1 tablet by mouth Every 8 (Eight) Hours As Needed for Nausea or Vomiting. 24 tablet 0    silver sulfadiazine (Silvadene) 1 % cream Apply 1 application topically to the appropriate area as directed 2 (Two) Times a Day. 50 g 0    spironolactone (ALDACTONE) 25 MG tablet Take 1 tablet by mouth Daily. 90 tablet 3    sucralfate (CARAFATE) 1 g tablet TAKE 1 TABLET BY MOUTH FOUR TIMES DAILY 120 tablet 5    vitamin B-6 (PYRIDOXINE) 100 MG tablet Take 1 tablet by mouth Daily.      vitamin C (ASCORBIC ACID) 500 MG tablet Take 1 tablet by mouth Daily.       No current facility-administered medications on file prior to visit.            /59 (BP Location: Left arm, Patient Position: Sitting, Cuff Size: Small Adult)   Pulse 56   Temp 98.1 °F (36.7 °C) (Oral)   Ht 170.2 cm (67.01\")   Wt 67 kg (147 lb 12.8 oz)   SpO2 96% Comment: room air  BMI 23.14 kg/m²          Objective     Physical Exam  Vitals and nursing note " reviewed.   Constitutional:       General: He is not in acute distress.     Appearance: Normal appearance.   HENT:      Head: Normocephalic and atraumatic.      Right Ear: Tympanic membrane, ear canal and external ear normal. A middle ear effusion is present. Tympanic membrane is not erythematous.      Left Ear: Tympanic membrane, ear canal and external ear normal. A middle ear effusion is present. Tympanic membrane is not erythematous.      Nose: Congestion present. No rhinorrhea.      Mouth/Throat:      Mouth: Mucous membranes are moist.      Pharynx: Oropharynx is clear. Posterior oropharyngeal erythema (Purulent appearing postnasal drainage) present.   Eyes:      Extraocular Movements: Extraocular movements intact.      Conjunctiva/sclera: Conjunctivae normal.      Pupils: Pupils are equal, round, and reactive to light.   Cardiovascular:      Rate and Rhythm: Normal rate and regular rhythm.      Heart sounds: Normal heart sounds.   Pulmonary:      Effort: Pulmonary effort is normal. No respiratory distress.      Breath sounds: Normal breath sounds. No wheezing.   Musculoskeletal:      Cervical back: Normal range of motion. No rigidity.   Skin:     General: Skin is warm and dry.   Neurological:      Mental Status: He is alert and oriented to person, place, and time.   Psychiatric:         Mood and Affect: Mood normal.         Behavior: Behavior normal.            Procedures                    Lab Results (last 24 hours)       ** No results found for the last 24 hours. **                  No Radiology Exams Resulted Within Past 24 Hours                    Diagnoses and all orders for this visit:    1. Acute non-recurrent sinusitis, unspecified location (Primary)  Comments:  Evidence of bacterial infection given purulent postnasal drainage and duration of symptoms.  Will treat with amoxicillin.  Follow-up if symptoms do not improve.  Orders:  -     amoxicillin (AMOXIL) 875 MG tablet; Take 1 tablet by mouth 2 (Two)  Times a Day for 10 days.  Dispense: 20 tablet; Refill: 0                           FOR FULL DISCHARGE INSTRUCTIONS/COMMENTS/HANDOUTS please see the   AVS

## 2023-07-12 PROBLEM — I48.21 PERMANENT ATRIAL FIBRILLATION: Status: ACTIVE | Noted: 2022-05-10

## 2023-07-31 RX ORDER — POTASSIUM CHLORIDE 750 MG/1
TABLET, EXTENDED RELEASE ORAL
Qty: 90 TABLET | Refills: 1 | Status: SHIPPED | OUTPATIENT
Start: 2023-07-31

## 2023-08-15 ENCOUNTER — LAB (OUTPATIENT)
Dept: LAB | Facility: HOSPITAL | Age: 83
End: 2023-08-15
Payer: MEDICARE

## 2023-08-15 ENCOUNTER — OFFICE VISIT (OUTPATIENT)
Dept: FAMILY MEDICINE CLINIC | Age: 83
End: 2023-08-15
Payer: MEDICARE

## 2023-08-15 VITALS
OXYGEN SATURATION: 93 % | HEIGHT: 67 IN | SYSTOLIC BLOOD PRESSURE: 134 MMHG | HEART RATE: 50 BPM | BODY MASS INDEX: 22.85 KG/M2 | WEIGHT: 145.6 LBS | DIASTOLIC BLOOD PRESSURE: 62 MMHG

## 2023-08-15 DIAGNOSIS — I25.10 CORONARY ARTERY DISEASE INVOLVING NATIVE CORONARY ARTERY OF NATIVE HEART WITHOUT ANGINA PECTORIS: ICD-10-CM

## 2023-08-15 DIAGNOSIS — K29.50 CHRONIC GASTRITIS WITHOUT BLEEDING, UNSPECIFIED GASTRITIS TYPE: ICD-10-CM

## 2023-08-15 DIAGNOSIS — I50.42 CHRONIC COMBINED SYSTOLIC AND DIASTOLIC CONGESTIVE HEART FAILURE: ICD-10-CM

## 2023-08-15 DIAGNOSIS — I10 PRIMARY HYPERTENSION: ICD-10-CM

## 2023-08-15 DIAGNOSIS — E11.59 TYPE 2 DIABETES MELLITUS WITH OTHER CIRCULATORY COMPLICATIONS: Primary | ICD-10-CM

## 2023-08-15 DIAGNOSIS — E78.2 MIXED HYPERLIPIDEMIA: ICD-10-CM

## 2023-08-15 DIAGNOSIS — I48.21 PERMANENT ATRIAL FIBRILLATION: ICD-10-CM

## 2023-08-15 DIAGNOSIS — E11.59 TYPE 2 DIABETES MELLITUS WITH OTHER CIRCULATORY COMPLICATIONS: ICD-10-CM

## 2023-08-15 DIAGNOSIS — I25.118 CORONARY ARTERY DISEASE OF NATIVE ARTERY OF NATIVE HEART WITH STABLE ANGINA PECTORIS: ICD-10-CM

## 2023-08-15 DIAGNOSIS — J30.1 SEASONAL ALLERGIC RHINITIS DUE TO POLLEN: ICD-10-CM

## 2023-08-15 LAB
ALBUMIN SERPL-MCNC: 4.2 G/DL (ref 3.5–5.2)
ALBUMIN UR-MCNC: 2.3 MG/DL
ALBUMIN/GLOB SERPL: 2 G/DL
ALP SERPL-CCNC: 55 U/L (ref 39–117)
ALT SERPL W P-5'-P-CCNC: 11 U/L (ref 1–41)
ANION GAP SERPL CALCULATED.3IONS-SCNC: 13.5 MMOL/L (ref 5–15)
AST SERPL-CCNC: 20 U/L (ref 1–40)
BASOPHILS # BLD AUTO: 0.03 10*3/MM3 (ref 0–0.2)
BASOPHILS NFR BLD AUTO: 0.5 % (ref 0–1.5)
BILIRUB SERPL-MCNC: 0.5 MG/DL (ref 0–1.2)
BUN SERPL-MCNC: 26 MG/DL (ref 8–23)
BUN/CREAT SERPL: 21 (ref 7–25)
CALCIUM SPEC-SCNC: 9.4 MG/DL (ref 8.6–10.5)
CHLORIDE SERPL-SCNC: 103 MMOL/L (ref 98–107)
CHOLEST SERPL-MCNC: 91 MG/DL (ref 0–200)
CO2 SERPL-SCNC: 19.5 MMOL/L (ref 22–29)
CREAT SERPL-MCNC: 1.24 MG/DL (ref 0.76–1.27)
CREAT UR-MCNC: 47.7 MG/DL
DEPRECATED RDW RBC AUTO: 45.7 FL (ref 37–54)
EGFRCR SERPLBLD CKD-EPI 2021: 57.7 ML/MIN/1.73
EOSINOPHIL # BLD AUTO: 0.11 10*3/MM3 (ref 0–0.4)
EOSINOPHIL NFR BLD AUTO: 1.9 % (ref 0.3–6.2)
ERYTHROCYTE [DISTWIDTH] IN BLOOD BY AUTOMATED COUNT: 13.6 % (ref 12.3–15.4)
GLOBULIN UR ELPH-MCNC: 2.1 GM/DL
GLUCOSE SERPL-MCNC: 105 MG/DL (ref 65–99)
HBA1C MFR BLD: 6.3 % (ref 4.8–5.6)
HCT VFR BLD AUTO: 37.8 % (ref 37.5–51)
HDLC SERPL-MCNC: 30 MG/DL (ref 40–60)
HGB BLD-MCNC: 12.5 G/DL (ref 13–17.7)
IMM GRANULOCYTES # BLD AUTO: 0.01 10*3/MM3 (ref 0–0.05)
IMM GRANULOCYTES NFR BLD AUTO: 0.2 % (ref 0–0.5)
LDLC SERPL CALC-MCNC: 45 MG/DL (ref 0–100)
LDLC/HDLC SERPL: 1.54 {RATIO}
LYMPHOCYTES # BLD AUTO: 1.19 10*3/MM3 (ref 0.7–3.1)
LYMPHOCYTES NFR BLD AUTO: 20.4 % (ref 19.6–45.3)
MCH RBC QN AUTO: 29.9 PG (ref 26.6–33)
MCHC RBC AUTO-ENTMCNC: 33.1 G/DL (ref 31.5–35.7)
MCV RBC AUTO: 90.4 FL (ref 79–97)
MICROALBUMIN/CREAT UR: 48.2 MG/G
MONOCYTES # BLD AUTO: 0.49 10*3/MM3 (ref 0.1–0.9)
MONOCYTES NFR BLD AUTO: 8.4 % (ref 5–12)
NEUTROPHILS NFR BLD AUTO: 3.99 10*3/MM3 (ref 1.7–7)
NEUTROPHILS NFR BLD AUTO: 68.6 % (ref 42.7–76)
PLATELET # BLD AUTO: 132 10*3/MM3 (ref 140–450)
PMV BLD AUTO: 9.5 FL (ref 6–12)
POTASSIUM SERPL-SCNC: 4.6 MMOL/L (ref 3.5–5.2)
PROT SERPL-MCNC: 6.3 G/DL (ref 6–8.5)
RBC # BLD AUTO: 4.18 10*6/MM3 (ref 4.14–5.8)
SODIUM SERPL-SCNC: 136 MMOL/L (ref 136–145)
TRIGL SERPL-MCNC: 74 MG/DL (ref 0–150)
VLDLC SERPL-MCNC: 16 MG/DL (ref 5–40)
WBC NRBC COR # BLD: 5.82 10*3/MM3 (ref 3.4–10.8)

## 2023-08-15 PROCEDURE — 80053 COMPREHEN METABOLIC PANEL: CPT

## 2023-08-15 PROCEDURE — 82043 UR ALBUMIN QUANTITATIVE: CPT

## 2023-08-15 PROCEDURE — 36415 COLL VENOUS BLD VENIPUNCTURE: CPT

## 2023-08-15 PROCEDURE — 85025 COMPLETE CBC W/AUTO DIFF WBC: CPT

## 2023-08-15 PROCEDURE — 80061 LIPID PANEL: CPT

## 2023-08-15 PROCEDURE — 83036 HEMOGLOBIN GLYCOSYLATED A1C: CPT

## 2023-08-15 PROCEDURE — 82570 ASSAY OF URINE CREATININE: CPT

## 2023-08-15 RX ORDER — NITROGLYCERIN 0.4 MG/1
0.4 TABLET SUBLINGUAL
Qty: 25 TABLET | Refills: 0 | Status: SHIPPED | OUTPATIENT
Start: 2023-08-15

## 2023-08-15 RX ORDER — MONTELUKAST SODIUM 10 MG/1
10 TABLET ORAL NIGHTLY
Qty: 90 TABLET | Refills: 1 | Status: SHIPPED | OUTPATIENT
Start: 2023-08-15

## 2023-08-15 RX ORDER — PANTOPRAZOLE SODIUM 40 MG/1
40 TABLET, DELAYED RELEASE ORAL DAILY
Qty: 90 TABLET | Refills: 1 | Status: SHIPPED | OUTPATIENT
Start: 2023-08-15

## 2023-08-15 NOTE — ASSESSMENT & PLAN NOTE
Stable on lovastatin 40 mg daily.  Refills were not needed today.  Labs were due today.  Continue to monitor.

## 2023-08-15 NOTE — PROGRESS NOTES
Chief Complaint  Diabetes (2 month f/u)    Subjective          Marcello Larson presents to [facility] today for follow-up of routine problems.    He is on Imdur and Ranexa for CAD s/p 2v. CABG.  He is on Plavix for CAD and Eliquis for anticoagulation of atrial fibrillation (through patient assistance).  He is on Lasix.  He has had some troubles with hypokalemia intermittently.  He is on carvedilol and losartan for hypertension.  His BP has been well controlled.  He is on lovastatin for hyperlipidemia.  He has NTG for prn use but rarely uses them.  He follows with Dr. Castañeda [facility].  He has chronic systolic heart failure for which he is on a beta-blocker, ACEI, statin and loop diuretic.  S/p ICD placement.    He is on metformin for diabetes.  Last A1c was 7.1 in March.  Due for labs today.  He does adhere to a diabetic diet.  He is UTD on eye exam (last done 8/2022); no diabetic retinopathy.  He is on an ARB and statin.      He is on pantoprazole for GERD and h/o bleeding ulcer thought to be NSAID-induced.  He has sucralfate for prn use.  Continues his daily iron supplement.    He is on Norco for longstanding history chronic low back pain and leg pain.  Follows with Dr. [facility].  He has used gabapentin in the past but not currently.  S/p physical therapy and RFA.      He is on Flonase and montelukast for allergies.  He uses a saline rinse a couple times a month.    He is getting a replacement L prosthetic leg later this week.  His old one is 7 years old.      Current Outpatient Medications:     apixaban (ELIQUIS) 5 MG tablet tablet, Take 1 tablet by mouth 2 (Two) Times a Day., Disp: 180 tablet, Rfl: 3    carvedilol (COREG) 12.5 MG tablet, Take 1 tablet by mouth 2 (Two) Times a Day., Disp: 180 tablet, Rfl: 3    clopidogrel (PLAVIX) 75 MG tablet, Take 1 tablet by mouth Daily., Disp: 90 tablet, Rfl: 3    Diclofenac Sodium (VOLTAREN) 1 % gel gel, APPLY TO THE  AFFECTED AREA(S) TWICE DAILY, Disp: 100 g, Rfl: 3    empagliflozin (Jardiance) 10 MG tablet tablet, Take 1 tablet by mouth Daily., Disp: 90 tablet, Rfl: 3    erythromycin (ROMYCIN) 5 MG/GM ophthalmic ointment, Administer  into the left eye As Needed., Disp: , Rfl:     ferrous sulfate 325 (65 FE) MG tablet, Take 1 tablet by mouth Daily With Breakfast., Disp: , Rfl:     fexofenadine (ALLEGRA) 180 MG tablet, Take 1 tablet by mouth Daily., Disp: , Rfl:     fluticasone (FLONASE) 50 MCG/ACT nasal spray, 2 sprays into the nostril(s) as directed by provider Daily., Disp: 48 g, Rfl: 3    furosemide (LASIX) 40 MG tablet, Take 1 tablet by mouth Daily., Disp: 60 tablet, Rfl: 2    glucose blood test strip, Check BS daily.  DX E11.9, Disp: 100 each, Rfl: 3    glucose monitor monitoring kit, 1 each Daily. E11.9, Disp: 1 each, Rfl: 0    HYDROcodone-acetaminophen (NORCO) 5-325 MG per tablet, Take 1 tablet by mouth Every 8 (Eight) Hours As Needed., Disp: , Rfl:     isosorbide dinitrate (ISORDIL) 30 MG tablet, Take 1 tablet by mouth 4 (Four) Times a Day., Disp: , Rfl:     Lancets Thin misc, 1 each Daily. DX E11.9, Disp: 100 each, Rfl: 3    losartan (COZAAR) 50 MG tablet, Take 1 tablet by mouth Daily., Disp: 90 tablet, Rfl: 3    lovastatin (MEVACOR) 40 MG tablet, Take 1 tablet by mouth Every Evening., Disp: 90 tablet, Rfl: 1    metFORMIN (GLUCOPHAGE) 500 MG tablet, Take 1 tablet by mouth Daily., Disp: 90 tablet, Rfl: 1    montelukast (SINGULAIR) 10 MG tablet, Take 1 tablet by mouth Every Night., Disp: 90 tablet, Rfl: 1    nitroglycerin (NITROSTAT) 0.4 MG SL tablet, Place 1 tablet under the tongue Every 5 (Five) Minutes As Needed for Chest Pain. Take no more than 3 doses in 15 minutes., Disp: 25 tablet, Rfl: 0    pantoprazole (PROTONIX) 40 MG EC tablet, Take 1 tablet by mouth Daily., Disp: 90 tablet, Rfl: 1    prochlorperazine (COMPAZINE) 10 MG tablet, Take 1 tablet by mouth Every 8 (Eight) Hours As Needed for Nausea or Vomiting., Disp:  "24 tablet, Rfl: 0    silver sulfadiazine (Silvadene) 1 % cream, Apply 1 application topically to the appropriate area as directed 2 (Two) Times a Day., Disp: 50 g, Rfl: 0    sucralfate (CARAFATE) 1 g tablet, TAKE 1 TABLET BY MOUTH FOUR TIMES DAILY, Disp: 120 tablet, Rfl: 5    vitamin B-6 (PYRIDOXINE) 100 MG tablet, Take 1 tablet by mouth Daily., Disp: , Rfl:     vitamin C (ASCORBIC ACID) 500 MG tablet, Take 1 tablet by mouth Daily., Disp: , Rfl:     Allergies:  Iodinated contrast media, Lisinopril, and Prednisone      Objective   Vital Signs:   Vitals:    08/15/23 0805   BP: 134/62   BP Location: Right arm   Patient Position: Sitting   Cuff Size: Adult   Pulse: 50   SpO2: 93%   Weight: 66 kg (145 lb 9.6 oz)   Height: 170.2 cm (67.01\")       Physical Exam  Vitals reviewed.   Constitutional:       General: He is not in acute distress.     Appearance: Normal appearance. He is well-developed.   HENT:      Head: Normocephalic and atraumatic.      Right Ear: External ear normal.      Left Ear: External ear normal.   Eyes:      Extraocular Movements: Extraocular movements intact.      Conjunctiva/sclera: Conjunctivae normal.      Pupils: Pupils are equal, round, and reactive to light.   Cardiovascular:      Rate and Rhythm: Normal rate and regular rhythm.      Heart sounds: No murmur heard.  Pulmonary:      Effort: Pulmonary effort is normal.      Breath sounds: Normal breath sounds. No wheezing, rhonchi or rales.   Abdominal:      General: Bowel sounds are normal. There is no distension.      Palpations: Abdomen is soft.      Tenderness: There is no abdominal tenderness.   Genitourinary:     Comments: Urostomy bag in place filled with clear urine, stoma appears normal  Musculoskeletal:         General: Normal range of motion.      Comments: +L BKA prosthesis   Skin:     General: Skin is warm and dry.   Neurological:      Mental Status: He is alert.   Psychiatric:         Mood and Affect: Affect normal.         "   Assessment and Plan    Diagnoses and all orders for this visit:    1. 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  No refills needed.  Last A1c was 7.1 in March.  Due for labs today; ordered.  He does adhere to a diabetic diet.  He is UTD on eye exam (last done 8/2022); no diabetic retinopathy.  He is getting this scheduled.  He is on an ARB and statin.    Orders:  -     Comprehensive Metabolic Panel; Future  -     Lipid Panel; Future  -     Hemoglobin A1c; Future  -     Microalbumin / Creatinine Urine Ratio - Urine, Clean Catch; Future    2. Primary hypertension  Assessment & Plan:  Blood pressure has been running at goal.  Continue losartan 50 mg daily and carvedilol 12.5 mg 2 times daily.  Refills were not needed today.  Labs were needed today.  Continue to monitor.      Orders:  -     CBC Auto Differential; Future    3. Mixed hyperlipidemia  Assessment & Plan:  Stable on lovastatin 40 mg daily.  Refills were not needed today.  Labs were due today.  Continue to monitor.        4. Permanent atrial fibrillation  Overview:  Following with Dr. Castañeda Card[facility].  On Eliquis 5 mg twice daily for anticoagulation and carvedilol 12.5 mg twice daily for rate control.      5. Chronic gastritis without bleeding, unspecified gastritis type  Assessment & Plan:  Stable on pantoprazole 40 mg daily.  Refills were needed today.  Continue to monitor.  Wean PPI as able.      Orders:  -     pantoprazole (PROTONIX) 40 MG EC tablet; Take 1 tablet by mouth Daily.  Dispense: 90 tablet; Refill: 1    6. Seasonal allergic rhinitis due to pollen  -     montelukast (SINGULAIR) 10 MG tablet; Take 1 tablet by mouth Every Night.  Dispense: 90 tablet; Refill: 1    7. Coronary artery disease of native artery of native heart with stable angina pectoris  -     nitroglycerin (NITROSTAT) 0.4 MG SL tablet; Place 1 tablet under the tongue Every 5 (Five) Minutes As Needed  for Chest Pain. Take no more than 3 doses in 15 minutes.  Dispense: 25 tablet; Refill: 0    8. Chronic combined systolic and diastolic congestive heart failure  Overview:  Follows with Dr. Castañeda [facility].  Stable on ARB and beta-blocker.  Status post ICD placement.      9. Coronary artery disease involving native coronary artery of native heart without angina pectoris  Overview:  Follows with Dr. Castañeda Card[facility].  He is on Eliquis for the combination of CAD, atrial fibrillation and systolic heart failure.  No longer on aspirin/Plavix per Cardiology.  Status post ICD placement.  He is on statin, beta-blocker and ARB.            Follow Up   Return in about 2 months (around 10/15/2023) for Recheck.  Patient was given instructions and counseling regarding his condition or for health maintenance advice. Please see specific information pulled into the AVS if appropriate.

## 2023-08-15 NOTE — ASSESSMENT & PLAN NOTE
He is on metformin for diabetes.  No refills needed.  Last A1c was 7.1 in March.  Due for labs today; ordered.  He does adhere to a diabetic diet.  He is UTD on eye exam (last done 8/2022); no diabetic retinopathy.  He is getting this scheduled.  He is on an ARB and statin.

## 2023-08-15 NOTE — ASSESSMENT & PLAN NOTE
Blood pressure has been running at goal.  Continue losartan 50 mg daily and carvedilol 12.5 mg 2 times daily.  Refills were not needed today.  Labs were needed today.  Continue to monitor.

## 2023-08-15 NOTE — ASSESSMENT & PLAN NOTE
Stable on pantoprazole 40 mg daily.  Refills were needed today.  Continue to monitor.  Wean PPI as able.

## 2023-08-21 ENCOUNTER — TELEPHONE (OUTPATIENT)
Dept: FAMILY MEDICINE CLINIC | Age: 83
End: 2023-08-21

## 2023-08-21 NOTE — TELEPHONE ENCOUNTER
Caller: Marcello Larson    Relationship: Self    Best call back number: [phone]     What is the best time to reach you: ANYTIME     Who are you requesting to speak with (clinical staff, provider,  specific staff member): CLINICAL     What was the call regarding: PATIENT IS CALLING IN REGARDS TO, A CALL RECEIVED FROM THE OFFICE, STATING THE NURSE CONTACTED THE PATIENT, NO ENCOUNTER TO FOLLOW, PLEASE ADVISE.

## 2023-08-30 ENCOUNTER — TELEPHONE (OUTPATIENT)
Dept: FAMILY MEDICINE CLINIC | Age: 83
End: 2023-08-30

## 2023-08-30 NOTE — TELEPHONE ENCOUNTER
Caller: Marcello Larson    Relationship: Self    Best call back number:     [phone]       Caller requesting test results: PATIENT    What test was performed: LABS    When was the test performed: 8.15.2023    Where was the test performed: [facility]    Additional notes: PATIENT STATES HE HAS NOT RECEIVED A CALL ABOUT RESULTS AND WOULD LIKE TO DISCUSS IF THEY ARE BACK

## 2023-10-17 ENCOUNTER — OFFICE VISIT (OUTPATIENT)
Dept: FAMILY MEDICINE CLINIC | Age: 83
End: 2023-10-17
Payer: MEDICARE

## 2023-10-17 VITALS
HEART RATE: 50 BPM | TEMPERATURE: 97.8 F | SYSTOLIC BLOOD PRESSURE: 132 MMHG | WEIGHT: 143.8 LBS | HEIGHT: 67 IN | BODY MASS INDEX: 22.57 KG/M2 | DIASTOLIC BLOOD PRESSURE: 51 MMHG

## 2023-10-17 DIAGNOSIS — I50.42 CHRONIC COMBINED SYSTOLIC AND DIASTOLIC CONGESTIVE HEART FAILURE: ICD-10-CM

## 2023-10-17 DIAGNOSIS — I10 PRIMARY HYPERTENSION: ICD-10-CM

## 2023-10-17 DIAGNOSIS — M46.97 UNSPECIFIED INFLAMMATORY SPONDYLOPATHY, LUMBOSACRAL REGION: ICD-10-CM

## 2023-10-17 DIAGNOSIS — I48.21 PERMANENT ATRIAL FIBRILLATION: ICD-10-CM

## 2023-10-17 DIAGNOSIS — I25.10 CORONARY ARTERY DISEASE INVOLVING NATIVE CORONARY ARTERY OF NATIVE HEART WITHOUT ANGINA PECTORIS: ICD-10-CM

## 2023-10-17 DIAGNOSIS — E11.59 TYPE 2 DIABETES MELLITUS WITH OTHER CIRCULATORY COMPLICATIONS: Primary | ICD-10-CM

## 2023-10-17 DIAGNOSIS — E78.2 MIXED HYPERLIPIDEMIA: ICD-10-CM

## 2023-10-17 DIAGNOSIS — I25.5 ISCHEMIC CARDIOMYOPATHY: ICD-10-CM

## 2023-10-17 NOTE — ASSESSMENT & PLAN NOTE
Stable on lovastatin 40 mg daily.  Refills were not needed today.  Labs were not due today.  Continue to monitor.

## 2023-10-17 NOTE — ASSESSMENT & PLAN NOTE
Blood pressure has been running at goal.  Continue losartan 50 mg daily and carvedilol 12.5 mg twice daily.  Refills were not needed today.  Labs were not needed today.  Continue to monitor.

## 2023-10-17 NOTE — PROGRESS NOTES
Chief Complaint  Diabetes    Subjective          Marcello Larson presents to [facility] today for routine f/u of chronic issues.    He is on Ranexa for CAD, history of 2 vessel CABG.  He is on Plavix for CAD.  He is on Eliquis for anticoagulation of atrial fibrillation (through patient assistance).  He is on Lasix.  He has had intermittent hypokalemia.  He is on carvedilol and losartan for HTN.  His blood pressure has been well controlled.  He is on lovastatin for HLD.  He has NTG for prn use but rarely uses them.  He follows with Dr. Castañeda [facility].  He has chronic systolic heart failure for which he is on a beta-blocker, ACEI, statin and loop diuretic.  S/p ICD placement.    He is on metformin for diabetes.  Dr. Castañeda wanted to try him on Jardiance but he was unable to afford it due to cost.  Last A1c was 6.3 in August.  He does adhere to a diabetic diet.  He is UTD on eye exam (last done 8/2022); no diabetic retinopathy.  He is on an ARB and statin.      He is on pantoprazole for GERD and history of bleeding ulcer (NSAID-induced.)  He has sucralfate as needed.  He is on an iron supplement.    He is on hydrocodone/APAP for longstanding history chronic low back pain and leg pain.  He follows with Dr. [facility].  He has used gabapentin in the past but not currently.  S/p physical therapy and RFA.      He is on Flonase and montelukast for chronic allergies.  He uses a saline rinse as needed.    He did get his new left leg prosthesis since last visit.  That is working out great.  He has it decked with Kayla motta!      Current Outpatient Medications:     apixaban (ELIQUIS) 5 MG tablet tablet, Take 1 tablet by mouth 2 (Two) Times a Day., Disp: 180 tablet, Rfl: 3    carvedilol (COREG) 12.5 MG tablet, Take 1 tablet by mouth 2 (Two) Times a Day., Disp: 180 tablet, Rfl: 3    clopidogrel (PLAVIX) 75 MG tablet, Take 1 tablet by mouth Daily., Disp: 90  tablet, Rfl: 3    Diclofenac Sodium (VOLTAREN) 1 % gel gel, APPLY TO THE AFFECTED AREA(S) TWICE DAILY, Disp: 100 g, Rfl: 3    erythromycin (ROMYCIN) 5 MG/GM ophthalmic ointment, Administer  into the left eye As Needed., Disp: , Rfl:     ferrous sulfate 325 (65 FE) MG tablet, Take 1 tablet by mouth Daily With Breakfast., Disp: , Rfl:     fexofenadine (ALLEGRA) 180 MG tablet, Take 1 tablet by mouth Daily., Disp: , Rfl:     fluticasone (FLONASE) 50 MCG/ACT nasal spray, 2 sprays into the nostril(s) as directed by provider Daily., Disp: 48 g, Rfl: 3    furosemide (LASIX) 40 MG tablet, Take 1 tablet by mouth Daily., Disp: 60 tablet, Rfl: 2    glucose blood test strip, Check BS daily.  DX E11.9, Disp: 100 each, Rfl: 3    glucose monitor monitoring kit, 1 each Daily. E11.9, Disp: 1 each, Rfl: 0    HYDROcodone-acetaminophen (NORCO) 5-325 MG per tablet, Take 1 tablet by mouth Every 8 (Eight) Hours As Needed., Disp: , Rfl:     Lancets Thin misc, 1 each Daily. DX E11.9, Disp: 100 each, Rfl: 3    losartan (COZAAR) 50 MG tablet, Take 1 tablet by mouth Daily., Disp: 90 tablet, Rfl: 3    lovastatin (MEVACOR) 40 MG tablet, Take 1 tablet by mouth Every Evening., Disp: 90 tablet, Rfl: 1    metFORMIN (GLUCOPHAGE) 500 MG tablet, Take 1 tablet by mouth Daily., Disp: 90 tablet, Rfl: 1    montelukast (SINGULAIR) 10 MG tablet, Take 1 tablet by mouth Every Night., Disp: 90 tablet, Rfl: 1    nitroglycerin (NITROSTAT) 0.4 MG SL tablet, Place 1 tablet under the tongue Every 5 (Five) Minutes As Needed for Chest Pain. Take no more than 3 doses in 15 minutes., Disp: 25 tablet, Rfl: 0    pantoprazole (PROTONIX) 40 MG EC tablet, Take 1 tablet by mouth Daily., Disp: 90 tablet, Rfl: 1    prochlorperazine (COMPAZINE) 10 MG tablet, Take 1 tablet by mouth Every 8 (Eight) Hours As Needed for Nausea or Vomiting., Disp: 24 tablet, Rfl: 0    silver sulfadiazine (Silvadene) 1 % cream, Apply 1 application topically to the appropriate area as directed 2 (Two)  "Times a Day., Disp: 50 g, Rfl: 0    sucralfate (CARAFATE) 1 g tablet, TAKE 1 TABLET BY MOUTH FOUR TIMES DAILY, Disp: 120 tablet, Rfl: 5    vitamin B-6 (PYRIDOXINE) 100 MG tablet, Take 1 tablet by mouth Daily., Disp: , Rfl:     vitamin C (ASCORBIC ACID) 500 MG tablet, Take 1 tablet by mouth Daily., Disp: , Rfl:     Allergies:  Iodinated contrast media, Lisinopril, and Prednisone      Objective   Vital Signs:   Vitals:    10/17/23 0805   BP: 132/51   Pulse: 50   Temp: 97.8 °F (36.6 °C)   TempSrc: Oral   Weight: 65.2 kg (143 lb 12.8 oz)   Height: 170.2 cm (67.01\")       Physical Exam  Vitals reviewed.   Constitutional:       General: He is not in acute distress.     Appearance: Normal appearance. He is well-developed.   HENT:      Head: Normocephalic and atraumatic.      Right Ear: External ear normal.      Left Ear: External ear normal.   Eyes:      Extraocular Movements: Extraocular movements intact.      Conjunctiva/sclera: Conjunctivae normal.      Pupils: Pupils are equal, round, and reactive to light.   Cardiovascular:      Rate and Rhythm: Normal rate and regular rhythm.      Heart sounds: No murmur heard.  Pulmonary:      Effort: Pulmonary effort is normal.      Breath sounds: Normal breath sounds. No wheezing, rhonchi or rales.   Abdominal:      General: Bowel sounds are normal. There is no distension.      Palpations: Abdomen is soft.      Tenderness: There is no abdominal tenderness.   Genitourinary:     Comments: Urostomy bag in place filled with clear urine, stoma appears normal  Musculoskeletal:         General: Normal range of motion.      Comments: +L BKA prosthesis   Skin:     General: Skin is warm and dry.   Neurological:      Mental Status: He is alert.   Psychiatric:         Mood and Affect: Affect normal.        Lab Results   Component Value Date    GLUCOSE 105 (H) 08/15/2023    BUN 26 (H) 08/15/2023    CREATININE 1.24 08/15/2023    EGFR 57.7 (L) 08/15/2023    BCR 21.0 08/15/2023    K 4.6 " 08/15/2023    CO2 19.5 (L) 08/15/2023    CALCIUM 9.4 08/15/2023    ALBUMIN 4.2 08/15/2023    BILITOT 0.5 08/15/2023    AST 20 08/15/2023    ALT 11 08/15/2023       Lab Results   Component Value Date    CHOL 91 08/15/2023    CHLPL 129 02/20/2021    TRIG 74 08/15/2023    HDL 30 (L) 08/15/2023    LDL 45 08/15/2023       Lab Results   Component Value Date    WBC 5.82 08/15/2023    HGB 12.5 (L) 08/15/2023    HCT 37.8 08/15/2023    MCV 90.4 08/15/2023     (L) 08/15/2023     Lab Results   Component Value Date    HGBA1C 6.30 (H) 08/15/2023            Assessment and Plan    Diagnoses and all orders for this visit:    1. 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  No refills needed.  Dr. Castañeda wanted to try him on Jardiance but he was unable to afford it due to cost.  Last A1c was 6.3 in August.  He does adhere to a diabetic diet.  He is UTD on eye exam (last done 8/2022); no diabetic retinopathy.  He is on an ARB and statin.      2. Primary hypertension  Assessment & Plan:  Blood pressure has been running at goal.  Continue losartan 50 mg daily and carvedilol 12.5 mg twice daily.  Refills were not needed today.  Labs were not needed today.  Continue to monitor.        3. Mixed hyperlipidemia  Assessment & Plan:  Stable on lovastatin 40 mg daily.  Refills were not needed today.  Labs were not due today.  Continue to monitor.        4. Coronary artery disease involving native coronary artery of native heart without angina pectoris  Overview:  Follows with Dr. Castañeda [facility].  He is on Eliquis for the combination of CAD, atrial fibrillation and systolic heart failure.  No longer on aspirin/Plavix per Cardiology.  Status post ICD placement.  He is on statin, beta-blocker and ARB.        5. Ischemic cardiomyopathy  Overview:  Status post ICD placement.  Following with Dr. Castañeda [facility].      6. Chronic combined systolic and diastolic  congestive heart failure  Overview:  Follows with Dr. Castañeda [facility].  Stable on ARB and beta-blocker.  Status post ICD placement.      7. Permanent atrial fibrillation  Overview:  Following with Dr. Castañeda [facility].  On Eliquis 5 mg twice daily for anticoagulation and carvedilol 12.5 mg twice daily for rate control.      8. Unspecified inflammatory spondylopathy, lumbosacral region  Overview:  Following with Dr. Charlette Rascon at [facility].  On hydrocodone.  They are monitoring.            Follow Up   Return in about 2 months (around 12/17/2023) for or as scheduled 12/26/2023 [facility].  Patient was given instructions and counseling regarding his condition or for health maintenance advice. Please see specific information pulled into the AVS if appropriate.

## 2023-10-17 NOTE — ASSESSMENT & PLAN NOTE
He is on metformin for diabetes.  No refills needed.  Dr. Castañeda wanted to try him on Jardiance but he was unable to afford it due to cost.  Last A1c was 6.3 in August.  He does adhere to a diabetic diet.  He is UTD on eye exam (last done 8/2022); no diabetic retinopathy.  He is on an ARB and statin.

## 2023-11-10 DIAGNOSIS — M46.97 UNSPECIFIED INFLAMMATORY SPONDYLOPATHY, LUMBOSACRAL REGION: ICD-10-CM

## 2023-11-17 DIAGNOSIS — I25.118 CORONARY ARTERY DISEASE OF NATIVE ARTERY OF NATIVE HEART WITH STABLE ANGINA PECTORIS: ICD-10-CM

## 2023-11-17 RX ORDER — NITROGLYCERIN 0.4 MG/1
TABLET SUBLINGUAL
Qty: 25 TABLET | Refills: 0 | Status: SHIPPED | OUTPATIENT
Start: 2023-11-17

## 2023-11-18 DIAGNOSIS — E78.2 MIXED HYPERLIPIDEMIA: ICD-10-CM

## 2023-11-20 RX ORDER — LOVASTATIN 40 MG/1
40 TABLET ORAL EVERY EVENING
Qty: 90 TABLET | Refills: 1 | Status: SHIPPED | OUTPATIENT
Start: 2023-11-20

## 2023-12-06 ENCOUNTER — OFFICE VISIT (OUTPATIENT)
Dept: FAMILY MEDICINE CLINIC | Age: 83
End: 2023-12-06
Payer: MEDICARE

## 2023-12-06 VITALS
DIASTOLIC BLOOD PRESSURE: 66 MMHG | OXYGEN SATURATION: 99 % | BODY MASS INDEX: 22.7 KG/M2 | SYSTOLIC BLOOD PRESSURE: 168 MMHG | HEART RATE: 60 BPM | TEMPERATURE: 96.9 F | WEIGHT: 144.6 LBS | HEIGHT: 67 IN

## 2023-12-06 DIAGNOSIS — R05.1 ACUTE COUGH: ICD-10-CM

## 2023-12-06 DIAGNOSIS — U07.1 COVID: Primary | ICD-10-CM

## 2023-12-06 PROCEDURE — 99213 OFFICE O/P EST LOW 20 MIN: CPT | Performed by: NURSE PRACTITIONER

## 2023-12-06 PROCEDURE — 1160F RVW MEDS BY RX/DR IN RCRD: CPT | Performed by: NURSE PRACTITIONER

## 2023-12-06 PROCEDURE — 3077F SYST BP >= 140 MM HG: CPT | Performed by: NURSE PRACTITIONER

## 2023-12-06 PROCEDURE — 1159F MED LIST DOCD IN RCRD: CPT | Performed by: NURSE PRACTITIONER

## 2023-12-06 PROCEDURE — 3078F DIAST BP <80 MM HG: CPT | Performed by: NURSE PRACTITIONER

## 2023-12-06 PROCEDURE — 87428 SARSCOV & INF VIR A&B AG IA: CPT | Performed by: NURSE PRACTITIONER

## 2023-12-06 RX ORDER — GUAIFENESIN 600 MG/1
600 TABLET, EXTENDED RELEASE ORAL 2 TIMES DAILY
Qty: 40 TABLET | Refills: 0 | Status: SHIPPED | OUTPATIENT
Start: 2023-12-06

## 2023-12-06 RX ORDER — AMOXICILLIN AND CLAVULANATE POTASSIUM 875; 125 MG/1; MG/1
1 TABLET, FILM COATED ORAL
COMMUNITY
Start: 2023-11-30

## 2023-12-06 NOTE — PROGRESS NOTES
"Chief Complaint  Nasal Congestion (X 2wks)    Subjective        Marcello Larson presents to [facility]  History of Present Illness  Patient here today for concerns of possible covid infection or URI. Symptoms began one and a half weeks ago. He went to urgent care and they started him on Augmentin for sinus infection but it has not improved his nasal congestion.    Known Exposure to positive case?   na  Date of exposure?   na  Date of symptoms start?   11/25/2023  (Symptoms may appear 2-14 days after exposure )    Fever or chills?   yes  Cough?   yes  Shortness of breath or difficulty breathing?  no  Fatigue?   yes  Muscle or body aches?  no   Headache?   no  New loss of taste or smell? no  Sore throat?  no  Congestion or runny nose? yes  Nausea or vomiting?   no  Diarrhea?   no  Any  emergency warning signs for COVID-19.   Trouble breathing?  no  Persistent pain or pressure in the chest?   no  New confusion? no  Inability to wake or stay awake?no  Pale, gray, or blue-colored skin, lips, or nail beds, depending on skin tone?   no    Taking any medications at home to help with symptoms?yes  Any prior vaccine to covid?   yes  Any significant health problems / existing lung / heart problems?  yes  Interested in monoclonal antibody treatment if test result is positive? no  (must be symptomatic, have a positive PCR covid test and meet ONE of the following criteria and be at least 18 YOA)  Age 65 or older  Obesity (BMI of 25 or more)  Pregnancy  Immunosuppressed  Diabetes  CKD  Cardiovascular disease, hypertension, chronic lung disease  Sickle cell disease   Neurodevelopment disorder  Objective   Vital Signs:  /66 (BP Location: Left arm, Patient Position: Sitting)   Pulse 60   Temp 96.9 °F (36.1 °C) (Temporal)   Ht 170.2 cm (67.01\")   Wt 65.6 kg (144 lb 9.6 oz)   SpO2 99%   BMI 22.64 kg/m²   Estimated body mass index is 22.64 kg/m² as calculated from the following:    Height as " "of this encounter: 170.2 cm (67.01\").    Weight as of this encounter: 65.6 kg (144 lb 9.6 oz).       BMI is within normal parameters. No other follow-up for BMI required.      Physical Exam  HENT:      Nose: Congestion present.   Cardiovascular:      Rate and Rhythm: Normal rate and regular rhythm.   Pulmonary:      Effort: Pulmonary effort is normal. No respiratory distress.      Breath sounds: Normal breath sounds. No stridor. No wheezing, rhonchi or rales.      Comments: No coughing during exam  Skin:     General: Skin is warm and dry.   Neurological:      Mental Status: He is alert and oriented to person, place, and time.   Psychiatric:         Mood and Affect: Mood normal.        Result Review :          Results for orders placed or performed in visit on 12/06/23   POCT SARS-CoV-2 Antigen SAMANTHA + Flu    Specimen: Swab   Result Value Ref Range    SARS Antigen Detected (A) Not Detected, Presumptive Negative    Influenza A Antigen SAMANTHA Not Detected Not Detected    Influenza B Antigen SAMANTHA Not Detected Not Detected    Internal Control Passed Passed    Lot Number [id number]     Expiration Date 9/13/24               Assessment and Plan   Diagnoses and all orders for this visit:    1. COVID (Primary)  Comments:  Day 12 of symptoms, improving, no current signs of pneumonia, will add mucinex to aide in drying secretions, F/U for worsening  Orders:  -     guaiFENesin (Mucinex) 600 MG 12 hr tablet; Take 1 tablet by mouth 2 (Two) Times a Day.  Dispense: 40 tablet; Refill: 0    2. Acute cough  -     POCT SARS-CoV-2 Antigen SAMANTHA + Flu             Follow Up   Return if symptoms worsen or fail to improve.  Patient was given instructions and counseling regarding his condition or for health maintenance advice. Please see specific information pulled into the AVS if appropriate.         "

## 2023-12-12 DIAGNOSIS — E11.59 TYPE 2 DIABETES MELLITUS WITH OTHER CIRCULATORY COMPLICATIONS: ICD-10-CM

## 2023-12-26 ENCOUNTER — LAB (OUTPATIENT)
Dept: LAB | Facility: HOSPITAL | Age: 83
End: 2023-12-26
Payer: MEDICARE

## 2023-12-26 ENCOUNTER — OFFICE VISIT (OUTPATIENT)
Dept: FAMILY MEDICINE CLINIC | Age: 83
End: 2023-12-26
Payer: MEDICARE

## 2023-12-26 VITALS
BODY MASS INDEX: 22.95 KG/M2 | SYSTOLIC BLOOD PRESSURE: 113 MMHG | TEMPERATURE: 97.8 F | HEART RATE: 50 BPM | HEIGHT: 67 IN | OXYGEN SATURATION: 96 % | WEIGHT: 146.2 LBS | DIASTOLIC BLOOD PRESSURE: 69 MMHG

## 2023-12-26 DIAGNOSIS — I50.42 CHRONIC COMBINED SYSTOLIC AND DIASTOLIC CONGESTIVE HEART FAILURE: ICD-10-CM

## 2023-12-26 DIAGNOSIS — Z00.00 ANNUAL PHYSICAL EXAM: Primary | ICD-10-CM

## 2023-12-26 DIAGNOSIS — E11.59 TYPE 2 DIABETES MELLITUS WITH OTHER CIRCULATORY COMPLICATIONS: ICD-10-CM

## 2023-12-26 DIAGNOSIS — D64.9 ANEMIA, UNSPECIFIED TYPE: ICD-10-CM

## 2023-12-26 DIAGNOSIS — E78.2 MIXED HYPERLIPIDEMIA: ICD-10-CM

## 2023-12-26 DIAGNOSIS — I48.21 PERMANENT ATRIAL FIBRILLATION: ICD-10-CM

## 2023-12-26 DIAGNOSIS — I25.5 ISCHEMIC CARDIOMYOPATHY: ICD-10-CM

## 2023-12-26 DIAGNOSIS — D69.6 PLATELETS DECREASED: ICD-10-CM

## 2023-12-26 DIAGNOSIS — M46.97 UNSPECIFIED INFLAMMATORY SPONDYLOPATHY, LUMBOSACRAL REGION: ICD-10-CM

## 2023-12-26 DIAGNOSIS — I10 PRIMARY HYPERTENSION: ICD-10-CM

## 2023-12-26 DIAGNOSIS — I25.10 CORONARY ARTERY DISEASE INVOLVING NATIVE CORONARY ARTERY OF NATIVE HEART WITHOUT ANGINA PECTORIS: ICD-10-CM

## 2023-12-26 LAB
BASOPHILS # BLD AUTO: 0.02 10*3/MM3 (ref 0–0.2)
BASOPHILS NFR BLD AUTO: 0.3 % (ref 0–1.5)
DEPRECATED RDW RBC AUTO: 49.8 FL (ref 37–54)
EOSINOPHIL # BLD AUTO: 0.19 10*3/MM3 (ref 0–0.4)
EOSINOPHIL NFR BLD AUTO: 3.2 % (ref 0.3–6.2)
ERYTHROCYTE [DISTWIDTH] IN BLOOD BY AUTOMATED COUNT: 14.3 % (ref 12.3–15.4)
FERRITIN SERPL-MCNC: 71.6 NG/ML (ref 30–400)
FOLATE SERPL-MCNC: 9.43 NG/ML (ref 4.78–24.2)
HCT VFR BLD AUTO: 35.7 % (ref 37.5–51)
HGB BLD-MCNC: 11.3 G/DL (ref 13–17.7)
IMM GRANULOCYTES # BLD AUTO: 0.03 10*3/MM3 (ref 0–0.05)
IMM GRANULOCYTES NFR BLD AUTO: 0.5 % (ref 0–0.5)
IRON 24H UR-MRATE: 126 MCG/DL (ref 59–158)
IRON SATN MFR SERPL: 37 % (ref 20–50)
LYMPHOCYTES # BLD AUTO: 1.06 10*3/MM3 (ref 0.7–3.1)
LYMPHOCYTES NFR BLD AUTO: 18 % (ref 19.6–45.3)
MCH RBC QN AUTO: 30 PG (ref 26.6–33)
MCHC RBC AUTO-ENTMCNC: 31.7 G/DL (ref 31.5–35.7)
MCV RBC AUTO: 94.7 FL (ref 79–97)
MONOCYTES # BLD AUTO: 0.56 10*3/MM3 (ref 0.1–0.9)
MONOCYTES NFR BLD AUTO: 9.5 % (ref 5–12)
NEUTROPHILS NFR BLD AUTO: 4.03 10*3/MM3 (ref 1.7–7)
NEUTROPHILS NFR BLD AUTO: 68.5 % (ref 42.7–76)
PLATELET # BLD AUTO: 111 10*3/MM3 (ref 140–450)
PMV BLD AUTO: 9.9 FL (ref 6–12)
RBC # BLD AUTO: 3.77 10*6/MM3 (ref 4.14–5.8)
TIBC SERPL-MCNC: 343 MCG/DL (ref 298–536)
TRANSFERRIN SERPL-MCNC: 230 MG/DL (ref 200–360)
VIT B12 BLD-MCNC: 281 PG/ML (ref 211–946)
WBC NRBC COR # BLD AUTO: 5.89 10*3/MM3 (ref 3.4–10.8)

## 2023-12-26 PROCEDURE — 82607 VITAMIN B-12: CPT

## 2023-12-26 PROCEDURE — 83540 ASSAY OF IRON: CPT

## 2023-12-26 PROCEDURE — 36415 COLL VENOUS BLD VENIPUNCTURE: CPT

## 2023-12-26 PROCEDURE — 85025 COMPLETE CBC W/AUTO DIFF WBC: CPT

## 2023-12-26 PROCEDURE — 84466 ASSAY OF TRANSFERRIN: CPT

## 2023-12-26 PROCEDURE — 82746 ASSAY OF FOLIC ACID SERUM: CPT

## 2023-12-26 PROCEDURE — 82728 ASSAY OF FERRITIN: CPT

## 2023-12-26 RX ORDER — ISOSORBIDE DINITRATE 30 MG/1
30 TABLET ORAL 4 TIMES DAILY
COMMUNITY

## 2023-12-26 NOTE — ASSESSMENT & PLAN NOTE
Colonoscopy is no longer indicated by age and history (last done 8/2019 and this showed diverticulosis).  Prostate cancer screening is no longer indicated by age and history; s/p prostatectomy d/t bladder cancer.  He is UTD on COVID (10/2023), Pneumovax, (10/2008), Oajppep36 ([facility]), Vmujbfl45 (9/2015), Zostavax (10/2006), Shingrix (12/2019, 5/2020), Tdap (1/2013), and flu (10/2022).  He is UTD on routine labs including diabetes panel.

## 2023-12-26 NOTE — ASSESSMENT & PLAN NOTE
Blood pressure has been running at goal.  Continue carvedilol 6.25 mg twice daily and losartan 25 mg daily.  Refills were not needed today.  Labs were not needed today.  Continue to monitor.

## 2023-12-26 NOTE — ASSESSMENT & PLAN NOTE
Rechecking labs this month with an anemia panel to assess.  He has a history of GI bleed.  Currently on Eliquis 5 mg twice daily alone.  No antiplatelet agents.

## 2023-12-26 NOTE — ASSESSMENT & PLAN NOTE
He is on metformin for diabetes.  No refills needed.  Dr. Castañeda wanted to try him on Jardiance but it was too expensive.  He is switching medication insurance to [employer] in the new year, so we may try the Jardiance again at that time.  Last A1c was 6.3 in August.  He does adhere to a diabetic diet.  He is UTD on eye exam (last done 8/2023).  He is on an ARB and statin.  Labs are reviewed and UTD.

## 2023-12-26 NOTE — PROGRESS NOTES
The ABCs of the Annual Wellness Visit  Subsequent Medicare Wellness Visit    Subjective    Marcello Larson is a 83 y.o. male who presents for a Subsequent Medicare Wellness Visit.    Colonoscopy is no longer indicated by age and history (last done 8/2019 and this showed diverticulosis).  Prostate cancer screening is no longer indicated by age and history; s/p prostatectomy d/t bladder cancer.  He is UTD on COVID (10/2023), Pneumovax, (10/2008), [id number] (got at [facility] last year), Dohdmzm86 (9/2015), Zostavax (10/2006), Shingrix (12/2019, 5/2020), Tdap (1/2013), and flu (10/2022).  He is UTD on routine labs including diabetes panel.     The following portions of the patient's history were reviewed and   updated as appropriate: allergies, current medications, past family history, past medical history, past social history, past surgical history, and problem list.    Compared to one year ago, the patient feels his physical   health is better.    Compared to one year ago, the patient feels his mental   health is the same.    Recent Hospitalizations:  He was not admitted to the hospital during the last year.       Current Medical Providers:  Patient Care Team:  Zeny Corley MD as PCP - General (Family Medicine)  Andrew Castañeda MD as Consulting Physician (Cardiology)    Outpatient Medications Prior to Visit   Medication Sig Dispense Refill    apixaban (ELIQUIS) 5 MG tablet tablet Take 1 tablet by mouth 2 (Two) Times a Day. 180 tablet 3    carvedilol (COREG) 12.5 MG tablet Take 1 tablet by mouth 2 (Two) Times a Day. (Patient taking differently: Take 0.5 tablets by mouth 2 (Two) Times a Day.) 180 tablet 3    Diclofenac Sodium (VOLTAREN) 1 % gel gel APPLY TO THE AFFECTED AREA(S) TWICE DAILY 100 g 3    ferrous sulfate 325 (65 FE) MG tablet Take 1 tablet by mouth Daily With Breakfast.      fexofenadine (ALLEGRA) 180 MG tablet Take 1 tablet by mouth Daily.      fluticasone (FLONASE) 50 MCG/ACT nasal spray 2 sprays  into the nostril(s) as directed by provider Daily. 48 g 3    furosemide (LASIX) 40 MG tablet Take 1 tablet by mouth Daily. 60 tablet 2    glucose blood test strip Check BS daily.  DX E11.9 100 each 3    glucose monitor monitoring kit 1 each Daily. E11.9 1 each 0    guaiFENesin (Mucinex) 600 MG 12 hr tablet Take 1 tablet by mouth 2 (Two) Times a Day. 40 tablet 0    HYDROcodone-acetaminophen (NORCO) 5-325 MG per tablet Take 1 tablet by mouth Every 8 (Eight) Hours As Needed.      isosorbide dinitrate (ISORDIL) 30 MG tablet Take 1 tablet by mouth 4 (Four) Times a Day.      Lancets Thin misc 1 each Daily. DX E11.9 100 each 3    losartan (COZAAR) 50 MG tablet Take 1 tablet by mouth Daily. (Patient taking differently: Take 0.5 tablets by mouth Daily.) 90 tablet 3    lovastatin (MEVACOR) 40 MG tablet TAKE 1 TABLET BY MOUTH EVERY EVENING 90 tablet 1    metFORMIN (GLUCOPHAGE) 500 MG tablet TAKE 1 TABLET BY MOUTH ONCE DAILY 90 tablet 1    montelukast (SINGULAIR) 10 MG tablet Take 1 tablet by mouth Every Night. 90 tablet 1    nitroglycerin (NITROSTAT) 0.4 MG SL tablet DISSOLVE 1 TABLET UNDER THE TONGUE EVERY 5 MINUTES AS NEEDED FOR CHEST PAIN. DO NOT EXCEED A TOTAL OF 3 DOSES IN 15 MINUTES. IF NO RELIEF, CALL 911 25 tablet 0    pantoprazole (PROTONIX) 40 MG EC tablet Take 1 tablet by mouth Daily. 90 tablet 1    prochlorperazine (COMPAZINE) 10 MG tablet Take 1 tablet by mouth Every 8 (Eight) Hours As Needed for Nausea or Vomiting. 24 tablet 0    silver sulfadiazine (Silvadene) 1 % cream Apply 1 application topically to the appropriate area as directed 2 (Two) Times a Day. 50 g 0    sucralfate (CARAFATE) 1 g tablet TAKE 1 TABLET BY MOUTH FOUR TIMES DAILY 120 tablet 5    vitamin B-6 (PYRIDOXINE) 100 MG tablet Take 1 tablet by mouth Daily.      vitamin C (ASCORBIC ACID) 500 MG tablet Take 1 tablet by mouth Daily.      amoxicillin-clavulanate (AUGMENTIN) 875-125 MG per tablet Take 1 tablet by mouth. (Patient not taking: Reported on  12/26/2023)      clopidogrel (PLAVIX) 75 MG tablet Take 1 tablet by mouth Daily. (Patient not taking: Reported on 12/26/2023) 90 tablet 3    erythromycin (ROMYCIN) 5 MG/GM ophthalmic ointment Administer  into the left eye As Needed. (Patient not taking: Reported on 12/26/2023)       No facility-administered medications prior to visit.       Opioid medication/s are on active medication list.  and I have evaluated his active treatment plan and pain score trends (see table).  There were no vitals filed for this visit.  I have reviewed the chart for potential of high risk medication and harmful drug interactions in the elderly.          Aspirin is not on active medication list.  Aspirin use is contraindicated for this patient due to: history of bleeding and current use of Eliquis.  .    Patient Active Problem List   Diagnosis    Mixed hyperlipidemia    Primary hypertension    Coronary artery disease involving native coronary artery of native heart without angina pectoris    Ischemic cardiomyopathy    Obstructive sleep apnea syndrome    Palpitations    Hx of CABG    Stented coronary artery    Positive cardiac stress test    Type 2 diabetes mellitus with other circulatory complications    Annual physical exam    Chronic combined systolic and diastolic congestive heart failure    ICD (implantable cardioverter-defibrillator), dual, in situ    Other artificial openings of urinary tract status    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  ACP discussion was held with the  "patient during this visit. Patient has an advance directive in EMR which is still valid.      Objective    Vitals:    23 0758   BP: 113/69   BP Location: Left arm   Patient Position: Sitting   Cuff Size: Adult   Pulse: 50   Temp: 97.8 °F (36.6 °C)   TempSrc: Oral   SpO2: 96%   Weight: 66.3 kg (146 lb 3.2 oz)   Height: 170.2 cm (67.01\")     Estimated body mass index is 22.89 kg/m² as calculated from the following:    Height as of this encounter: 170.2 cm (67.01\").    Weight as of this encounter: 66.3 kg (146 lb 3.2 oz).    BMI is within normal parameters. No other follow-up for BMI required.      Does the patient have evidence of cognitive impairment? No          HEALTH RISK ASSESSMENT    Smoking Status:  Social History     Tobacco Use   Smoking Status Former    Packs/day: 1.00    Years: 24.00    Additional pack years: 0.00    Total pack years: 24.00    Types: Cigarettes    Start date:     Quit date: 1984    Years since quittin.0   Smokeless Tobacco Never   Tobacco Comments    Have not smoked since      Alcohol Consumption:  Social History     Substance and Sexual Activity   Alcohol Use Never     Fall Risk Screen:    CLARA Fall Risk Assessment was completed, and patient is at LOW risk for falls.Assessment completed on:2023    Depression Screenin/26/2023     7:56 AM   PHQ-2/PHQ-9 Depression Screening   Little Interest or Pleasure in Doing Things 0-->not at all   Feeling Down, Depressed or Hopeless 0-->not at all   PHQ-9: Brief Depression Severity Measure Score 0       Health Habits and Functional and Cognitive Screenin/26/2023     7:56 AM   Functional & Cognitive Status   Do you have difficulty preparing food and eating? No   Do you have difficulty bathing yourself, getting dressed or grooming yourself? No   Do you have difficulty using the toilet? No   Do you have difficulty moving around from place to place? No   Do you have trouble with steps or getting out of a bed " or a chair? No   Current Diet Well Balanced Diet   Dental Exam Up to date   Eye Exam Up to date   Exercise (times per week) 4 times per week   Current Exercises Include Walking   Do you need help using the phone?  No   Are you deaf or do you have serious difficulty hearing?  No   Do you need help to go to places out of walking distance? No   Do you need help shopping? No   Do you need help preparing meals?  No   Do you need help with housework?  No   Do you need help with laundry? No   Do you need help taking your medications? No   Do you need help managing money? No   Do you ever drive or ride in a car without wearing a seat belt? No   Have you felt unusual stress, anger or loneliness in the last month? No   Who do you live with? Alone   If you need help, do you have trouble finding someone available to you? No   Do you have difficulty concentrating, remembering or making decisions? No       Age-appropriate Screening Schedule:  Refer to the list below for future screening recommendations based on patient's age, sex and/or medical conditions. Orders for these recommended tests are listed in the plan section. The patient has been provided with a written plan.    Health Maintenance   Topic Date Due    HEMOGLOBIN A1C  02/15/2024    LIPID PANEL  08/15/2024    URINE MICROALBUMIN  08/15/2024    DIABETIC EYE EXAM  08/31/2024    ANNUAL WELLNESS VISIT  12/26/2024    TDAP/TD VACCINES (4 - Td or Tdap) 02/10/2033    COVID-19 Vaccine  Completed    INFLUENZA VACCINE  Completed    Pneumococcal Vaccine 65+  Completed    ZOSTER VACCINE  Completed                  CMS Preventative Services Quick Reference  Risk Factors Identified During Encounter  Immunizations Discussed/Encouraged: Prevnar 20 (Pneumococcal 20-valent conjugate)  The above risks/problems have been discussed with the patient.  Pertinent information has been shared with the patient in the After Visit Summary.  An After Visit Summary and PPPS were made available to the  patient.    Follow Up:   Next Medicare Wellness visit to be scheduled in 1 year.       Additional E&M Note during same encounter follows:  Patient has multiple medical problems which are significant and separately identifiable that require additional work above and beyond the Medicare Wellness Visit.      Chief Complaint  Medicare Wellness-subsequent    Subjective        HPI  Marcello Larson is also being seen today for routine f/u of chronic issues.    He is on Ranexa for CAD, history of 2 vessel CABG.  He is no longer on Plavix for CAD; stopped whenever he was started on Eliquis.  He is on Eliquis for anticoagulation of atrial fibrillation (through patient assistance).  He did have some mild anemia noted on his last CBC in August with hemoglobin 12.5.  He is on Lasix.  He is on carvedilol and losartan for hypertension but his carvedilol was recently decreased to 6.25 mg twice daily.  BP has been well controlled.  He is on lovastatin for hyperlipidemia.  He has as needed nitroglycerin but rarely uses them.  He is following with Dr. Castañeda Cardiology.  Next appointment there is 1/17/2024.  He has chronic systolic heart failure for which he is on a beta-blocker, ACEI, statin and loop diuretic.  Status post ICD placement.     He is on metformin for diabetes.  Dr. Castañeda wanted to try him on Jardiance but it was too expensive.  Last A1c was 6.3 in August.  He does adhere to a diabetic diet.  He is UTD on eye exam (last done 8/2023).  He is on an ARB and statin.      He is on pantoprazole for GERD and history of bleeding ulcer (NSAID-induced.)  He has sucralfate as needed.  He is on an iron supplement.     He is on Norco for chronic low back and leg pain.  He is following with Dr. [facility].  He has used gabapentin in the past but not currently.  S/p physical therapy and RFA.      He is on Flonase and montelukast for chronic allergies.  He uses a saline rinse as needed.    Review of Systems  "  Constitutional:  Negative for chills, fatigue and fever.   HENT:  Negative for congestion, hearing loss and rhinorrhea.    Eyes:  Negative for pain and visual disturbance.   Respiratory:  Negative for cough and shortness of breath.    Cardiovascular:  Negative for chest pain and palpitations.   Gastrointestinal:  Negative for abdominal pain, constipation, diarrhea, nausea and vomiting.   Genitourinary:  Negative for difficulty urinating and dysuria.   Musculoskeletal:  Positive for back pain. Negative for arthralgias and myalgias.   Neurological:  Negative for weakness and numbness.   Psychiatric/Behavioral:  Negative for dysphoric mood and sleep disturbance. The patient is not nervous/anxious.        Objective   Vital Signs:  /69 (BP Location: Left arm, Patient Position: Sitting, Cuff Size: Adult)   Pulse 50   Temp 97.8 °F (36.6 °C) (Oral)   Ht 170.2 cm (67.01\")   Wt 66.3 kg (146 lb 3.2 oz)   SpO2 96%   BMI 22.89 kg/m²     Physical Exam  Vitals reviewed.   Constitutional:       General: He is not in acute distress.     Appearance: Normal appearance. He is well-developed.   HENT:      Head: Normocephalic and atraumatic.      Right Ear: External ear normal.      Left Ear: External ear normal.      Mouth/Throat:      Mouth: Mucous membranes are moist.   Eyes:      Extraocular Movements: Extraocular movements intact.      Conjunctiva/sclera: Conjunctivae normal.      Pupils: Pupils are equal, round, and reactive to light.   Cardiovascular:      Rate and Rhythm: Normal rate. Rhythm irregularly irregular.      Heart sounds: No murmur heard.  Pulmonary:      Effort: Pulmonary effort is normal.      Breath sounds: Normal breath sounds. No wheezing, rhonchi or rales.   Abdominal:      General: Bowel sounds are normal. There is no distension.      Palpations: Abdomen is soft.      Tenderness: There is no abdominal tenderness.   Genitourinary:     Comments: Urostomy bag in place filled with clear urine, stoma " appears normal and pink  Musculoskeletal:         General: Normal range of motion.      Comments: +L BKA prosthesis   Skin:     General: Skin is warm and dry.   Neurological:      Mental Status: He is alert and oriented to person, place, and time.      Deep Tendon Reflexes: Reflexes normal.   Psychiatric:         Mood and Affect: Mood and affect normal.         Behavior: Behavior normal.         Thought Content: Thought content normal.         Judgment: Judgment normal.                    Lab Results   Component Value Date    GLUCOSE 105 (H) 08/15/2023    BUN 26 (H) 08/15/2023    CREATININE 1.24 08/15/2023    EGFR 57.7 (L) 08/15/2023    BCR 21.0 08/15/2023    K 4.6 08/15/2023    CO2 19.5 (L) 08/15/2023    CALCIUM 9.4 08/15/2023    ALBUMIN 4.2 08/15/2023    BILITOT 0.5 08/15/2023    AST 20 08/15/2023    ALT 11 08/15/2023       Lab Results   Component Value Date    CHOL 91 08/15/2023    CHLPL 129 02/20/2021    TRIG 74 08/15/2023    HDL 30 (L) 08/15/2023    LDL 45 08/15/2023       Lab Results   Component Value Date    WBC 5.82 08/15/2023    HGB 12.5 (L) 08/15/2023    HCT 37.8 08/15/2023    MCV 90.4 08/15/2023     (L) 08/15/2023     Lab Results   Component Value Date    HGBA1C 6.30 (H) 08/15/2023          Assessment and Plan   Diagnoses and all orders for this visit:    1. Annual physical exam (Primary)  Assessment & Plan:  Colonoscopy is no longer indicated by age and history (last done 8/2019 and this showed diverticulosis).  Prostate cancer screening is no longer indicated by age and history; s/p prostatectomy d/t bladder cancer.  He is UTD on COVID (10/2023), Pneumovax, (10/2008), Kofmxzo78 (got at [facility] last year), Irqxrzh53 (9/2015), Zostavax (10/2006), Shingrix (12/2019, 5/2020), Tdap (1/2013), and flu (10/2022).  He is UTD on routine labs including diabetes panel.       2. Type 2 diabetes mellitus with other circulatory complications  Assessment & Plan:  He is on metformin for diabetes.  No refills  needed.  Dr. Castañeda wanted to try him on Jardiance but it was too expensive.  He is switching medication insurance to [employer] in the new year, so we may try the Jardiance again at that time.  Last A1c was 6.3 in August.  He does adhere to a diabetic diet.  He is UTD on eye exam (last done 8/2023).  He is on an ARB and statin.  Labs are reviewed and UTD.      3. Primary hypertension  Assessment & Plan:  Blood pressure has been running at goal.  Continue carvedilol 6.25 mg twice daily and losartan 25 mg daily.  Refills were not needed today.  Labs were not needed today.  Continue to monitor.        4. Mixed hyperlipidemia  Assessment & Plan:  Stable on lovastatin 40 mg daily.  Refills were not needed today.  Labs were not due today.  Continue to monitor.        5. Permanent atrial fibrillation  Overview:  Following with Dr. Castañeda Cardiology.  On Eliquis 5 mg twice daily for anticoagulation and carvedilol 6.25 mg twice daily for rate control.      6. Coronary artery disease involving native coronary artery of native heart without angina pectoris  Overview:  Follows with Dr. Castañeda Cardiology.  He is on Eliquis for the combination of CAD, atrial fibrillation and systolic heart failure.  No longer on aspirin/Plavix per Cardiology.  Status post ICD placement.  He is on statin, beta-blocker and ARB.        7. Ischemic cardiomyopathy  Overview:  Status post ICD placement.  Following with Dr. Castañeda Cardiology.      8. Chronic combined systolic and diastolic congestive heart failure  Overview:  Follows with Dr. Castañeda [facility].  Stable on ARB and beta-blocker.  Status post ICD placement.      9. Unspecified inflammatory spondylopathy, lumbosacral region  Overview:  Following with Dr. Charlette Rascon at [facility].  On hydrocodone.  They are monitoring.      10. Platelets decreased  Overview:  Stable, 130-170.  Continue to monitor.      11. Anemia, unspecified type  Assessment & Plan:  Rechecking labs this month with an anemia  panel to assess.  He has a history of GI bleed.  Currently on Eliquis 5 mg twice daily alone.  No antiplatelet agents.    Orders:  -     CBC & Differential; Future  -     Ferritin; Future  -     Iron Profile; Future  -     Vitamin B12 & Folate; Future               Follow Up   Return in about 2 months (around 2/26/2024) for Recheck.  Patient was given instructions and counseling regarding his condition or for health maintenance advice. Please see specific information pulled into the AVS if appropriate.

## 2023-12-28 DIAGNOSIS — D64.9 ANEMIA, UNSPECIFIED TYPE: Primary | ICD-10-CM

## 2024-01-02 ENCOUNTER — TELEPHONE (OUTPATIENT)
Dept: CARDIOLOGY | Facility: CLINIC | Age: 84
End: 2024-01-02

## 2024-01-02 ENCOUNTER — LAB (OUTPATIENT)
Dept: LAB | Facility: HOSPITAL | Age: 84
End: 2024-01-02
Payer: MEDICARE

## 2024-01-02 DIAGNOSIS — D64.9 ANEMIA, UNSPECIFIED TYPE: ICD-10-CM

## 2024-01-02 LAB — HEMOCCULT STL QL IA: NEGATIVE

## 2024-01-02 PROCEDURE — 82274 ASSAY TEST FOR BLOOD FECAL: CPT

## 2024-01-03 ENCOUNTER — OFFICE VISIT (OUTPATIENT)
Dept: UROLOGY | Facility: CLINIC | Age: 84
End: 2024-01-03
Payer: MEDICARE

## 2024-01-03 VITALS
DIASTOLIC BLOOD PRESSURE: 51 MMHG | HEART RATE: 49 BPM | WEIGHT: 145 LBS | HEIGHT: 67 IN | SYSTOLIC BLOOD PRESSURE: 117 MMHG | BODY MASS INDEX: 22.76 KG/M2

## 2024-01-03 DIAGNOSIS — R31.9 HEMATURIA, UNSPECIFIED TYPE: Primary | ICD-10-CM

## 2024-01-03 LAB
BILIRUB BLD-MCNC: NEGATIVE MG/DL
CLARITY, POC: CLEAR
COLOR UR: YELLOW
EXPIRATION DATE: ABNORMAL
GLUCOSE UR STRIP-MCNC: NEGATIVE MG/DL
KETONES UR QL: NEGATIVE
LEUKOCYTE EST, POC: ABNORMAL
Lab: ABNORMAL
NITRITE UR-MCNC: NEGATIVE MG/ML
PH UR: 7 [PH] (ref 5–8)
PROT UR STRIP-MCNC: ABNORMAL MG/DL
RBC # UR STRIP: ABNORMAL /UL
SP GR UR: 1.02 (ref 1–1.03)
UROBILINOGEN UR QL: ABNORMAL

## 2024-01-03 NOTE — TELEPHONE ENCOUNTER
I called shira and she said she mailed the financial assistance docs to [address] the end of Nov, 2023. Susan hasn't seen them, so I'm going to look for them. If not located, I'll start from scratch.     [facility]  1/3/24  1000am    I was unable to locate financial assistance forms, Ailin/shira is aware. I have put form in Dr. Castañeda's tray for signature. Once his part is completed, I will scan forms to [email or web address], which is Ailin's email.  She will complete her dads portion and fax it to [place].    [facility]  1/3/24  1034am

## 2024-01-03 NOTE — PROGRESS NOTES
Chief Complaint: Blood in Urine (Pt is here today to follow up. Pt denies urinary symptoms at this time and states that he has not seen the blood in his urine.)    Subjective         History of Present Illness  Marcello Larson is a 83 y.o. male presents to [facility] to be seen for f/u recurrent UTI.     No Uti since we last saw him.     No GH     He states he is doing well at this time.       Previous:     The patient states that he has been having issues with UTIs.     He states Uti symptoms are usually just back pain.     He has had no positive urine cx since 1/2021.     Most recent urine cx was with >100,000 cfu/ml of mixed ibrahima.     He states that he was seen in 2007 by Dr. Rojas [facility]  for cystectomy for bladder cancer high grade papillary TCC.    He has not had any f/u with them since 2009.     The concern at present is that he has had some blood in his urine.     He has had 0-2 rbc/ HPF for at least the last 2 years.    He has no complaints today.     Urine cultures:   2/20/2023 greater than 100,000 colony-forming units per milliliter mixed urogenital ibrahima.    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  LV systolic  function is moderate to severely reduced.  EF 30-35%.  The transmittal spectral doppler flow pattern is suggestive of pseudonormalization.  There is moderate to severe global hypokinesis of the LV.  The left atrium is mildly dilated.  The mitral leaflets appear thickened, but open well.  Mild MR and AR.    Hx of CABG 08/13/2019 01/01/1991 - left internal mammary graft to the LAD, SVG to OM1, OM2 in the RCA    Hyperlipidemia 08/13/2019    Hypo-osmolality and hyponatremia     Low back pain     Myocardial infarct     Other long term (current) drug therapy     Palpitations 08/13/2019    Personal history of malignant neoplasm of bladder     Sleep apnea 08/13/2019    Stented coronary artery 08/13/2019 04/11/2014 - VISION bare metal stent to the proximal stenosis of the vein graft to the right and ISION bare metal stent in the mid to distal 80% stenosis.    Type 2 diabetes mellitus without complication     Unstable angina        Past Surgical History:   Procedure Laterality Date    AMPUTATION Left     LOWER EXTREMITY BKA    APPENDECTOMY      CARDIAC CATHETERIZATION      CARDIAC CATHETERIZATION N/A 10/13/2020    Procedure: Left Heart Cath;  Surgeon: Kofi Campoverde MD;  Location: [facility] CATH INVASIVE LOCATION;  Service: Cardiology;  Laterality: N/A;    CARDIAC CATHETERIZATION N/A 10/13/2020    Procedure: Coronary angiography;  Surgeon: Kofi Campoverde MD;  Location: [facility] CATH INVASIVE LOCATION;  Service: Cardiology;  Laterality: N/A;    CARDIAC CATHETERIZATION N/A 10/13/2020    Procedure: Saphenous Vein Graft;  Surgeon: Kofi Campoverde MD;  Location: [facility] CATH INVASIVE LOCATION;  Service: Cardiology;  Laterality: N/A;    CARDIAC ELECTROPHYSIOLOGY PROCEDURE N/A 04/04/2022    Procedure: ICD DC new/SJM;  Surgeon: Mamadou Terry MD;  Location: [facility] CATH INVASIVE LOCATION;  Service: Cardiology;  Laterality: N/A;    CATARACT EXTRACTION      CHOLECYSTECTOMY      CORONARY ARTERY BYPASS GRAFT      1984, 1991 4  VESSEL    INTERNAL CARDIAC DEFIBRILLATOR INSERTION  04/2022    OTHER SURGICAL HISTORY      UROSTOMY S/P BLADDER CA         Current Outpatient Medications:     apixaban (ELIQUIS) 5 MG tablet tablet, Take 1 tablet by mouth 2 (Two) Times a Day., Disp: 180 tablet, Rfl: 3    carvedilol (COREG) 12.5 MG tablet, Take 1 tablet by mouth 2 (Two) Times a Day. (Patient taking differently: Take 0.5 tablets by mouth 2 (Two) Times a Day.), Disp: 180 tablet, Rfl: 3    Diclofenac Sodium (VOLTAREN) 1 % gel gel, APPLY TO THE AFFECTED AREA(S) TWICE DAILY, Disp: 100 g, Rfl: 3    ferrous sulfate 325 (65 FE) MG tablet, Take 1 tablet by mouth Daily With Breakfast., Disp: , Rfl:     fexofenadine (ALLEGRA) 180 MG tablet, Take 1 tablet by mouth Daily., Disp: , Rfl:     fluticasone (FLONASE) 50 MCG/ACT nasal spray, 2 sprays into the nostril(s) as directed by provider Daily., Disp: 48 g, Rfl: 3    furosemide (LASIX) 40 MG tablet, Take 1 tablet by mouth Daily., Disp: 60 tablet, Rfl: 2    glucose blood test strip, Check BS daily.  DX E11.9, Disp: 100 each, Rfl: 3    glucose monitor monitoring kit, 1 each Daily. E11.9, Disp: 1 each, Rfl: 0    guaiFENesin (Mucinex) 600 MG 12 hr tablet, Take 1 tablet by mouth 2 (Two) Times a Day., Disp: 40 tablet, Rfl: 0    HYDROcodone-acetaminophen (NORCO) 5-325 MG per tablet, Take 1 tablet by mouth Every 8 (Eight) Hours As Needed., Disp: , Rfl:     isosorbide dinitrate (ISORDIL) 30 MG tablet, Take 1 tablet by mouth 4 (Four) Times a Day., Disp: , Rfl:     Lancets Thin misc, 1 each Daily. DX E11.9, Disp: 100 each, Rfl: 3    losartan (COZAAR) 50 MG tablet, Take 1 tablet by mouth Daily., Disp: 90 tablet, Rfl: 3    lovastatin (MEVACOR) 40 MG tablet, TAKE 1 TABLET BY MOUTH EVERY EVENING, Disp: 90 tablet, Rfl: 1    metFORMIN (GLUCOPHAGE) 500 MG tablet, TAKE 1 TABLET BY MOUTH ONCE DAILY, Disp: 90 tablet, Rfl: 1    montelukast (SINGULAIR) 10 MG tablet, Take 1 tablet by mouth Every Night., Disp: 90 tablet, Rfl: 1     "nitroglycerin (NITROSTAT) 0.4 MG SL tablet, DISSOLVE 1 TABLET UNDER THE TONGUE EVERY 5 MINUTES AS NEEDED FOR CHEST PAIN. DO NOT EXCEED A TOTAL OF 3 DOSES IN 15 MINUTES. IF NO RELIEF, CALL 911, Disp: 25 tablet, Rfl: 0    pantoprazole (PROTONIX) 40 MG EC tablet, Take 1 tablet by mouth Daily., Disp: 90 tablet, Rfl: 1    prochlorperazine (COMPAZINE) 10 MG tablet, Take 1 tablet by mouth Every 8 (Eight) Hours As Needed for Nausea or Vomiting., Disp: 24 tablet, Rfl: 0    silver sulfadiazine (Silvadene) 1 % cream, Apply 1 application topically to the appropriate area as directed 2 (Two) Times a Day., Disp: 50 g, Rfl: 0    sucralfate (CARAFATE) 1 g tablet, TAKE 1 TABLET BY MOUTH FOUR TIMES DAILY, Disp: 120 tablet, Rfl: 5    vitamin B-6 (PYRIDOXINE) 100 MG tablet, Take 1 tablet by mouth Daily., Disp: , Rfl:     vitamin C (ASCORBIC ACID) 500 MG tablet, Take 1 tablet by mouth Daily., Disp: , Rfl:     Allergies   Allergen Reactions    Iodinated Contrast Media Anaphylaxis    Lisinopril Other (See Comments)    Prednisone Cough        Family History   Problem Relation Age of Onset    No Known Problems Mother     No Known Problems Father        Social History     Socioeconomic History    Marital status:     Number of children: 2   Tobacco Use    Smoking status: Former     Packs/day: 0.00     Years: 0.00     Additional pack years: 0.00     Total pack years: 0.00     Types: Cigarettes     Start date:      Quit date: 1984     Years since quittin.0     Passive exposure: Past    Smokeless tobacco: Never    Tobacco comments:     Have not smoked since    Vaping Use    Vaping Use: Never used   Substance and Sexual Activity    Alcohol use: Never    Drug use: Never    Sexual activity: Not Currently     Partners: Female       Vital Signs:   /51 (BP Location: Left arm, Patient Position: Sitting, Cuff Size: Adult)   Pulse (!) 49   Ht 170.2 cm (67\")   Wt 65.8 kg (145 lb)   BMI 22.71 kg/m²      Physical Exam "     Result Review :   The following data was reviewed by: RONALD Dean on 01/03/2024:  Results for orders placed or performed in visit on 01/03/24   POC Urinalysis Dipstick, Automated    Specimen: Urine   Result Value Ref Range    Color Yellow Yellow, Straw, Dark Yellow, [place]    Clarity, UA Clear Clear    Specific Gravity  1.020 1.005 - 1.030    pH, Urine 7.0 5.0 - 8.0    Leukocytes Small (1+) (A) Negative    Nitrite, UA Negative Negative    Protein, POC Trace (A) Negative mg/dL    Glucose, UA Negative Negative mg/dL    Ketones, UA Negative Negative    Urobilinogen, UA 0.2 E.U./dL Normal, 0.2 E.U./dL    Bilirubin Negative Negative    Blood, UA Small (A) Negative    Lot Number [id number]     Expiration Date 10/31/2024             Procedures        Assessment and Plan    Diagnoses and all orders for this visit:    1. Hematuria, unspecified type (Primary)  -     POC Urinalysis Dipstick, Automated      He is doing well at this time.     He will call with any s/s of UTI for an outpatient cx     I spent 15 minutes caring for Marcello on this date of service. This time includes time spent by me in the following activities:reviewing tests, obtaining and/or reviewing a separately obtained history, performing a medically appropriate examination and/or evaluation , counseling and educating the patient/family/caregiver, ordering medications, tests, or procedures, and documenting information in the medical record  Follow Up   Return in about 6 months (around 7/3/2024) for f/u UTI .  Patient was given instructions and counseling regarding his condition or for health maintenance advice. Please see specific information pulled into the AVS if appropriate.         This document has been electronically signed by RONALD Dean  January 3, 2024 08:56 EST

## 2024-01-05 DIAGNOSIS — E11.59 TYPE 2 DIABETES MELLITUS WITH OTHER CIRCULATORY COMPLICATIONS: ICD-10-CM

## 2024-01-05 DIAGNOSIS — J30.1 SEASONAL ALLERGIC RHINITIS DUE TO POLLEN: ICD-10-CM

## 2024-01-05 RX ORDER — FLUTICASONE PROPIONATE 50 MCG
2 SPRAY, SUSPENSION (ML) NASAL DAILY
Qty: 48 G | Refills: 3 | Status: SHIPPED | OUTPATIENT
Start: 2024-01-05

## 2024-01-08 ENCOUNTER — OFFICE VISIT (OUTPATIENT)
Dept: FAMILY MEDICINE CLINIC | Age: 84
End: 2024-01-08
Payer: MEDICARE

## 2024-01-08 ENCOUNTER — HOSPITAL ENCOUNTER (OUTPATIENT)
Dept: GENERAL RADIOLOGY | Facility: HOSPITAL | Age: 84
Discharge: HOME | End: 2024-01-08
Admitting: NURSE PRACTITIONER
Payer: MEDICARE

## 2024-01-08 VITALS
HEIGHT: 67 IN | WEIGHT: 143.8 LBS | BODY MASS INDEX: 22.57 KG/M2 | SYSTOLIC BLOOD PRESSURE: 114 MMHG | HEART RATE: 62 BPM | TEMPERATURE: 97.9 F | OXYGEN SATURATION: 99 % | DIASTOLIC BLOOD PRESSURE: 49 MMHG

## 2024-01-08 DIAGNOSIS — Z86.16 HISTORY OF COVID-19: ICD-10-CM

## 2024-01-08 DIAGNOSIS — R05.1 ACUTE COUGH: ICD-10-CM

## 2024-01-08 DIAGNOSIS — R09.89 CHEST CONGESTION: Primary | ICD-10-CM

## 2024-01-08 DIAGNOSIS — R09.89 CHEST CONGESTION: ICD-10-CM

## 2024-01-08 PROCEDURE — 71046 X-RAY EXAM CHEST 2 VIEWS: CPT

## 2024-01-08 PROCEDURE — 1159F MED LIST DOCD IN RCRD: CPT | Performed by: NURSE PRACTITIONER

## 2024-01-08 PROCEDURE — 1160F RVW MEDS BY RX/DR IN RCRD: CPT | Performed by: NURSE PRACTITIONER

## 2024-01-08 PROCEDURE — 3078F DIAST BP <80 MM HG: CPT | Performed by: NURSE PRACTITIONER

## 2024-01-08 PROCEDURE — 3074F SYST BP LT 130 MM HG: CPT | Performed by: NURSE PRACTITIONER

## 2024-01-08 PROCEDURE — 99214 OFFICE O/P EST MOD 30 MIN: CPT | Performed by: NURSE PRACTITIONER

## 2024-01-08 RX ORDER — ALBUTEROL SULFATE 90 UG/1
2 AEROSOL, METERED RESPIRATORY (INHALATION) EVERY 4 HOURS PRN
Qty: 18 G | Refills: 0 | Status: SHIPPED | OUTPATIENT
Start: 2024-01-08

## 2024-01-08 NOTE — PROGRESS NOTES
Chief Complaint  Marcello Larson presents to [facility] for Cough (Coughing up green mucus /Dx with Covid on 12/6/23, Sx seem to not be getting any better ) and Nasal Congestion      Subjective     History of Present Illness  Marcello is here today to follow up on cough x 1 month.  He had covid back on 12/6 and cough does not seem to be getting any better. He stopped taking the mucinex about 5 days ago.  He was originally put on Augmentin thinking he originally had sinusitis.  He was too far past window to take Paxlovid .  He is currently taking OTC cough medication.   No history lung disease  quit smoking over 30 years ago.  He does have CAD    Assessment and Plan       Diagnoses and all orders for this visit:    1. Chest congestion (Primary)  Comments:  will check CXR- advised that cough from covid can linger for several weeks-months.  Inhaler, continue mucinex and pending Xray, if antibiotic is necessary  Orders:  -     XR Chest PA & Lateral; Future  -     albuterol sulfate  (90 Base) MCG/ACT inhaler; Inhale 2 puffs Every 4 (Four) Hours As Needed for Wheezing.  Dispense: 18 g; Refill: 0    2. History of COVID-19  -     XR Chest PA & Lateral; Future  -     albuterol sulfate  (90 Base) MCG/ACT inhaler; Inhale 2 puffs Every 4 (Four) Hours As Needed for Wheezing.  Dispense: 18 g; Refill: 0    3. Acute cough  -     albuterol sulfate  (90 Base) MCG/ACT inhaler; Inhale 2 puffs Every 4 (Four) Hours As Needed for Wheezing.  Dispense: 18 g; Refill: 0            Follow Up   Return for Pending imaging results.  Future Appointments   Date Time Provider Department Center   1/17/2024 11:15 AM Andrew Castañeda MD [facility]   2/28/2024  8:00 AM Zeny Corley MD [facility]   7/3/2024  8:15 AM Brenda Arroyo APRN [facility]       New Medications Ordered This Visit   Medications    albuterol sulfate  (90 Base) MCG/ACT inhaler     Sig: Inhale 2  "puffs Every 4 (Four) Hours As Needed for Wheezing.     Dispense:  18 g     Refill:  0       Medications Discontinued During This Encounter   Medication Reason    guaiFENesin (Mucinex) 600 MG 12 hr tablet *Therapy completed          Review of Systems    Objective     Vitals:    01/08/24 0850   BP: 114/49   BP Location: Left arm   Patient Position: Sitting   Cuff Size: Adult   Pulse: 62   Temp: 97.9 °F (36.6 °C)   TempSrc: Oral   SpO2: 99%   Weight: 65.2 kg (143 lb 12.8 oz)   Height: 170.2 cm (67\")     Body mass index is 22.52 kg/m².   BMI is within normal parameters. No other follow-up for BMI required.      Physical Exam  Vitals reviewed.   Constitutional:       Appearance: Normal appearance.   HENT:      Head: Normocephalic.   Eyes:      Pupils: Pupils are equal, round, and reactive to light.   Cardiovascular:      Rate and Rhythm: Normal rate and regular rhythm.      Heart sounds: No murmur heard.  Pulmonary:      Effort: Pulmonary effort is normal.      Breath sounds: Examination of the right-upper field reveals decreased breath sounds and rhonchi. Examination of the left-upper field reveals rhonchi. Rhonchi (anteriorly) present.      Comments: Anterior congestion  Musculoskeletal:         General: Normal range of motion.   Neurological:      Mental Status: He is alert.   Psychiatric:         Mood and Affect: Mood normal.         Behavior: Behavior normal.            Result Review   The following data was reviewed by: RONALD Benjamin on 01/08/2024:  CBC & Differential (12/26/2023 08:52)  Ferritin (12/26/2023 08:52)  Iron Profile (12/26/2023 08:52)  Vitamin B12 & Folate (12/26/2023 08:52)          Allergies   Allergen Reactions    Iodinated Contrast Media Anaphylaxis    Lisinopril Other (See Comments)    Prednisone Cough      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     Allergic rhinitis due to pollen     Anemia, unspecified     Anxiety disorder, unspecified     " Atherosclerotic heart disease of native coronary artery without angina pectoris     Benign essential hypertension 08/13/2019    Bilateral primary osteoarthritis of knee     Bladder cancer     Cancer     Cardiac dysfunction 08/13/2019    Left ventricle    Cardiomyopathy 08/13/2019    CHF (congestive heart failure)     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  LV systolic function is moderate to severely reduced.  EF 30-35%.  The transmittal spectral doppler flow pattern is suggestive of pseudonormalization.  There is moderate to severe global hypokinesis of the LV.  The left atrium is mildly dilated.  The mitral leaflets appear thickened, but open well.  Mild MR and AR.    Hx of CABG 08/13/2019 01/01/1991 - left internal mammary graft to the LAD, SVG to OM1, OM2 in the RCA    Hyperlipidemia 08/13/2019    Hypo-osmolality and hyponatremia     Low back pain     Myocardial infarct     Other long term (current) drug therapy     Palpitations 08/13/2019    Personal history of malignant neoplasm of bladder     Sleep apnea 08/13/2019    Stented coronary artery 08/13/2019 04/11/2014 - VISION bare metal stent to the proximal stenosis of the vein graft to the right and ISION bare metal stent in the mid to distal 80% stenosis.    Type 2 diabetes mellitus without complication     Unstable angina      Current Outpatient Medications   Medication Sig Dispense Refill    apixaban (ELIQUIS) 5 MG tablet tablet Take 1 tablet by mouth 2 (Two) Times a Day. 180 tablet 3    carvedilol (COREG) 12.5 MG tablet Take 1 tablet by mouth 2 (Two) Times a Day. (Patient taking differently: Take 0.5 tablets by mouth 2 (Two) Times a Day.) 180 tablet 3    Diclofenac Sodium (VOLTAREN) 1 % gel gel APPLY  TO THE AFFECTED AREA(S) TWICE DAILY 100 g 3    ferrous sulfate 325 (65 FE) MG tablet Take 1 tablet by mouth Daily With Breakfast.      fexofenadine (ALLEGRA) 180 MG tablet Take 1 tablet by mouth Daily.      fluticasone (FLONASE) 50 MCG/ACT nasal spray 2 sprays into the nostril(s) as directed by provider Daily. 48 g 3    furosemide (LASIX) 40 MG tablet Take 1 tablet by mouth Daily. 60 tablet 2    glucose blood test strip Check BS daily.  DX E11.9 100 each 3    glucose monitor monitoring kit 1 each Daily. E11.9 1 each 0    HYDROcodone-acetaminophen (NORCO) 5-325 MG per tablet Take 1 tablet by mouth Every 8 (Eight) Hours As Needed.      isosorbide dinitrate (ISORDIL) 30 MG tablet Take 1 tablet by mouth 4 (Four) Times a Day.      Lancets Thin misc 1 each Daily. DX E11.9 100 each 3    losartan (COZAAR) 50 MG tablet Take 1 tablet by mouth Daily. 90 tablet 3    lovastatin (MEVACOR) 40 MG tablet TAKE 1 TABLET BY MOUTH EVERY EVENING 90 tablet 1    metFORMIN (GLUCOPHAGE) 500 MG tablet Take 1 tablet by mouth Daily. 90 tablet 1    montelukast (SINGULAIR) 10 MG tablet Take 1 tablet by mouth Every Night. 90 tablet 1    nitroglycerin (NITROSTAT) 0.4 MG SL tablet DISSOLVE 1 TABLET UNDER THE TONGUE EVERY 5 MINUTES AS NEEDED FOR CHEST PAIN. DO NOT EXCEED A TOTAL OF 3 DOSES IN 15 MINUTES. IF NO RELIEF, CALL 911 25 tablet 0    pantoprazole (PROTONIX) 40 MG EC tablet Take 1 tablet by mouth Daily. 90 tablet 1    prochlorperazine (COMPAZINE) 10 MG tablet Take 1 tablet by mouth Every 8 (Eight) Hours As Needed for Nausea or Vomiting. 24 tablet 0    silver sulfadiazine (Silvadene) 1 % cream Apply 1 application topically to the appropriate area as directed 2 (Two) Times a Day. 50 g 0    sucralfate (CARAFATE) 1 g tablet TAKE 1 TABLET BY MOUTH FOUR TIMES DAILY 120 tablet 5    vitamin B-6 (PYRIDOXINE) 100 MG tablet Take 1 tablet by mouth Daily.      vitamin C (ASCORBIC ACID) 500 MG tablet Take 1 tablet by mouth Daily.      albuterol sulfate HFA  108 (90 Base) MCG/ACT inhaler Inhale 2 puffs Every 4 (Four) Hours As Needed for Wheezing. 18 g 0     No current facility-administered medications for this visit.     Past Surgical History:   Procedure Laterality Date    AMPUTATION Left     LOWER EXTREMITY BKA    APPENDECTOMY      CARDIAC CATHETERIZATION      CARDIAC CATHETERIZATION N/A 10/13/2020    Procedure: Left Heart Cath;  Surgeon: Kofi Campoverde MD;  Location: [facility] CATH INVASIVE LOC[facility];  Service: Cardiology;  Laterality: N/A;    CARDIAC CATHETERIZATION N/A 10/13/2020    Procedure: Coronary angiography;  Surgeon: Kofi Campoverde MD;  Location:  [facility] CATH INVASIVE LOCATION;  Service: Cardiology;  Laterality: N/A;    CARDIAC CATHETERIZATION N/A 10/13/2020    Procedure: Saphenous Vein Graft;  Surgeon: Kofi Campoverde MD;  Location:  [facility] CATH INVASIVE LOCATION;  Service: Cardiology;  Laterality: N/A;    CARDIAC ELECTROPHYSIOLOGY PROCEDURE N/A 04/04/2022    Procedure: ICD DC new/SJM;  Surgeon: Mamadou Terry MD;  Location: [facility] CATH INVASIVE LOC[facility];  Service: Cardiology;  Laterality: N/A;    CATARACT EXTRACTION      CHOLECYSTECTOMY      CORONARY ARTERY BYPASS GRAFT      1984, 1991 4 VESSEL    INTERNAL CARDIAC DEFIBRILLATOR INSERTION  04/2022    OTHER SURGICAL HISTORY      UROSTOMY S/P BLADDER CA      There are no preventive care reminders to display for this patient.   Immunization History   Administered Date(s) Administered    Arexvy (RSV, Adults 60+ yrs) 11/02/2023    COVID-19 (MODERNA) 1st,2nd,3rd Dose Monovalent 01/22/2021, 02/25/2021, 10/18/2021, 11/18/2021, 04/14/2022    COVID-19 (MODERNA) Monovalent Original Booster 09/09/2022    COVID-19 F23 (PFIZER) 12YRS+ (COMIRNATY) 10/09/2023    Fluad Quad 65+ 09/20/2021, 09/28/2023    Fluzone High Dose =>65 Years ([employer] ONLY) 10/03/2016, 09/19/2017, 09/16/2022    Hepatitis A 06/14/2018, 11/20/2019    Influenza, Unspecified 10/01/2020    Pneumococcal Conjugate 13-Valent (PCV13) 09/01/2015     Pneumococcal Polysaccharide (PPSV23) 10/01/2008    Shingrix 12/11/2019, 05/06/2020    Tdap 01/01/2013, 02/04/2013, 02/10/2023    Zoster, Unspecified 10/01/2006, 05/06/2020         Part of this note may be an electronic transcription/translation of spoken language to printed   text using the Dragon Dictation System.      Mendy Simpson, APRN

## 2024-01-11 RX ORDER — CARVEDILOL 12.5 MG/1
12.5 TABLET ORAL 2 TIMES DAILY
Qty: 180 TABLET | Refills: 3 | Status: SHIPPED | OUTPATIENT
Start: 2024-01-11

## 2024-01-16 ENCOUNTER — TELEPHONE (OUTPATIENT)
Dept: CARDIOLOGY | Facility: CLINIC | Age: 84
End: 2024-01-16
Payer: MEDICARE

## 2024-01-16 NOTE — TELEPHONE ENCOUNTER
I answered a call from Ellen with [employer].  She says that they received the patient assistance forms, however, these were the old forms.  They are faxing the new forms to our fax at [phone].  In addition to filling out the new forms, the patient will need to sign the form (this wasn't completed on the last forms we sent to them).    She said that if we have any questions, we can call them at [phone], they're open 8a-8p.    Thank you,    Consuelo FRAGOSO RN  [place] [facility] [place]  01/16/24 10:44 EST

## 2024-01-17 ENCOUNTER — OFFICE VISIT (OUTPATIENT)
Dept: FAMILY MEDICINE CLINIC | Age: 84
End: 2024-01-17
Payer: MEDICARE

## 2024-01-17 VITALS
DIASTOLIC BLOOD PRESSURE: 68 MMHG | OXYGEN SATURATION: 95 % | SYSTOLIC BLOOD PRESSURE: 126 MMHG | TEMPERATURE: 97.9 F | HEIGHT: 67 IN | WEIGHT: 144 LBS | HEART RATE: 57 BPM | BODY MASS INDEX: 22.6 KG/M2

## 2024-01-17 DIAGNOSIS — J11.1 INFLUENZA: Primary | ICD-10-CM

## 2024-01-17 DIAGNOSIS — Z93.6 OTHER ARTIFICIAL OPENINGS OF URINARY TRACT STATUS: ICD-10-CM

## 2024-01-17 DIAGNOSIS — I10 PRIMARY HYPERTENSION: ICD-10-CM

## 2024-01-17 DIAGNOSIS — J30.1 SEASONAL ALLERGIC RHINITIS DUE TO POLLEN: ICD-10-CM

## 2024-01-17 DIAGNOSIS — I25.10 CORONARY ARTERY DISEASE INVOLVING NATIVE CORONARY ARTERY OF NATIVE HEART WITHOUT ANGINA PECTORIS: ICD-10-CM

## 2024-01-17 PROBLEM — Z00.00 ANNUAL PHYSICAL EXAM: Status: RESOLVED | Noted: 2021-11-23 | Resolved: 2024-01-17

## 2024-01-17 RX ORDER — ERYTHROMYCIN 5 MG/G
OINTMENT OPHTHALMIC NIGHTLY
COMMUNITY

## 2024-01-17 RX ORDER — CLOPIDOGREL BISULFATE 75 MG/1
75 TABLET ORAL DAILY
COMMUNITY
End: 2024-01-17 | Stop reason: SDUPTHER

## 2024-01-17 RX ORDER — CLOPIDOGREL BISULFATE 75 MG/1
75 TABLET ORAL DAILY
Qty: 90 TABLET | Refills: 1 | Status: SHIPPED | OUTPATIENT
Start: 2024-01-17

## 2024-01-17 RX ORDER — FLUTICASONE PROPIONATE 50 MCG
1 SPRAY, SUSPENSION (ML) NASAL DAILY
Qty: 48 G | Refills: 3 | Status: SHIPPED | OUTPATIENT
Start: 2024-01-17

## 2024-01-17 RX ORDER — LOSARTAN POTASSIUM 50 MG/1
50 TABLET ORAL DAILY
Qty: 90 TABLET | Refills: 1 | Status: SHIPPED | OUTPATIENT
Start: 2024-01-17

## 2024-01-17 RX ORDER — LEVOFLOXACIN 500 MG/1
500 TABLET, FILM COATED ORAL DAILY
COMMUNITY

## 2024-01-17 NOTE — PROGRESS NOTES
"Chief Complaint  Hospital Follow Up Visit ([place] 1/14/24 for UTI)    Subjective          Marcello Larson presents to [facility] today for f/u after presenting to the ED on 1/14/2024 with complaint of subacute cough for the past month.    In the ED he did test positive for flu.  He was also diagnosed with acute UTI and given a 7-day course of levofloxacin.  He does have a urostomy secondary to history of bladder cancer status post resection. He is completing his Levaquin and has 5 more doses to complete.  He is still having a fair amount of coughing but overall, \"I feel miles better than I did Sunday.\"  No fevers.  Cough is not productive.  Some rhinorrhea/congestion.      Current Outpatient Medications:     albuterol sulfate  (90 Base) MCG/ACT inhaler, Inhale 2 puffs Every 4 (Four) Hours As Needed for Wheezing., Disp: 18 g, Rfl: 0    apixaban (ELIQUIS) 5 MG tablet tablet, Take 1 tablet by mouth 2 (Two) Times a Day., Disp: 180 tablet, Rfl: 3    carvedilol (COREG) 12.5 MG tablet, Take 1 tablet by mouth 2 (Two) Times a Day., Disp: 180 tablet, Rfl: 3    clopidogrel (PLAVIX) 75 MG tablet, Take 1 tablet by mouth Daily., Disp: 90 tablet, Rfl: 1    Diclofenac Sodium (VOLTAREN) 1 % gel gel, APPLY TO THE AFFECTED AREA(S) TWICE DAILY, Disp: 100 g, Rfl: 3    erythromycin (ROMYCIN) 5 MG/GM ophthalmic ointment, Administer  to both eyes Every Night., Disp: , Rfl:     ferrous sulfate 325 (65 FE) MG tablet, Take 1 tablet by mouth Daily With Breakfast., Disp: , Rfl:     fexofenadine (ALLEGRA) 180 MG tablet, Take 1 tablet by mouth Daily., Disp: , Rfl:     fluticasone (FLONASE) 50 MCG/ACT nasal spray, 1 spray into the nostril(s) as directed by provider Daily., Disp: 48 g, Rfl: 3    furosemide (LASIX) 40 MG tablet, Take 1 tablet by mouth Daily., Disp: 60 tablet, Rfl: 2    glucose blood test strip, Check BS daily.  DX E11.9, Disp: 100 each, Rfl: 3    glucose monitor monitoring kit, 1 each Daily. " E11.9, Disp: 1 each, Rfl: 0    HYDROcodone-acetaminophen (NORCO) 5-325 MG per tablet, Take 1 tablet by mouth Every 8 (Eight) Hours As Needed., Disp: , Rfl:     isosorbide dinitrate (ISORDIL) 30 MG tablet, Take 1 tablet by mouth 4 (Four) Times a Day., Disp: , Rfl:     Lancets Thin misc, 1 each Daily. DX E11.9, Disp: 100 each, Rfl: 3    levoFLOXacin (LEVAQUIN) 500 MG tablet, Take 1 tablet by mouth Daily., Disp: , Rfl:     losartan (COZAAR) 50 MG tablet, Take 1 tablet by mouth Daily., Disp: 90 tablet, Rfl: 1    lovastatin (MEVACOR) 40 MG tablet, TAKE 1 TABLET BY MOUTH EVERY EVENING, Disp: 90 tablet, Rfl: 1    metFORMIN (GLUCOPHAGE) 500 MG tablet, Take 1 tablet by mouth Daily., Disp: 90 tablet, Rfl: 1    montelukast (SINGULAIR) 10 MG tablet, Take 1 tablet by mouth Every Night., Disp: 90 tablet, Rfl: 1    nitroglycerin (NITROSTAT) 0.4 MG SL tablet, DISSOLVE 1 TABLET UNDER THE TONGUE EVERY 5 MINUTES AS NEEDED FOR CHEST PAIN. DO NOT EXCEED A TOTAL OF 3 DOSES IN 15 MINUTES. IF NO RELIEF, CALL 911, Disp: 25 tablet, Rfl: 0    pantoprazole (PROTONIX) 40 MG EC tablet, Take 1 tablet by mouth Daily., Disp: 90 tablet, Rfl: 1    prochlorperazine (COMPAZINE) 10 MG tablet, Take 1 tablet by mouth Every 8 (Eight) Hours As Needed for Nausea or Vomiting., Disp: 24 tablet, Rfl: 0    silver sulfadiazine (Silvadene) 1 % cream, Apply 1 application topically to the appropriate area as directed 2 (Two) Times a Day., Disp: 50 g, Rfl: 0    sucralfate (CARAFATE) 1 g tablet, TAKE 1 TABLET BY MOUTH FOUR TIMES DAILY, Disp: 120 tablet, Rfl: 5    vitamin B-6 (PYRIDOXINE) 100 MG tablet, Take 1 tablet by mouth Daily., Disp: , Rfl:     vitamin C (ASCORBIC ACID) 500 MG tablet, Take 1 tablet by mouth Daily., Disp: , Rfl:   Medications Discontinued During This Encounter   Medication Reason    losartan (COZAAR) 50 MG tablet Reorder    clopidogrel (PLAVIX) 75 MG tablet Reorder    fluticasone (FLONASE) 50 MCG/ACT nasal spray Reorder  "        Allergies:  Iodinated contrast media, Lisinopril, and Prednisone      Objective   Vital Signs:   Vitals:    01/17/24 0747 01/17/24 0815   BP: 144/67 126/68   BP Location: Left arm    Patient Position: Sitting    Cuff Size: Adult    Pulse: 54 57   Temp: 97.9 °F (36.6 °C)    TempSrc: Oral    SpO2: 95%    Weight: 65.3 kg (144 lb)    Height: 170.2 cm (67.01\")          Physical Exam  Vitals reviewed.   Constitutional:       General: He is not in acute distress.     Appearance: Normal appearance. He is well-developed.   HENT:      Head: Normocephalic and atraumatic.      Right Ear: External ear normal.      Left Ear: External ear normal.   Eyes:      Extraocular Movements: Extraocular movements intact.      Conjunctiva/sclera: Conjunctivae normal.      Pupils: Pupils are equal, round, and reactive to light.   Cardiovascular:      Rate and Rhythm: Normal rate and regular rhythm.      Heart sounds: No murmur heard.  Pulmonary:      Effort: Pulmonary effort is normal.      Breath sounds: Normal breath sounds. No wheezing, rhonchi or rales.   Abdominal:      General: Bowel sounds are normal. There is no distension.      Palpations: Abdomen is soft.      Tenderness: There is no abdominal tenderness.   Musculoskeletal:         General: Normal range of motion.      Comments: +L BKA prosthesis   Skin:     General: Skin is warm and dry.   Neurological:      Mental Status: He is alert.   Psychiatric:         Mood and Affect: Affect normal.         Lab Results   Component Value Date    GLUCOSE 105 (H) 08/15/2023    BUN 26 (H) 08/15/2023    CREATININE 1.24 08/15/2023    EGFRIFNONA 90 12/01/2021    EGFRIFAFRI 96 03/20/2018    BCR 21.0 08/15/2023    K 4.6 08/15/2023    CO2 19.5 (L) 08/15/2023    CALCIUM 9.4 08/15/2023    ALBUMIN 4.2 08/15/2023    LABIL2 1.6 02/20/2021    AST 20 08/15/2023    ALT 11 08/15/2023       Lab Results   Component Value Date    CHOL 91 08/15/2023    CHLPL 129 02/20/2021    TRIG 74 08/15/2023    HDL 30 " (L) 08/15/2023    LDL 45 08/15/2023       Lab Results   Component Value Date    WBC 5.89 12/26/2023    HGB 11.3 (L) 12/26/2023    HCT 35.7 (L) 12/26/2023    MCV 94.7 12/26/2023     (L) 12/26/2023            Assessment and Plan    Diagnoses and all orders for this visit:    1. Influenza (Primary)  Assessment & Plan:  Feeling much better this week.  Continue supportive management.  Recommend continued use of Mucinex.  Stay well hydrated.  Refills sent on Flonase, as he has not been taking this but would like to restart.      2. Primary hypertension  Assessment & Plan:  Blood pressure has been running at goal.  Continue losartan 50 mg daily and carvedilol 12.5 mg twice daily.  Refills were needed today.  Labs were not needed today.  Continue to monitor.      Orders:  -     losartan (COZAAR) 50 MG tablet; Take 1 tablet by mouth Daily.  Dispense: 90 tablet; Refill: 1    3. Coronary artery disease involving native coronary artery of native heart without angina pectoris  Overview:  Follows with Dr. Castañeda Cardiology.  He is on Eliquis for the combination of CAD, atrial fibrillation and systolic heart failure.  No longer on aspirin/Plavix per Cardiology.  Status post ICD placement.  He is on statin, beta-blocker and ARB.      Orders:  -     clopidogrel (PLAVIX) 75 MG tablet; Take 1 tablet by mouth Daily.  Dispense: 90 tablet; Refill: 1    4. Seasonal allergic rhinitis due to pollen  -     fluticasone (FLONASE) 50 MCG/ACT nasal spray; 1 spray into the nostril(s) as directed by provider Daily.  Dispense: 48 g; Refill: 3    5. Other artificial openings of urinary tract status  Overview:  S/p urostomy.  Functioning well.    Assessment & Plan:  Currently completing Levaquin treatment as prescribed by the ED.            Follow Up   Return if symptoms worsen or fail to improve, for or as scheduled 2/28/2024.  Patient was given instructions and counseling regarding his condition or for health maintenance advice. Please see  specific information pulled into the AVS if appropriate.           01/17/2024

## 2024-01-17 NOTE — ASSESSMENT & PLAN NOTE
Blood pressure has been running at goal.  Continue losartan 50 mg daily and carvedilol 12.5 mg twice daily.  Refills were needed today.  Labs were not needed today.  Continue to monitor.

## 2024-01-17 NOTE — ASSESSMENT & PLAN NOTE
Feeling much better this week.  Continue supportive management.  Recommend continued use of Mucinex.  Stay well hydrated.  Refills sent on Flonase, as he has not been taking this but would like to restart.

## 2024-01-24 ENCOUNTER — OFFICE VISIT (OUTPATIENT)
Dept: CARDIOLOGY | Facility: CLINIC | Age: 84
End: 2024-01-24
Payer: MEDICARE

## 2024-01-24 VITALS
DIASTOLIC BLOOD PRESSURE: 64 MMHG | HEART RATE: 50 BPM | HEIGHT: 67 IN | SYSTOLIC BLOOD PRESSURE: 120 MMHG | WEIGHT: 131 LBS | OXYGEN SATURATION: 99 % | BODY MASS INDEX: 20.56 KG/M2

## 2024-01-24 DIAGNOSIS — I25.10 CORONARY ARTERY DISEASE INVOLVING NATIVE CORONARY ARTERY OF NATIVE HEART WITHOUT ANGINA PECTORIS: ICD-10-CM

## 2024-01-24 DIAGNOSIS — Z95.810 ICD (IMPLANTABLE CARDIOVERTER-DEFIBRILLATOR), DUAL, IN SITU: ICD-10-CM

## 2024-01-24 DIAGNOSIS — I50.42 CHRONIC COMBINED SYSTOLIC AND DIASTOLIC CONGESTIVE HEART FAILURE: Primary | ICD-10-CM

## 2024-01-24 DIAGNOSIS — I48.21 PERMANENT ATRIAL FIBRILLATION: ICD-10-CM

## 2024-01-24 DIAGNOSIS — I10 PRIMARY HYPERTENSION: ICD-10-CM

## 2024-01-24 DIAGNOSIS — Z95.1 HX OF CABG: Chronic | ICD-10-CM

## 2024-01-24 RX ORDER — LOSARTAN POTASSIUM 50 MG/1
50 TABLET ORAL DAILY
Qty: 90 TABLET | Refills: 3 | Status: SHIPPED | OUTPATIENT
Start: 2024-01-24

## 2024-01-24 RX ORDER — CLOPIDOGREL BISULFATE 75 MG/1
75 TABLET ORAL DAILY
Qty: 90 TABLET | Refills: 3 | Status: SHIPPED | OUTPATIENT
Start: 2024-01-24

## 2024-01-24 NOTE — PROGRESS NOTES
.      Subjective:     Encounter Date:1/24/2024      Patient ID: Marcello Larson is a 83 y.o. male.    Chief Complaint:  Management of HFrEF, CAD    HPI:   83 y.o. male with CAD status post CABG, HFrEF secondary to ischemic cardiomyopathy (EF 30 to 35%) status post ICD, permanent atrial fibrillation on Eliquis, hypertension, hyperlipidemia who presents for follow-up and management of his chronic conditions.  Patient was last seen on 7/12/2023 and he felt well.     Echo 2/22/2022 with an EF 30 to 35%, grade 3 diastolic dysfunction, moderately dilated LA, mildly dilated RA.    The following portions of the patient's history were reviewed and updated as appropriate: allergies, current medications, past family history, past medical history, past social history, past surgical history and problem list.     REVIEW OF SYSTEMS:   All systems reviewed.  Pertinent positives identified in HPI.  All other systems are negative.    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  LV systolic function is moderate to severely reduced.  EF 30-35%.  The transmittal spectral doppler flow pattern is suggestive of pseudonormalization.  There is moderate to severe global hypokinesis of the LV.  The  left atrium is mildly dilated.  The mitral leaflets appear thickened, but open well.  Mild MR and AR.    Hx of CABG 2019 - left internal mammary graft to the LAD, SVG to OM1, OM2 in the RCA    Hyperlipidemia 2019    Hypo-osmolality and hyponatremia     Low back pain     Myocardial infarct     Other long term (current) drug therapy     Palpitations 2019    Personal history of malignant neoplasm of bladder     Sleep apnea 2019    Stented coronary artery 2019 - VISION bare metal stent to the proximal stenosis of the vein graft to the right and ISION bare metal stent in the mid to distal 80% stenosis.    Type 2 diabetes mellitus without complication     Unstable angina        Family History   Problem Relation Age of Onset    No Known Problems Mother     No Known Problems Father        Social History     Socioeconomic History    Marital status:     Number of children: 2   Tobacco Use    Smoking status: Former     Packs/day: 0.00     Years: 0.00     Additional pack years: 0.00     Total pack years: 0.00     Types: Cigarettes     Start date:      Quit date: 1984     Years since quittin.0     Passive exposure: Past    Smokeless tobacco: Never    Tobacco comments:     Have not smoked since    Vaping Use    Vaping Use: Never used   Substance and Sexual Activity    Alcohol use: Never    Drug use: Never    Sexual activity: Not Currently     Partners: Female       Allergies   Allergen Reactions    Iodinated Contrast Media Anaphylaxis    Lisinopril Other (See Comments)    Prednisone Cough       Past Surgical History:   Procedure Laterality Date    AMPUTATION Left     LOWER EXTREMITY BKA    APPENDECTOMY      CARDIAC CATHETERIZATION      CARDIAC CATHETERIZATION N/A 10/13/2020    Procedure: Left Heart Cath;  Surgeon: Kofi Campoverde MD;  Location: [facility] CATH INVASIVE LOCATION;  Service: Cardiology;  Laterality: N/A;    CARDIAC CATHETERIZATION N/A  10/13/2020    Procedure: Coronary angiography;  Surgeon: Kofi Campoverde MD;  Location:  [facility] CATH INVASIVE LOCATION;  Service: Cardiology;  Laterality: N/A;    CARDIAC CATHETERIZATION N/A 10/13/2020    Procedure: Saphenous Vein Graft;  Surgeon: Kofi Campoverde MD;  Location:  [facility] CATH INVASIVE LOCATION;  Service: Cardiology;  Laterality: N/A;    CARDIAC ELECTROPHYSIOLOGY PROCEDURE N/A 04/04/2022    Procedure: ICD DC new/SJM;  Surgeon: Mamadou Terry MD;  Location:  [facility] CATH INVASIVE LOCATION;  Service: Cardiology;  Laterality: N/A;    CATARACT EXTRACTION      CHOLECYSTECTOMY      CORONARY ARTERY BYPASS GRAFT      1984, 1991 4 VESSEL    INTERNAL CARDIAC DEFIBRILLATOR INSERTION  04/2022    OTHER SURGICAL HISTORY      UROSTOMY S/P BLADDER CA       Procedures       Objective:         Vitals:    01/24/24 1211   BP: 120/64   Pulse: 50   SpO2: 99%       PHYSICAL EXAM:  GEN: well appearing, in NAD   HEENT: NCAT, EOMI, moist mucus membranes   Respiratory: CTAB, no wheezes, rales or rhonchi  CV: normal rate, regular rhythm, normal S1, S2, no murmurs, rubs, gallops, +2 radial pulses b/l  GI: soft, nontender, nondistended  MSK: Left leg BKA, no edema of right leg  Skin: no rash, warm, dry  Heme/Lymph: no bruising or bleeding  Psych: organized thought, normal behavior and affect  Neuro: Alert and Oriented x 3, grossly normal motor function        Assessment:         (I50.42) Chronic combined systolic and diastolic congestive heart failure    (I25.10) Coronary artery disease involving native coronary artery of native heart without angina pectoris - Plan: clopidogrel (PLAVIX) 75 MG tablet    (I10) Primary hypertension - Plan: losartan (COZAAR) 50 MG tablet    (Z95.1) Hx of CABG    (Z95.810) ICD (implantable cardioverter-defibrillator), dual, in situ    (I48.21) Permanent atrial fibrillation    82 year old man with CAD status post CABG, HFrEF secondary to ischemic cardiomyopathy (EF 30 to 35%) status post ICD,  permanent atrial fibrillation on Eliquis, hypertension, hyperlipidemia who presents for follow-up and management of his chronic conditions.       Plan:       #HFrEF  Well compensated.  Will uptitrate GDMT.  NYHA class I. Echo 2/22/2022 with EF 30 to 35%, grade 3 diastolic dysfunction, moderately dilated LA, mildly dilated RA.  - continue Coreg 12.5 mg twice daily, losartan 50 mg  - no spironolactone given NICO and inabilty to tolerate(fatigue)  - no Jardiance given cost    #CAD status post CABG  Patient without any symptoms of angina.  - continue plavix 75 mg daily, lovastatin 40 mg daily    #Permanent atrial fibrillation  [id number] 5  -Continue Coreg as above, Eliquis 5 mg twice daily    Dr Corley, thank you very much for referring this kind patient to me. Please call me with any questions or concerns. I will see the patient again in the office in one year or earlier as needed.         Andrew Castañeda MD  01/24/24  [facility]    Outpatient Encounter Medications as of 1/24/2024   Medication Sig Dispense Refill    albuterol sulfate  (90 Base) MCG/ACT inhaler Inhale 2 puffs Every 4 (Four) Hours As Needed for Wheezing. 18 g 0    apixaban (ELIQUIS) 5 MG tablet tablet Take 1 tablet by mouth 2 (Two) Times a Day. 180 tablet 3    carvedilol (COREG) 12.5 MG tablet Take 1 tablet by mouth 2 (Two) Times a Day. 180 tablet 3    clopidogrel (PLAVIX) 75 MG tablet Take 1 tablet by mouth Daily. 90 tablet 3    Diclofenac Sodium (VOLTAREN) 1 % gel gel APPLY TO THE AFFECTED AREA(S) TWICE DAILY 100 g 3    erythromycin (ROMYCIN) 5 MG/GM ophthalmic ointment Administer  to both eyes Every Night.      ferrous sulfate 325 (65 FE) MG tablet Take 1 tablet by mouth Daily With Breakfast.      fexofenadine (ALLEGRA) 180 MG tablet Take 1 tablet by mouth Daily.      fluticasone (FLONASE) 50 MCG/ACT nasal spray 1 spray into the nostril(s) as directed by provider Daily. 48 g 3    furosemide (LASIX) 40 MG tablet Take 1 tablet by  mouth Daily. 60 tablet 2    glucose blood test strip Check BS daily.  DX E11.9 100 each 3    glucose monitor monitoring kit 1 each Daily. E11.9 1 each 0    HYDROcodone-acetaminophen (NORCO) 5-325 MG per tablet Take 1 tablet by mouth Every 8 (Eight) Hours As Needed.      isosorbide dinitrate (ISORDIL) 30 MG tablet Take 1 tablet by mouth 4 (Four) Times a Day.      Lancets Thin misc 1 each Daily. DX E11.9 100 each 3    losartan (COZAAR) 50 MG tablet Take 1 tablet by mouth Daily. 90 tablet 3    lovastatin (MEVACOR) 40 MG tablet TAKE 1 TABLET BY MOUTH EVERY EVENING 90 tablet 1    metFORMIN (GLUCOPHAGE) 500 MG tablet Take 1 tablet by mouth Daily. 90 tablet 1    montelukast (SINGULAIR) 10 MG tablet Take 1 tablet by mouth Every Night. 90 tablet 1    nitroglycerin (NITROSTAT) 0.4 MG SL tablet DISSOLVE 1 TABLET UNDER THE TONGUE EVERY 5 MINUTES AS NEEDED FOR CHEST PAIN. DO NOT EXCEED A TOTAL OF 3 DOSES IN 15 MINUTES. IF NO RELIEF, CALL 911 25 tablet 0    pantoprazole (PROTONIX) 40 MG EC tablet Take 1 tablet by mouth Daily. 90 tablet 1    prochlorperazine (COMPAZINE) 10 MG tablet Take 1 tablet by mouth Every 8 (Eight) Hours As Needed for Nausea or Vomiting. 24 tablet 0    silver sulfadiazine (Silvadene) 1 % cream Apply 1 application topically to the appropriate area as directed 2 (Two) Times a Day. 50 g 0    sucralfate (CARAFATE) 1 g tablet TAKE 1 TABLET BY MOUTH FOUR TIMES DAILY 120 tablet 5    vitamin B-6 (PYRIDOXINE) 100 MG tablet Take 1 tablet by mouth Daily.      vitamin C (ASCORBIC ACID) 500 MG tablet Take 1 tablet by mouth Daily.      [DISCONTINUED] losartan (COZAAR) 50 MG tablet Take 1 tablet by mouth Daily. 90 tablet 1    [DISCONTINUED] clopidogrel (PLAVIX) 75 MG tablet Take 1 tablet by mouth Daily. 90 tablet 1    [DISCONTINUED] levoFLOXacin (LEVAQUIN) 500 MG tablet Take 1 tablet by mouth Daily.       No facility-administered encounter medications on file as of 1/24/2024.

## 2024-02-12 ENCOUNTER — TELEPHONE (OUTPATIENT)
Dept: CARDIOLOGY | Facility: CLINIC | Age: 84
End: 2024-02-12
Payer: MEDICARE

## 2024-02-13 ENCOUNTER — TELEPHONE (OUTPATIENT)
Age: 84
End: 2024-02-13
Payer: MEDICARE

## 2024-02-13 ENCOUNTER — HOSPITAL ENCOUNTER (OUTPATIENT)
Dept: CARDIOLOGY | Facility: HOSPITAL | Age: 84
Discharge: HOME | End: 2024-02-13
Admitting: NURSE PRACTITIONER
Payer: MEDICARE

## 2024-02-13 ENCOUNTER — TELEPHONE (OUTPATIENT)
Dept: CARDIOLOGY | Facility: CLINIC | Age: 84
End: 2024-02-13
Payer: MEDICARE

## 2024-02-13 ENCOUNTER — OFFICE VISIT (OUTPATIENT)
Age: 84
End: 2024-02-13
Payer: MEDICARE

## 2024-02-13 VITALS
HEART RATE: 50 BPM | HEIGHT: 67 IN | BODY MASS INDEX: 22.91 KG/M2 | SYSTOLIC BLOOD PRESSURE: 122 MMHG | DIASTOLIC BLOOD PRESSURE: 74 MMHG | WEIGHT: 146 LBS

## 2024-02-13 DIAGNOSIS — Z95.5 STENTED CORONARY ARTERY: Chronic | ICD-10-CM

## 2024-02-13 DIAGNOSIS — I25.10 CORONARY ARTERY DISEASE INVOLVING NATIVE CORONARY ARTERY OF NATIVE HEART WITHOUT ANGINA PECTORIS: ICD-10-CM

## 2024-02-13 DIAGNOSIS — I25.5 ISCHEMIC CARDIOMYOPATHY: ICD-10-CM

## 2024-02-13 DIAGNOSIS — I73.9 PERIPHERAL VASCULAR DISEASE, UNSPECIFIED: ICD-10-CM

## 2024-02-13 DIAGNOSIS — I50.43 ACUTE ON CHRONIC COMBINED SYSTOLIC AND DIASTOLIC CHF (CONGESTIVE HEART FAILURE): ICD-10-CM

## 2024-02-13 DIAGNOSIS — E78.2 MIXED HYPERLIPIDEMIA: ICD-10-CM

## 2024-02-13 DIAGNOSIS — I50.42 CHRONIC COMBINED SYSTOLIC AND DIASTOLIC CONGESTIVE HEART FAILURE: Primary | ICD-10-CM

## 2024-02-13 DIAGNOSIS — Z95.1 HX OF CABG: Chronic | ICD-10-CM

## 2024-02-13 DIAGNOSIS — I10 PRIMARY HYPERTENSION: ICD-10-CM

## 2024-02-13 DIAGNOSIS — Z95.810 ICD (IMPLANTABLE CARDIOVERTER-DEFIBRILLATOR), DUAL, IN SITU: ICD-10-CM

## 2024-02-13 DIAGNOSIS — I48.21 PERMANENT ATRIAL FIBRILLATION: ICD-10-CM

## 2024-02-13 LAB
ANION GAP SERPL CALCULATED.3IONS-SCNC: 11.4 MMOL/L (ref 5–15)
BUN SERPL-MCNC: 27 MG/DL (ref 8–23)
BUN/CREAT SERPL: 23.5 (ref 7–25)
CALCIUM SPEC-SCNC: 9.3 MG/DL (ref 8.6–10.5)
CHLORIDE SERPL-SCNC: 97 MMOL/L (ref 98–107)
CO2 SERPL-SCNC: 17.6 MMOL/L (ref 22–29)
CREAT SERPL-MCNC: 1.15 MG/DL (ref 0.76–1.27)
EGFRCR SERPLBLD CKD-EPI 2021: 63.1 ML/MIN/1.73
GLUCOSE SERPL-MCNC: 129 MG/DL (ref 65–99)
NT-PROBNP SERPL-MCNC: 7362 PG/ML (ref 0–1800)
POTASSIUM SERPL-SCNC: 4.2 MMOL/L (ref 3.5–5.2)
SODIUM SERPL-SCNC: 126 MMOL/L (ref 136–145)

## 2024-02-13 PROCEDURE — 83880 ASSAY OF NATRIURETIC PEPTIDE: CPT | Performed by: NURSE PRACTITIONER

## 2024-02-13 PROCEDURE — 36415 COLL VENOUS BLD VENIPUNCTURE: CPT

## 2024-02-13 PROCEDURE — 80048 BASIC METABOLIC PNL TOTAL CA: CPT | Performed by: NURSE PRACTITIONER

## 2024-02-28 ENCOUNTER — OFFICE VISIT (OUTPATIENT)
Dept: FAMILY MEDICINE CLINIC | Age: 84
End: 2024-02-28
Payer: MEDICARE

## 2024-02-28 VITALS
BODY MASS INDEX: 22.91 KG/M2 | HEART RATE: 53 BPM | OXYGEN SATURATION: 95 % | HEIGHT: 67 IN | SYSTOLIC BLOOD PRESSURE: 153 MMHG | DIASTOLIC BLOOD PRESSURE: 66 MMHG | WEIGHT: 146 LBS | TEMPERATURE: 98.3 F

## 2024-02-28 DIAGNOSIS — I10 PRIMARY HYPERTENSION: ICD-10-CM

## 2024-02-28 DIAGNOSIS — K29.50 CHRONIC GASTRITIS WITHOUT BLEEDING, UNSPECIFIED GASTRITIS TYPE: ICD-10-CM

## 2024-02-28 DIAGNOSIS — I25.5 ISCHEMIC CARDIOMYOPATHY: ICD-10-CM

## 2024-02-28 DIAGNOSIS — I50.42 CHRONIC COMBINED SYSTOLIC AND DIASTOLIC CONGESTIVE HEART FAILURE: ICD-10-CM

## 2024-02-28 DIAGNOSIS — E11.59 TYPE 2 DIABETES MELLITUS WITH OTHER CIRCULATORY COMPLICATIONS: Primary | ICD-10-CM

## 2024-02-28 DIAGNOSIS — D69.6 PLATELETS DECREASED: ICD-10-CM

## 2024-02-28 DIAGNOSIS — Z23 ENCOUNTER FOR IMMUNIZATION: ICD-10-CM

## 2024-02-28 DIAGNOSIS — M46.97 UNSPECIFIED INFLAMMATORY SPONDYLOPATHY, LUMBOSACRAL REGION: ICD-10-CM

## 2024-02-28 DIAGNOSIS — I73.9 PERIPHERAL VASCULAR DISEASE, UNSPECIFIED: ICD-10-CM

## 2024-02-28 DIAGNOSIS — I48.21 PERMANENT ATRIAL FIBRILLATION: ICD-10-CM

## 2024-02-28 DIAGNOSIS — Z89.512 ACQUIRED ABSENCE OF LEFT LEG BELOW KNEE: ICD-10-CM

## 2024-02-28 DIAGNOSIS — J30.1 SEASONAL ALLERGIC RHINITIS DUE TO POLLEN: ICD-10-CM

## 2024-02-28 DIAGNOSIS — E78.2 MIXED HYPERLIPIDEMIA: ICD-10-CM

## 2024-02-28 DIAGNOSIS — I25.10 CORONARY ARTERY DISEASE INVOLVING NATIVE CORONARY ARTERY OF NATIVE HEART WITHOUT ANGINA PECTORIS: ICD-10-CM

## 2024-02-28 PROBLEM — J11.1 INFLUENZA: Status: RESOLVED | Noted: 2024-01-17 | Resolved: 2024-02-28

## 2024-02-28 LAB
EXPIRATION DATE: ABNORMAL
HBA1C MFR BLD: 6.7 % (ref 4.5–5.7)
Lab: ABNORMAL

## 2024-02-28 RX ORDER — LOVASTATIN 40 MG/1
40 TABLET ORAL EVERY EVENING
Qty: 90 TABLET | Refills: 1 | Status: SHIPPED | OUTPATIENT
Start: 2024-02-28

## 2024-02-28 RX ORDER — LOSARTAN POTASSIUM 50 MG/1
50 TABLET ORAL DAILY
Qty: 90 TABLET | Refills: 1 | Status: SHIPPED | OUTPATIENT
Start: 2024-02-28

## 2024-02-28 RX ORDER — MONTELUKAST SODIUM 10 MG/1
10 TABLET ORAL NIGHTLY
Qty: 90 TABLET | Refills: 3 | Status: SHIPPED | OUTPATIENT
Start: 2024-02-28

## 2024-02-28 RX ORDER — PANTOPRAZOLE SODIUM 40 MG/1
40 TABLET, DELAYED RELEASE ORAL DAILY
Qty: 90 TABLET | Refills: 1 | Status: SHIPPED | OUTPATIENT
Start: 2024-02-28

## 2024-02-28 NOTE — PROGRESS NOTES
Chief Complaint  Diabetes    Subjective          Marcello Larson presents to [facility] today for follow-up on chronic issues.    He is on Ranexa for CAD, s/p 2v. CABG.  He is no longer on Plavix for CAD as this was stopped when he started Eliquis.  He is on Eliquis for anticoagulation of a-fibrillation (through patient assistance).  He is on Lasix.  He is on carvedilol and losartan for HTN.  His BP has been well controlled.  He is on lovastatin for HLD.  He has prn NTG but uses rarely uses them.  He is following with Dr. [facility]eda [facility].  Next appointment there is 1/17/2024.  He has chronic systolic heart failure for which he is on a beta-blocker, ACEI, statin and loop diuretic.  Status post ICD placement.     He is on metformin for diabetes.  Dr. Castañeda wanted to try him on Jardiance but it was too expensive.  Last A1c was 6.3 in August.  He does adhere to a diabetic diet.  He is UTD on eye exam (last done 8/2023).  He is on an ARB and statin.      He is on pantoprazole for GERD and history of bleeding ulcer (NSAID-induced.)  He has sucralfate as needed.  He is on an iron supplement.     He is on Norco for chronic low back and leg pain.  He is following with Dr. [facility].  He has used gabapentin in the past but not currently.  S/p physical therapy and RFA.      He is on Flonase and montelukast for chronic allergies.  He uses a saline rinse as needed.      Current Outpatient Medications:     apixaban (ELIQUIS) 5 MG tablet tablet, Take 1 tablet by mouth 2 (Two) Times a Day., Disp: 180 tablet, Rfl: 3    carvedilol (COREG) 12.5 MG tablet, Take 1 tablet by mouth 2 (Two) Times a Day., Disp: 180 tablet, Rfl: 3    Diclofenac Sodium (VOLTAREN) 1 % gel gel, APPLY TO THE AFFECTED AREA(S) TWICE DAILY, Disp: 100 g, Rfl: 3    ferrous sulfate 325 (65 FE) MG tablet, Take 1 tablet by mouth Daily With Breakfast., Disp: , Rfl:     fexofenadine (ALLEGRA) 180 MG tablet, Take 1  tablet by mouth Daily., Disp: , Rfl:     fluticasone (FLONASE) 50 MCG/ACT nasal spray, 1 spray into the nostril(s) as directed by provider Daily., Disp: 48 g, Rfl: 3    furosemide (LASIX) 40 MG tablet, Take 1 tablet by mouth Daily., Disp: 60 tablet, Rfl: 2    glucose blood test strip, Check BS daily.  DX E11.9, Disp: 100 each, Rfl: 3    glucose monitor monitoring kit, 1 each Daily. E11.9, Disp: 1 each, Rfl: 0    HYDROcodone-acetaminophen (NORCO) 5-325 MG per tablet, Take 1 tablet by mouth Every 8 (Eight) Hours As Needed., Disp: , Rfl:     isosorbide dinitrate (ISORDIL) 30 MG tablet, Take 1 tablet by mouth 4 (Four) Times a Day., Disp: , Rfl:     Lancets Thin misc, 1 each Daily. DX E11.9, Disp: 100 each, Rfl: 3    losartan (COZAAR) 50 MG tablet, Take 1 tablet by mouth Daily., Disp: 90 tablet, Rfl: 1    lovastatin (MEVACOR) 40 MG tablet, Take 1 tablet by mouth Every Evening., Disp: 90 tablet, Rfl: 1    metFORMIN (GLUCOPHAGE) 500 MG tablet, Take 1 tablet by mouth Daily., Disp: 90 tablet, Rfl: 1    montelukast (SINGULAIR) 10 MG tablet, Take 1 tablet by mouth Every Night., Disp: 90 tablet, Rfl: 3    nitroglycerin (NITROSTAT) 0.4 MG SL tablet, DISSOLVE 1 TABLET UNDER THE TONGUE EVERY 5 MINUTES AS NEEDED FOR CHEST PAIN. DO NOT EXCEED A TOTAL OF 3 DOSES IN 15 MINUTES. IF NO RELIEF, CALL 911, Disp: 25 tablet, Rfl: 0    pantoprazole (PROTONIX) 40 MG EC tablet, Take 1 tablet by mouth Daily., Disp: 90 tablet, Rfl: 1    silver sulfadiazine (Silvadene) 1 % cream, Apply 1 application topically to the appropriate area as directed 2 (Two) Times a Day., Disp: 50 g, Rfl: 0    sucralfate (CARAFATE) 1 g tablet, TAKE 1 TABLET BY MOUTH FOUR TIMES DAILY, Disp: 120 tablet, Rfl: 5    vitamin B-6 (PYRIDOXINE) 100 MG tablet, Take 1 tablet by mouth Daily., Disp: , Rfl:     vitamin C (ASCORBIC ACID) 500 MG tablet, Take 1 tablet by mouth Daily., Disp: , Rfl:     erythromycin (ROMYCIN) 5 MG/GM ophthalmic ointment, Administer  to both eyes Every  "Night. (Patient not taking: Reported on 2/28/2024), Disp: , Rfl:     prochlorperazine (COMPAZINE) 10 MG tablet, Take 1 tablet by mouth Every 8 (Eight) Hours As Needed for Nausea or Vomiting. (Patient not taking: Reported on 2/28/2024), Disp: 24 tablet, Rfl: 0    Allergies:  Iodinated contrast media, Lisinopril, and Prednisone      Objective   Vital Signs:   Vitals:    02/28/24 0803   BP: 153/66   BP Location: Left arm   Patient Position: Sitting   Cuff Size: Adult   Pulse: 53   Temp: 98.3 °F (36.8 °C)   TempSrc: Oral   SpO2: 95%   Weight: 66.2 kg (146 lb)   Height: 170.2 cm (67.01\")         Physical Exam  Vitals reviewed.   Constitutional:       General: He is not in acute distress.     Appearance: Normal appearance. He is well-developed.   HENT:      Head: Normocephalic and atraumatic.      Right Ear: External ear normal.      Left Ear: External ear normal.   Eyes:      Extraocular Movements: Extraocular movements intact.      Conjunctiva/sclera: Conjunctivae normal.      Pupils: Pupils are equal, round, and reactive to light.   Cardiovascular:      Rate and Rhythm: Normal rate. Rhythm irregularly irregular.      Heart sounds: No murmur heard.  Pulmonary:      Effort: Pulmonary effort is normal.      Breath sounds: Normal breath sounds. No wheezing, rhonchi or rales.   Abdominal:      General: Bowel sounds are normal. There is no distension.      Palpations: Abdomen is soft.      Tenderness: There is no abdominal tenderness.   Genitourinary:     Comments: Urostomy bag in place filled with clear urine, stoma appears normal  Musculoskeletal:         General: Normal range of motion.      Comments: +L BKA prosthesis   Skin:     General: Skin is warm and dry.   Neurological:      Mental Status: He is alert.   Psychiatric:         Mood and Affect: Affect normal.        Lab Results   Component Value Date    GLUCOSE 129 (H) 02/13/2024    BUN 27 (H) 02/13/2024    CREATININE 1.15 02/13/2024    EGFR 63.1 02/13/2024    BCR " 23.5 02/13/2024    K 4.2 02/13/2024    CO2 17.6 (L) 02/13/2024    CALCIUM 9.3 02/13/2024    ALBUMIN 4.2 08/15/2023    BILITOT 0.5 08/15/2023    AST 20 08/15/2023    ALT 11 08/15/2023       Lab Results   Component Value Date    CHOL 91 08/15/2023    CHLPL 129 02/20/2021    TRIG 74 08/15/2023    HDL 30 (L) 08/15/2023    LDL 45 08/15/2023       Lab Results   Component Value Date    WBC 5.89 12/26/2023    HGB 11.3 (L) 12/26/2023    HCT 35.7 (L) 12/26/2023    MCV 94.7 12/26/2023     (L) 12/26/2023     Lab Results   Component Value Date    HGBA1C 6.30 (H) 08/15/2023            Assessment and Plan    Assessment & Plan  Type 2 diabetes mellitus with other circulatory complications    He is on metformin for diabetes.  No refills needed.  Dr. Castañeda wanted to try him on Jardiance but it was too expensive.  Last A1c was 6.3 in August.  He does adhere to a diabetic diet.  He is UTD on eye exam (last done 8/2023).  He is on an ARB and statin.  Checking A1c today.  Primary hypertension    Blood pressure has been running at goal.  Continue losartan 50 mg daily and carvedilol 6.25 mg twice daily.  Refills were needed today.  Labs were not needed today.  Continue to monitor.    Mixed hyperlipidemia     Stable on lovastatin 40 mg daily.  Refills were needed today.  Labs were not due today.  Continue to monitor.    Permanent atrial fibrillation  Following with Dr. [facility].  On Eliquis 5 mg twice daily for anticoagulation and carvedilol 6.25 mg twice daily for rate control.  Ischemic cardiomyopathy  Status post ICD placement. Following with Dr. Cast[facility]   Coronary artery disease involving native coronary artery of native heart without angina pectoris    Chronic combined systolic and diastolic congestive heart failure    Follows with Dr. Cast[facility]. Stable on ARB and beta-blocker. Status post ICD placement.       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     Peripheral vascular disease,  unspecified  Status post left BKA.  On Plavix and statin.  Unspecified inflammatory spondylopathy, lumbosacral region  Following with Dr. Charlette Rascon at [facility].  On hydrocodone.  They are monitoring.  Platelets decreased  Stable, 130-170. Continue to monitor.        Chronic gastritis without bleeding, unspecified gastritis type  Stable on pantoprazole 40 mg daily.  Refills were needed today.  Continue to monitor.  Wean PPI as able.    Seasonal allergic rhinitis due to pollen  Doing well on current regimen.  Refills sent on montelukast 10 mg nightly.  Encounter for immunization      Orders Placed This Encounter   Procedures    Pneumococcal Conjugate Vaccine 20-Valent All    POC Glycosylated Hemoglobin (Hb A1C)     New Medications Ordered This Visit   Medications    pantoprazole (PROTONIX) 40 MG EC tablet     Sig: Take 1 tablet by mouth Daily.     Dispense:  90 tablet     Refill:  1     This prescription was filled on 2/25/2022. Any refills authorized will be placed on file.    losartan (COZAAR) 50 MG tablet     Sig: Take 1 tablet by mouth Daily.     Dispense:  90 tablet     Refill:  1     This prescription was filled on 4/4/2022. Any refills authorized will be placed on file.    montelukast (SINGULAIR) 10 MG tablet     Sig: Take 1 tablet by mouth Every Night.     Dispense:  90 tablet     Refill:  3     This prescription was filled on 6/21/2022. Any refills authorized will be placed on file.    lovastatin (MEVACOR) 40 MG tablet     Sig: Take 1 tablet by mouth Every Evening.     Dispense:  90 tablet     Refill:  1     This prescription was filled on 08/30/2023. Any refills authorized will be placed on file.           Follow Up   Return in about 2 months (around 4/28/2024) for Recheck.  Patient was given instructions and counseling regarding his condition or for health maintenance advice. Please see specific information pulled into the AVS if appropriate.

## 2024-02-28 NOTE — ASSESSMENT & PLAN NOTE
Following with Dr. [facility].  On Eliquis 5 mg twice daily for anticoagulation and carvedilol 6.25 mg twice daily for rate control.

## 2024-02-28 NOTE — ASSESSMENT & PLAN NOTE
Stable on lovastatin 40 mg daily.  Refills were needed today.  Labs were not due today.  Continue to monitor.

## 2024-02-28 NOTE — ASSESSMENT & PLAN NOTE
Following with Dr. Charlette Rascon at [facility].  On hydrocodone.  They are monitoring.

## 2024-02-28 NOTE — ASSESSMENT & PLAN NOTE
Blood pressure has been running at goal.  Continue losartan 50 mg daily and carvedilol 6.25 mg twice daily.  Refills were needed today.  Labs were not needed today.  Continue to monitor.

## 2024-02-28 NOTE — ASSESSMENT & PLAN NOTE
He is on metformin for diabetes.  No refills needed.  Dr. Castañeda wanted to try him on Jardiance but it was too expensive.  Last A1c was 6.3 in August.  He does adhere to a diabetic diet.  He is UTD on eye exam (last done 8/2023).  He is on an ARB and statin.  Checking A1c today.

## 2024-03-10 ENCOUNTER — READMISSION MANAGEMENT (OUTPATIENT)
Dept: CALL CENTER | Facility: HOSPITAL | Age: 84
End: 2024-03-10
Payer: MEDICARE

## 2024-03-10 NOTE — OUTREACH NOTE
Prep Survey      Flowsheet Row Responses   [facility] facility patient discharged from? Non-[facility]   Is LACE score < 7 ? Non-BH Discharge   Eligibility [facility]   Date of Discharge 03/10/24   Discharge Disposition Home or Self Care   Discharge diagnosis Chronic obstructive pulmonary disease with (acute) exacerbation/COVID 19   Does the patient have one of the following disease processes/diagnoses(primary or secondary)? COPD   Prep survey completed? Yes            RAFA DAVIS - Registered Nurse           High Dose Vitamin A Pregnancy And Lactation Text: High dose vitamin A therapy is contraindicated during pregnancy and breast feeding.

## 2024-03-11 ENCOUNTER — TRANSITIONAL CARE MANAGEMENT TELEPHONE ENCOUNTER (OUTPATIENT)
Dept: CALL CENTER | Facility: HOSPITAL | Age: 84
End: 2024-03-11
Payer: MEDICARE

## 2024-03-11 NOTE — OUTREACH NOTE
Call Center TCM Note      Flowsheet Row Responses   [facility] patient discharged from? Non-BH  [[place]]   Does the patient have one of the following disease processes/diagnoses(primary or secondary)? Other   TCM attempt successful? Yes  [verbal release ]   Call start time [phone]   Call end time 940   Discharge diagnosis Chronic obstructive pulmonary disease with (acute) exacerbation/COVID 19   Meds reviewed with patient/caregiver? Yes   Is the patient having any side effects they believe may be caused by any medication additions or changes? No   Does the patient have all medications ordered at discharge? Yes   Is the patient taking all medications as directed (includes completed medication regime)? Yes   Medication comments Lasix increased to 40 mg po bid   Comments Hospital f/u on 3/19/24@230pm   Does the patient have an appointment with their PCP within 7-14 days of discharge? Yes   Has home health visited the patient within 72 hours of discharge? N/A   Psychosocial issues? No   Did the patient receive a copy of their discharge instructions? Yes   Nursing interventions Reviewed instructions with patient   What is the patient's perception of their health status since discharge? Improving  [Pt reports improvement--acknowledged lasix increase and v/u to weigh qd, reviewed wt gain parameters,  Pt denies ongoing resp type s/s,  denies edema.  Reports he has cardiology f/u on 3/13/24.  Pt has no concerns today.]   Is the patient/caregiver able to teach back signs and symptoms related to disease process for when to call PCP? Yes   Is the patient/caregiver able to teach back signs and symptoms related to disease process for when to call 911? Yes   Additional teach back comments Reviewed resp type s/s and need to seek care---no questions in this regard.   TCM call completed? Yes   Call end time 940            Terese De Leon RN    3/11/2024, 09:47 EDT

## 2024-03-13 ENCOUNTER — OFFICE VISIT (OUTPATIENT)
Dept: CARDIOLOGY | Facility: CLINIC | Age: 84
End: 2024-03-13
Payer: MEDICARE

## 2024-03-13 VITALS
OXYGEN SATURATION: 95 % | BODY MASS INDEX: 21.97 KG/M2 | HEART RATE: 77 BPM | WEIGHT: 140 LBS | SYSTOLIC BLOOD PRESSURE: 108 MMHG | DIASTOLIC BLOOD PRESSURE: 62 MMHG | HEIGHT: 67 IN

## 2024-03-13 DIAGNOSIS — I25.10 CORONARY ARTERY DISEASE INVOLVING NATIVE CORONARY ARTERY OF NATIVE HEART WITHOUT ANGINA PECTORIS: ICD-10-CM

## 2024-03-13 DIAGNOSIS — I10 PRIMARY HYPERTENSION: ICD-10-CM

## 2024-03-13 DIAGNOSIS — I50.42 CHRONIC COMBINED SYSTOLIC AND DIASTOLIC CONGESTIVE HEART FAILURE: Primary | ICD-10-CM

## 2024-03-13 RX ORDER — FUROSEMIDE 40 MG/1
40 TABLET ORAL 2 TIMES DAILY
Qty: 60 TABLET | Refills: 11 | Status: SHIPPED | OUTPATIENT
Start: 2024-03-13

## 2024-03-13 RX ORDER — POTASSIUM CHLORIDE 750 MG/1
10 CAPSULE, EXTENDED RELEASE ORAL
COMMUNITY
Start: 2024-03-10 | End: 2025-03-05

## 2024-03-13 NOTE — PROGRESS NOTES
Subjective:     Encounter Date:03/13/2024      Patient ID: Marcello Larson is a 83 y.o. male.    Chief Complaint:  Management of HFrEF, CAD    HPI:   83 y.o. male with CAD status post CABG, HFrEF secondary to ischemic cardiomyopathy (EF 30 to 35%) status post ICD, permanent atrial fibrillation on Eliquis, hypertension, hyperlipidemia who presents for follow-up and management of his chronic conditions.  I last saw the patient in January 24 and he was feeling well at the time.  He contacted our office due to shortness of breath with exertion in the setting of recovering from influenza and UTI and followed up with Lisette Bentley in February.  He was found to have 2+ lower extremity edema on the right leg and his Lasix was switched from as needed to daily.  He misunderstood and only increase his Lasix for 3 days and unfortunately was admitted to [facility] over the past weekend due to volume overload.  He was diuresed and his Lasix was increased to 40 mg twice daily.  He presents today for follow-up and is feeling back to baseline.    Echo 2/22/2022 with an EF 30 to 35%, grade 3 diastolic dysfunction, moderately dilated LA, mildly dilated RA.    The following portions of the patient's history were reviewed and updated as appropriate: allergies, current medications, past family history, past medical history, past social history, past surgical history and problem list.     REVIEW OF SYSTEMS:   All systems reviewed.  Pertinent positives identified in HPI.  All other systems are negative.    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  LV systolic function is moderate to severely reduced.  EF 30-35%.  The transmittal spectral doppler flow pattern is suggestive of pseudonormalization.  There is moderate to severe global hypokinesis of the LV.  The left atrium is mildly dilated.  The mitral leaflets appear thickened, but open well.  Mild MR and AR.    Hx of CABG 2019 - left internal mammary graft to the LAD, SVG to OM1, OM2 in the RCA    Hyperlipidemia 2019    Hypo-osmolality and hyponatremia     Low back pain     Myocardial infarct     Other long term (current) drug therapy     Palpitations 2019    Personal history of malignant neoplasm of bladder     Sleep apnea 2019    Stented coronary artery 2019 - VISION bare metal stent to the proximal stenosis of the vein graft to the right and ISION bare metal stent in the mid to distal 80% stenosis.    Type 2 diabetes mellitus without complication     Unstable angina        Family History   Problem Relation Age of Onset    No Known Problems Mother     No Known Problems Father        Social History     Socioeconomic History    Marital status:     Number of children: 2   Tobacco Use    Smoking status: Former     Current packs/day: 0.00     Types: Cigarettes     Start date:      Quit date: 1984     Years since quittin.2     Passive exposure: Past    Smokeless tobacco: Never    Tobacco comments:     Have not smoked since    Vaping Use    Vaping status: Never Used   Substance and Sexual Activity    Alcohol use: Never    Drug use: Never    Sexual activity: Not Currently      Partners: Female       Allergies   Allergen Reactions    Iodinated Contrast Media Anaphylaxis    Lisinopril Other (See Comments)    Prednisone Cough       Past Surgical History:   Procedure Laterality Date    AMPUTATION Left     LOWER EXTREMITY BKA    APPENDECTOMY      CARDIAC CATHETERIZATION      CARDIAC CATHETERIZATION N/A 10/13/2020    Procedure: Left Heart Cath;  Surgeon: Kofi Campoverde MD;  Location:  [facility];  Service: Cardiology;  Laterality: N/A;    CARDIAC CATHETERIZATION N/A 10/13/2020    Procedure: Coronary angiography;  Surgeon: Kofi Campoverde MD;  Location:  [facility];  Service: Cardiology;  Laterality: N/A;    CARDIAC CATHETERIZATION N/A 10/13/2020    Procedure: Saphenous Vein Graft;  Surgeon: Kofi Campoverde MD;  Location:  [facility];  Service: Cardiology;  Laterality: N/A;    CARDIAC ELECTROPHYSIOLOGY PROCEDURE N/A 04/04/2022    Procedure: ICD DC new/SJM;  Surgeon: Mamadou Terry MD;  Location:  [facility];  Service: Cardiology;  Laterality: N/A;    CATARACT EXTRACTION      CHOLECYSTECTOMY      CORONARY ARTERY BYPASS GRAFT      1984, 1991 4 VESSEL    INTERNAL CARDIAC DEFIBRILLATOR INSERTION  04/2022    OTHER SURGICAL HISTORY      UROSTOMY S/P BLADDER CA       Procedures       Objective:         Vitals:    03/13/24 1034   BP: 108/62   Pulse: 77   SpO2: 95%         PHYSICAL EXAM:  GEN: well appearing, in NAD   HEENT: NCAT, EOMI, moist mucus membranes   Respiratory: CTAB, no wheezes, rales or rhonchi  CV: normal rate, regular rhythm, normal S1, S2, no murmurs, rubs, gallops, +2 radial pulses b/l  GI: soft, nontender, nondistended  MSK: Left leg BKA, no edema of right leg  Skin: no rash, warm, dry  Heme/Lymph: no bruising or bleeding  Psych: organized thought, normal behavior and affect  Neuro: Alert and Oriented x 3, grossly normal motor function        Assessment:         (I50.42) Chronic combined systolic and  diastolic congestive heart failure - Plan: Basic Metabolic Panel, Magnesium, furosemide (LASIX) 40 MG tablet    (I25.10) Coronary artery disease involving native coronary artery of native heart without angina pectoris    (I10) Primary hypertension    82 year old man with CAD status post CABG, HFrEF secondary to ischemic cardiomyopathy (EF 30 to 35%) status post ICD, permanent atrial fibrillation on Eliquis, hypertension, hyperlipidemia who presents for follow-up and management of his chronic conditions.       Plan:       #HFrEF  Well compensated.  Will uptitrate GDMT.  NYHA class I. Echo 2/22/2022 with EF 30 to 35%, grade 3 diastolic dysfunction, moderately dilated LA, mildly dilated RA.  - continue Coreg 12.5 mg twice daily, losartan 50 mg  -Continue Lasix 40 mg twice daily, BMP in 2 to 3 weeks  - no spironolactone given NICO and inabilty to tolerate(fatigue)  - no Jardiance given cost    #CAD status post CABG  Patient without any symptoms of angina.  - continue plavix 75 mg daily, lovastatin 40 mg daily    #Permanent atrial fibrillation  [id number]  -Continue Coreg as above, Eliquis 5 mg twice daily    Dr Corley, thank you very much for referring this kind patient to me. Please call me with any questions or concerns. I will see the patient again in the office in 6 weeks or earlier as needed.         Andrew Castañeda MD  03/13/24  [facility]    Outpatient Encounter Medications as of 3/13/2024   Medication Sig Dispense Refill    apixaban (ELIQUIS) 5 MG tablet tablet Take 1 tablet by mouth 2 (Two) Times a Day. 180 tablet 3    carvedilol (COREG) 12.5 MG tablet Take 1 tablet by mouth 2 (Two) Times a Day. 180 tablet 3    Diclofenac Sodium (VOLTAREN) 1 % gel gel APPLY TO THE AFFECTED AREA(S) TWICE DAILY 100 g 3    erythromycin (ROMYCIN) 5 MG/GM ophthalmic ointment Administer  to both eyes Every Night.      ferrous sulfate 325 (65 FE) MG tablet Take 1 tablet by mouth Daily With Breakfast.      fexofenadine  (ALLEGRA) 180 MG tablet Take 1 tablet by mouth Daily.      fluticasone (FLONASE) 50 MCG/ACT nasal spray 1 spray into the nostril(s) as directed by provider Daily. 48 g 3    furosemide (LASIX) 40 MG tablet Take 1 tablet by mouth 2 (Two) Times a Day. 60 tablet 11    glucose blood test strip Check BS daily.  DX E11.9 100 each 3    glucose monitor monitoring kit 1 each Daily. E11.9 1 each 0    HYDROcodone-acetaminophen (NORCO) 5-325 MG per tablet Take 1 tablet by mouth Every 8 (Eight) Hours As Needed.      isosorbide dinitrate (ISORDIL) 30 MG tablet Take 1 tablet by mouth 4 (Four) Times a Day.      Lancets Thin misc 1 each Daily. DX E11.9 100 each 3    losartan (COZAAR) 50 MG tablet Take 1 tablet by mouth Daily. 90 tablet 1    lovastatin (MEVACOR) 40 MG tablet Take 1 tablet by mouth Every Evening. 90 tablet 1    metFORMIN (GLUCOPHAGE) 500 MG tablet Take 1 tablet by mouth Daily. 90 tablet 1    montelukast (SINGULAIR) 10 MG tablet Take 1 tablet by mouth Every Night. 90 tablet 3    nitroglycerin (NITROSTAT) 0.4 MG SL tablet DISSOLVE 1 TABLET UNDER THE TONGUE EVERY 5 MINUTES AS NEEDED FOR CHEST PAIN. DO NOT EXCEED A TOTAL OF 3 DOSES IN 15 MINUTES. IF NO RELIEF, CALL 911 25 tablet 0    pantoprazole (PROTONIX) 40 MG EC tablet Take 1 tablet by mouth Daily. 90 tablet 1    potassium chloride (MICRO-K) 10 MEQ CR capsule Take 1 capsule by mouth.      prochlorperazine (COMPAZINE) 10 MG tablet Take 1 tablet by mouth Every 8 (Eight) Hours As Needed for Nausea or Vomiting. 24 tablet 0    silver sulfadiazine (Silvadene) 1 % cream Apply 1 application topically to the appropriate area as directed 2 (Two) Times a Day. 50 g 0    sucralfate (CARAFATE) 1 g tablet TAKE 1 TABLET BY MOUTH FOUR TIMES DAILY 120 tablet 5    vitamin B-6 (PYRIDOXINE) 100 MG tablet Take 1 tablet by mouth Daily.      vitamin C (ASCORBIC ACID) 500 MG tablet Take 1 tablet by mouth Daily.      [DISCONTINUED] furosemide (LASIX) 40 MG tablet Take 1 tablet by mouth Daily.  60 tablet 2     No facility-administered encounter medications on file as of 3/13/2024.

## 2024-03-15 DIAGNOSIS — J30.1 SEASONAL ALLERGIC RHINITIS DUE TO POLLEN: ICD-10-CM

## 2024-03-15 RX ORDER — MONTELUKAST SODIUM 10 MG/1
10 TABLET ORAL NIGHTLY
Qty: 90 TABLET | Refills: 1 | OUTPATIENT
Start: 2024-03-15

## 2024-03-19 ENCOUNTER — OFFICE VISIT (OUTPATIENT)
Dept: FAMILY MEDICINE CLINIC | Age: 84
End: 2024-03-19
Payer: MEDICARE

## 2024-03-19 VITALS
DIASTOLIC BLOOD PRESSURE: 59 MMHG | HEIGHT: 67 IN | SYSTOLIC BLOOD PRESSURE: 130 MMHG | OXYGEN SATURATION: 99 % | WEIGHT: 143.4 LBS | HEART RATE: 53 BPM | TEMPERATURE: 97.9 F | BODY MASS INDEX: 22.51 KG/M2

## 2024-03-19 DIAGNOSIS — I50.42 CHRONIC COMBINED SYSTOLIC AND DIASTOLIC CONGESTIVE HEART FAILURE: Primary | ICD-10-CM

## 2024-03-19 DIAGNOSIS — Z95.810 ICD (IMPLANTABLE CARDIOVERTER-DEFIBRILLATOR), DUAL, IN SITU: ICD-10-CM

## 2024-03-19 DIAGNOSIS — I10 PRIMARY HYPERTENSION: ICD-10-CM

## 2024-03-19 DIAGNOSIS — E11.59 TYPE 2 DIABETES MELLITUS WITH OTHER CIRCULATORY COMPLICATIONS: ICD-10-CM

## 2024-03-19 NOTE — ASSESSMENT & PLAN NOTE
Blood pressure has been running at goal.  Continue carvedilol 12.5 mg twice daily.  Refills were not needed today.  Labs were not needed today; labs done from recent hospitalization reviewed.  Continue to monitor.

## 2024-03-19 NOTE — PROGRESS NOTES
"Transitional Care Follow Up Visit  Subjective     Marcello Larson is a 83 y.o. male who presents for a transitional care management visit.    Within 48 business hours after discharge our office contacted him via telephone to coordinate his care and needs.      I reviewed and discussed the details of that call along with the discharge summary, hospital problems, inpatient lab results, inpatient diagnostic studies, and consultation reports with Marcello.     Current outpatient and discharge medications have been reconciled for the patient.  Reviewed by: Zeny Corley MD          3/10/2024     2:43 PM   Date of TCM Phone Call   [facility]   Date of Discharge 3/10/2024   Discharge Disposition Home or Self Care     Risk for Readmission (LACE) No data recorded    History of Present Illness   Course During Hospital Stay:  Marcello is here today for transition of care appointment following admission to [facility] from 3/9/2024 to 3/10/2024 for diagnosis of acute on chronic diastolic heart failure.    He presented to the ED initially with complaint of shortness of breath.  He was found to be in CHF exacerbation and was diuresed with IV Lasix.  Baseline EF 30 to 35% on most recent echo.  He does have ICD placement.  He responded well and clinically improved.  His Lasix was increased to 40 mg twice daily and he followed up with his cardiologist Dr. Oliveira on 3/13/2024.  No changes were made to his regimen and he was instructed to continue the Lasix twice daily for the foreseeable future with BMP to be completed in 2 to 3 weeks.  He is taking potassium BID.      Today, \"I feel great.\"  No shortness of breath.  He has been continuing to cough up some mild clear sputum.  No clear pain or palpitations.  No peripheral edema.  No PND or orthopnea.       The following portions of the patient's history were reviewed and updated as appropriate: allergies, current medications, past family history, past medical " "history, past social history, past surgical history, and problem list.    Review of Systems   Constitutional:  Positive for fatigue. Negative for chills and fever.   HENT:  Positive for hearing loss.    Respiratory:  Negative for cough and shortness of breath.    Cardiovascular:  Negative for chest pain and palpitations.   Gastrointestinal:  Negative for abdominal pain, constipation, diarrhea, nausea and vomiting.   Musculoskeletal:  Negative for arthralgias and myalgias.   Neurological:  Negative for weakness (had some weakness initially but \"my strength is coming back\") and numbness.         Current Outpatient Medications:     apixaban (ELIQUIS) 5 MG tablet tablet, Take 1 tablet by mouth 2 (Two) Times a Day., Disp: 180 tablet, Rfl: 3    carvedilol (COREG) 12.5 MG tablet, Take 1 tablet by mouth 2 (Two) Times a Day., Disp: 180 tablet, Rfl: 3    Diclofenac Sodium (VOLTAREN) 1 % gel gel, APPLY TO THE AFFECTED AREA(S) TWICE DAILY, Disp: 100 g, Rfl: 3    erythromycin (ROMYCIN) 5 MG/GM ophthalmic ointment, Administer  to both eyes As Needed., Disp: , Rfl:     ferrous sulfate 325 (65 FE) MG tablet, Take 1 tablet by mouth Daily With Breakfast., Disp: , Rfl:     fexofenadine (ALLEGRA) 180 MG tablet, Take 1 tablet by mouth Daily., Disp: , Rfl:     fluticasone (FLONASE) 50 MCG/ACT nasal spray, 1 spray into the nostril(s) as directed by provider Daily., Disp: 48 g, Rfl: 3    furosemide (LASIX) 40 MG tablet, Take 1 tablet by mouth 2 (Two) Times a Day., Disp: 60 tablet, Rfl: 11    glucose blood test strip, Check BS daily.  DX E11.9, Disp: 100 each, Rfl: 3    glucose monitor monitoring kit, 1 each Daily. E11.9, Disp: 1 each, Rfl: 0    HYDROcodone-acetaminophen (NORCO) 5-325 MG per tablet, Take 1 tablet by mouth Every 8 (Eight) Hours As Needed., Disp: , Rfl:     isosorbide dinitrate (ISORDIL) 30 MG tablet, Take 1 tablet by mouth 4 (Four) Times a Day., Disp: , Rfl:     Lancets Thin misc, 1 each Daily. DX E11.9, Disp: 100 each, Rfl: " "3    losartan (COZAAR) 50 MG tablet, Take 1 tablet by mouth Daily., Disp: 90 tablet, Rfl: 1    lovastatin (MEVACOR) 40 MG tablet, Take 1 tablet by mouth Every Evening., Disp: 90 tablet, Rfl: 1    metFORMIN (GLUCOPHAGE) 500 MG tablet, Take 1 tablet by mouth Daily., Disp: 90 tablet, Rfl: 1    montelukast (SINGULAIR) 10 MG tablet, Take 1 tablet by mouth Every Night., Disp: 90 tablet, Rfl: 3    nitroglycerin (NITROSTAT) 0.4 MG SL tablet, DISSOLVE 1 TABLET UNDER THE TONGUE EVERY 5 MINUTES AS NEEDED FOR CHEST PAIN. DO NOT EXCEED A TOTAL OF 3 DOSES IN 15 MINUTES. IF NO RELIEF, CALL 911, Disp: 25 tablet, Rfl: 0    pantoprazole (PROTONIX) 40 MG EC tablet, Take 1 tablet by mouth Daily., Disp: 90 tablet, Rfl: 1    potassium chloride (MICRO-K) 10 MEQ CR capsule, Take 1 capsule by mouth., Disp: , Rfl:     prochlorperazine (COMPAZINE) 10 MG tablet, Take 1 tablet by mouth Every 8 (Eight) Hours As Needed for Nausea or Vomiting., Disp: 24 tablet, Rfl: 0    silver sulfadiazine (Silvadene) 1 % cream, Apply 1 application topically to the appropriate area as directed 2 (Two) Times a Day., Disp: 50 g, Rfl: 0    sucralfate (CARAFATE) 1 g tablet, TAKE 1 TABLET BY MOUTH FOUR TIMES DAILY, Disp: 120 tablet, Rfl: 5    vitamin B-6 (PYRIDOXINE) 100 MG tablet, Take 1 tablet by mouth Daily., Disp: , Rfl:     vitamin C (ASCORBIC ACID) 500 MG tablet, Take 1 tablet by mouth Daily., Disp: , Rfl:   There are no discontinued medications.      Objective   Vitals:    03/19/24 1432 03/19/24 1508   BP: 154/44 130/59   BP Location: Left arm Left arm   Patient Position: Sitting Sitting   Cuff Size: Adult Adult   Pulse: 60 53   Temp: 97.9 °F (36.6 °C)    TempSrc: Oral    SpO2: 99%    Weight: 65 kg (143 lb 6.4 oz)    Height: 170.2 cm (67.01\")      Body mass index is 22.45 kg/m².  BMI is within normal parameters. No other follow-up for BMI required.    Physical Exam  Vitals reviewed.   Constitutional:       General: He is not in acute distress.     Appearance: " Normal appearance. He is well-developed.   HENT:      Head: Normocephalic and atraumatic.      Right Ear: External ear normal.      Left Ear: External ear normal.   Eyes:      Extraocular Movements: Extraocular movements intact.      Conjunctiva/sclera: Conjunctivae normal.      Pupils: Pupils are equal, round, and reactive to light.   Cardiovascular:      Rate and Rhythm: Normal rate. Rhythm irregularly irregular.      Heart sounds: No murmur heard.  Pulmonary:      Effort: Pulmonary effort is normal.      Breath sounds: Normal breath sounds. No wheezing, rhonchi or rales.   Abdominal:      General: Bowel sounds are normal. There is no distension.      Palpations: Abdomen is soft.      Tenderness: There is no abdominal tenderness.   Musculoskeletal:         General: Normal range of motion.      Comments: +L BKA prosthesis   Skin:     General: Skin is warm and dry.   Neurological:      Mental Status: He is alert.   Psychiatric:         Mood and Affect: Affect normal.         Lab Results   Component Value Date    GLUCOSE 129 (H) 02/13/2024    BUN 27 (H) 02/13/2024    CREATININE 1.15 02/13/2024    EGFR 63.1 02/13/2024    BCR 23.5 02/13/2024    K 4.2 02/13/2024    CO2 17.6 (L) 02/13/2024    CALCIUM 9.3 02/13/2024    ALBUMIN 4.2 08/15/2023    BILITOT 0.5 08/15/2023    AST 20 08/15/2023    ALT 11 08/15/2023       Lab Results   Component Value Date    CHOL 91 08/15/2023    CHLPL 129 02/20/2021    TRIG 74 08/15/2023    HDL 30 (L) 08/15/2023    LDL 45 08/15/2023       Lab Results   Component Value Date    WBC 5.89 12/26/2023    HGB 11.3 (L) 12/26/2023    HCT 35.7 (L) 12/26/2023    MCV 94.7 12/26/2023     (L) 12/26/2023         Assessment & Plan   Assessment & Plan  Chronic combined systolic and diastolic congestive heart failure    Morbid appears euvolemic on exam today.  He continues on the furosemide 40 mg twice daily along with potassium chloride 10 mEq twice daily.  No refills needed today.  He is continuing to  follow closely with his cardiologist Dr. Castañeda.  Continue to follow fluid restriction and salt restriction as recommended.  Dr. Castañeda will be rechecking a BMP in a couple of weeks so that has not been ordered today.  Primary hypertension    Blood pressure has been running at goal.  Continue carvedilol 12.5 mg twice daily.  Refills were not needed today.  Labs were not needed today; labs done from recent hospitalization reviewed.  Continue to monitor.    Type 2 diabetes mellitus with other circulatory complications    Blood sugars stable.  Continue on metformin 500 mg daily.  Continue to monitor.  Last A1c 6.7.  ICD (implantable cardioverter-defibrillator), dual, in situ  Status post ICD placement for CHF.  EF 30 to 35% on most recent echo.

## 2024-03-19 NOTE — ASSESSMENT & PLAN NOTE
Morbid appears euvolemic on exam today.  He continues on the furosemide 40 mg twice daily along with potassium chloride 10 mEq twice daily.  No refills needed today.  He is continuing to follow closely with his cardiologist Dr. Castañeda.  Continue to follow fluid restriction and salt restriction as recommended.  Dr. Castañeda will be rechecking a BMP in a couple of weeks so that has not been ordered today.

## 2024-03-26 ENCOUNTER — TELEPHONE (OUTPATIENT)
Dept: FAMILY MEDICINE CLINIC | Age: 84
End: 2024-03-26
Payer: MEDICARE

## 2024-04-30 ENCOUNTER — LAB (OUTPATIENT)
Dept: LAB | Facility: HOSPITAL | Age: 84
End: 2024-04-30
Payer: MEDICARE

## 2024-04-30 ENCOUNTER — OFFICE VISIT (OUTPATIENT)
Dept: FAMILY MEDICINE CLINIC | Age: 84
End: 2024-04-30
Payer: MEDICARE

## 2024-04-30 VITALS
HEART RATE: 50 BPM | OXYGEN SATURATION: 98 % | BODY MASS INDEX: 21.79 KG/M2 | DIASTOLIC BLOOD PRESSURE: 52 MMHG | HEIGHT: 67 IN | SYSTOLIC BLOOD PRESSURE: 110 MMHG | WEIGHT: 138.8 LBS

## 2024-04-30 DIAGNOSIS — I48.21 PERMANENT ATRIAL FIBRILLATION: ICD-10-CM

## 2024-04-30 DIAGNOSIS — I10 PRIMARY HYPERTENSION: ICD-10-CM

## 2024-04-30 DIAGNOSIS — E11.51 TYPE 2 DIABETES MELLITUS WITH DIABETIC PERIPHERAL ANGIOPATHY WITHOUT GANGRENE, WITHOUT LONG-TERM CURRENT USE OF INSULIN: Primary | ICD-10-CM

## 2024-04-30 DIAGNOSIS — E78.2 MIXED HYPERLIPIDEMIA: ICD-10-CM

## 2024-04-30 DIAGNOSIS — I50.42 CHRONIC COMBINED SYSTOLIC AND DIASTOLIC CONGESTIVE HEART FAILURE: ICD-10-CM

## 2024-04-30 DIAGNOSIS — K29.50 CHRONIC GASTRITIS WITHOUT BLEEDING, UNSPECIFIED GASTRITIS TYPE: ICD-10-CM

## 2024-04-30 DIAGNOSIS — I25.5 ISCHEMIC CARDIOMYOPATHY: ICD-10-CM

## 2024-04-30 DIAGNOSIS — I25.10 CORONARY ARTERY DISEASE INVOLVING NATIVE CORONARY ARTERY OF NATIVE HEART WITHOUT ANGINA PECTORIS: ICD-10-CM

## 2024-04-30 DIAGNOSIS — D69.6 PLATELETS DECREASED: ICD-10-CM

## 2024-04-30 DIAGNOSIS — E11.51 TYPE 2 DIABETES MELLITUS WITH DIABETIC PERIPHERAL ANGIOPATHY WITHOUT GANGRENE, WITHOUT LONG-TERM CURRENT USE OF INSULIN: ICD-10-CM

## 2024-04-30 DIAGNOSIS — Z23 ENCOUNTER FOR IMMUNIZATION: ICD-10-CM

## 2024-04-30 PROBLEM — R94.39 POSITIVE CARDIAC STRESS TEST: Status: RESOLVED | Noted: 2020-09-30 | Resolved: 2024-04-30

## 2024-04-30 LAB
ALBUMIN SERPL-MCNC: 4.1 G/DL (ref 3.5–5.2)
ALBUMIN/GLOB SERPL: 1.2 G/DL
ALP SERPL-CCNC: 70 U/L (ref 39–117)
ALT SERPL W P-5'-P-CCNC: 8 U/L (ref 1–41)
ANION GAP SERPL CALCULATED.3IONS-SCNC: 11.6 MMOL/L (ref 5–15)
AST SERPL-CCNC: 16 U/L (ref 1–40)
BASOPHILS # BLD AUTO: 0.03 10*3/MM3 (ref 0–0.2)
BASOPHILS NFR BLD AUTO: 0.5 % (ref 0–1.5)
BILIRUB SERPL-MCNC: 0.5 MG/DL (ref 0–1.2)
BUN SERPL-MCNC: 40 MG/DL (ref 8–23)
BUN/CREAT SERPL: 28 (ref 7–25)
CALCIUM SPEC-SCNC: 9.8 MG/DL (ref 8.6–10.5)
CHLORIDE SERPL-SCNC: 101 MMOL/L (ref 98–107)
CHOLEST SERPL-MCNC: 102 MG/DL (ref 0–200)
CO2 SERPL-SCNC: 21.4 MMOL/L (ref 22–29)
CREAT SERPL-MCNC: 1.43 MG/DL (ref 0.76–1.27)
DEPRECATED RDW RBC AUTO: 44.2 FL (ref 37–54)
EGFRCR SERPLBLD CKD-EPI 2021: 48.3 ML/MIN/1.73
EOSINOPHIL # BLD AUTO: 0.2 10*3/MM3 (ref 0–0.4)
EOSINOPHIL NFR BLD AUTO: 3.5 % (ref 0.3–6.2)
ERYTHROCYTE [DISTWIDTH] IN BLOOD BY AUTOMATED COUNT: 13.3 % (ref 12.3–15.4)
GLOBULIN UR ELPH-MCNC: 3.4 GM/DL
GLUCOSE SERPL-MCNC: 121 MG/DL (ref 65–99)
HBA1C MFR BLD: 6.7 % (ref 4.8–5.6)
HCT VFR BLD AUTO: 36.2 % (ref 37.5–51)
HDLC SERPL-MCNC: 31 MG/DL (ref 40–60)
HGB BLD-MCNC: 11.3 G/DL (ref 13–17.7)
IMM GRANULOCYTES # BLD AUTO: 0.01 10*3/MM3 (ref 0–0.05)
IMM GRANULOCYTES NFR BLD AUTO: 0.2 % (ref 0–0.5)
LDLC SERPL CALC-MCNC: 55 MG/DL (ref 0–100)
LDLC/HDLC SERPL: 1.79 {RATIO}
LYMPHOCYTES # BLD AUTO: 1.12 10*3/MM3 (ref 0.7–3.1)
LYMPHOCYTES NFR BLD AUTO: 19.4 % (ref 19.6–45.3)
MAGNESIUM SERPL-MCNC: 2 MG/DL (ref 1.6–2.4)
MCH RBC QN AUTO: 27.9 PG (ref 26.6–33)
MCHC RBC AUTO-ENTMCNC: 31.2 G/DL (ref 31.5–35.7)
MCV RBC AUTO: 89.4 FL (ref 79–97)
MONOCYTES # BLD AUTO: 0.47 10*3/MM3 (ref 0.1–0.9)
MONOCYTES NFR BLD AUTO: 8.1 % (ref 5–12)
NEUTROPHILS NFR BLD AUTO: 3.95 10*3/MM3 (ref 1.7–7)
NEUTROPHILS NFR BLD AUTO: 68.3 % (ref 42.7–76)
PLATELET # BLD AUTO: 173 10*3/MM3 (ref 140–450)
PMV BLD AUTO: 9.7 FL (ref 6–12)
POTASSIUM SERPL-SCNC: 4.3 MMOL/L (ref 3.5–5.2)
PROT SERPL-MCNC: 7.5 G/DL (ref 6–8.5)
RBC # BLD AUTO: 4.05 10*6/MM3 (ref 4.14–5.8)
SODIUM SERPL-SCNC: 134 MMOL/L (ref 136–145)
TRIGL SERPL-MCNC: 77 MG/DL (ref 0–150)
VLDLC SERPL-MCNC: 16 MG/DL (ref 5–40)
WBC NRBC COR # BLD AUTO: 5.78 10*3/MM3 (ref 3.4–10.8)

## 2024-04-30 PROCEDURE — 83735 ASSAY OF MAGNESIUM: CPT

## 2024-04-30 PROCEDURE — 36415 COLL VENOUS BLD VENIPUNCTURE: CPT

## 2024-04-30 PROCEDURE — 3078F DIAST BP <80 MM HG: CPT | Performed by: FAMILY MEDICINE

## 2024-04-30 PROCEDURE — 1160F RVW MEDS BY RX/DR IN RCRD: CPT | Performed by: FAMILY MEDICINE

## 2024-04-30 PROCEDURE — 80053 COMPREHEN METABOLIC PANEL: CPT

## 2024-04-30 PROCEDURE — 91320 SARSCV2 VAC 30MCG TRS-SUC IM: CPT | Performed by: FAMILY MEDICINE

## 2024-04-30 PROCEDURE — 83036 HEMOGLOBIN GLYCOSYLATED A1C: CPT

## 2024-04-30 PROCEDURE — 1159F MED LIST DOCD IN RCRD: CPT | Performed by: FAMILY MEDICINE

## 2024-04-30 PROCEDURE — 3074F SYST BP LT 130 MM HG: CPT | Performed by: FAMILY MEDICINE

## 2024-04-30 PROCEDURE — 80061 LIPID PANEL: CPT

## 2024-04-30 PROCEDURE — 99214 OFFICE O/P EST MOD 30 MIN: CPT | Performed by: FAMILY MEDICINE

## 2024-04-30 PROCEDURE — 90480 ADMN SARSCOV2 VAC 1/ONLY CMP: CPT | Performed by: FAMILY MEDICINE

## 2024-04-30 PROCEDURE — 85025 COMPLETE CBC W/AUTO DIFF WBC: CPT

## 2024-04-30 RX ORDER — LOVASTATIN 40 MG/1
40 TABLET ORAL EVERY EVENING
Qty: 90 TABLET | Refills: 1 | Status: SHIPPED | OUTPATIENT
Start: 2024-04-30

## 2024-04-30 RX ORDER — PANTOPRAZOLE SODIUM 40 MG/1
40 TABLET, DELAYED RELEASE ORAL DAILY
Qty: 90 TABLET | Refills: 1 | Status: SHIPPED | OUTPATIENT
Start: 2024-04-30

## 2024-04-30 RX ORDER — CLOPIDOGREL BISULFATE 75 MG/1
75 TABLET ORAL DAILY
COMMUNITY

## 2024-04-30 RX ORDER — PROCHLORPERAZINE MALEATE 10 MG
10 TABLET ORAL EVERY 8 HOURS PRN
Qty: 24 TABLET | Refills: 0 | Status: SHIPPED | OUTPATIENT
Start: 2024-04-30

## 2024-04-30 NOTE — ASSESSMENT & PLAN NOTE
Blood pressure has been running at goal.  Continue carvedilol 12.5 mg twice daily and losartan 50 mg daily.  Refills were not needed today.  Labs were needed today.  Continue to monitor.

## 2024-04-30 NOTE — ASSESSMENT & PLAN NOTE
Stable on panttoprazole 40 mg daily.  Refills were needed today.  Continue to monitor.  Wean PPI as able.

## 2024-04-30 NOTE — ASSESSMENT & PLAN NOTE
He is on metformin for diabetes.  Dr. Castañeda wanted to try him on Jardiance but cost was too much; we will give this another try today.  Last A1c was 6.7 in February.  He does adhere to a diabetic diet.  He is UTD on eye exam (last done 8/2023).  He is on an ARB and statin.  Checking labs.

## 2024-04-30 NOTE — ASSESSMENT & PLAN NOTE
Stable on lovastatin 40 mg daily.  Refills were needed today.  Labs were due today.  Continue to monitor.

## 2024-04-30 NOTE — PROGRESS NOTES
Chief Complaint  Diabetes, Atrial Fibrillation, Hypertension, and Hyperlipidemia    Subjective          Marcello Larson presents to [facility] today for routine follow-up.    He is on Ranexa for CAD, s/p 2v. CABG.  He is on Plavix, beta-blocker, ARB, and Imdur for CAD.  He is also on Eliquis for anticoagulation of a-fibrillation (through patient assistance).  He is onlosartan and carvedilol for hypertension.  His blood pressure has been well controlled.  He is on lovastatin for hyperlipidemia.  He has prn NTG but uses rarely uses them.  Follows with Dr. Castañeda Cardiology.  He is on furosemide for chronic systolic heart failure.  +ICD.     He is on metformin for diabetes.  Dr. Castañeda wanted to try him on Jardiance but cost was too much.  Last A1c was 6.7 in February.  He does adhere to a diabetic diet.  He is UTD on eye exam (last done 8/2023).  He is on an ARB and statin.      He is on pantoprazole for GERD and history of bleeding ulcer (NSAID-induced.)  He has sucralfate prn.  Continues his iron supplement.     He is on Norco for chronic low back and leg pain.  Follows with Dr. [facility].  He has used gabapentin in the past but not currently.  S/p physical therapy and RFA.  He is going for epidural in the near future.      He is on Flonase and montelukast for chronic allergies.  He uses a saline rinse as needed.      Current Outpatient Medications:     apixaban (ELIQUIS) 5 MG tablet tablet, Take 1 tablet by mouth 2 (Two) Times a Day., Disp: 180 tablet, Rfl: 3    carvedilol (COREG) 12.5 MG tablet, Take 1 tablet by mouth 2 (Two) Times a Day., Disp: 180 tablet, Rfl: 3    clopidogrel (PLAVIX) 75 MG tablet, Take 1 tablet by mouth Daily., Disp: , Rfl:     Diclofenac Sodium (VOLTAREN) 1 % gel gel, APPLY TO THE AFFECTED AREA(S) TWICE DAILY, Disp: 100 g, Rfl: 3    erythromycin (ROMYCIN) 5 MG/GM ophthalmic ointment, Administer  to both eyes As Needed., Disp: , Rfl:      ferrous sulfate 325 (65 FE) MG tablet, Take 1 tablet by mouth Daily With Breakfast., Disp: , Rfl:     fexofenadine (ALLEGRA) 180 MG tablet, Take 1 tablet by mouth Daily., Disp: , Rfl:     fluticasone (FLONASE) 50 MCG/ACT nasal spray, 1 spray into the nostril(s) as directed by provider Daily., Disp: 48 g, Rfl: 3    furosemide (LASIX) 40 MG tablet, Take 1 tablet by mouth 2 (Two) Times a Day., Disp: 60 tablet, Rfl: 11    glucose blood test strip, Check BS daily.  DX E11.9, Disp: 100 each, Rfl: 3    glucose monitor monitoring kit, 1 each Daily. E11.9, Disp: 1 each, Rfl: 0    HYDROcodone-acetaminophen (NORCO) 5-325 MG per tablet, Take 1 tablet by mouth Every 8 (Eight) Hours As Needed., Disp: , Rfl:     isosorbide dinitrate (ISORDIL) 30 MG tablet, Take 1 tablet by mouth 4 (Four) Times a Day., Disp: , Rfl:     Lancets Thin misc, 1 each Daily. DX E11.9, Disp: 100 each, Rfl: 3    losartan (COZAAR) 50 MG tablet, Take 1 tablet by mouth Daily., Disp: 90 tablet, Rfl: 1    lovastatin (MEVACOR) 40 MG tablet, Take 1 tablet by mouth Every Evening., Disp: 90 tablet, Rfl: 1    metFORMIN (GLUCOPHAGE) 500 MG tablet, Take 1 tablet by mouth Daily., Disp: 90 tablet, Rfl: 1    montelukast (SINGULAIR) 10 MG tablet, Take 1 tablet by mouth Every Night., Disp: 90 tablet, Rfl: 3    nitroglycerin (NITROSTAT) 0.4 MG SL tablet, DISSOLVE 1 TABLET UNDER THE TONGUE EVERY 5 MINUTES AS NEEDED FOR CHEST PAIN. DO NOT EXCEED A TOTAL OF 3 DOSES IN 15 MINUTES. IF NO RELIEF, CALL 911, Disp: 25 tablet, Rfl: 0    pantoprazole (PROTONIX) 40 MG EC tablet, Take 1 tablet by mouth Daily., Disp: 90 tablet, Rfl: 1    potassium chloride (MICRO-K) 10 MEQ CR capsule, Take 1 capsule by mouth., Disp: , Rfl:     prochlorperazine (COMPAZINE) 10 MG tablet, Take 1 tablet by mouth Every 8 (Eight) Hours As Needed for Nausea or Vomiting., Disp: 24 tablet, Rfl: 0    silver sulfadiazine (Silvadene) 1 % cream, Apply 1 application topically to the appropriate area as directed 2 (Two)  "Times a Day., Disp: 50 g, Rfl: 0    sucralfate (CARAFATE) 1 g tablet, TAKE 1 TABLET BY MOUTH FOUR TIMES DAILY, Disp: 120 tablet, Rfl: 5    vitamin B-6 (PYRIDOXINE) 100 MG tablet, Take 1 tablet by mouth Daily., Disp: , Rfl:     vitamin C (ASCORBIC ACID) 500 MG tablet, Take 1 tablet by mouth Daily., Disp: , Rfl:     empagliflozin (Jardiance) 10 MG tablet tablet, Take 1 tablet by mouth Daily., Disp: 30 tablet, Rfl: 5    Allergies:  Iodinated contrast media, Lisinopril, and Prednisone      Objective   Vital Signs:   Vitals:    04/30/24 0816   BP: 110/52   BP Location: Right arm   Patient Position: Sitting   Cuff Size: Adult   Pulse: 50   SpO2: 98%   Weight: 63 kg (138 lb 12.8 oz)   Height: 170.2 cm (67.01\")         Physical Exam  Vitals reviewed.   Constitutional:       General: He is not in acute distress.     Appearance: Normal appearance. He is well-developed.   HENT:      Head: Normocephalic and atraumatic.      Right Ear: External ear normal.      Left Ear: External ear normal.   Eyes:      Extraocular Movements: Extraocular movements intact.      Conjunctiva/sclera: Conjunctivae normal.      Pupils: Pupils are equal, round, and reactive to light.   Cardiovascular:      Rate and Rhythm: Normal rate. Rhythm irregularly irregular.      Heart sounds: No murmur heard.  Pulmonary:      Effort: Pulmonary effort is normal.      Breath sounds: Normal breath sounds. No wheezing, rhonchi or rales.   Abdominal:      General: Bowel sounds are normal. There is no distension.      Palpations: Abdomen is soft.      Tenderness: There is no abdominal tenderness.   Genitourinary:     Comments: Urostomy bag in place filled with clear urine, stoma appears normal  Musculoskeletal:         General: Normal range of motion.      Comments: +L BKA prosthesis   Skin:     General: Skin is warm and dry.   Neurological:      Mental Status: He is alert.   Psychiatric:         Mood and Affect: Affect normal.        Lab Results   Component Value " Date    GLUCOSE 129 (H) 02/13/2024    BUN 27 (H) 02/13/2024    CREATININE 1.15 02/13/2024    EGFR 63.1 02/13/2024    BCR 23.5 02/13/2024    K 4.2 02/13/2024    CO2 17.6 (L) 02/13/2024    CALCIUM 9.3 02/13/2024    ALBUMIN 4.2 08/15/2023    BILITOT 0.5 08/15/2023    AST 20 08/15/2023    ALT 11 08/15/2023       Lab Results   Component Value Date    CHOL 91 08/15/2023    CHLPL 129 02/20/2021    TRIG 74 08/15/2023    HDL 30 (L) 08/15/2023    LDL 45 08/15/2023       Lab Results   Component Value Date    WBC 5.89 12/26/2023    HGB 11.3 (L) 12/26/2023    HCT 35.7 (L) 12/26/2023    MCV 94.7 12/26/2023     (L) 12/26/2023     Lab Results   Component Value Date    HGBA1C 6.7 (A) 02/28/2024            Assessment and Plan    Assessment & Plan  Type 2 diabetes mellitus with diabetic peripheral angiopathy without gangrene, without long-term current use of insulin  He is on metformin for diabetes.  Dr. Castañeda wanted to try him on Jardiance but cost was too much; we will give this another try today.  Last A1c was 6.7 in February.  He does adhere to a diabetic diet.  He is UTD on eye exam (last done 8/2023).  He is on an ARB and statin.  Checking labs.  Primary hypertension  Blood pressure has been running at goal.  Continue carvedilol 12.5 mg twice daily and losartan 50 mg daily.  Refills were not needed today.  Labs were needed today.  Continue to monitor.   Mixed hyperlipidemia   Stable on lovastatin 40 mg daily.  Refills were needed today.  Labs were due today.  Continue to monitor.   Coronary artery disease involving native coronary artery of native heart without angina pectoris  Follows with Dr. Castañeda Cardiology.  He is on Eliquis for the combination of CAD, atrial fibrillation and systolic heart failure.  Back on Plavix.  per Cardiology.  Status post ICD placement.  He is on statin, beta-blocker and ARB.    Permanent atrial fibrillation  Following with Dr. Castañeda Cardiology.  On Eliquis 5 mg twice daily for anticoagulation  and carvedilol 6.25 mg twice daily for rate control.  Chronic combined systolic and diastolic congestive heart failure    Follows with Dr. Castañeda Cards.  Stable on ARB and beta-blocker.  Status post ICD placement.  Ischemic cardiomyopathy  Status post ICD placement. Following with Dr. Castañeda Cardiology.           Chronic gastritis without bleeding, unspecified gastritis type  Stable on panttoprazole 40 mg daily.  Refills were needed today.  Continue to monitor.  Wean PPI as able.   Platelets decreased  Stable, 130-170.  Continue to monitor.  Checking labs today.  Encounter for immunization      Orders Placed This Encounter   Procedures    COVID-19 F23 (Pfizer) 12yrs+ (COMIRNATY)    Comprehensive Metabolic Panel    Lipid Panel    Hemoglobin A1c    CBC Auto Differential       New Medications Ordered This Visit   Medications    lovastatin (MEVACOR) 40 MG tablet     Sig: Take 1 tablet by mouth Every Evening.     Dispense:  90 tablet     Refill:  1     This prescription was filled on 08/30/2023. Any refills authorized will be placed on file.    pantoprazole (PROTONIX) 40 MG EC tablet     Sig: Take 1 tablet by mouth Daily.     Dispense:  90 tablet     Refill:  1     This prescription was filled on 2/25/2022. Any refills authorized will be placed on file.    prochlorperazine (COMPAZINE) 10 MG tablet     Sig: Take 1 tablet by mouth Every 8 (Eight) Hours As Needed for Nausea or Vomiting.     Dispense:  24 tablet     Refill:  0     06/25/2021 9:35:28 AM    empagliflozin (Jardiance) 10 MG tablet tablet     Sig: Take 1 tablet by mouth Daily.     Dispense:  30 tablet     Refill:  5             Follow Up   Return in about 2 months (around 6/30/2024) for Recheck.  Patient was given instructions and counseling regarding his condition or for health maintenance advice. Please see specific information pulled into the AVS if appropriate.

## 2024-04-30 NOTE — ASSESSMENT & PLAN NOTE
Follows with Dr. Castañeda Cardiology.  He is on Eliquis for the combination of CAD, atrial fibrillation and systolic heart failure.  Back on Plavix.  per Cardiology.  Status post ICD placement.  He is on statin, beta-blocker and ARB.

## 2024-05-04 DIAGNOSIS — K29.50 CHRONIC GASTRITIS WITHOUT BLEEDING, UNSPECIFIED GASTRITIS TYPE: ICD-10-CM

## 2024-05-06 RX ORDER — PANTOPRAZOLE SODIUM 40 MG/1
40 TABLET, DELAYED RELEASE ORAL DAILY
Qty: 90 TABLET | Refills: 1 | OUTPATIENT
Start: 2024-05-06

## 2024-05-09 DIAGNOSIS — M46.97 UNSPECIFIED INFLAMMATORY SPONDYLOPATHY, LUMBOSACRAL REGION: ICD-10-CM

## 2024-05-18 ENCOUNTER — APPOINTMENT (OUTPATIENT)
Dept: CT IMAGING | Facility: HOSPITAL | Age: 84
End: 2024-05-18
Payer: MEDICARE

## 2024-05-18 ENCOUNTER — HOSPITAL ENCOUNTER (INPATIENT)
Facility: HOSPITAL | Age: 84
LOS: 3 days | Discharge: HOME | End: 2024-05-21
Attending: STUDENT IN AN ORGANIZED HEALTH CARE EDUCATION/TRAINING PROGRAM | Admitting: INTERNAL MEDICINE
Payer: MEDICARE

## 2024-05-18 DIAGNOSIS — N28.9 RENAL INSUFFICIENCY: ICD-10-CM

## 2024-05-18 DIAGNOSIS — R26.2 DIFFICULTY WALKING: ICD-10-CM

## 2024-05-18 DIAGNOSIS — Z78.9 DECREASED ACTIVITIES OF DAILY LIVING (ADL): Primary | ICD-10-CM

## 2024-05-18 DIAGNOSIS — E87.1 HYPONATREMIA: ICD-10-CM

## 2024-05-18 DIAGNOSIS — I50.42 CHRONIC COMBINED SYSTOLIC AND DIASTOLIC CONGESTIVE HEART FAILURE: ICD-10-CM

## 2024-05-18 LAB
ALBUMIN SERPL-MCNC: 3.3 G/DL (ref 3.5–5.2)
ALBUMIN/GLOB SERPL: 0.9 G/DL
ALP SERPL-CCNC: 79 U/L (ref 39–117)
ALT SERPL W P-5'-P-CCNC: 15 U/L (ref 1–41)
ANION GAP SERPL CALCULATED.3IONS-SCNC: 12.5 MMOL/L (ref 5–15)
ANION GAP SERPL CALCULATED.3IONS-SCNC: 9.9 MMOL/L (ref 5–15)
AST SERPL-CCNC: 15 U/L (ref 1–40)
B PARAPERT DNA SPEC QL NAA+PROBE: NOT DETECTED
B PERT DNA SPEC QL NAA+PROBE: NOT DETECTED
BACTERIA UR QL AUTO: ABNORMAL /HPF
BASOPHILS # BLD AUTO: 0.02 10*3/MM3 (ref 0–0.2)
BASOPHILS NFR BLD AUTO: 0.3 % (ref 0–1.5)
BILIRUB SERPL-MCNC: 0.4 MG/DL (ref 0–1.2)
BILIRUB UR QL STRIP: NEGATIVE
BUN SERPL-MCNC: 38 MG/DL (ref 8–23)
BUN SERPL-MCNC: 38 MG/DL (ref 8–23)
BUN/CREAT SERPL: 28.6 (ref 7–25)
BUN/CREAT SERPL: 33 (ref 7–25)
C PNEUM DNA NPH QL NAA+NON-PROBE: NOT DETECTED
CALCIUM SPEC-SCNC: 8.7 MG/DL (ref 8.6–10.5)
CALCIUM SPEC-SCNC: 8.9 MG/DL (ref 8.6–10.5)
CHLORIDE SERPL-SCNC: 90 MMOL/L (ref 98–107)
CHLORIDE SERPL-SCNC: 91 MMOL/L (ref 98–107)
CLARITY UR: ABNORMAL
CO2 SERPL-SCNC: 19.5 MMOL/L (ref 22–29)
CO2 SERPL-SCNC: 21.1 MMOL/L (ref 22–29)
COLOR UR: YELLOW
CREAT SERPL-MCNC: 1.15 MG/DL (ref 0.76–1.27)
CREAT SERPL-MCNC: 1.33 MG/DL (ref 0.76–1.27)
DEPRECATED RDW RBC AUTO: 40.6 FL (ref 37–54)
EGFRCR SERPLBLD CKD-EPI 2021: 52.7 ML/MIN/1.73
EGFRCR SERPLBLD CKD-EPI 2021: 62.8 ML/MIN/1.73
EOSINOPHIL # BLD AUTO: 0.15 10*3/MM3 (ref 0–0.4)
EOSINOPHIL NFR BLD AUTO: 1.9 % (ref 0.3–6.2)
ERYTHROCYTE [DISTWIDTH] IN BLOOD BY AUTOMATED COUNT: 13.3 % (ref 12.3–15.4)
FLUAV SUBTYP SPEC NAA+PROBE: NOT DETECTED
FLUBV RNA ISLT QL NAA+PROBE: NOT DETECTED
GLOBULIN UR ELPH-MCNC: 3.5 GM/DL
GLUCOSE SERPL-MCNC: 122 MG/DL (ref 65–99)
GLUCOSE SERPL-MCNC: 123 MG/DL (ref 65–99)
GLUCOSE UR STRIP-MCNC: NEGATIVE MG/DL
HADV DNA SPEC NAA+PROBE: NOT DETECTED
HCOV 229E RNA SPEC QL NAA+PROBE: NOT DETECTED
HCOV HKU1 RNA SPEC QL NAA+PROBE: NOT DETECTED
HCOV NL63 RNA SPEC QL NAA+PROBE: NOT DETECTED
HCOV OC43 RNA SPEC QL NAA+PROBE: NOT DETECTED
HCT VFR BLD AUTO: 27.9 % (ref 37.5–51)
HGB BLD-MCNC: 9.2 G/DL (ref 13–17.7)
HGB UR QL STRIP.AUTO: ABNORMAL
HMPV RNA NPH QL NAA+NON-PROBE: NOT DETECTED
HPIV1 RNA ISLT QL NAA+PROBE: NOT DETECTED
HPIV2 RNA SPEC QL NAA+PROBE: NOT DETECTED
HPIV3 RNA NPH QL NAA+PROBE: NOT DETECTED
HPIV4 P GENE NPH QL NAA+PROBE: NOT DETECTED
HYALINE CASTS UR QL AUTO: ABNORMAL /LPF
IMM GRANULOCYTES # BLD AUTO: 0.05 10*3/MM3 (ref 0–0.05)
IMM GRANULOCYTES NFR BLD AUTO: 0.6 % (ref 0–0.5)
KETONES UR QL STRIP: NEGATIVE
LEUKOCYTE ESTERASE UR QL STRIP.AUTO: ABNORMAL
LYMPHOCYTES # BLD AUTO: 0.64 10*3/MM3 (ref 0.7–3.1)
LYMPHOCYTES NFR BLD AUTO: 8.2 % (ref 19.6–45.3)
M PNEUMO IGG SER IA-ACNC: NOT DETECTED
MAGNESIUM SERPL-MCNC: 1.7 MG/DL (ref 1.6–2.4)
MCH RBC QN AUTO: 27.7 PG (ref 26.6–33)
MCHC RBC AUTO-ENTMCNC: 33 G/DL (ref 31.5–35.7)
MCV RBC AUTO: 84 FL (ref 79–97)
MONOCYTES # BLD AUTO: 0.56 10*3/MM3 (ref 0.1–0.9)
MONOCYTES NFR BLD AUTO: 7.2 % (ref 5–12)
MUCOUS THREADS URNS QL MICRO: ABNORMAL /HPF
NEUTROPHILS NFR BLD AUTO: 6.38 10*3/MM3 (ref 1.7–7)
NEUTROPHILS NFR BLD AUTO: 81.8 % (ref 42.7–76)
NITRITE UR QL STRIP: NEGATIVE
NRBC BLD AUTO-RTO: 0 /100 WBC (ref 0–0.2)
OSMOLALITY SERPL: 267 MOSM/KG (ref 280–301)
OSMOLALITY UR: 221 MOSM/KG (ref 50–1400)
PH UR STRIP.AUTO: 7 [PH] (ref 5–8)
PHOSPHATE SERPL-MCNC: 3.6 MG/DL (ref 2.5–4.5)
PLATELET # BLD AUTO: 185 10*3/MM3 (ref 140–450)
PMV BLD AUTO: 9.2 FL (ref 6–12)
POTASSIUM SERPL-SCNC: 3.7 MMOL/L (ref 3.5–5.2)
POTASSIUM SERPL-SCNC: 3.8 MMOL/L (ref 3.5–5.2)
PROT SERPL-MCNC: 6.8 G/DL (ref 6–8.5)
PROT UR QL STRIP: NEGATIVE
QT INTERVAL: 471 MS
QTC INTERVAL: 446 MS
RBC # BLD AUTO: 3.32 10*6/MM3 (ref 4.14–5.8)
RBC # UR STRIP: ABNORMAL /HPF
REF LAB TEST METHOD: ABNORMAL
RHINOVIRUS RNA SPEC NAA+PROBE: NOT DETECTED
RSV RNA NPH QL NAA+NON-PROBE: NOT DETECTED
SARS-COV-2 RNA RESP QL NAA+PROBE: NOT DETECTED
SODIUM SERPL-SCNC: 122 MMOL/L (ref 136–145)
SODIUM SERPL-SCNC: 122 MMOL/L (ref 136–145)
SP GR UR STRIP: <=1.005 (ref 1–1.03)
SQUAMOUS #/AREA URNS HPF: ABNORMAL /HPF
UROBILINOGEN UR QL STRIP: ABNORMAL
WBC # UR STRIP: ABNORMAL /HPF
WBC NRBC COR # BLD AUTO: 7.8 10*3/MM3 (ref 3.4–10.8)
YEAST URNS QL MICRO: ABNORMAL /HPF

## 2024-05-18 PROCEDURE — 0202U NFCT DS 22 TRGT SARS-COV-2: CPT | Performed by: INTERNAL MEDICINE

## 2024-05-18 PROCEDURE — 84300 ASSAY OF URINE SODIUM: CPT | Performed by: STUDENT IN AN ORGANIZED HEALTH CARE EDUCATION/TRAINING PROGRAM

## 2024-05-18 PROCEDURE — 80053 COMPREHEN METABOLIC PANEL: CPT | Performed by: STUDENT IN AN ORGANIZED HEALTH CARE EDUCATION/TRAINING PROGRAM

## 2024-05-18 PROCEDURE — 87086 URINE CULTURE/COLONY COUNT: CPT | Performed by: INTERNAL MEDICINE

## 2024-05-18 PROCEDURE — 99223 1ST HOSP IP/OBS HIGH 75: CPT | Performed by: INTERNAL MEDICINE

## 2024-05-18 PROCEDURE — 83930 ASSAY OF BLOOD OSMOLALITY: CPT | Performed by: STUDENT IN AN ORGANIZED HEALTH CARE EDUCATION/TRAINING PROGRAM

## 2024-05-18 PROCEDURE — 71250 CT THORAX DX C-: CPT

## 2024-05-18 PROCEDURE — 84100 ASSAY OF PHOSPHORUS: CPT | Performed by: STUDENT IN AN ORGANIZED HEALTH CARE EDUCATION/TRAINING PROGRAM

## 2024-05-18 PROCEDURE — 81001 URINALYSIS AUTO W/SCOPE: CPT | Performed by: INTERNAL MEDICINE

## 2024-05-18 PROCEDURE — 83935 ASSAY OF URINE OSMOLALITY: CPT | Performed by: STUDENT IN AN ORGANIZED HEALTH CARE EDUCATION/TRAINING PROGRAM

## 2024-05-18 PROCEDURE — 87147 CULTURE TYPE IMMUNOLOGIC: CPT | Performed by: INTERNAL MEDICINE

## 2024-05-18 PROCEDURE — 93010 ELECTROCARDIOGRAM REPORT: CPT | Performed by: SPECIALIST

## 2024-05-18 PROCEDURE — 85025 COMPLETE CBC W/AUTO DIFF WBC: CPT | Performed by: STUDENT IN AN ORGANIZED HEALTH CARE EDUCATION/TRAINING PROGRAM

## 2024-05-18 PROCEDURE — 87186 SC STD MICRODIL/AGAR DIL: CPT | Performed by: INTERNAL MEDICINE

## 2024-05-18 PROCEDURE — 83735 ASSAY OF MAGNESIUM: CPT | Performed by: STUDENT IN AN ORGANIZED HEALTH CARE EDUCATION/TRAINING PROGRAM

## 2024-05-18 PROCEDURE — 93005 ELECTROCARDIOGRAM TRACING: CPT | Performed by: STUDENT IN AN ORGANIZED HEALTH CARE EDUCATION/TRAINING PROGRAM

## 2024-05-18 RX ORDER — SODIUM CHLORIDE 0.9 % (FLUSH) 0.9 %
10 SYRINGE (ML) INJECTION AS NEEDED
Status: DISCONTINUED | OUTPATIENT
Start: 2024-05-18 | End: 2024-05-21 | Stop reason: HOSPADM

## 2024-05-18 RX ORDER — CARVEDILOL 12.5 MG/1
12.5 TABLET ORAL 2 TIMES DAILY
Status: DISCONTINUED | OUTPATIENT
Start: 2024-05-18 | End: 2024-05-21 | Stop reason: HOSPADM

## 2024-05-18 RX ORDER — SUCRALFATE 1 G/1
1 TABLET ORAL 4 TIMES DAILY
Status: DISCONTINUED | OUTPATIENT
Start: 2024-05-18 | End: 2024-05-21 | Stop reason: HOSPADM

## 2024-05-18 RX ORDER — ONDANSETRON 4 MG/1
4 TABLET, ORALLY DISINTEGRATING ORAL EVERY 6 HOURS PRN
Status: DISCONTINUED | OUTPATIENT
Start: 2024-05-18 | End: 2024-05-21 | Stop reason: HOSPADM

## 2024-05-18 RX ORDER — SODIUM CHLORIDE 0.9 % (FLUSH) 0.9 %
10 SYRINGE (ML) INJECTION EVERY 12 HOURS SCHEDULED
Status: DISCONTINUED | OUTPATIENT
Start: 2024-05-18 | End: 2024-05-21 | Stop reason: HOSPADM

## 2024-05-18 RX ORDER — FERROUS SULFATE 325(65) MG
325 TABLET ORAL
Status: DISCONTINUED | OUTPATIENT
Start: 2024-05-18 | End: 2024-05-21 | Stop reason: HOSPADM

## 2024-05-18 RX ORDER — ATORVASTATIN CALCIUM 10 MG/1
10 TABLET, FILM COATED ORAL NIGHTLY
Status: DISCONTINUED | OUTPATIENT
Start: 2024-05-18 | End: 2024-05-21 | Stop reason: HOSPADM

## 2024-05-18 RX ORDER — LOSARTAN POTASSIUM 50 MG/1
50 TABLET ORAL DAILY
Status: DISCONTINUED | OUTPATIENT
Start: 2024-05-18 | End: 2024-05-21 | Stop reason: HOSPADM

## 2024-05-18 RX ORDER — CLOPIDOGREL BISULFATE 75 MG/1
75 TABLET ORAL DAILY
Status: DISCONTINUED | OUTPATIENT
Start: 2024-05-18 | End: 2024-05-21 | Stop reason: HOSPADM

## 2024-05-18 RX ORDER — BISACODYL 5 MG/1
5 TABLET, DELAYED RELEASE ORAL DAILY PRN
Status: DISCONTINUED | OUTPATIENT
Start: 2024-05-18 | End: 2024-05-20

## 2024-05-18 RX ORDER — MONTELUKAST SODIUM 10 MG/1
10 TABLET ORAL NIGHTLY
Status: DISCONTINUED | OUTPATIENT
Start: 2024-05-18 | End: 2024-05-21 | Stop reason: HOSPADM

## 2024-05-18 RX ORDER — HYDROCODONE BITARTRATE AND ACETAMINOPHEN 5; 325 MG/1; MG/1
1 TABLET ORAL EVERY 8 HOURS PRN
Status: DISCONTINUED | OUTPATIENT
Start: 2024-05-18 | End: 2024-05-21 | Stop reason: HOSPADM

## 2024-05-18 RX ORDER — BISACODYL 10 MG
10 SUPPOSITORY, RECTAL RECTAL DAILY PRN
Status: DISCONTINUED | OUTPATIENT
Start: 2024-05-18 | End: 2024-05-20

## 2024-05-18 RX ORDER — FLUTICASONE PROPIONATE 50 MCG
1 SPRAY, SUSPENSION (ML) NASAL DAILY
Status: DISCONTINUED | OUTPATIENT
Start: 2024-05-18 | End: 2024-05-21 | Stop reason: HOSPADM

## 2024-05-18 RX ORDER — CIPROFLOXACIN 500 MG/1
500 TABLET, FILM COATED ORAL 2 TIMES DAILY
COMMUNITY
End: 2024-05-21 | Stop reason: HOSPADM

## 2024-05-18 RX ORDER — ACETAMINOPHEN 325 MG/1
650 TABLET ORAL EVERY 6 HOURS PRN
Status: DISCONTINUED | OUTPATIENT
Start: 2024-05-18 | End: 2024-05-21 | Stop reason: HOSPADM

## 2024-05-18 RX ORDER — PANTOPRAZOLE SODIUM 40 MG/1
40 TABLET, DELAYED RELEASE ORAL
Status: DISCONTINUED | OUTPATIENT
Start: 2024-05-18 | End: 2024-05-21 | Stop reason: HOSPADM

## 2024-05-18 RX ORDER — AMOXICILLIN 250 MG
2 CAPSULE ORAL 2 TIMES DAILY PRN
Status: DISCONTINUED | OUTPATIENT
Start: 2024-05-18 | End: 2024-05-20

## 2024-05-18 RX ORDER — POLYETHYLENE GLYCOL 3350 17 G/17G
17 POWDER, FOR SOLUTION ORAL DAILY PRN
Status: DISCONTINUED | OUTPATIENT
Start: 2024-05-18 | End: 2024-05-20

## 2024-05-18 RX ORDER — SODIUM CHLORIDE 9 MG/ML
40 INJECTION, SOLUTION INTRAVENOUS AS NEEDED
Status: DISCONTINUED | OUTPATIENT
Start: 2024-05-18 | End: 2024-05-21 | Stop reason: HOSPADM

## 2024-05-18 RX ADMIN — ATORVASTATIN CALCIUM 10 MG: 10 TABLET, FILM COATED ORAL at 20:17

## 2024-05-18 RX ADMIN — CLOPIDOGREL BISULFATE 75 MG: 75 TABLET ORAL at 12:38

## 2024-05-18 RX ADMIN — Medication 10 ML: at 20:17

## 2024-05-18 RX ADMIN — APIXABAN 5 MG: 5 TABLET, FILM COATED ORAL at 20:17

## 2024-05-18 RX ADMIN — PANTOPRAZOLE SODIUM 40 MG: 40 TABLET, DELAYED RELEASE ORAL at 12:38

## 2024-05-18 RX ADMIN — MONTELUKAST 10 MG: 10 TABLET, FILM COATED ORAL at 20:17

## 2024-05-18 RX ADMIN — SUCRALFATE 1 G: 1 TABLET ORAL at 20:17

## 2024-05-18 RX ADMIN — FLUTICASONE PROPIONATE 1 SPRAY: 50 SPRAY, METERED NASAL at 12:42

## 2024-05-18 RX ADMIN — SUCRALFATE 1 G: 1 TABLET ORAL at 18:00

## 2024-05-18 RX ADMIN — SUCRALFATE 1 G: 1 TABLET ORAL at 12:38

## 2024-05-18 RX ADMIN — APIXABAN 5 MG: 5 TABLET, FILM COATED ORAL at 12:38

## 2024-05-18 RX ADMIN — Medication 10 ML: at 12:39

## 2024-05-18 RX ADMIN — HYDROCODONE BITARTRATE AND ACETAMINOPHEN 1 TABLET: 5; 325 TABLET ORAL at 20:17

## 2024-05-18 RX ADMIN — HYDROCODONE BITARTRATE AND ACETAMINOPHEN 1 TABLET: 5; 325 TABLET ORAL at 12:38

## 2024-05-18 RX ADMIN — FERROUS SULFATE TAB 325 MG (65 MG ELEMENTAL FE) 325 MG: 325 (65 FE) TAB at 12:38

## 2024-05-18 NOTE — H&P
[facility]  [facility]: [facility]   Patient Name: Marcello Larson  : 1940  MRN: [id number]  Primary Care Physician:  Zeny Corley MD  Date of admission: 2024    Subjective   Subjective     Chief Complaint: Generalized weakness    HPI:  Marcello Larson is a 84 y.o. male with essential hypertension, hyperlipidemia, CAD s/p CABG, chronic paroxysmal atrial fibrillation on Eliquis, chronic combined systolic and diastolic heart failure, ischemic cardiomyopathy s/p ICD that presented to the ED at [facility] with complaints of generalized weakness.  Labs were significant for hyponatremia and elevated creatinine compared to baseline.  CXR showed increased diffuse bilateral pulmonary interstitial opacities and small pleural effusions.  Transferred to [facility] for nephrology consultation given the hyponatremia was requested.  Patient denies any chest pain or shortness of breath upon exam today.  Did endorse generalized weakness and back pain/discomfort.    Personal History     Past Medical History:  Essential hypertension  Hyperlipidemia  CAD s/p CABG  Chronic paroxysmal atrial fibrillation  Chronic combined systolic and diastolic heart failure  Ischemic cardiomyopathy s/p ICD    Past Surgical History:  Left BKA  Appendectomy  Cholecystectomy  CABG  ICD implantation    Family History:   Mother: ; history of heart disease  Father: ; history of stroke    Social History:   Tobacco: Denies current use.  Quit smoking in .  Alcohol: Denies  Illicit Substances: Denies use    Home Medications:  Diclofenac Sodium, HYDROcodone-acetaminophen, apixaban, carvedilol, ciprofloxacin, clopidogrel, ferrous sulfate, fexofenadine, fluticasone, furosemide, losartan, lovastatin, metFORMIN, montelukast, nitroglycerin, pantoprazole, potassium chloride, prochlorperazine, sucralfate, vitamin B-6, and vitamin C    Allergies:  Allergies   Allergen  Reactions    Iodinated Contrast Media Anaphylaxis    Lisinopril Other (See Comments)    Prednisone Cough       Review of Systems  All systems were reviewed and negative except for:   -General ROS: positive for  - fatigue    Objective   Objective     Vitals:        Physical Exam    Constitutional: Awake, alert, no acute distress   Eyes: Pupils equal, sclerae anicteric, no conjunctival injection   HENT: NCAT, mucous membranes moist   Neck: Supple, no thyromegaly, no lymphadenopathy, trachea midline   Respiratory: Clear to auscultation bilaterally, nonlabored respirations    Cardiovascular: RRR, no murmurs, rubs, or gallops, palpable pedal pulses bilaterally   Gastrointestinal: Positive bowel sounds, soft, nontender, nondistended   Musculoskeletal: Left BKA, no clubbing or cyanosis to extremities   Psychiatric: Appropriate affect, cooperative   Neurologic: Oriented x 3, strength symmetric in all extremities, Cranial Nerves grossly intact, speech clear   Skin: No rashes     Result Review    Result Review:  I have personally reviewed the results from the time of this admission to 5/18/2024 09:43 EDT and agree with these findings:  [x]  Laboratory:  CMP          2/13/2024    10:32 4/30/2024    09:32 5/18/2024    08:21   CMP   Glucose 129  121  123    BUN 27  40  38    Creatinine 1.15  1.43  1.33    EGFR 63.1  48.3  52.7    Sodium 126  134  122    Potassium 4.2  4.3  3.7    Chloride 97  101  90    Calcium 9.3  9.8  8.7    Total Protein  7.5  6.8    Albumin  4.1  3.3    Globulin  3.4  3.5    Total Bilirubin  0.5  0.4    Alkaline Phosphatase  70  79    AST (SGOT)  16  15    ALT (SGPT)  8  15    Albumin/Globulin Ratio  1.2  0.9    BUN/Creatinine Ratio 23.5  28.0  28.6    Anion Gap 11.4  11.6  12.5      CBC          12/26/2023    08:52 4/30/2024    09:32 5/18/2024    08:21   CBC   WBC 5.89  5.78  7.80    RBC 3.77  4.05  3.32    Hemoglobin 11.3  11.3  9.2    Hematocrit 35.7  36.2  27.9    MCV 94.7  89.4  84.0    MCH 30.0  27.9   27.7    MCHC 31.7  31.2  33.0    RDW 14.3  13.3  13.3    Platelets 111  173  185    Magnesium and phosphorus within normal limits  []  Microbiology:  [x]  Radiology:  [x]  EKG/Telemetry:   []  Cardiology/Vascular:   []  Pathology:  []  Old records:  []  Other:      Assessment & Plan   Assessment / Plan     Assessment:  Clinically significant hyponatremia  Renal insufficiency  Chronic combined systolic and diastolic heart failure  Type 2 diabetes mellitus  History of bladder cancer with ileal conduit  Essential hypertension  History of ischemic cardiomyopathy  Chronic paroxysmal atrial fibrillation  Hyperlipidemia  History of left BKA    Plan:  -Admit to telemetry bed  -Nephrology consulted to assist in care  -Given complaints of generalized weakness and abnormal chest x-ray findings at outlying facility will obtain CT of the chest  -Start appropriate home medications  -Hold diuresis for for now.  The patient's Rester status worsens will initiate IV diuresis  -Care discussed with nephrology.  Nephrology to check TSH and a.m. cortisol level.  Nephrology also checking an SPEP and kappa lambda ratio  -Urine sodium and urine osmolality ordered  -PT/OT consulted  -Monitor electrolytes and renal function with BMP and magnesium level in the AM  -Monitor WBC and Hgb with CBC in the AM  -Clinical course will dictate further management     DVT Prophylaxis: Apixaban  GI Prophylaxis: Pantoprazole  Diet: Cardiac/diabetic/fluid restriction  Dispo: Admit to telemetry bed  Code Status: Full code     Personally reviewed patients labs and imaging, discussed with patient and nurse at bedside. Discussed management with the following consultants: Nephrology.     Part of this note may be an electronic transcription/translation of spoken language to printed text using the Dragon dictation system.      DVT prophylaxis:  No DVT prophylaxis order currently exists.        CODE STATUS:    Code Status (Patient has no pulse and is not breathing):  CPR (Attempt to Resuscitate)  Medical Interventions (Patient has pulse or is breathing): Full Support      Admission Status: I believe this patient meets inpatient status.    Electronically signed by Baldev Dawn MD, 05/18/24, 9:43 AM EDT.

## 2024-05-18 NOTE — CONSULTS
Nephrology consult note    Miles Holley MD     Reason for consult: Hyponatremia and renal insufficiency    HPI: This is an 84-year-old gentleman past medical history of Hypertension, hyperlipidemia, CAD status post CABG, A-fib on Eliquis, combined systolic and diastolic heart failure, ischemic cardiomyopathy s/p ICD, history of type 2 diabetes mellitus, history of traumatic left BKA, history of bladder cancer in 2007 with ileal conduit now.  Patient states over the last few days he has had some back pain and nausea which prompted him to go to the emergency room which was noted to have a sodium of 117 and a creatinine of 1.49 last night patient had a chest x-ray at outside ER that showed some increased interstitial opacities some small effusion questionable increased volume.  Patient was transferred here urinate this morning noted to have a sodium of 122 with creatinine of 1.33.  Patient states he has been told he had a sodium intermittently low in the past but not sure of any workup for that was told the cause.  Patient's creatinine has been ranging between 1.12 and 1.43 over the last year as well as a sodiums ranged anywhere from 126-136.  Patient is alert he denies any chest pain shortness of breath he has had some intermittent nausea but no vomiting no increased swelling no skin rashes.  He denied any nonsteroidals when I was seeing him although looking on his med list he was may be on Voltaren gel and will ask him about this tomorrow.    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  LV systolic function is moderate to severely reduced.  EF 30-35%.  The transmittal spectral doppler flow pattern is suggestive of pseudonormalization.  There is moderate to severe global hypokinesis of the LV.  The left atrium is mildly dilated.  The mitral leaflets appear thickened, but open well.  Mild MR and AR.    Hx of CABG 2019 - left internal mammary graft to the LAD, SVG to OM1, OM2 in the RCA    Hyperlipidemia 2019    Hypo-osmolality and hyponatremia     Low back pain     Myocardial infarct     Other long term (current) drug therapy     Palpitations 2019    Personal history of malignant neoplasm of bladder     Sleep apnea 2019    Stented coronary artery 2019 - VISION bare metal stent to the proximal stenosis of the vein graft to the right and ISION bare metal stent in the mid to distal 80% stenosis.    Type 2 diabetes mellitus without complication     Unstable angina      Social History     Socioeconomic History    Marital status:     Number of children: 2   Tobacco Use    Smoking status: Former     Current packs/day: 0.00     Types: Cigarettes     Start date:      Quit date: 1984     Years since quittin.4     Passive exposure: Past    Smokeless tobacco: Never    Tobacco comments:     Have not smoked since    Vaping Use    Vaping status: Never Used   Substance and Sexual Activity    Alcohol use: Never    Drug use: Never    Sexual activity: Not Currently     Partners: Female     No current facility-administered medications on file prior to encounter.     Current Outpatient Medications on File Prior to  Encounter   Medication Sig Dispense Refill    apixaban (ELIQUIS) 5 MG tablet tablet Take 1 tablet by mouth 2 (Two) Times a Day. 180 tablet 3    carvedilol (COREG) 12.5 MG tablet Take 1 tablet by mouth 2 (Two) Times a Day. 180 tablet 3    ciprofloxacin (CIPRO) 500 MG tablet Take 1 tablet by mouth 2 (Two) Times a Day.      clopidogrel (PLAVIX) 75 MG tablet Take 1 tablet by mouth Daily.      Diclofenac Sodium (VOLTAREN) 1 % gel gel APPLY TO THE AFFECTED AREA(S) TWICE DAILY (Patient taking differently: Apply 4 g topically to the appropriate area as directed 2 (Two) Times a Day.) 100 g 3    ferrous sulfate 325 (65 FE) MG tablet Take 1 tablet by mouth Daily With Breakfast.      fexofenadine (ALLEGRA) 180 MG tablet Take 1 tablet by mouth Daily.      fluticasone (FLONASE) 50 MCG/ACT nasal spray 1 spray into the nostril(s) as directed by provider Daily. 48 g 3    furosemide (LASIX) 40 MG tablet Take 1 tablet by mouth 2 (Two) Times a Day. 60 tablet 11    HYDROcodone-acetaminophen (NORCO) 5-325 MG per tablet Take 1 tablet by mouth Daily.      losartan (COZAAR) 50 MG tablet Take 1 tablet by mouth Daily. 90 tablet 1    lovastatin (MEVACOR) 40 MG tablet Take 1 tablet by mouth Every Evening. 90 tablet 1    metFORMIN (GLUCOPHAGE) 500 MG tablet Take 1 tablet by mouth Daily. 90 tablet 1    montelukast (SINGULAIR) 10 MG tablet Take 1 tablet by mouth Every Night. 90 tablet 3    pantoprazole (PROTONIX) 40 MG EC tablet Take 1 tablet by mouth Daily. 90 tablet 1    potassium chloride (MICRO-K) 10 MEQ CR capsule Take 1 capsule by mouth Daily.      sucralfate (CARAFATE) 1 g tablet TAKE 1 TABLET BY MOUTH FOUR TIMES DAILY (Patient taking differently: Take 1 tablet by mouth 4 (Four) Times a Day.) 120 tablet 5    vitamin B-6 (PYRIDOXINE) 100 MG tablet Take 1 tablet by mouth Daily.      vitamin C (ASCORBIC ACID) 500 MG tablet Take 1 tablet by mouth Daily.      nitroglycerin (NITROSTAT) 0.4 MG SL tablet DISSOLVE 1 TABLET UNDER THE TONGUE EVERY 5  MINUTES AS NEEDED FOR CHEST PAIN. DO NOT EXCEED A TOTAL OF 3 DOSES IN 15 MINUTES. IF NO RELIEF, CALL 911 (Patient taking differently: Place 1 tablet under the tongue Every 5 (Five) Minutes As Needed for Chest Pain.) 25 tablet 0    prochlorperazine (COMPAZINE) 10 MG tablet Take 1 tablet by mouth Every 8 (Eight) Hours As Needed for Nausea or Vomiting. 24 tablet 0    [DISCONTINUED] empagliflozin (Jardiance) 10 MG tablet tablet Take 1 tablet by mouth Daily. 30 tablet 5    [DISCONTINUED] erythromycin (ROMYCIN) 5 MG/GM ophthalmic ointment Administer  to both eyes As Needed.      [DISCONTINUED] glucose blood test strip Check BS daily.  DX E11.9 100 each 3    [DISCONTINUED] glucose monitor monitoring kit 1 each Daily. E11.9 1 each 0    [DISCONTINUED] isosorbide dinitrate (ISORDIL) 30 MG tablet Take 1 tablet by mouth 4 (Four) Times a Day.      [DISCONTINUED] Lancets Thin misc 1 each Daily. DX E11.9 100 each 3    [DISCONTINUED] silver sulfadiazine (Silvadene) 1 % cream Apply 1 application topically to the appropriate area as directed 2 (Two) Times a Day. 50 g 0      apixaban, 5 mg, Oral, BID  atorvastatin, 10 mg, Oral, Nightly  [Held by provider] carvedilol, 12.5 mg, Oral, BID  clopidogrel, 75 mg, Oral, Daily  ferrous sulfate, 325 mg, Oral, Daily With Breakfast  fluticasone, 1 spray, Nasal, Daily  [Held by provider] losartan, 50 mg, Oral, Daily  montelukast, 10 mg, Oral, Nightly  pantoprazole, 40 mg, Oral, Q AM  sodium chloride, 10 mL, Intravenous, Q12H  sucralfate, 1 g, Oral, 4x Daily         acetaminophen    senna-docusate sodium **AND** polyethylene glycol **AND** bisacodyl **AND** bisacodyl    HYDROcodone-acetaminophen    ondansetron ODT    sodium chloride    sodium chloride   Family History   Problem Relation Age of Onset    No Known Problems Mother     No Known Problems Father        Review of Systems: As mentioned above otherwise negative    Physical Exam:  /57 (BP Location: Left arm, Patient Position: Sitting)    Pulse 54   Temp 97.3 °F (36.3 °C) (Oral)   Resp 20   SpO2 100%   No intake or output data in the 24 hours ending 05/18/24 1301   General: Patient alert and oriented no acute distress  HEENT: Normocephalic atraumatic pupils are equal round reactive to light extraocular muscles are intact there appears to be normal mouth is clear no erythema no exudates neck supple no adenopathy  Cardiac: Regular rate and rhythm without a rub no S3 or S4  Lungs: Clear bilaterally no wheezes rhonchi or rales  Abdomen: Bowel sounds positive nontender soft no mass felt no apparent organomegaly noted, patient has ostomy noted urinary in the right lower quadrant  Extremities: No edema, left BKA   skin: No acute rashes noted  : Deferred  Neuro: Appears intact with motor and sensory grossly    Lab Results (last 24 hours)       Procedure Component Value Units Date/Time    Osmolality, Serum [[id number]]  (Abnormal) Collected: 05/18/24 0821    Specimen: Blood from Arm, Right Updated: 05/18/24 0855     Osmolality 267 mOsm/kg     Phosphorus [[id number]]  (Normal) Collected: 05/18/24 0821    Specimen: Blood from Arm, Right Updated: 05/18/24 0854     Phosphorus 3.6 mg/dL     Magnesium [[id number]]  (Normal) Collected: 05/18/24 0821    Specimen: Blood from Arm, Right Updated: 05/18/24 0854     Magnesium 1.7 mg/dL     Comprehensive Metabolic Panel [[id number]]  (Abnormal) Collected: 05/18/24 0821    Specimen: Blood from Arm, Right Updated: 05/18/24 0854     Glucose 123 mg/dL      BUN 38 mg/dL      Creatinine 1.33 mg/dL      Sodium 122 mmol/L      Potassium 3.7 mmol/L      Chloride 90 mmol/L      CO2 19.5 mmol/L      Calcium 8.7 mg/dL      Total Protein 6.8 g/dL      Albumin 3.3 g/dL      ALT (SGPT) 15 U/L      AST (SGOT) 15 U/L      Alkaline Phosphatase 79 U/L      Total Bilirubin 0.4 mg/dL      Globulin 3.5 gm/dL      A/G Ratio 0.9 g/dL      BUN/Creatinine Ratio 28.6     Anion Gap 12.5 mmol/L      eGFR 52.7 mL/min/1.73     Narrative:      GFR  Normal >60  Chronic Kidney Disease <60  Kidney Failure <15    The GFR formula is only valid for adults with stable renal function between ages 18 and 70.    CBC & Differential [[id number]]  (Abnormal) Collected: 05/18/24 0821    Specimen: Blood from Arm, Right Updated: 05/18/24 0832    Narrative:      The following orders were created for panel order CBC & Differential.  Procedure                               Abnormality         Status                     ---------                               -----------         ------                     CBC Auto Differential[[id number]]        Abnormal            Final result                 Please view results for these tests on the individual orders.    CBC Auto Differential [[id number]]  (Abnormal) Collected: 05/18/24 0821    Specimen: Blood from Arm, Right Updated: 05/18/24 0832     WBC 7.80 10*3/mm3      RBC 3.32 10*6/mm3      Hemoglobin 9.2 g/dL      Hematocrit 27.9 %      MCV 84.0 fL      MCH 27.7 pg      MCHC 33.0 g/dL      RDW 13.3 %      RDW-SD 40.6 fl      MPV 9.2 fL      Platelets 185 10*3/mm3      Neutrophil % 81.8 %      Lymphocyte % 8.2 %      Monocyte % 7.2 %      Eosinophil % 1.9 %      Basophil % 0.3 %      Immature Grans % 0.6 %      Neutrophils, Absolute 6.38 10*3/mm3      Lymphocytes, Absolute 0.64 10*3/mm3      Monocytes, Absolute 0.56 10*3/mm3      Eosinophils, Absolute 0.15 10*3/mm3      Basophils, Absolute 0.02 10*3/mm3      Immature Grans, Absolute 0.05 10*3/mm3      nRBC 0.0 /100 WBC              Imaging Results (Last 24 Hours)       ** No results found for the last 24 hours. **             Patient Active Problem List   Diagnosis    Mixed hyperlipidemia    Primary hypertension    Coronary artery disease involving native coronary artery of native heart without angina pectoris    Ischemic cardiomyopathy    Obstructive sleep apnea syndrome    Palpitations    Hx of CABG    Stented coronary artery    Type 2 diabetes mellitus with diabetic peripheral  angiopathy without gangrene, without long-term current use of insulin    Chronic combined systolic and diastolic congestive heart failure    ICD (implantable cardioverter-defibrillator), dual, in situ    Other artificial openings of urinary tract status    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  Hyponatremia: On physical exam he does not look to be volume overloaded however his chest x-ray showed failure.  Really unsure if this is a euvolemic versus hypervolemic and even potentially a hypovolemic hyponatremia.  What I would like to do is put him on a mild p.o. fluid restriction.  Sodium is improving but we will recheck it later today do not want him increase it too fast.  I would like to check a TSH, check an a.m. cortisol level.  Urine sodium and urine osmolality has been ordered but have not been done yet.  Will ask the nurse to collect those.  I guess lets hold his Lasix for now.  I do not see any other medicines could be causing this unless theoretically he has been doing a lot of Voltaren gel.    2.  Renal insufficiency: Patient appears to have early stage III chronic kidney disease with creatinines in the low 1 range.  Urinalysis is hard to interpret due to his ileal conduit.  In March of this year he had a CT scan of his chest that showed his kidneys with some benign cysts only.  Will check an SPEP and kappa lambda ratio and continue to follow.    3.  History of cardiomyopathy decrease EF: See above, hold diuretics for now but if worsening shortness of breath and will need to restart    4.   Diabetes mellitus: Per primary    5.  History of bladder cancer with ileal conduit.  Patient has a mild metabolic acidosis and if its worsens could start some oral bicarbonate and most of the time these ileal conduit stable become acidotic due to bicarb loss in the urine.    6.  History of hypertension: Would not use thiazide type diuretics due to hyponatremia.  There has been association although very very rare with hyponatremia from angiotensin receptor blockers although I do not think her doubt this is the issue.    Thank you very much will continue to follow            Miles Holley MD

## 2024-05-18 NOTE — PLAN OF CARE
84-year-old male with a past medical history of Hypertension, hyperlipidemia, CAD status post CABG, A-fib on Eliquis, combined systolic and diastolic heart failure, ischemic cardiomyopathy s/p ICD presented to the ER at [facility] with complaints of generalized weakness since last few days.  Labs were significant for sodium 117, potassium 4, chloride 87, bicarb 19, creatinine 1.49, 2 months ago was 1.17.  Chest x-ray showed increased diffuse bilateral pulmonary interstitial opacities.  Possible small pleural effusions.  No pneumothorax.  Cardiomegaly.  Lung findings are most compatible with cardiogenic pulmonary edema/CHF.  Request was made to transfer the patient to [facility] for nephrology consultation for severe hyponatremia.  Patient has been accepted under hospitalist service for further evaluation and management of severe hyponatremia, elevated creatinine.  Need to consult nephrology.

## 2024-05-18 NOTE — PLAN OF CARE
Problem: Adult Inpatient Plan of Care  Goal: Plan of Care Review  Outcome: Ongoing, Progressing  Goal: Patient-Specific Goal (Individualized)  Outcome: Ongoing, Progressing  Goal: Absence of Hospital-Acquired Illness or Injury  Outcome: Ongoing, Progressing  Intervention: Identify and Manage Fall Risk  Intervention: Prevent and Manage VTE (Venous Thromboembolism) Risk  Intervention: Prevent Infection  Goal: Optimal Comfort and Wellbeing  Outcome: Ongoing, Progressing  Intervention: Provide Person-Centered Care  Goal: Readiness for Transition of Care  Outcome: Ongoing, Progressing  Intervention: Mutually Develop Transition Plan     Problem: Pain Acute  Goal: Acceptable Pain Control and Functional Ability  Outcome: Ongoing, Progressing  Intervention: Optimize Psychosocial Wellbeing   Goal Outcome Evaluation:   Patient direct admit from [facility], arrive on unit at approximately 1030, VSS, no significant changes in status during shift, medicated for pain per MAR.

## 2024-05-19 ENCOUNTER — APPOINTMENT (OUTPATIENT)
Dept: CT IMAGING | Facility: HOSPITAL | Age: 84
End: 2024-05-19
Payer: MEDICARE

## 2024-05-19 LAB
ALBUMIN SERPL-MCNC: 3.1 G/DL (ref 3.5–5.2)
ALBUMIN/GLOB SERPL: 0.9 G/DL
ALP SERPL-CCNC: 70 U/L (ref 39–117)
ALT SERPL W P-5'-P-CCNC: 11 U/L (ref 1–41)
ANION GAP SERPL CALCULATED.3IONS-SCNC: 10.8 MMOL/L (ref 5–15)
AST SERPL-CCNC: 11 U/L (ref 1–40)
BILIRUB SERPL-MCNC: 0.3 MG/DL (ref 0–1.2)
BUN SERPL-MCNC: 39 MG/DL (ref 8–23)
BUN/CREAT SERPL: 32 (ref 7–25)
CALCIUM SPEC-SCNC: 9 MG/DL (ref 8.6–10.5)
CHLORIDE SERPL-SCNC: 94 MMOL/L (ref 98–107)
CO2 SERPL-SCNC: 20.2 MMOL/L (ref 22–29)
CORTIS SERPL-MCNC: 5.15 MCG/DL
CREAT SERPL-MCNC: 1.22 MG/DL (ref 0.76–1.27)
DEPRECATED RDW RBC AUTO: 41.3 FL (ref 37–54)
EGFRCR SERPLBLD CKD-EPI 2021: 58.5 ML/MIN/1.73
ERYTHROCYTE [DISTWIDTH] IN BLOOD BY AUTOMATED COUNT: 13.3 % (ref 12.3–15.4)
GLOBULIN UR ELPH-MCNC: 3.3 GM/DL
GLUCOSE SERPL-MCNC: 104 MG/DL (ref 65–99)
HCT VFR BLD AUTO: 26.3 % (ref 37.5–51)
HGB BLD-MCNC: 8.3 G/DL (ref 13–17.7)
MAGNESIUM SERPL-MCNC: 1.9 MG/DL (ref 1.6–2.4)
MCH RBC QN AUTO: 26.9 PG (ref 26.6–33)
MCHC RBC AUTO-ENTMCNC: 31.6 G/DL (ref 31.5–35.7)
MCV RBC AUTO: 85.1 FL (ref 79–97)
PHOSPHATE SERPL-MCNC: 3.1 MG/DL (ref 2.5–4.5)
PLATELET # BLD AUTO: 191 10*3/MM3 (ref 140–450)
PMV BLD AUTO: 9.2 FL (ref 6–12)
POTASSIUM SERPL-SCNC: 4.1 MMOL/L (ref 3.5–5.2)
PROT SERPL-MCNC: 6.4 G/DL (ref 6–8.5)
RBC # BLD AUTO: 3.09 10*6/MM3 (ref 4.14–5.8)
SODIUM SERPL-SCNC: 125 MMOL/L (ref 136–145)
SODIUM UR-SCNC: 22 MMOL/L
TSH SERPL DL<=0.05 MIU/L-ACNC: 2.78 UIU/ML (ref 0.27–4.2)
WBC NRBC COR # BLD AUTO: 6 10*3/MM3 (ref 3.4–10.8)

## 2024-05-19 PROCEDURE — 82533 TOTAL CORTISOL: CPT | Performed by: INTERNAL MEDICINE

## 2024-05-19 PROCEDURE — 83521 IG LIGHT CHAINS FREE EACH: CPT | Performed by: INTERNAL MEDICINE

## 2024-05-19 PROCEDURE — 80053 COMPREHEN METABOLIC PANEL: CPT | Performed by: INTERNAL MEDICINE

## 2024-05-19 PROCEDURE — 83735 ASSAY OF MAGNESIUM: CPT | Performed by: INTERNAL MEDICINE

## 2024-05-19 PROCEDURE — 82784 ASSAY IGA/IGD/IGG/IGM EACH: CPT | Performed by: INTERNAL MEDICINE

## 2024-05-19 PROCEDURE — 84443 ASSAY THYROID STIM HORMONE: CPT | Performed by: INTERNAL MEDICINE

## 2024-05-19 PROCEDURE — 86334 IMMUNOFIX E-PHORESIS SERUM: CPT | Performed by: INTERNAL MEDICINE

## 2024-05-19 PROCEDURE — 84100 ASSAY OF PHOSPHORUS: CPT | Performed by: INTERNAL MEDICINE

## 2024-05-19 PROCEDURE — 74176 CT ABD & PELVIS W/O CONTRAST: CPT

## 2024-05-19 PROCEDURE — 85027 COMPLETE CBC AUTOMATED: CPT | Performed by: INTERNAL MEDICINE

## 2024-05-19 PROCEDURE — 99233 SBSQ HOSP IP/OBS HIGH 50: CPT | Performed by: INTERNAL MEDICINE

## 2024-05-19 RX ORDER — FUROSEMIDE 40 MG/1
40 TABLET ORAL DAILY
Status: DISCONTINUED | OUTPATIENT
Start: 2024-05-19 | End: 2024-05-21 | Stop reason: HOSPADM

## 2024-05-19 RX ADMIN — SUCRALFATE 1 G: 1 TABLET ORAL at 21:04

## 2024-05-19 RX ADMIN — FUROSEMIDE 40 MG: 40 TABLET ORAL at 17:28

## 2024-05-19 RX ADMIN — Medication 10 ML: at 21:05

## 2024-05-19 RX ADMIN — PANTOPRAZOLE SODIUM 40 MG: 40 TABLET, DELAYED RELEASE ORAL at 05:19

## 2024-05-19 RX ADMIN — MONTELUKAST 10 MG: 10 TABLET, FILM COATED ORAL at 21:04

## 2024-05-19 RX ADMIN — ATORVASTATIN CALCIUM 10 MG: 10 TABLET, FILM COATED ORAL at 21:04

## 2024-05-19 RX ADMIN — HYDROCODONE BITARTRATE AND ACETAMINOPHEN 1 TABLET: 5; 325 TABLET ORAL at 05:21

## 2024-05-19 RX ADMIN — FERROUS SULFATE TAB 325 MG (65 MG ELEMENTAL FE) 325 MG: 325 (65 FE) TAB at 08:18

## 2024-05-19 RX ADMIN — FLUTICASONE PROPIONATE 1 SPRAY: 50 SPRAY, METERED NASAL at 08:19

## 2024-05-19 RX ADMIN — HYDROCODONE BITARTRATE AND ACETAMINOPHEN 1 TABLET: 5; 325 TABLET ORAL at 19:07

## 2024-05-19 RX ADMIN — SUCRALFATE 1 G: 1 TABLET ORAL at 17:29

## 2024-05-19 RX ADMIN — SUCRALFATE 1 G: 1 TABLET ORAL at 08:19

## 2024-05-19 RX ADMIN — Medication 10 ML: at 08:18

## 2024-05-19 RX ADMIN — SUCRALFATE 1 G: 1 TABLET ORAL at 12:17

## 2024-05-19 NOTE — PLAN OF CARE
Problem: Adult Inpatient Plan of Care  Goal: Plan of Care Review  Outcome: Ongoing, Progressing  Goal: Patient-Specific Goal (Individualized)  Outcome: Ongoing, Progressing  Goal: Absence of Hospital-Acquired Illness or Injury  Outcome: Ongoing, Progressing  Intervention: Identify and Manage Fall Risk  Intervention: Prevent and Manage VTE (Venous Thromboembolism) Risk  Intervention: Prevent Infection  Goal: Optimal Comfort and Wellbeing  Outcome: Ongoing, Progressing  Intervention: Provide Person-Centered Care  Goal: Readiness for Transition of Care  Outcome: Ongoing, Progressing     Problem: Pain Acute  Goal: Acceptable Pain Control and Functional Ability  Outcome: Ongoing, Progressing  Intervention: Optimize Psychosocial Wellbeing   Goal Outcome Evaluation:   Patient alert & oriented x 4, VSS, no significant changes in status during shift.

## 2024-05-19 NOTE — PROGRESS NOTES
[facility]   Hospitalist Progress Note  Date: 2024  Patient Name: Marcello Larson  : 1940  MRN: [id number]  Date of admission: 2024  Consultants:   -Nephrology: Dr. Miles Holley    Subjective   Subjective     Chief Complaint: Generalized weakness    Summary:   Marcello Larson is a 84 y.o. male  with essential hypertension, hyperlipidemia, CAD s/p CABG, chronic paroxysmal atrial fibrillation on Eliquis, chronic combined systolic and diastolic heart failure, ischemic cardiomyopathy s/p ICD that presented to the ED at [facility] with complaints of generalized weakness and was transferred to [facility] for further evaluation given hyponatremia on labs.  Nephrology consulted.    Interval Followup:   No acute events overnight.  Patient stated he is feeling better today.  Sodium level has improved since transfer.  He denied any chest pain or shortness of breath.  Back pain slightly improved.  Hemoglobin downtrending on labs.    Pain Medication:   -Norco    Objective   Objective     Vitals:   Temp:  [97.3 °F (36.3 °C)-97.9 °F (36.6 °C)] 97.3 °F (36.3 °C)  Heart Rate:  [50-60] 50  Resp:  [12-20] 18  BP: (109-125)/(54-61) 109/56  Physical Exam   Gen: No acute distress, Conversant, Pleasant, sitting up in bed  Resp: CTAB, No w/r/r, No respiratory distress appreciated  Card: RRR, No m/r/g  Abd: Soft, Nontender, Nondistended, + bowel sounds    Result Review    Result Review:  I have personally reviewed the results as below and agree with these findings:  []  Laboratory:   CMP          2024    09:32 2024    08:21 2024    13:30 2024    04:44   CMP   Glucose 121  123  122  104    BUN 40  38  38  39    Creatinine 1.43  1.33  1.15  1.22    EGFR 48.3  52.7  62.8  58.5    Sodium 134  122  122  125    Potassium 4.3  3.7  3.8  4.1    Chloride 101  90  91  94    Calcium 9.8  8.7  8.9  9.0    Total Protein 7.5  6.8   6.4    Albumin 4.1  3.3   3.1    Globulin 3.4  3.5   3.3     Total Bilirubin 0.5  0.4   0.3    Alkaline Phosphatase 70  79   70    AST (SGOT) 16  15   11    ALT (SGPT) 8  15   11    Albumin/Globulin Ratio 1.2  0.9   0.9    BUN/Creatinine Ratio 28.0  28.6  33.0  32.0    Anion Gap 11.6  12.5  9.9  10.8      CBC          4/30/2024    09:32 5/18/2024    08:21 5/19/2024    04:44   CBC   WBC 5.78  7.80  6.00    RBC 4.05  3.32  3.09    Hemoglobin 11.3  9.2  8.3    Hematocrit 36.2  27.9  26.3    MCV 89.4  84.0  85.1    MCH 27.9  27.7  26.9    MCHC 31.2  33.0  31.6    RDW 13.3  13.3  13.3    Platelets 173  185  191    Magnesium and phosphorus within normal limits  []  Microbiology:   [x]  Radiology: CT chest imaging personally reviewed.  Small bilateral pleural effusions noted.  Mixed groundglass/interstitial infiltrates noted.  [x]  EKG/Telemetry:    []  Cardiology/Vascular:    []  Pathology:  []  Old records:  []  Other:    Assessment & Plan   Assessment / Plan     Assessment:  Clinically significant hyponatremia  Renal insufficiency  Chronic combined systolic and diastolic heart failure  Type 2 diabetes mellitus  History of bladder cancer with ileal conduit  Anemia, unclear chronicity  Essential hypertension  History of ischemic cardiomyopathy  Chronic paroxysmal atrial fibrillation  Hyperlipidemia  History of left BKA    Plan:  -Nephrology consulted and following, appreciate assistance and recommendations in the care of this patient.  -Will obtain a CT abdomen pelvis to evaluate for retroperitoneal bleed given patient's complaints of worsening back pain and discomfort and downtrending hemoglobin and patient also on anticoagulation and antiplatelet therapy as an outpatient  -Hold home apixaban and Plavix given downtrending hemoglobin  -Hold home carvedilol and losartan given soft blood pressures  -Continue holding diuresis  -PT/OT consulted  -Continue with fluid restriction diet  -Encouraged patient to increase activity level as tolerated  -Will check iron profile  -If hemoglobin  continues to downtrend will consider gastroenterology consultation  -Will monitor electrolytes and renal function with BMP and magnesium level in the AM  -Will monitor WBC and Hgb with CBC in the AM  -Clinical course will dictate further management     DVT Prophylaxis: SCDs  GI Prophylaxis: Pantoprazole  Diet:   Diet Order   Procedures    Diet: Cardiac, Diabetic, Fluid Restriction (240 mL/tray); Healthy Heart (2-3 Na+); Consistent Carbohydrate; 1500 mL/day; Fluid Consistency: Thin (IDDSI 0)     Dispo: PT/OT consulted     Time spent personally reviewing patient's chart, labs and imaging, evaluating/examining the patient, discussing care plan with patient and nurse at bedside and discussing management with consultants (Nephrology): 51 minutes.       Part of this note may be an electronic transcription/translation of spoken language to printed text using the Dragon dictation system.    DVT prophylaxis:  Medical DVT prophylaxis orders are present.        CODE STATUS:   Code Status (Patient has no pulse and is not breathing): CPR (Attempt to Resuscitate)  Medical Interventions (Patient has pulse or is breathing): Full Support      Electronically signed by Baldev Dawn MD, 5/19/2024, 15:34 EDT.

## 2024-05-19 NOTE — PLAN OF CARE
Goal Outcome Evaluation:      A&O x4 VSS. Medicated x2 for pain, see MAR. All needs met at this time. Plan of care continue.

## 2024-05-19 NOTE — PROGRESS NOTES
"Nephrology progress note    iMles Holley MD     Current history: Patient states he is feeling a lot better still with some back pain, no significant shortness of breath no chest pain no nausea or vomiting.    Current medication list:  [Held by provider] apixaban, 5 mg, Oral, BID  atorvastatin, 10 mg, Oral, Nightly  [Held by provider] carvedilol, 12.5 mg, Oral, BID  [Held by provider] clopidogrel, 75 mg, Oral, Daily  ferrous sulfate, 325 mg, Oral, Daily With Breakfast  fluticasone, 1 spray, Nasal, Daily  furosemide, 40 mg, Oral, Daily  [Held by provider] losartan, 50 mg, Oral, Daily  montelukast, 10 mg, Oral, Nightly  pantoprazole, 40 mg, Oral, Q AM  sodium chloride, 10 mL, Intravenous, Q12H  sucralfate, 1 g, Oral, 4x Daily         acetaminophen    senna-docusate sodium **AND** polyethylene glycol **AND** bisacodyl **AND** bisacodyl    HYDROcodone-acetaminophen    ondansetron ODT    sodium chloride    sodium chloride     Physical Exam:  /47 (BP Location: Right arm, Patient Position: Lying)   Pulse 59   Temp 97.3 °F (36.3 °C) (Oral)   Resp 18   Ht 170.2 cm (67\")   Wt 60.9 kg (134 lb 4.2 oz) Comment: 2 pillows and a blanket. prosthetic not on at this time.  SpO2 96%   BMI 21.03 kg/m²     Intake/Output Summary (Last 24 hours) at 5/19/2024 1532  Last data filed at 5/19/2024 1249  Gross per 24 hour   Intake 720 ml   Output 1400 ml   Net -680 ml      General: Patient alert and oriented no acute distress  Cardiac: Regular rate and rhythm without a rub no S3 or S4  Lungs: Clear bilaterally no wheezes rhonchi or rales  Abdomen: Bowel sounds positive nontender soft no mass felt no apparent organomegaly noted  Extremities: Left BKA right without any edema  Skin: No acute rashes noted  : Deferred  Neuro: Appears intact with motor and sensory grossly    Results from last 7 days   Lab Units 05/19/24  0444 05/18/24  1330 05/18/24  0821   SODIUM mmol/L 125* 122* 122*   POTASSIUM mmol/L 4.1 3.8 3.7   CHLORIDE " mmol/L 94* 91* 90*   CO2 mmol/L 20.2* 21.1* 19.5*   BUN mg/dL 39* 38* 38*   CREATININE mg/dL 1.22 1.15 1.33*   CALCIUM mg/dL 9.0 8.9 8.7   BILIRUBIN mg/dL 0.3  --  0.4   ALK PHOS U/L 70  --  79   ALT (SGPT) U/L 11  --  15   AST (SGOT) U/L 11  --  15   GLUCOSE mg/dL 104* 122* 123*       Estimated Creatinine Clearance: 38.8 mL/min (by C-G formula based on SCr of 1.22 mg/dL).    Results from last 7 days   Lab Units 05/19/24  0444 05/18/24  0821   MAGNESIUM mg/dL 1.9 1.7   PHOSPHORUS mg/dL 3.1 3.6             Results from last 7 days   Lab Units 05/19/24  0444 05/18/24  0821   WBC 10*3/mm3 6.00 7.80   HEMOGLOBIN g/dL 8.3* 9.2*   PLATELETS 10*3/mm3 191 185             Imaging Results (Last 24 Hours)       Procedure Component Value Units Date/Time    CT Abdomen Pelvis Without Contrast [[id number]] Collected: 05/19/24 1041     Updated: 05/19/24 1058    Narrative:      CT ABDOMEN PELVIS WO CONTRAST-     Date of Exam: 5/19/2024 9:32 AM     Indication: Concern for retroperitoneal bleed. Hgb downtrending, patient  with worsening back pain, on eliquis and plavix as outpatient. Pt with  NICO and allergy to contrast..     Comparison: CT chest 5/18/2024 and prior CT abdomen pelvis 1/7/2021     Technique: Contiguous axial CT images were obtained from the lung bases  to the to the pubic symphysis without contrast.  Sagittal and coronal  reconstructions were performed.  Automated exposure control and  iterative reconstruction methods were used.          FINDINGS:     Lower Chest: Chronic interstitial changes, mild bronchiectasis and small  dependent pleural effusions, prominent paraseptal markings appear  grossly stable from recent CT chest     No free air is noted below the diaphragm.     Organs: Gallbladder is not well visualized. Limited noncontrast imaging  of the liver, spleen, pancreas and adrenal glands are grossly  unremarkable in appearance. Perinephric stranding is noted of the  kidneys bilaterally. Low-attenuation lesions of  the kidneys again noted  indeterminate but likely representing cysts. No hydronephrosis noted.     GI/Bowel: Stomach is grossly unremarkable on limited imaging.  Postsurgical changes of neobladder, small bowel conduit formation noted  within the lower abdomen with an ostomy extending to the right mid  abdomen. Small bowel demonstrates no acute abnormality. No suspicious  mesenteric adenopathy or fluid collection noted. Ileocecal valve is  grossly unremarkable in appearance. The colon demonstrates a moderate  stool burden. There is diverticulosis without evidence of acute  diverticulitis     Pelvis: Patient appears to be status post prostatectomy and cystectomy.  Postsurgical changes from a neobladder construction noted.     Peritoneum/Retroperitoneum: Atherosclerotic changes are noted of the  aorta with partial calcification thrombus or chronic dissection  unchanged from the prior exam on limited noncontrast imaging.  Atherosclerotic changes extend into the iliac vessels. There is no  suspicious retroperitoneal adenopathy and no evidence of retroperitoneal  hematoma.     Bones/Soft Tissues: No acute osseous abnormality noted. Multilevel  degenerative changes noted of the spine       Impression:         1. No evidence of retroperitoneal hemorrhage.     2. Stable appearance given differences in technique of changes within  the lung bases suggesting pulmonary edema. Superimposed pneumonia not  excluded.     3. Indeterminate lesions within the kidneys bilaterally statistically  likely represent cysts. This can always be followed with renal  ultrasound on a nonemergent basis if clinically indicated.     4. Chronic changes within the abdomen and pelvis without evidence of  acute abnormality              Electronically Signed By-Saleem Lui On:5/19/2024 10:56 AM                Patient Active Problem List   Diagnosis    Mixed hyperlipidemia    Primary hypertension    Coronary artery disease involving native coronary  artery of native heart without angina pectoris    Ischemic cardiomyopathy    Obstructive sleep apnea syndrome    Palpitations    Hx of CABG    Stented coronary artery    Type 2 diabetes mellitus with diabetic peripheral angiopathy without gangrene, without long-term current use of insulin    Chronic combined systolic and diastolic congestive heart failure    ICD (implantable cardioverter-defibrillator), dual, in situ    Other artificial openings of urinary tract status    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  Hyponatremia: On physical exam he does not look to be volume overloaded however his chest x-ray and CT scan showed failure.  Sodium still low although 125 today, urine osmolality is nondiagnostic, TSH and cortisol level normal.  Continue mild p.o. fluid restriction.  Will go ahead and restart Lasix but 40 mg once a day, check in a.m.     2.  Renal insufficiency: Patient appears to have early stage III chronic kidney disease with creatinines in the low 1 range.  Urinalysis is hard to interpret due to his ileal conduit.  In March of this year he had a CT scan of his chest that showed his kidneys with some benign cysts only.  Ordered SPEP and kappa lambda ratio and continue to follow.     3.  History of cardiomyopathy decrease EF: See above, restart Lasix    4.  Diabetes mellitus: Per primary     5.  History of bladder cancer with ileal conduit.  Patient has a mild metabolic acidosis and if its worsens could start some oral bicarbonate and most of the time these ileal conduit stable become  acidotic due to bicarb loss in the urine.     6.  History of hypertension: Would not use thiazide type diuretics due to hyponatremia.            Miles Holley MD

## 2024-05-20 LAB
ANION GAP SERPL CALCULATED.3IONS-SCNC: 10.3 MMOL/L (ref 5–15)
BUN SERPL-MCNC: 38 MG/DL (ref 8–23)
BUN/CREAT SERPL: 28.8 (ref 7–25)
CALCIUM SPEC-SCNC: 9.1 MG/DL (ref 8.6–10.5)
CHLORIDE SERPL-SCNC: 96 MMOL/L (ref 98–107)
CO2 SERPL-SCNC: 22.7 MMOL/L (ref 22–29)
CREAT SERPL-MCNC: 1.32 MG/DL (ref 0.76–1.27)
CREAT UR-MCNC: 44 MG/DL
DEPRECATED RDW RBC AUTO: 42 FL (ref 37–54)
EGFRCR SERPLBLD CKD-EPI 2021: 53.2 ML/MIN/1.73
ERYTHROCYTE [DISTWIDTH] IN BLOOD BY AUTOMATED COUNT: 13.4 % (ref 12.3–15.4)
GLUCOSE SERPL-MCNC: 122 MG/DL (ref 65–99)
HCT VFR BLD AUTO: 28.6 % (ref 37.5–51)
HGB BLD-MCNC: 9 G/DL (ref 13–17.7)
IRON 24H UR-MRATE: 33 MCG/DL (ref 59–158)
IRON SATN MFR SERPL: 13 % (ref 20–50)
MAGNESIUM SERPL-MCNC: 2 MG/DL (ref 1.6–2.4)
MCH RBC QN AUTO: 26.9 PG (ref 26.6–33)
MCHC RBC AUTO-ENTMCNC: 31.5 G/DL (ref 31.5–35.7)
MCV RBC AUTO: 85.4 FL (ref 79–97)
PHOSPHATE SERPL-MCNC: 3.1 MG/DL (ref 2.5–4.5)
PLATELET # BLD AUTO: 221 10*3/MM3 (ref 140–450)
PMV BLD AUTO: 8.8 FL (ref 6–12)
POTASSIUM SERPL-SCNC: 4.2 MMOL/L (ref 3.5–5.2)
PROT ?TM UR-MCNC: 11.6 MG/DL
PROT/CREAT UR: 0.26 MG/G{CREAT}
RBC # BLD AUTO: 3.35 10*6/MM3 (ref 4.14–5.8)
SODIUM SERPL-SCNC: 129 MMOL/L (ref 136–145)
TIBC SERPL-MCNC: 264 MCG/DL (ref 298–536)
TRANSFERRIN SERPL-MCNC: 177 MG/DL (ref 200–360)
WBC NRBC COR # BLD AUTO: 6.89 10*3/MM3 (ref 3.4–10.8)

## 2024-05-20 PROCEDURE — 25010000002 NA FERRIC GLUC CPLX PER 12.5 MG: Performed by: INTERNAL MEDICINE

## 2024-05-20 PROCEDURE — 97161 PT EVAL LOW COMPLEX 20 MIN: CPT

## 2024-05-20 PROCEDURE — 83735 ASSAY OF MAGNESIUM: CPT | Performed by: INTERNAL MEDICINE

## 2024-05-20 PROCEDURE — 82570 ASSAY OF URINE CREATININE: CPT | Performed by: INTERNAL MEDICINE

## 2024-05-20 PROCEDURE — 99232 SBSQ HOSP IP/OBS MODERATE 35: CPT | Performed by: INTERNAL MEDICINE

## 2024-05-20 PROCEDURE — 85027 COMPLETE CBC AUTOMATED: CPT | Performed by: INTERNAL MEDICINE

## 2024-05-20 PROCEDURE — 97165 OT EVAL LOW COMPLEX 30 MIN: CPT

## 2024-05-20 PROCEDURE — 84466 ASSAY OF TRANSFERRIN: CPT | Performed by: INTERNAL MEDICINE

## 2024-05-20 PROCEDURE — 80048 BASIC METABOLIC PNL TOTAL CA: CPT | Performed by: INTERNAL MEDICINE

## 2024-05-20 PROCEDURE — 83540 ASSAY OF IRON: CPT | Performed by: INTERNAL MEDICINE

## 2024-05-20 PROCEDURE — 84156 ASSAY OF PROTEIN URINE: CPT | Performed by: INTERNAL MEDICINE

## 2024-05-20 PROCEDURE — 84100 ASSAY OF PHOSPHORUS: CPT | Performed by: INTERNAL MEDICINE

## 2024-05-20 RX ORDER — AMOXICILLIN 250 MG
2 CAPSULE ORAL 2 TIMES DAILY
Status: DISCONTINUED | OUTPATIENT
Start: 2024-05-20 | End: 2024-05-21 | Stop reason: HOSPADM

## 2024-05-20 RX ORDER — BISACODYL 10 MG
10 SUPPOSITORY, RECTAL RECTAL DAILY PRN
Status: DISCONTINUED | OUTPATIENT
Start: 2024-05-20 | End: 2024-05-21 | Stop reason: HOSPADM

## 2024-05-20 RX ORDER — POLYETHYLENE GLYCOL 3350 17 G/17G
17 POWDER, FOR SOLUTION ORAL DAILY
Status: DISCONTINUED | OUTPATIENT
Start: 2024-05-20 | End: 2024-05-21 | Stop reason: HOSPADM

## 2024-05-20 RX ORDER — BISACODYL 5 MG/1
5 TABLET, DELAYED RELEASE ORAL DAILY PRN
Status: DISCONTINUED | OUTPATIENT
Start: 2024-05-20 | End: 2024-05-21 | Stop reason: HOSPADM

## 2024-05-20 RX ADMIN — POLYETHYLENE GLYCOL 3350 17 G: 17 POWDER, FOR SOLUTION ORAL at 14:36

## 2024-05-20 RX ADMIN — Medication 10 ML: at 20:22

## 2024-05-20 RX ADMIN — SENNOSIDES AND DOCUSATE SODIUM 2 TABLET: 50; 8.6 TABLET ORAL at 20:21

## 2024-05-20 RX ADMIN — HYDROCODONE BITARTRATE AND ACETAMINOPHEN 1 TABLET: 5; 325 TABLET ORAL at 11:00

## 2024-05-20 RX ADMIN — ATORVASTATIN CALCIUM 10 MG: 10 TABLET, FILM COATED ORAL at 20:21

## 2024-05-20 RX ADMIN — PANTOPRAZOLE SODIUM 40 MG: 40 TABLET, DELAYED RELEASE ORAL at 05:39

## 2024-05-20 RX ADMIN — FLUTICASONE PROPIONATE 1 SPRAY: 50 SPRAY, METERED NASAL at 08:39

## 2024-05-20 RX ADMIN — FERROUS SULFATE TAB 325 MG (65 MG ELEMENTAL FE) 325 MG: 325 (65 FE) TAB at 08:36

## 2024-05-20 RX ADMIN — SUCRALFATE 1 G: 1 TABLET ORAL at 18:04

## 2024-05-20 RX ADMIN — Medication 10 ML: at 08:36

## 2024-05-20 RX ADMIN — APIXABAN 2.5 MG: 2.5 TABLET, FILM COATED ORAL at 20:22

## 2024-05-20 RX ADMIN — SODIUM CHLORIDE 125 MG: 9 INJECTION, SOLUTION INTRAVENOUS at 11:00

## 2024-05-20 RX ADMIN — MONTELUKAST 10 MG: 10 TABLET, FILM COATED ORAL at 20:22

## 2024-05-20 RX ADMIN — SUCRALFATE 1 G: 1 TABLET ORAL at 08:36

## 2024-05-20 RX ADMIN — SUCRALFATE 1 G: 1 TABLET ORAL at 20:22

## 2024-05-20 RX ADMIN — CARVEDILOL 12.5 MG: 12.5 TABLET, FILM COATED ORAL at 20:21

## 2024-05-20 RX ADMIN — SUCRALFATE 1 G: 1 TABLET ORAL at 11:00

## 2024-05-20 RX ADMIN — FUROSEMIDE 40 MG: 40 TABLET ORAL at 08:36

## 2024-05-20 NOTE — PROGRESS NOTES
[facility]     Nephrology Progress Note      Patient Name: Marcello Larson  : 1940  MRN: [id number]  Primary Care Physician:  Zeny Corley MD  Date of admission: 2024    Subjective   Subjective     Interval History:  Patient Reports feeling better.  Sitting in the chair, no distress.  Hungary and eating well.    Review of Systems   All systems were reviewed and negative except for: What is mentioned above.    Objective   Objective     Vitals:   Temp:  [97 °F (36.1 °C)-99.1 °F (37.3 °C)] 97 °F (36.1 °C)  Heart Rate:  [52-70] 56  Resp:  [16-18] 18  BP: (125-144)/(49-78) 142/49  Physical Exam:   Constitutional: Awake, alert, no distress, sitting in a chair.   Eyes: sclerae anicteric, no conjunctival injection   HENT: mucous membranes moist   Neck: Supple, no thyromegaly, no lymphadenopathy, trachea midline, No JVD   Respiratory: Clear to auscultation bilaterally, nonlabored respirations    Cardiovascular: RRR, no murmurs, rubs, or gallops.   Gastrointestinal: Positive bowel sounds, soft, nontender, nondistended, right sided urostomy connected to urinary bag.   Musculoskeletal: No edema, no clubbing or cyanosis, left BKA.   Psychiatric: Appropriate affect, cooperative   Neurologic: Oriented x 3, moving all extremities, Cranial Nerves grossly intact, speech clear   Skin: warm and dry, no rashes     Result Review    Result Reviewed:  I have personally reviewed the results from the time of this admission to 2024 17:01 EDT and agree with these findings:  [x]  Laboratory  []  Microbiology  [x]  Radiology  []  EKG/Telemetry   []  Cardiology/Vascular   []  Pathology  []  Old records  []  Other:        Lab 24  0439 24  0444 24  1330 24  0821   SODIUM 129* 125* 122* 122*   POTASSIUM 4.2 4.1 3.8 3.7   CHLORIDE 96* 94* 91* 90*   CO2 22.7 20.2* 21.1* 19.5*   BUN 38* 39* 38* 38*   CREATININE 1.32* 1.22 1.15 1.33*   GLUCOSE 122* 104* 122* 123*   EGFR 53.2* 58.5* 62.8 52.7*    ANION GAP 10.3 10.8 9.9 12.5   MAGNESIUM 2.0 1.9  --  1.7   PHOSPHORUS 3.1 3.1  --  3.6           Most notable findings include: Sodium 129 creatinine 1.3, iron saturation 13%, urine osmolality 221.  Hemoglobin 9.0.  Bilateral renal cyst noted on imaging.    Assessment & Plan   Assessment / Plan       Active Hospital Problems:  Active Hospital Problems    Diagnosis     **Hyponatremia        Assessment and Plan:    - Hyponatremia, possibly hypervolemic initially, sodium is improving with p.o. fluid restriction and Lasix administration.  Sodium is up to 129.  He appeared to be euvolemic to me today.  TSH and cortisol level are adequate.  Continue current dose of Lasix and monitor.  - Mild CKD possibly stage III, with relatively bland urinary sediment, likely multifactorial.  - Bilateral renal cyst noted on CT scan.  - History of cardiomyopathy with decreased EF.  - Diabetes with complications.  - History of bladder cancer, status post ileal conduit.  - History of hypertension, blood pressure is acceptable at this time.  - Iron deficiency anemia, out of proportion to her CKD, serum and urine immunofixation are pending.  Workup and replacement per primary.  Will follow.      Electronically signed by Radha Baca MD, 5/20/2024, 17:01 EDT.

## 2024-05-20 NOTE — THERAPY EVALUATION
Patient Name: Marcello Larson  : 1940    MRN: [id number]                              Today's Date: 2024       Admit Date: 2024    Visit Dx:     ICD-10-CM ICD-9-CM   1. Decreased activities of daily living (ADL)  Z78.9 V49.89     Patient Active Problem List   Diagnosis    Mixed hyperlipidemia    Primary hypertension    Coronary artery disease involving native coronary artery of native heart without angina pectoris    Ischemic cardiomyopathy    Obstructive sleep apnea syndrome    Palpitations    Hx of CABG    Stented coronary artery    Type 2 diabetes mellitus with diabetic peripheral angiopathy without gangrene, without long-term current use of insulin    Chronic combined systolic and diastolic congestive heart failure    ICD (implantable cardioverter-defibrillator), dual, in situ    Other artificial openings of urinary tract status    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    Peripheral vascular disease, unspecified    Permanent atrial fibrillation    Unspecified inflammatory spondylopathy, lumbosacral region    Chronic gastritis without bleeding    Acute bilateral low back pain without sciatica    Long term current use of anticoagulant therapy    Asymptomatic microscopic hematuria    History of bladder cancer    Platelets decreased    Anemia    Hyponatremia     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     Allergic rhinitis due to pollen     Anemia, unspecified     Anxiety disorder, unspecified     Atherosclerotic heart disease of native coronary artery without angina pectoris     Benign essential hypertension 2019    Bilateral primary osteoarthritis of knee     Bladder cancer     Cancer     Cardiac dysfunction 2019    Left ventricle    Cardiomyopathy 2019    CHF (congestive heart failure)     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  LV systolic function is moderate to severely reduced.  EF 30-35%.  The transmittal spectral doppler flow pattern is suggestive of pseudonormalization.  There is moderate to severe global hypokinesis of the LV.  The left atrium is mildly dilated.  The mitral leaflets appear thickened, but open well.  Mild MR and AR.    Hx of CABG 08/13/2019 01/01/1991 - left internal mammary graft to the LAD, SVG to OM1, OM2 in the RCA    Hyperlipidemia 08/13/2019    Hypo-osmolality and hyponatremia     Low back pain     Myocardial infarct     Other long term (current) drug therapy     Palpitations 08/13/2019    Personal history of malignant neoplasm of bladder     Sleep apnea 08/13/2019    Stented coronary artery 08/13/2019 04/11/2014 - VISION bare metal stent to the proximal stenosis of the vein graft to the right and ISION bare metal stent in the mid to distal 80% stenosis.    Type 2 diabetes mellitus without complication     Unstable angina      Past Surgical History:   Procedure Laterality Date    AMPUTATION Left     LOWER EXTREMITY BKA    APPENDECTOMY      CARDIAC CATHETERIZATION      CARDIAC CATHETERIZATION N/A 10/13/2020    Procedure: Left Heart Cath;  Surgeon: Kofi Campoverde MD;  Location[facility] CATH [facility]AS[facility];  Service: Cardiology;  Laterality: N/A;    CARDIAC CATHETERIZATION N/A 10/13/2020    Procedure: Coronary angiography;  Surgeon: Kofi Campoverde MD;  Location:  [facility] CATH INVASIVE LOCATION;  Service: Cardiology;  Laterality: N/A;    CARDIAC CATHETERIZATION N/A 10/13/2020    Procedure: Saphenous Vein Graft;  Surgeon: Kofi Campoverde MD;  Location:  [facility] CATH INVASIVE LOCATION;  Service: Cardiology;  Laterality: N/A;    CARDIAC ELECTROPHYSIOLOGY PROCEDURE N/A 04/04/2022    Procedure: ICD DC new/SJM;  Surgeon: Mamadou Terry MD;   Location:  [facility] CATH INVASIVE LOCATION;  Service: Cardiology;  Laterality: N/A;    CATARACT EXTRACTION      CHOLECYSTECTOMY      CORONARY ARTERY BYPASS GRAFT      1984, 1991 4 VESSEL    INTERNAL CARDIAC DEFIBRILLATOR INSERTION  04/2022    OTHER SURGICAL HISTORY      UROSTOMY S/P BLADDER CA      General Information       Row Name 05/20/24 0953          OT Time and Intention    Document Type evaluation  -AV     Mode of Treatment individual therapy;occupational therapy  -AV       Row Name 05/20/24 0953          General Information    Patient Profile Reviewed yes  -AV     Prior Level of Function independent:;ADL's;home management;transfer;all household mobility;driving  sits for tub bath without DME (reports no issues with task completion). sits to complete grooming. elevated commode. ambulates without assistive device using prosthesis. no home O2.  -AV     Existing Precautions/Restrictions fall  -AV     Barriers to Rehab none identified  -AV       Row Name 05/20/24 0953          Occupational Profile    Reason for Services/Referral (Occupational Profile) Patient is an 84 year old male admitted to [facility] on 5/18/24 with weakness. He is currently on 3W/ room air. OT consulted to evaluate for ADL needs. No previous OT services for current condition.  -AV       Row Name 05/20/24 0953          Living Environment    People in Home alone  -AV       Row Name 05/20/24 0953          Home Main Entrance    Number of Stairs, Main Entrance none  -AV       Row Name 05/20/24 0953          Stairs Within Home, Primary    Number of Stairs, Within Home, Primary none  -AV       Row Name 05/20/24 0953          Cognition    Orientation Status (Cognition) --  alert, pleasant and cooperative. able to retain information and follow commands.  -AV       Row Name 05/20/24 0953          Safety Issues, Functional Mobility    Impairments Affecting Function (Mobility) --  none identified  -AV               User Key  (r) = Recorded By,  (t) = Taken By, (c) = Cosigned By      Initials Name Provider Type    Keven Reyes OT Occupational Therapist                     Mobility/ADL's       Row Name 05/20/24 0956          Transfers    Comment, (Transfers) independent  -AV       Row Name 05/20/24 0956          Functional Mobility    Functional Mobility- Comment has been ambulating in room ad estrellita  -AV       Row Name 05/20/24 0956          Activities of Daily Living    BADL Assessment/Intervention --  Independent all basic ADLs. He reports that he has even been standing at sink for partial grooming.  -AV               User Key  (r) = Recorded By, (t) = Taken By, (c) = Cosigned By      Initials Name Provider Type    Keven Reyes OT Occupational Therapist                   Obj/Interventions       Row Name 05/20/24 0957          Sensory Assessment (Somatosensory)    Sensory Assessment (Somatosensory) UE sensation intact  -AV       Row Name 05/20/24 0957          Vision Assessment/Intervention    Visual Impairment/Limitations WFL  -AV     Vision Assessment Comment glasses  -AV       Row Name 05/20/24 0957          Range of Motion Comprehensive    General Range of Motion bilateral upper extremity ROM WFL  -AV     Comment, General Range of Motion AROM  -AV       Row Name 05/20/24 0957          Strength Comprehensive (MMT)    Comment, General Manual Muscle Testing (MMT) Assessment 5/5 bilateral biceps, triceps and   -AV       Row Name 05/20/24 0957          Motor Skills    Motor Skills coordination;functional endurance  -AV     Coordination WFL  right dominant  -AV     Functional Endurance WFL basic ADLs  -AV       Row Name 05/20/24 0957          Balance    Comment, Balance independent  -AV               User Key  (r) = Recorded By, (t) = Taken By, (c) = Cosigned By      Initials Name Provider Type    Kevne Reyes OT Occupational Therapist                   Goals/Plan    No documentation.                  Clinical Impression       Row Name  05/20/24 0958          Pain Assessment    Pretreatment Pain Rating 0/10 - no pain  -AV     Posttreatment Pain Rating 0/10 - no pain  -AV       Row Name 05/20/24 0958          Plan of Care Review    Plan of Care Reviewed With patient  -AV     Progress no change  -AV     Outcome Evaluation Skilled OT services are not indicated at this time as patient remains independent with all basic ADL/ transfers. Will dc OT with patient in agreement.  -AV       Row Name 05/20/24 0958          Therapy Assessment/Plan (OT)    Patient/Family Therapy Goal Statement (OT) NA  -AV     Rehab Potential (OT) --  NA  -AV     Criteria for Skilled Therapeutic Interventions Met (OT) no problems identified which require skilled intervention  -AV     Therapy Frequency (OT) evaluation only  -AV       Row Name 05/20/24 0958          Therapy Plan Review/Discharge Plan (OT)    Equipment Needs Upon Discharge (OT) tub bench  -AV     Anticipated Discharge Disposition (OT) home  -AV       Row Name 05/20/24 0958          Vital Signs    O2 Delivery Pre Treatment room air  -AV     O2 Delivery Intra Treatment room air  -AV     O2 Delivery Post Treatment room air  -AV       Row Name 05/20/24 0958          Positioning and Restraints    Pre-Treatment Position in bed  -AV     Post Treatment Position bed  -AV     In Bed call light within reach;encouraged to call for assist  -AV               User Key  (r) = Recorded By, (t) = Taken By, (c) = Cosigned By      Initials Name Provider Type    Keven Reyes, JAYLYN Occupational Therapist                   Outcome Measures       Row Name 05/20/24 0959          How much help from another is currently needed...    Putting on and taking off regular lower body clothing? 4  -AV     Bathing (including washing, rinsing, and drying) 4  -AV     Toileting (which includes using toilet bed pan or urinal) 4  -AV     Putting on and taking off regular upper body clothing 4  -AV     Taking care of personal grooming (such as brushing  teeth) 4  -AV     Eating meals 4  -AV     AM-PAC 6 Clicks Score (OT) 24  -AV       Row Name 05/20/24 0959          Functional Assessment    Outcome Measure Options AM-PAC 6 Clicks Daily Activity (OT);Optimal Instrument  -AV       Row Name 05/20/24 0959          Optimal Instrument    Optimal Instrument Optimal - 3  -AV     Bending/Stooping 1  -AV     Standing 1  -AV     Reaching 1  -AV     From the list, choose the 3 activities you would most like to be able to do without any difficulty Bending/stooping;Standing;Reaching  -AV     Total Score Optimal - 3 3  -AV               User Key  (r) = Recorded By, (t) = Taken By, (c) = Cosigned By      Initials Name Provider Type    Keven Reyes OT Occupational Therapist                    Occupational Therapy Education       Title: PT OT SLP Therapies (In Progress)       Topic: Occupational Therapy (In Progress)       Point: ADL training (Done)       Description:   Instruct learner(s) on proper safety adaptation and remediation techniques during self care or transfers.   Instruct in proper use of assistive devices.                  Learning Progress Summary             Patient Acceptance, E, VU by AV at 5/20/2024 1000    Comment: no need for OT services identified                         Point: Home exercise program (Not Started)       Description:   Instruct learner(s) on appropriate technique for monitoring, assisting and/or progressing therapeutic exercises/activities.                  Learner Progress:  Not documented in this visit.              Point: Precautions (Not Started)       Description:   Instruct learner(s) on prescribed precautions during self-care and functional transfers.                  Learner Progress:  Not documented in this visit.              Point: Body mechanics (Not Started)       Description:   Instruct learner(s) on proper positioning and spine alignment during self-care, functional mobility activities and/or exercises.                   Learner Progress:  Not documented in this visit.                              User Key       Initials Effective Dates Name Provider Type Discipline    AV 06/16/21 -  Keven Alatorre OT Occupational Therapist OT                  OT Recommendation and Plan  Therapy Frequency (OT): evaluation only  Plan of Care Review  Plan of Care Reviewed With: patient  Progress: no change  Outcome Evaluation: Skilled OT services are not indicated at this time as patient remains independent with all basic ADL/ transfers. Will dc OT with patient in agreement.     Time Calculation:   Evaluation Complexity (OT)  Review Occupational Profile/Medical/Therapy History Complexity: expanded/moderate complexity  Assessment, Occupational Performance/Identification of Deficit Complexity: 1-3 performance deficits  Clinical Decision Making Complexity (OT): problem focused assessment/low complexity  Overall Complexity of Evaluation (OT): low complexity     Time Calculation- OT       Row Name 05/20/24 1001             Time Calculation- OT    OT Received On 05/20/24  -AV         Untimed Charges    OT Eval/Re-eval Minutes 32  -AV         Total Minutes    Untimed Charges Total Minutes 32  -AV       Total Minutes 32  -AV                User Key  (r) = Recorded By, (t) = Taken By, (c) = Cosigned By      Initials Name Provider Type    AV Keven Alatorre OT Occupational Therapist                  Therapy Charges for Today       Code Description Service Date Service Provider Modifiers Qty    91009096178 HC OT EVAL LOW COMPLEXITY 3 5/20/2024 Keven Alatorre OT GO 1                 Keven Alatorre OT  5/20/2024

## 2024-05-20 NOTE — PLAN OF CARE
Goal Outcome Evaluation:                      Patient is alert and oriented x4.  Vital signs stable.  Patient complained of pain and was medicated with PRN per MAR x1.  Ferric gluconate per MAR.  Continuing plan of care.

## 2024-05-20 NOTE — PROGRESS NOTES
[facility]   Hospitalist Progress Note  Date: 2024  Patient Name: Marcello Larson  : 1940  MRN: [id number]  Date of admission: 2024  Consultants:   -Nephrology: Dr. Miles Holley    Subjective   Subjective     Chief Complaint: Generalized weakness    Summary:   Marcello Larson is a 84 y.o. male  with essential hypertension, hyperlipidemia, CAD s/p CABG, chronic paroxysmal atrial fibrillation on Eliquis, chronic combined systolic and diastolic heart failure, ischemic cardiomyopathy s/p ICD that presented to the ED at [facility] with complaints of generalized weakness and was transferred to [facility] for further evaluation given hyponatremia on labs.  Nephrology consulted.  Sodium level improved.  Hemoglobin was found to be downtrending, no overt signs of bleeding noted and imaging not consistent with retroperitoneal bleed.    Interval Followup:   No acute issues overnight.  Patient stated he is feeling better today.  Sodium level has improved.  Urine culture did result showing Staph aureus.  Patient asymptomatic, no fever, no leukocytosis.  High suspicion for colonization.  Patient does have some complaints of constipation.    Pain Medication:   -Norco    Objective   Objective     Vitals:   Temp:  [97.3 °F (36.3 °C)-99.1 °F (37.3 °C)] 97.3 °F (36.3 °C)  Heart Rate:  [52-70] 69  Resp:  [16-18] 16  BP: (121-144)/(54-78) 125/78  Physical Exam   Gen: No acute distress, sitting up in bed, pleasant, conversant  Resp: CTAB, No w/r/r, good aeration, equal chest rise bilaterally  Card: RRR, No m/r/g  Abd: Soft, Nontender, Nondistended, + bowel sounds    Result Review    Result Review:  I have personally reviewed the results as below and agree with these findings:  []  Laboratory:   CMP          2024    08:21 2024    13:30 2024    04:44 2024    04:39   CMP   Glucose 123  122  104  122    BUN 38  38  39  38    Creatinine 1.33  1.15  1.22  1.32    EGFR 52.7  62.8   58.5  53.2    Sodium 122  122  125  129    Potassium 3.7  3.8  4.1  4.2    Chloride 90  91  94  96    Calcium 8.7  8.9  9.0  9.1    Total Protein 6.8   6.4     Albumin 3.3   3.1     Globulin 3.5   3.3     Total Bilirubin 0.4   0.3     Alkaline Phosphatase 79   70     AST (SGOT) 15   11     ALT (SGPT) 15   11     Albumin/Globulin Ratio 0.9   0.9     BUN/Creatinine Ratio 28.6  33.0  32.0  28.8    Anion Gap 12.5  9.9  10.8  10.3      CBC          5/18/2024    08:21 5/19/2024    04:44 5/20/2024    04:39   CBC   WBC 7.80  6.00  6.89    RBC 3.32  3.09  3.35    Hemoglobin 9.2  8.3  9.0    Hematocrit 27.9  26.3  28.6    MCV 84.0  85.1  85.4    MCH 27.7  26.9  26.9    MCHC 33.0  31.6  31.5    RDW 13.3  13.3  13.4    Platelets 185  191  221    Phosphorus and magnesium within normal limits.  Iron low.  []  Microbiology:   [x]  Radiology: CT abdomen pelvis imaging pressure reviewed.  No evidence of retroperitoneal hemorrhage identified.  [x]  EKG/Telemetry:    []  Cardiology/Vascular:    []  Pathology:  []  Old records:  []  Other:    Assessment & Plan   Assessment / Plan     Assessment:  Clinically significant hyponatremia  Renal insufficiency  Chronic combined systolic and diastolic heart failure  Type 2 diabetes mellitus  History of bladder cancer with ileal conduit  Iron deficiency anemia  Essential hypertension  History of ischemic cardiomyopathy  Chronic paroxysmal atrial fibrillation  Hyperlipidemia  History of left BKA    Plan:  -Nephrology consulted and following, appreciate assistance and recommendations in the care of this patient.  -IV iron ordered  -Start oral Lasix.  Monitor renal function.  -Resume home carvedilol.  Continue holding home losartan.  -Continue fluid restriction  -Patient encouraged to increase activity level as tolerated.  Voiced understanding.  -Resume apixaban and Plavix  -PT/OT consulted  -Monitor electrolytes and renal function with BMP and magnesium level in the AM  -Monitor WBC and Hgb with  CBC in the AM  -Clinical course will dictate further management     DVT Prophylaxis: SCDs  GI Prophylaxis: Pantoprazole  Diet:   Diet Order   Procedures    Diet: Cardiac, Diabetic, Fluid Restriction (240 mL/tray); Healthy Heart (2-3 Na+); Consistent Carbohydrate; 1500 mL/day; Fluid Consistency: Thin (IDDSI 0)     Dispo: PT/OT consulted     Personally reviewed patients labs and imaging, discussed with patient and nurse at bedside. Discussed management with the following consultants: Nephrology.     Part of this note may be an electronic transcription/translation of spoken language to printed text using the Dragon dictation system.    DVT prophylaxis:  Medical and mechanical DVT prophylaxis orders are present.        CODE STATUS:   Code Status (Patient has no pulse and is not breathing): CPR (Attempt to Resuscitate)  Medical Interventions (Patient has pulse or is breathing): Full Support      Electronically signed by Baldev Dawn MD, 5/20/2024, 14:25 EDT.

## 2024-05-20 NOTE — THERAPY EVALUATION
Acute Care - Physical Therapy Initial Evaluation  [facility]     Patient Name: Marcello Larson  : 1940  MRN: [id number]  Today's Date: 2024      Visit Dx:     ICD-10-CM ICD-9-CM   1. Decreased activities of daily living (ADL)  Z78.9 V49.89   2. Difficulty walking  R26.2 719.7     Patient Active Problem List   Diagnosis    Mixed hyperlipidemia    Primary hypertension    Coronary artery disease involving native coronary artery of native heart without angina pectoris    Ischemic cardiomyopathy    Obstructive sleep apnea syndrome    Palpitations    Hx of CABG    Stented coronary artery    Type 2 diabetes mellitus with diabetic peripheral angiopathy without gangrene, without long-term current use of insulin    Chronic combined systolic and diastolic congestive heart failure    ICD (implantable cardioverter-defibrillator), dual, in situ    Other artificial openings of urinary tract status    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    Peripheral vascular disease, unspecified    Permanent atrial fibrillation    Unspecified inflammatory spondylopathy, lumbosacral region    Chronic gastritis without bleeding    Acute bilateral low back pain without sciatica    Long term current use of anticoagulant therapy    Asymptomatic microscopic hematuria    History of bladder cancer    Platelets decreased    Anemia    Hyponatremia     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     Allergic rhinitis due to pollen     Anemia, unspecified     Anxiety disorder, unspecified     Atherosclerotic heart disease of native coronary artery without angina pectoris     Benign essential hypertension 2019    Bilateral primary osteoarthritis of knee     Bladder cancer     Cancer     Cardiac dysfunction 2019    Left ventricle    Cardiomyopathy 2019    CHF (congestive heart failure)     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  LV systolic function is moderate to severely reduced.  EF 30-35%.  The transmittal spectral doppler flow pattern is suggestive of pseudonormalization.  There is moderate to severe global hypokinesis of the LV.  The left atrium is mildly dilated.  The mitral leaflets appear thickened, but open well.  Mild MR and AR.    Hx of CABG 08/13/2019 01/01/1991 - left internal mammary graft to the LAD, SVG to OM1, OM2 in the RCA    Hyperlipidemia 08/13/2019    Hypo-osmolality and hyponatremia     Low back pain     Myocardial infarct     Other long term (current) drug therapy     Palpitations 08/13/2019    Personal history of malignant neoplasm of bladder     Sleep apnea 08/13/2019    Stented coronary artery 08/13/2019 04/11/2014 - VISION bare metal stent to the proximal stenosis of the vein graft to the right and ISION bare metal stent in the mid to distal 80% stenosis.    Type 2 diabetes mellitus without complication     Unstable angina      Past Surgical History:   Procedure Laterality Date    AMPUTATION Left     LOWER EXTREMITY BKA    APPENDECTOMY      CARDIAC CATHETERIZATION      CARDIAC CATHETERIZATION N/A 10/13/2020    Procedure: Left Heart Cath;  Surgeon: Kofi Campoverde MD;  Location: [facility] CATH INVASIVE LOCATION;  Service: Cardiology;  Laterality: N/A;    CARDIAC CATHETERIZATION N/A 10/13/2020    Procedure: Coronary angiography;  Surgeon: Kofi Campoverde MD;  [facility];  Service: Cardiology;  Laterality: N/A;    CARDIAC CATHETERIZATION N/A 10/13/2020    Procedure: Saphenous Vein Graft;  Surgeon: Kofi Campoverde MD;  Location: [facility] CATH INVASIVE LOCATION;  Service: Cardiology;  Laterality: N/A;    CARDIAC ELECTROPHYSIOLOGY PROCEDURE N/A 04/04/2022    Procedure: ICD DC  new/ROSELINE;  Surgeon: Mamadou Terry MD;  Location:  [facility] CATH INVASIVE LOCATION;  Service: Cardiology;  Laterality: N/A;    CATARACT EXTRACTION      CHOLECYSTECTOMY      CORONARY ARTERY BYPASS GRAFT      1984, 1991 4 VESSEL    INTERNAL CARDIAC DEFIBRILLATOR INSERTION  04/2022    OTHER SURGICAL HISTORY      UROSTOMY S/P BLADDER CA     PT Assessment (Last 12 Hours)       PT Evaluation and Treatment       Row Name 05/20/24 1100          Physical Therapy Time and Intention    Subjective Information no complaints  -DP     Document Type evaluation  -DP     Mode of Treatment individual therapy;physical therapy  -DP     Patient Effort good  -DP       Row Name 05/20/24 1100          General Information    Patient Profile Reviewed yes  -DP     Patient Observations alert;cooperative;agree to therapy  -DP     General Observations of Patient Pt has L BKA- he is independent with donning and doffing of the prosthesis  -DP     Prior Level of Function independent:;gait;transfer;bed mobility;ADL's  -DP     Equipment Currently Used at Home none  -DP     Existing Precautions/Restrictions no known precautions/restrictions  -DP     Barriers to Rehab none identified  -DP       Row Name 05/20/24 1100          Living Environment    Current Living Arrangements home  -DP     People in Home alone  children live close  -DP       Row Name 05/20/24 1100          Cognition    Orientation Status (Cognition) oriented x 3  -DP       Row Name 05/20/24 1100          Range of Motion Comprehensive    General Range of Motion bilateral lower extremity ROM WFL  -DP       Row Name 05/20/24 1100          Strength (Manual Muscle Testing)    Strength (Manual Muscle Testing) bilateral lower extremities;strength is WFL  -DP       Row Name 05/20/24 1100          Bed Mobility    Bed Mobility supine-sit-supine  -DP     Supine-Sit-Supine [place] (Bed Mobility) independent  -DP       Row Name 05/20/24 1100          Transfers    Transfers sit-stand transfer   -DP       Row Name 05/20/24 1100          Sit-Stand Transfer    Sit-Stand [place] (Transfers) independent  -DP       Row Name 05/20/24 1100          Gait/Stairs (Locomotion)    Gait/Stairs Locomotion gait/ambulation independence  -DP     [place] Level (Gait) modified independence  -DP     Comment, (Gait/Stairs) Pt is able to ambulate ad estrellita in his room.  -DP       Row Name 05/20/24 1100          Balance    Balance Assessment standing dynamic balance  -DP     Dynamic Standing Balance supervision  -DP       Row Name 05/20/24 1100          Plan of Care Review    Plan of Care Reviewed With patient  -DP     Outcome Evaluation Patient is able to complete all transfers and bed mobilities indepedently in the room. Pt is able to ambulate ad estrellita in the room. Pt declined need for inpatient PT services. Recommend DC home.  -DP       Row Name 05/20/24 1100          Therapy Assessment/Plan (PT)    Criteria for Skilled Interventions Met (PT) no problems identified which require skilled intervention  -DP     Therapy Frequency (PT) evaluation only  -DP       Row Name 05/20/24 1100          PT Evaluation Complexity    History, PT Evaluation Complexity no personal factors and/or comorbidities  -DP     Examination of Body Systems (PT Eval Complexity) total of 4 or more elements  -DP     Clinical Presentation (PT Evaluation Complexity) stable  -DP     Clinical Decision Making (PT Evaluation Complexity) low complexity  -DP     Overall Complexity (PT Evaluation Complexity) low complexity  -DP       Row Name 05/20/24 1100          Physical Therapy Goals    Problem Specific Goal Selection (PT) problem specific goal 1, PT  -DP       Row Name 05/20/24 1100          Problem Specific Goal 1 (PT)    Problem Specific Goal 1 (PT) Complete PT evaluation.  -DP     Time Frame (Problem Specific Goal 1, PT) 1 day  -DP     Progress/Outcome (Problem Specific Goal 1, PT) goal met  -DP               User Key  (r) = Recorded By, (t) = Taken By,  (c) = Cosigned By      Initials Name Provider Type    Reagan Hess, PT Physical Therapist                      PT Recommendation and Plan  Anticipated Discharge Disposition (PT): home  Therapy Frequency (PT): evaluation only  Plan of Care Reviewed With: patient  Outcome Evaluation: Patient is able to complete all transfers and bed mobilities indepedently in the room. Pt is able to ambulate ad estrellita in the room. Pt declined need for inpatient PT services. Recommend DC home.   Outcome Measures       Row Name 05/20/24 1100             How much help from another person do you currently need...    Turning from your back to your side while in flat bed without using bedrails? 4  -DP      Moving from lying on back to sitting on the side of a flat bed without bedrails? 4  -DP      Moving to and from a bed to a chair (including a wheelchair)? 4  -DP      Standing up from a chair using your arms (e.g., wheelchair, bedside chair)? 4  -DP      Climbing 3-5 steps with a railing? 4  -DP      To walk in hospital room? 4  -DP      AM-PAC 6 Clicks Score (PT) 24  -DP      Highest Level of Mobility Goal 8 --> Walked 250 feet or more  -DP                User Key  (r) = Recorded By, (t) = Taken By, (c) = Cosigned By      Initials Name Provider Type    Reagan Hess, PT Physical Therapist                     Time Calculation:    PT Charges       Row Name 05/20/24 1142             Time Calculation    PT Received On 05/20/24  -DP         Untimed Charges    PT Eval/Re-eval Minutes 20  -DP         Total Minutes    Untimed Charges Total Minutes 20  -DP       Total Minutes 20  -DP                User Key  (r) = Recorded By, (t) = Taken By, (c) = Cosigned By      Initials Name Provider Type    Reagan Hess, PT Physical Therapist                      PT G-Codes  Outcome Measure Options: AM-PAC 6 Clicks Daily Activity (OT), Optimal Instrument  AM-PAC 6 Clicks Score (PT): 24  AM-PAC 6 Clicks Score (OT): 24    Reagan Rosenbaum  PT  5/20/2024

## 2024-05-20 NOTE — PLAN OF CARE
Goal Outcome Evaluation:      VSS. Patient is alert & oriented x4. No c/o pain. No significant changes during shift.

## 2024-05-20 NOTE — PLAN OF CARE
Goal Outcome Evaluation:  Plan of Care Reviewed With: patient        Progress: no change  Outcome Evaluation: Skilled OT services are not indicated at this time as patient remains independent with all basic ADL/ transfers. Will dc OT with patient in agreement.      Anticipated Discharge Disposition (OT): home

## 2024-05-20 NOTE — PLAN OF CARE
Goal Outcome Evaluation:  Plan of Care Reviewed With: patient           Outcome Evaluation: Patient is able to complete all transfers and bed mobilities indepedently in the room. Pt is able to ambulate ad estrellita in the room. Pt declined need for inpatient PT services. Recommend DC home.      Anticipated Discharge Disposition (PT): home

## 2024-05-21 ENCOUNTER — READMISSION MANAGEMENT (OUTPATIENT)
Dept: CALL CENTER | Facility: HOSPITAL | Age: 84
End: 2024-05-21
Payer: MEDICARE

## 2024-05-21 VITALS
HEART RATE: 57 BPM | BODY MASS INDEX: 19.86 KG/M2 | HEIGHT: 67 IN | DIASTOLIC BLOOD PRESSURE: 53 MMHG | WEIGHT: 126.54 LBS | SYSTOLIC BLOOD PRESSURE: 125 MMHG | OXYGEN SATURATION: 95 % | RESPIRATION RATE: 16 BRPM | TEMPERATURE: 97.5 F

## 2024-05-21 LAB
ANION GAP SERPL CALCULATED.3IONS-SCNC: 10.7 MMOL/L (ref 5–15)
BACTERIA SPEC AEROBE CULT: ABNORMAL
BUN SERPL-MCNC: 37 MG/DL (ref 8–23)
BUN/CREAT SERPL: 31.4 (ref 7–25)
CALCIUM SPEC-SCNC: 9.3 MG/DL (ref 8.6–10.5)
CHLORIDE SERPL-SCNC: 99 MMOL/L (ref 98–107)
CO2 SERPL-SCNC: 21.3 MMOL/L (ref 22–29)
CREAT SERPL-MCNC: 1.18 MG/DL (ref 0.76–1.27)
DEPRECATED RDW RBC AUTO: 42.4 FL (ref 37–54)
EGFRCR SERPLBLD CKD-EPI 2021: 60.8 ML/MIN/1.73
ERYTHROCYTE [DISTWIDTH] IN BLOOD BY AUTOMATED COUNT: 13.6 % (ref 12.3–15.4)
FERRITIN SERPL-MCNC: 279.1 NG/ML (ref 30–400)
GLUCOSE SERPL-MCNC: 133 MG/DL (ref 65–99)
HCT VFR BLD AUTO: 29.9 % (ref 37.5–51)
HGB BLD-MCNC: 9.3 G/DL (ref 13–17.7)
IGA SERPL-MCNC: 235 MG/DL (ref 61–437)
IGG SERPL-MCNC: 1340 MG/DL (ref 603–1613)
IGM SERPL-MCNC: 56 MG/DL (ref 15–143)
KAPPA LC FREE SER-MCNC: 64.3 MG/L (ref 3.3–19.4)
KAPPA LC FREE/LAMBDA FREE SER: 0.77 {RATIO} (ref 0.26–1.65)
LAMBDA LC FREE SERPL-MCNC: 83.6 MG/L (ref 5.7–26.3)
MAGNESIUM SERPL-MCNC: 1.8 MG/DL (ref 1.6–2.4)
MCH RBC QN AUTO: 26.6 PG (ref 26.6–33)
MCHC RBC AUTO-ENTMCNC: 31.1 G/DL (ref 31.5–35.7)
MCV RBC AUTO: 85.4 FL (ref 79–97)
PHOSPHATE SERPL-MCNC: 3.3 MG/DL (ref 2.5–4.5)
PLATELET # BLD AUTO: 238 10*3/MM3 (ref 140–450)
PMV BLD AUTO: 9.1 FL (ref 6–12)
POTASSIUM SERPL-SCNC: 4.1 MMOL/L (ref 3.5–5.2)
PROT PATTERN SERPL IFE-IMP: ABNORMAL
RBC # BLD AUTO: 3.5 10*6/MM3 (ref 4.14–5.8)
SODIUM SERPL-SCNC: 131 MMOL/L (ref 136–145)
WBC NRBC COR # BLD AUTO: 8.11 10*3/MM3 (ref 3.4–10.8)

## 2024-05-21 PROCEDURE — 85027 COMPLETE CBC AUTOMATED: CPT | Performed by: INTERNAL MEDICINE

## 2024-05-21 PROCEDURE — 80048 BASIC METABOLIC PNL TOTAL CA: CPT | Performed by: INTERNAL MEDICINE

## 2024-05-21 PROCEDURE — 82728 ASSAY OF FERRITIN: CPT | Performed by: INTERNAL MEDICINE

## 2024-05-21 PROCEDURE — 99239 HOSP IP/OBS DSCHRG MGMT >30: CPT | Performed by: INTERNAL MEDICINE

## 2024-05-21 PROCEDURE — 84100 ASSAY OF PHOSPHORUS: CPT | Performed by: INTERNAL MEDICINE

## 2024-05-21 PROCEDURE — 83735 ASSAY OF MAGNESIUM: CPT | Performed by: INTERNAL MEDICINE

## 2024-05-21 PROCEDURE — 25010000002 NA FERRIC GLUC CPLX PER 12.5 MG: Performed by: INTERNAL MEDICINE

## 2024-05-21 RX ORDER — FUROSEMIDE 40 MG/1
40 TABLET ORAL DAILY
Start: 2024-05-21

## 2024-05-21 RX ADMIN — FUROSEMIDE 40 MG: 40 TABLET ORAL at 08:48

## 2024-05-21 RX ADMIN — FLUTICASONE PROPIONATE 1 SPRAY: 50 SPRAY, METERED NASAL at 08:49

## 2024-05-21 RX ADMIN — SUCRALFATE 1 G: 1 TABLET ORAL at 08:48

## 2024-05-21 RX ADMIN — PANTOPRAZOLE SODIUM 40 MG: 40 TABLET, DELAYED RELEASE ORAL at 05:22

## 2024-05-21 RX ADMIN — Medication 10 ML: at 08:49

## 2024-05-21 RX ADMIN — SODIUM CHLORIDE 125 MG: 9 INJECTION, SOLUTION INTRAVENOUS at 10:19

## 2024-05-21 RX ADMIN — SENNOSIDES AND DOCUSATE SODIUM 2 TABLET: 50; 8.6 TABLET ORAL at 08:48

## 2024-05-21 RX ADMIN — CARVEDILOL 12.5 MG: 12.5 TABLET, FILM COATED ORAL at 08:48

## 2024-05-21 RX ADMIN — POLYETHYLENE GLYCOL 3350 17 G: 17 POWDER, FOR SOLUTION ORAL at 08:48

## 2024-05-21 RX ADMIN — APIXABAN 2.5 MG: 2.5 TABLET, FILM COATED ORAL at 08:48

## 2024-05-21 RX ADMIN — CLOPIDOGREL BISULFATE 75 MG: 75 TABLET ORAL at 08:48

## 2024-05-21 NOTE — DISCHARGE SUMMARY
[facility]         HOSPITALIST  DISCHARGE SUMMARY    Patient Name: Marcello Larson  : 1940  MRN: [id number]    Date of Admission: 2024  Date of Discharge:  24  Primary Care Physician: Zeny Corley MD    Consultants:  -Nephrology: Dr. Miles Holley, Dr. Radha Baca    [facility] Problems:  Clinically significant hyponatremia  Renal insufficiency  Chronic combined systolic and diastolic heart failure  Type 2 diabetes mellitus  History of bladder cancer with ileal conduit  Iron deficiency anemia  Essential hypertension  History of ischemic cardiomyopathy  Chronic paroxysmal atrial fibrillation  Hyperlipidemia  History of left BKA    Hospital Course     Hospital Course:  Marcello Larson is a 84 y.o. male with essential hypertension, hyperlipidemia, CAD s/p CABG, chronic paroxysmal atrial fibrillation on Eliquis, chronic combined systolic and diastolic heart failure, ischemic cardiomyopathy s/p ICD that presented to the ED at [facility] with complaints of generalized weakness and was transferred to [facility] for further evaluation given hyponatremia on labs.  Nephrology consulted.  Sodium level improved.  Hemoglobin was found to be downtrending, no overt signs of bleeding noted and imaging not consistent with retroperitoneal bleed.  Patient was found to have iron deficiency, did receive IV iron infusion x 2 during hospitalization.  Patient continued on oral iron therapy at time of discharge.  Due to patient's age and weight apixaban dose decreased to 2.5 mg twice daily.  Patient noted that he was feeling much better.  Patient will discharge on Lasix 40 mg daily.  Home losartan discontinued since patient normotensive during hospitalization.  Patient was evaluated by PT/OT services during hospitalization who did not recommend rehab placement for patient at time of discharge.  On day of discharge patient hemodynamically stable and no additional  inpatient evaluation or workup necessary at this time, patient was discharged home with outpatient follow-up.    DISCHARGE Follow Up Recommendations for labs and diagnostics:   -Follow-up with PCP in 3 to 5 days  -Follow-up with nephrology in 2 weeks    Day of Discharge     Vital Signs:  Temp:  [97 °F (36.1 °C)-98.2 °F (36.8 °C)] 97.5 °F (36.4 °C)  Heart Rate:  [51-69] 57  Resp:  [16-18] 16  BP: (125-147)/(44-78) 125/53  Physical Exam:   Gen: No acute distress, conversant, pleasant, sitting up in bed  Resp: CTAB, No w/r/r, normal respiratory effort  Card: RRR, No m/r/g  Abd: Soft, Nontender, Nondistended, + bowel sounds     Discharge Details        Discharge Medications        Changes to Medications        Instructions Start Date   apixaban 2.5 MG tablet tablet  Commonly known as: ELIQUIS  What changed:   medication strength  how much to take   2.5 mg, Oral, 2 Times Daily      Diclofenac Sodium 1 % gel gel  Commonly known as: VOLTAREN  What changed: See the new instructions.   APPLY TO THE AFFECTED AREA(S) TWICE DAILY      furosemide 40 MG tablet  Commonly known as: LASIX  What changed: when to take this   40 mg, Oral, Daily      nitroglycerin 0.4 MG SL tablet  Commonly known as: NITROSTAT  What changed: See the new instructions.   DISSOLVE 1 TABLET UNDER THE TONGUE EVERY 5 MINUTES AS NEEDED FOR CHEST PAIN. DO NOT EXCEED A TOTAL OF 3 DOSES IN 15 MINUTES. IF NO RELIEF, CALL 911             Continue These Medications        Instructions Start Date   carvedilol 12.5 MG tablet  Commonly known as: COREG   12.5 mg, Oral, 2 Times Daily      clopidogrel 75 MG tablet  Commonly known as: PLAVIX   75 mg, Oral, Daily      ferrous sulfate 325 (65 FE) MG tablet   325 mg, Oral, Daily With Breakfast      fexofenadine 180 MG tablet  Commonly known as: ALLEGRA   180 mg, Oral, Daily      fluticasone 50 MCG/ACT nasal spray  Commonly known as: FLONASE   1 spray, Nasal, Daily      HYDROcodone-acetaminophen 5-325 MG per tablet  Commonly  known as: NORCO   1 tablet, Oral, Daily      lovastatin 40 MG tablet  Commonly known as: MEVACOR   40 mg, Oral, Every Evening      metFORMIN 500 MG tablet  Commonly known as: GLUCOPHAGE   500 mg, Oral, Daily      montelukast 10 MG tablet  Commonly known as: SINGULAIR   10 mg, Oral, Nightly      pantoprazole 40 MG EC tablet  Commonly known as: PROTONIX   40 mg, Oral, Daily      potassium chloride 10 MEQ CR capsule  Commonly known as: MICRO-K   10 mEq, Oral, Daily      prochlorperazine 10 MG tablet  Commonly known as: COMPAZINE   10 mg, Oral, Every 8 Hours PRN      sucralfate 1 g tablet  Commonly known as: CARAFATE   TAKE 1 TABLET BY MOUTH FOUR TIMES DAILY      vitamin B-6 100 MG tablet  Commonly known as: PYRIDOXINE   100 mg, Oral, Daily      vitamin C 500 MG tablet  Commonly known as: ASCORBIC ACID   500 mg, Oral, Daily             Stop These Medications      ciprofloxacin 500 MG tablet  Commonly known as: CIPRO     losartan 50 MG tablet  Commonly known as: COZAAR              Allergies   Allergen Reactions    Iodinated Contrast Media Anaphylaxis    Lisinopril Other (See Comments)    Prednisone Cough       Discharge Disposition:      Diet:  Hospital:  Diet Order   Procedures    Diet: Cardiac, Diabetic, Fluid Restriction (240 mL/tray); Healthy Heart (2-3 Na+); Consistent Carbohydrate; 1500 mL/day; Fluid Consistency: Thin (IDDSI 0)       Discharge Activity:   Activity Instructions       Activity as Tolerated              CODE STATUS:  Code Status and Medical Interventions:   Ordered at: 05/18/24 0824     Code Status (Patient has no pulse and is not breathing):    CPR (Attempt to Resuscitate)     Medical Interventions (Patient has pulse or is breathing):    Full Support       Future Appointments   Date Time Provider Department Center   5/29/2024 11:30 AM Andrew Castañeda MD [facility]   7/3/2024  8:15 AM Brenda Arroyo APRN [facility]   7/9/2024  8:15 AM Zeny Corley MD [facility]   12/27/2024   9:30 AM Zeny Corley MD [facility]   1/22/2025 10:30 AM Andrew Castañeda MD [facility]       Additional Instructions for the Follow-ups that You Need to Schedule       Discharge Follow-up with PCP   As directed       Currently Documented PCP:    Zeny Corley MD    PCP Phone Number:    [phone]     Follow Up Details: Follow-up in 3-5 days                Pertinent  and/or Most Recent Results     RADIOLOGY:  CT Abdomen Pelvis Without Contrast [[id number]] Qamar as Reviewed   Order Status: Completed Collected: 05/19/24 1041    Updated: 05/19/24 1058   Narrative:     CT ABDOMEN PELVIS WO CONTRAST-     Date of Exam: 5/19/2024 9:32 AM     Indication: Concern for retroperitoneal bleed. Hgb downtrending, patient  with worsening back pain, on eliquis and plavix as outpatient. Pt with  NICO and allergy to contrast..     Comparison: CT chest 5/18/2024 and prior CT abdomen pelvis 1/7/2021     Technique: Contiguous axial CT images were obtained from the lung bases  to the to the pubic symphysis without contrast.  Sagittal and coronal  reconstructions were performed.  Automated exposure control and  iterative reconstruction methods were used.          FINDINGS:     Lower Chest: Chronic interstitial changes, mild bronchiectasis and small  dependent pleural effusions, prominent paraseptal markings appear  grossly stable from recent CT chest     No free air is noted below the diaphragm.     Organs: Gallbladder is not well visualized. Limited noncontrast imaging  of the liver, spleen, pancreas and adrenal glands are grossly  unremarkable in appearance. Perinephric stranding is noted of the  kidneys bilaterally. Low-attenuation lesions of the kidneys again noted  indeterminate but likely representing cysts. No hydronephrosis noted.     GI/Bowel: Stomach is grossly unremarkable on limited imaging.  Postsurgical changes of neobladder, small bowel conduit formation noted  within the lower abdomen with an  ostomy extending to the right mid  abdomen. Small bowel demonstrates no acute abnormality. No suspicious  mesenteric adenopathy or fluid collection noted. Ileocecal valve is  grossly unremarkable in appearance. The colon demonstrates a moderate  stool burden. There is diverticulosis without evidence of acute  diverticulitis     Pelvis: Patient appears to be status post prostatectomy and cystectomy.  Postsurgical changes from a neobladder construction noted.     Peritoneum/Retroperitoneum: Atherosclerotic changes are noted of the  aorta with partial calcification thrombus or chronic dissection  unchanged from the prior exam on limited noncontrast imaging.  Atherosclerotic changes extend into the iliac vessels. There is no  suspicious retroperitoneal adenopathy and no evidence of retroperitoneal  hematoma.     Bones/Soft Tissues: No acute osseous abnormality noted. Multilevel  degenerative changes noted of the spine      Impression:        1. No evidence of retroperitoneal hemorrhage.     2. Stable appearance given differences in technique of changes within  the lung bases suggesting pulmonary edema. Superimposed pneumonia not  excluded.     3. Indeterminate lesions within the kidneys bilaterally statistically  likely represent cysts. This can always be followed with renal  ultrasound on a nonemergent basis if clinically indicated.     4. Chronic changes within the abdomen and pelvis without evidence of  acute abnormality              Electronically Signed By-Saleem Lui On:5/19/2024 10:56 AM       CT Chest Without Contrast Diagnostic [[id number]] Qamar as Reviewed   Order Status: Completed Collected: 05/18/24 1525    Updated: 05/18/24 1532   Narrative:     CT CHEST WO CONTRAST DIAGNOSTIC-     Date of Exam: 5/18/2024 3:00 PM     Indication: SHortness of breath; Abnormal CXR.     Comparison: None available.     Technique: Axial CT images were obtained of the chest without contrast  administration.  Reconstructed  coronal and sagittal images were also  obtained. Automated exposure control and iterative construction methods  were used.        Findings:  Urszula/mediastinum: No adenopathy. 4.6 cm dilatation of the ascending  thoracic aorta. Coronary artery calcification, status post bypass. No  pericardial effusion. Cardiomegaly     Lungs/pleura: Small bilateral pleural effusions. Interlobular septal  thickening most notable in the lung apices and the lung bases. Bilateral  mixed groundglass and interstitial opacities with possible mild  bronchiectasis, more notable in the upper lung zones.     Upper abdomen: No acute abnormality. Bilateral renal cysts     Bones/soft tissues: No acute bony abnormality      Impression:     Impression:     1. Combination of interlobular septal thickening and small bilateral  pleural effusions is most compatible with the presence of interstitial  pulmonary edema  2. Bilateral mixed groundglass/interstitial infiltrates with mild  bronchiectasis that is greater in the upper lung zones is suspicious for  concurrent pneumonia, including possible viral/atypical  3. 4.6 cm dilatation of the ascending thoracic aorta           Electronically Signed ByAdams Doe On:5/18/2024 3:30 PM      CT Abdomen Pelvis Without Contrast [[id number]] Review Not Required   Order Status: Completed Collected: 05/18/24 1115    Updated: 05/19/24 0832   Narrative:       CT SCAN OF THE ABDOMEN AND PELVIS WITHOUT CONTRAST   5/18/2024 12:05 AM    HISTORY: Right flank pain, kidney stone suspected    COMPARISON: October 2019.    PROCEDURE: Axial images were obtained from the lung bases to the pubic  symphysis by computed tomography. This study was performed with  techniques to keep radiation doses as low as reasonably achievable,  (ALARA). Individualized dose reduction techniques using automated  exposure control or adjustment of mA and/or kV according to the patient  size were employed.      FINDINGS:    ABDOMEN: The lung bases  show small bilateral pleural effusions. There is  extensive bibasilar pulmonary fibrosis. The heart size is normal. The  limited non-contrast images of the liver parenchyma are homogeneous.  There is a small amount of ascites surrounding the liver. The  gallbladder is not clearly seen. The spleen is normal. No adrenal masses  are seen.  The aorta is normal in caliber.   There is no nephrolithiasis  or hydronephrosis. There is a benign-appearing cyst in the periphery of  the right kidney measuring 4.1 x 2.6 cm. A right anterior abdominal wall  ostomy is present. There is colonic diverticulosis.    PELVIS: The appendix is not identified. The urinary bladder is  surgically absent with ileal conduit creation. There is no significant  pelvic free fluid or adenopathy.   Impression:     1. No nephrolithiasis identified.  2. Benign-appearing cyst in the periphery of the right kidney.  3. Small amount of ascites surrounding the liver.            Images reviewed, interpreted, and dictated by Dr. Ángel Lui.  Transcribed by Tamra Mitchell PA-C.   XR chest AP portable [[id number]] Review Not Required   Order Status: Completed Collected: 05/18/24 0048    Updated: 05/19/24 0832   Narrative:     EXAMINATION TECHNIQUE:  XR CHEST AP PORTABLE    CLINICAL HISTORY:  weakness    COMPARISON:  3/10/2024    FINDINGS:  Median sternotomy and CABG. Left chest wall implanted cardiac device.  Increased diffuse bilateral pulmonary interstitial opacities. Possible  small pleural effusions. No pneumothorax. Cardiomegaly.   Impression:     Lung findings are most compatible with cardiogenic pulmonary edema/CHF.            Images personally reviewed, interpreted and dictated by DONALD Perez M.D.     LAB RESULTS:      Lab 05/21/24  0436 05/20/24  0439 05/19/24  0444 05/18/24  0821   WBC 8.11 6.89 6.00 7.80   HEMOGLOBIN 9.3* 9.0* 8.3* 9.2*   HEMATOCRIT 29.9* 28.6* 26.3* 27.9*   PLATELETS 238 221 191 185   NEUTROS ABS  --   --   --  6.38    IMMATURE GRANS (ABS)  --   --   --  0.05   LYMPHS ABS  --   --   --  0.64*   MONOS ABS  --   --   --  0.56   EOS ABS  --   --   --  0.15   MCV 85.4 85.4 85.1 84.0         Lab 05/21/24  0436 05/20/24  0439 05/19/24  0444 05/18/24  1330 05/18/24  0821   SODIUM 131* 129* 125* 122* 122*   POTASSIUM 4.1 4.2 4.1 3.8 3.7   CHLORIDE 99 96* 94* 91* 90*   CO2 21.3* 22.7 20.2* 21.1* 19.5*   ANION GAP 10.7 10.3 10.8 9.9 12.5   BUN 37* 38* 39* 38* 38*   CREATININE 1.18 1.32* 1.22 1.15 1.33*   EGFR 60.8 53.2* 58.5* 62.8 52.7*   GLUCOSE 133* 122* 104* 122* 123*   CALCIUM 9.3 9.1 9.0 8.9 8.7   MAGNESIUM 1.8 2.0 1.9  --  1.7   PHOSPHORUS 3.3 3.1 3.1  --  3.6   TSH  --   --  2.780  --   --          Lab 05/19/24  0444 05/18/24  0821   TOTAL PROTEIN 6.4 6.8   ALBUMIN 3.1* 3.3*   GLOBULIN 3.3 3.5   ALT (SGPT) 11 15   AST (SGOT) 11 15   BILIRUBIN 0.3 0.4   ALK PHOS 70 79                 Lab 05/21/24 0436 05/20/24 0439   IRON  --  33*   IRON SATURATION (TSAT)  --  13*   TIBC  --  264*   TRANSFERRIN  --  177*   FERRITIN 279.10  --          Brief Urine Lab Results  (Last result in the past 365 days)        Color   Clarity   Blood   Leuk Est   Nitrite   Protein   CREAT   Urine HCG        05/20/24 1835             44.0               Microbiology Results (last 10 days)       Procedure Component Value - Date/Time    Urine Culture - Urine, Straight Cath [[id number]]  (Abnormal)  (Susceptibility) Collected: 05/18/24 1631    Lab Status: Final result Specimen: Urine from Straight Cath Updated: 05/21/24 0947     Urine Culture >100,000 CFU/mL Staphylococcus aureus     Comment:   Except in cases with genitourinary instrumentation, Staphylococcus aureus bacteriuria is associated with S. aureus bacteremia. Blood cultures are recommended.       Narrative:      Colonization of the urinary tract without infection is common. Treatment is discouraged unless the patient is symptomatic, pregnant, or undergoing an invasive urologic procedure.     Susceptibility        Staphylococcus aureus      JOHN      Nitrofurantoin Susceptible      Oxacillin Susceptible      Trimethoprim + Sulfamethoxazole Susceptible      Vancomycin Susceptible                       Susceptibility Comments       Staphylococcus aureus    This isolate is presumed to be clindamycin resistant based on detection of inducible clindamycin resistance.  Clindamycin may still be effective in some patients.               Respiratory Panel PCR w/COVID-19(SARS-CoV-2) [facility]/DARCY/IZZY/PAD/COR/PETRA In-House, NP Swab in UTM/VTM, 2 HR TAT - Swab, Nasopharynx [[id number]]  (Normal) Collected: 05/18/24 1630    Lab Status: Final result Specimen: Swab from Nasopharynx Updated: 05/18/24 8163     ADENOVIRUS, PCR Not Detected     Coronavirus 229E Not Detected     Coronavirus HKU1 Not Detected     Coronavirus NL63 Not Detected     Coronavirus OC43 Not Detected     COVID19 Not Detected     Human Metapneumovirus Not Detected     Human Rhinovirus/Enterovirus Not Detected     Influenza A PCR Not Detected     Influenza B PCR Not Detected     Parainfluenza Virus 1 Not Detected     Parainfluenza Virus 2 Not Detected     Parainfluenza Virus 3 Not Detected     Parainfluenza Virus 4 Not Detected     RSV, PCR Not Detected     Bordetella pertussis pcr Not Detected     Bordetella parapertussis PCR Not Detected     Chlamydophila pneumoniae PCR Not Detected     Mycoplasma pneumo by PCR Not Detected    Narrative:      In the setting of a positive respiratory panel with a viral infection PLUS a negative procalcitonin without other underlying concern for bacterial infection, consider observing off antibiotics or discontinuation of antibiotics and continue supportive care. If the respiratory panel is positive for atypical bacterial infection (Bordetella pertussis, Chlamydophila pneumoniae, or Mycoplasma pneumoniae), consider antibiotic de-escalation to target atypical bacterial infection.                      Results for orders placed in  visit on 08/18/21    Echocardiogram Scan      Labs Pending at Discharge:  Pending Labs       Order Current Status    Immunofixation, Serum In process    Immunoglobulin Free LT Chains Blood In process            Time spent on Discharge including face to face service:  31 minutes    Electronically signed by Baldev Dawn MD, 05/21/24, 10:45 AM EDT.

## 2024-05-21 NOTE — OUTREACH NOTE
Prep Survey      Flowsheet Row Responses   [facility] patient discharged from? [facility]   Is LACE score < 7 ? No   Eligibility [facility]   Date of Admission 05/18/24   Date of Discharge 05/21/24   Discharge Disposition Home or Self Care   Discharge diagnosis Hyponatremia   Does the patient have one of the following disease processes/diagnoses(primary or secondary)? CHF   Does the patient have Home health ordered? No   Is there a DME ordered? No   Comments regarding appointments Ambulatory Referral to Nephrology   Medication alerts for this patient see AVS   Prep survey completed? Yes            Meaghan DAY - Registered Nurse

## 2024-05-21 NOTE — PLAN OF CARE
Goal Outcome Evaluation:      A&O x4. VSS. No complaints from patient on shift. All needs met at this time. Call light within reach. Plan of care continue.

## 2024-05-21 NOTE — PLAN OF CARE
Problem: Adult Inpatient Plan of Care  Goal: Plan of Care Review  Outcome: Adequate for Care Transition  Goal: Patient-Specific Goal (Individualized)  Outcome: Adequate for Care Transition  Goal: Absence of Hospital-Acquired Illness or Injury  Outcome: Adequate for Care Transition  Intervention: Identify and Manage Fall Risk  Intervention: Prevent and Manage VTE (Venous Thromboembolism) Risk  Intervention: Prevent Infection  Goal: Optimal Comfort and Wellbeing  Outcome: Adequate for Care Transition  Intervention: Provide Person-Centered Care  Goal: Readiness for Transition of Care  Outcome: Adequate for Care Transition     Problem: Pain Acute  Goal: Acceptable Pain Control and Functional Ability  Outcome: Adequate for Care Transition  Intervention: Optimize Psychosocial Wellbeing   Goal Outcome Evaluation:

## 2024-05-22 ENCOUNTER — TRANSITIONAL CARE MANAGEMENT TELEPHONE ENCOUNTER (OUTPATIENT)
Dept: CALL CENTER | Facility: HOSPITAL | Age: 84
End: 2024-05-22
Payer: MEDICARE

## 2024-05-22 NOTE — OUTREACH NOTE
"Call Center TCM Note      Flowsheet Row Responses   [facility] patient discharged from? [facility]   Does the patient have one of the following disease processes/diagnoses(primary or secondary)? CHF   TCM attempt successful? Yes  [VR for Marcello Larson JR and Ailin Becker dtr]   Call start time 0900   Call end time 0907   Discharge diagnosis Hyponatremia   Medication alerts for this patient ELIQUIS--decreased to 2.5mg bid   Meds reviewed with patient/caregiver? Yes   Is the patient having any side effects they believe may be caused by any medication additions or changes? No   Does the patient have all medications ordered at discharge? Yes   Prescription comments Pt reports he cancelled prescription for Eliquis at pharmacy, reports it was not needed as not a medication change. This RN reviewed that it was decreased dose for him and reviewed MD note at discharge.  Pt had been taking 5mg po BID but decreased to 2.5mg bid due to \"patient's age and weight.\"  Pt declines to  prescription at pharmacy and reports he will use medication he has at home and is aware of need to decrease dose.  This RN called pharmacy to determine if pt had filled prior prescription for a 2.5 or 5mg Eliquis and pharmacy tech reports they have prescriptions on file but pt has never picked up. Will forward information to PCP office for f/u.   Is the patient taking all medications as directed (includes completed medication regime)? Yes   Comments Hospital f/u on 5/23/24@1030am   Does the patient have an appointment with their PCP within 7-14 days of discharge? Yes   Has home health visited the patient within 72 hours of discharge? N/A   Pulse Ox monitoring None   Psychosocial issues? No   Did the patient receive a copy of their discharge instructions? Yes   Nursing interventions Reviewed instructions with patient   What is the patient's perception of their health status since discharge? Improving  [Pt reports he is feeling better " today and offers no concerns. Aware to monitor for s/s edema. Pt denies concerns with bleeding, discussed eliquis as noted.  Pt has no questions for this RN today.  Appt confirmed.]   Nursing interventions Nurse provided patient education   Is the patient/caregiver able to teach back the hierarchy of who to call/visit for symptoms/problems? PCP, Specialist, Home health nurse, Urgent Care, ED, 911 Yes   TCM call completed? Yes   Call end time 0907             Terese De Leon RN    5/22/2024, 09:25 EDT

## 2024-05-23 ENCOUNTER — OFFICE VISIT (OUTPATIENT)
Dept: FAMILY MEDICINE CLINIC | Age: 84
End: 2024-05-23
Payer: MEDICARE

## 2024-05-23 VITALS
BODY MASS INDEX: 21.82 KG/M2 | TEMPERATURE: 98.1 F | HEIGHT: 67 IN | WEIGHT: 139 LBS | HEART RATE: 62 BPM | DIASTOLIC BLOOD PRESSURE: 48 MMHG | OXYGEN SATURATION: 98 % | SYSTOLIC BLOOD PRESSURE: 124 MMHG

## 2024-05-23 DIAGNOSIS — N18.31 STAGE 3A CHRONIC KIDNEY DISEASE: ICD-10-CM

## 2024-05-23 DIAGNOSIS — E87.1 HYPONATREMIA: Primary | ICD-10-CM

## 2024-05-23 PROCEDURE — 1111F DSCHRG MED/CURRENT MED MERGE: CPT | Performed by: FAMILY MEDICINE

## 2024-05-23 PROCEDURE — 99495 TRANSJ CARE MGMT MOD F2F 14D: CPT | Performed by: FAMILY MEDICINE

## 2024-05-23 PROCEDURE — 3078F DIAST BP <80 MM HG: CPT | Performed by: FAMILY MEDICINE

## 2024-05-23 PROCEDURE — 1159F MED LIST DOCD IN RCRD: CPT | Performed by: FAMILY MEDICINE

## 2024-05-23 PROCEDURE — 3074F SYST BP LT 130 MM HG: CPT | Performed by: FAMILY MEDICINE

## 2024-05-23 PROCEDURE — 1160F RVW MEDS BY RX/DR IN RCRD: CPT | Performed by: FAMILY MEDICINE

## 2024-05-23 NOTE — PROGRESS NOTES
Transitional Care Follow Up Visit  Subjective     Marcello Larson is a 84 y.o. male who presents for a transitional care management visit.    Within 48 business hours after discharge our office contacted him via telephone to coordinate his care and needs.      I reviewed and discussed the details of that call along with the discharge summary, hospital problems, inpatient lab results, inpatient diagnostic studies, and consultation reports with Marcello.     Current outpatient and discharge medications have been reconciled for the patient.  Reviewed by: Zeny Corley MD          5/21/2024     4:18 PM   Date of TCM Phone Call   Hospital [facility]   Date of Admission 5/18/2024   Date of Discharge 5/21/2024   Discharge Disposition Home or Self Care     Risk for Readmission (LACE) Score: 12 (5/21/2024  6:00 AM)      History of Present Illness   Course During Hospital Stay:  Marcello is here today for transition of care appointment following admission to [facility] from 5/18/2024 to 5/21/2024 for diagnosis of hyponatremia.    He presented to the [facility] ED initially with complaint of generalized weakness and was transferred to [facility] for further evaluation.  He was seen by Nephrology and sodium level improved.  Hemoglobin was low and decreasing.  He was found to have iron deficiency and received iron infusion x 2 while admitted.  He was discharged on p.o. iron supplementation.  His apixaban was decreased to 2.5 mg twice daily.  He was instructed to take Lasix 40 mg daily at the time of discharge but home losartan was discontinued.  He was instructed to follow-up with Nephrology as an outpatient.  Of note, we had been trending his creatinine level and sodium both as they had taken a dip on his last labs at the end of April.  He had a brief decrease in in both kidney function and sodium almost a year ago, but those values had normalized in the interim until his most recent  "outpatient labs.    Today, he is feeling much better.  \"I feel good today.\"  He was feeling pretty tired yesterday, but that is rapidly improving.  He is urinating fine.  No CP or SOB.       The following portions of the patient's history were reviewed and updated as appropriate: allergies, current medications, past family history, past medical history, past social history, past surgical history, and problem list.    Review of Systems   Constitutional:  Positive for fatigue. Negative for chills and fever.   Respiratory:  Negative for cough and shortness of breath.    Cardiovascular:  Negative for chest pain and palpitations.   Gastrointestinal:  Negative for abdominal pain, constipation, diarrhea, nausea and vomiting.   Musculoskeletal:  Positive for back pain. Negative for arthralgias and myalgias.   Neurological:  Positive for weakness (generalized). Negative for numbness.         Current Outpatient Medications:     apixaban (ELIQUIS) 2.5 MG tablet tablet, Take 1 tablet by mouth 2 (Two) Times a Day for 30 days. Indications: Atrial Fibrillation, Disp: 60 tablet, Rfl: 0    carvedilol (COREG) 12.5 MG tablet, Take 1 tablet by mouth 2 (Two) Times a Day., Disp: 180 tablet, Rfl: 3    clopidogrel (PLAVIX) 75 MG tablet, Take 1 tablet by mouth Daily., Disp: , Rfl:     Diclofenac Sodium (VOLTAREN) 1 % gel gel, APPLY TO THE AFFECTED AREA(S) TWICE DAILY (Patient taking differently: Apply 4 g topically to the appropriate area as directed 2 (Two) Times a Day.), Disp: 100 g, Rfl: 3    ferrous sulfate 325 (65 FE) MG tablet, Take 1 tablet by mouth 2 (Two) Times a Day., Disp: , Rfl:     fexofenadine (ALLEGRA) 180 MG tablet, Take 1 tablet by mouth Daily., Disp: , Rfl:     fluticasone (FLONASE) 50 MCG/ACT nasal spray, 1 spray into the nostril(s) as directed by provider Daily., Disp: 48 g, Rfl: 3    furosemide (LASIX) 40 MG tablet, Take 1 tablet by mouth Daily., Disp: , Rfl:     HYDROcodone-acetaminophen (NORCO) 5-325 MG per tablet, " "Take 1 tablet by mouth Daily., Disp: , Rfl:     lovastatin (MEVACOR) 40 MG tablet, Take 1 tablet by mouth Every Evening., Disp: 90 tablet, Rfl: 1    metFORMIN (GLUCOPHAGE) 500 MG tablet, Take 1 tablet by mouth Daily., Disp: 90 tablet, Rfl: 1    montelukast (SINGULAIR) 10 MG tablet, Take 1 tablet by mouth Every Night., Disp: 90 tablet, Rfl: 3    nitroglycerin (NITROSTAT) 0.4 MG SL tablet, DISSOLVE 1 TABLET UNDER THE TONGUE EVERY 5 MINUTES AS NEEDED FOR CHEST PAIN. DO NOT EXCEED A TOTAL OF 3 DOSES IN 15 MINUTES. IF NO RELIEF, CALL 911 (Patient taking differently: Place 1 tablet under the tongue Every 5 (Five) Minutes As Needed for Chest Pain.), Disp: 25 tablet, Rfl: 0    pantoprazole (PROTONIX) 40 MG EC tablet, Take 1 tablet by mouth Daily., Disp: 90 tablet, Rfl: 1    potassium chloride (MICRO-K) 10 MEQ CR capsule, Take 1 capsule by mouth Daily., Disp: , Rfl:     prochlorperazine (COMPAZINE) 10 MG tablet, Take 1 tablet by mouth Every 8 (Eight) Hours As Needed for Nausea or Vomiting., Disp: 24 tablet, Rfl: 0    sucralfate (CARAFATE) 1 g tablet, TAKE 1 TABLET BY MOUTH FOUR TIMES DAILY (Patient taking differently: Take 1 tablet by mouth 4 (Four) Times a Day.), Disp: 120 tablet, Rfl: 5    vitamin B-6 (PYRIDOXINE) 100 MG tablet, Take 1 tablet by mouth Daily., Disp: , Rfl:     vitamin C (ASCORBIC ACID) 500 MG tablet, Take 1 tablet by mouth Daily., Disp: , Rfl:   There are no discontinued medications.      Objective   Vitals:    05/23/24 1034   BP: 124/48   Pulse: 62   Temp: 98.1 °F (36.7 °C)   TempSrc: Oral   SpO2: 98%  Comment: room air   Weight: 63 kg (139 lb)   Height: 170.2 cm (67.01\")     Body mass index is 21.77 kg/m².  BMI is within normal parameters. No other follow-up for BMI required.    Physical Exam  Vitals reviewed.   Constitutional:       General: He is not in acute distress.     Appearance: Normal appearance. He is well-developed.   HENT:      Head: Normocephalic and atraumatic.      Right Ear: External ear " normal.      Left Ear: External ear normal.   Eyes:      Extraocular Movements: Extraocular movements intact.      Conjunctiva/sclera: Conjunctivae normal.      Pupils: Pupils are equal, round, and reactive to light.   Cardiovascular:      Rate and Rhythm: Normal rate. Rhythm irregularly irregular.      Heart sounds: No murmur heard.  Pulmonary:      Effort: Pulmonary effort is normal.      Breath sounds: Normal breath sounds. No wheezing, rhonchi or rales.   Abdominal:      General: Bowel sounds are normal. There is no distension.      Palpations: Abdomen is soft.      Tenderness: There is no abdominal tenderness.   Genitourinary:     Comments: Urostomy bag in place filled with clear urine, stoma appears normal  Musculoskeletal:         General: Normal range of motion.      Comments: +L BKA prosthesis   Skin:     General: Skin is warm and dry.   Neurological:      Mental Status: He is alert.   Psychiatric:         Mood and Affect: Affect normal.         Lab Results   Component Value Date    GLUCOSE 133 (H) 05/21/2024    BUN 37 (H) 05/21/2024    CREATININE 1.18 05/21/2024    EGFR 60.8 05/21/2024    BCR 31.4 (H) 05/21/2024    K 4.1 05/21/2024    CO2 21.3 (L) 05/21/2024    CALCIUM 9.3 05/21/2024    ALBUMIN 3.1 (L) 05/19/2024    BILITOT 0.3 05/19/2024    AST 11 05/19/2024    ALT 11 05/19/2024       Lab Results   Component Value Date    CHOL 102 04/30/2024    CHLPL 129 02/20/2021    TRIG 77 04/30/2024    HDL 31 (L) 04/30/2024    LDL 55 04/30/2024       Lab Results   Component Value Date    WBC 8.11 05/21/2024    HGB 9.3 (L) 05/21/2024    HCT 29.9 (L) 05/21/2024    MCV 85.4 05/21/2024     05/21/2024         Assessment & Plan   Assessment & Plan  Hyponatremia  Marcello has now had 2 discrete spells of hyponatremia and associated decreased kidney function over the past year.  Especially given that this most recent episode took him to the hospital, it is time to get him in with the nephrologist as an outpatient.  He  was evaluated by Nephro while admitted.  Referral placed today to Dr. Espinoza as Marcello would like to stay in [place].  Stage 3a chronic kidney disease  As per hyponatremia plan.  Of note, his creatinine had corrected back to normal by the end of his discharge, but his creatinine has been bouncing between normal range and 1.43 over the past year, so Nephrology recommendations would be helpful.    Orders Placed This Encounter   Procedures    Ambulatory Referral to Nephrology

## 2024-05-23 NOTE — ASSESSMENT & PLAN NOTE
Marcello has now had 2 discrete spells of hyponatremia and associated decreased kidney function over the past year.  Especially given that this most recent episode took him to the hospital, it is time to get him in with the nephrologist as an outpatient.  He was evaluated by Nephro while admitted.  Referral placed today to Dr. Espinoza as Marcello would like to stay in [place].

## 2024-05-29 ENCOUNTER — OFFICE VISIT (OUTPATIENT)
Dept: CARDIOLOGY | Facility: CLINIC | Age: 84
End: 2024-05-29
Payer: MEDICARE

## 2024-05-29 VITALS
SYSTOLIC BLOOD PRESSURE: 128 MMHG | BODY MASS INDEX: 21.03 KG/M2 | WEIGHT: 134 LBS | DIASTOLIC BLOOD PRESSURE: 62 MMHG | HEART RATE: 68 BPM | OXYGEN SATURATION: 92 % | HEIGHT: 67 IN

## 2024-05-29 DIAGNOSIS — I48.21 PERMANENT ATRIAL FIBRILLATION: ICD-10-CM

## 2024-05-29 DIAGNOSIS — I50.42 CHRONIC COMBINED SYSTOLIC AND DIASTOLIC CONGESTIVE HEART FAILURE: Primary | ICD-10-CM

## 2024-05-29 DIAGNOSIS — Z95.1 HX OF CABG: Chronic | ICD-10-CM

## 2024-05-29 DIAGNOSIS — I25.10 CORONARY ARTERY DISEASE INVOLVING NATIVE CORONARY ARTERY OF NATIVE HEART WITHOUT ANGINA PECTORIS: ICD-10-CM

## 2024-05-29 PROCEDURE — 3074F SYST BP LT 130 MM HG: CPT | Performed by: STUDENT IN AN ORGANIZED HEALTH CARE EDUCATION/TRAINING PROGRAM

## 2024-05-29 PROCEDURE — 1159F MED LIST DOCD IN RCRD: CPT | Performed by: STUDENT IN AN ORGANIZED HEALTH CARE EDUCATION/TRAINING PROGRAM

## 2024-05-29 PROCEDURE — 99214 OFFICE O/P EST MOD 30 MIN: CPT | Performed by: STUDENT IN AN ORGANIZED HEALTH CARE EDUCATION/TRAINING PROGRAM

## 2024-05-29 PROCEDURE — 1160F RVW MEDS BY RX/DR IN RCRD: CPT | Performed by: STUDENT IN AN ORGANIZED HEALTH CARE EDUCATION/TRAINING PROGRAM

## 2024-05-29 PROCEDURE — 3078F DIAST BP <80 MM HG: CPT | Performed by: STUDENT IN AN ORGANIZED HEALTH CARE EDUCATION/TRAINING PROGRAM

## 2024-05-29 NOTE — PROGRESS NOTES
Subjective:     Encounter Date:05/29/2024      Patient ID: Marcello Larson is a 84 y.o. male.    Chief Complaint:  Management of HFrEF, CAD    HPI:   84 y.o. male with CAD status post CABG, HFrEF secondary to ischemic cardiomyopathy (EF 30 to 35%) status post ICD, permanent atrial fibrillation on Eliquis, hypertension, hyperlipidemia who presents for follow-up and management of his chronic conditions.  Since his last visit, patient was admitted to [facility].  This was a couple of weeks ago for significant hyponatremia.  He received IV diuresis as well as fluid restriction and his hyponatremia improved.  His apixaban was decreased to 2.5 mg daily.  He has since felt well.    Echo 2/22/2022 with an EF 30 to 35%, grade 3 diastolic dysfunction, moderately dilated LA, mildly dilated RA.        The following portions of the patient's history were reviewed and updated as appropriate: allergies, current medications, past family history, past medical history, past social history, past surgical history and problem list.     REVIEW OF SYSTEMS:   All systems reviewed.  Pertinent positives identified in HPI.  All other systems are negative.    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  LV systolic function is moderate to severely reduced.  EF 30-35%.  The transmittal spectral doppler flow pattern is suggestive of pseudonormalization.  There is moderate to severe global hypokinesis of the LV.  The left atrium is mildly dilated.  The mitral leaflets appear thickened, but open well.  Mild MR and AR.    Hx of CABG 2019 - left internal mammary graft to the LAD, SVG to OM1, OM2 in the RCA    Hyperlipidemia 2019    Hypo-osmolality and hyponatremia     Low back pain     Myocardial infarct     Other long term (current) drug therapy     Palpitations 2019    Personal history of malignant neoplasm of bladder     Sleep apnea 2019    Stented coronary artery 2019 - VISION bare metal stent to the proximal stenosis of the vein graft to the right and ISION bare metal stent in the mid to distal 80% stenosis.    Type 2 diabetes mellitus without complication     Unstable angina        Family History   Problem Relation Age of Onset    No Known Problems Mother     No Known Problems Father        Social History     Socioeconomic History    Marital status:     Number of children: 2   Tobacco Use    Smoking status: Former     Current packs/day: 0.00     Average packs/day: 1 pack/day for 24.0 years (24.0 ttl pk-yrs)     Types: Cigarettes     Start date: 1960     Quit date: 1984     Years since quittin.4     Passive exposure: Past    Smokeless tobacco: Never    Tobacco comments:     Have not smoked since    Vaping Use    Vaping status: Never Used   Substance and Sexual Activity    Alcohol use: Never    Drug use: Never    Sexual activity: Not Currently     Partners: Female       Allergies   Allergen Reactions    Iodinated Contrast Media Anaphylaxis    Lisinopril Other (See Comments)    Prednisone Cough       Past Surgical History:   Procedure Laterality Date    AMPUTATION Left     LOWER EXTREMITY BKA     APPENDECTOMY      CARDIAC CATHETERIZATION      CARDIAC CATHETERIZATION N/A 10/13/2020    Procedure: Left Heart Cath;  Surgeon: Kofi Campoverde MD;  Location:  [facility];  Service: Cardiology;  Laterality: N/A;    CARDIAC CATHETERIZATION N/A 10/13/2020    Procedure: Coronary angiography;  Surgeon: Kofi Campoverde MD;  Location:  [facility];  Service: Cardiology;  Laterality: N/A;    CARDIAC CATHETERIZATION N/A 10/13/2020    Procedure: Saphenous Vein Graft;  Surgeon: Kofi Campoverde MD;  Location:  [facility];  Service: Cardiology;  Laterality: N/A;    CARDIAC DEFIBRILLATOR PLACEMENT      CARDIAC ELECTROPHYSIOLOGY PROCEDURE N/A 04/04/2022    Procedure: ICD DC new/SJM;  Surgeon: Mamadou Terry MD;  Location:  [facility];  Service: Cardiology;  Laterality: N/A;    CARDIAC VALVE REPLACEMENT      CAROTID STENT      CATARACT EXTRACTION      CHOLECYSTECTOMY      CORONARY ARTERY BYPASS GRAFT      1984, 1991 4 VESSEL    CORONARY STENT PLACEMENT      INSERT / REPLACE / REMOVE PACEMAKER      INTERNAL CARDIAC DEFIBRILLATOR INSERTION  04/2022    OTHER SURGICAL HISTORY      UROSTOMY S/P BLADDER CA       Procedures       Objective:         Vitals:    05/29/24 1049   BP: 128/62   Pulse: 68   SpO2: 92%         PHYSICAL EXAM:  GEN: well appearing, in NAD   HEENT: NCAT, EOMI, moist mucus membranes   Respiratory: CTAB, no wheezes, rales or rhonchi  CV: normal rate, regular rhythm, normal S1, S2, no murmurs, rubs, gallops, +2 radial pulses b/l  GI: soft, nontender, nondistended  MSK: Left leg BKA, no edema of right leg  Skin: no rash, warm, dry  Heme/Lymph: no bruising or bleeding  Psych: organized thought, normal behavior and affect  Neuro: Alert and Oriented x 3, grossly normal motor function        Assessment:         (I50.42) Chronic combined systolic and diastolic congestive heart failure    (I25.10) Coronary artery disease involving native coronary  artery of native heart without angina pectoris    (Z95.1) Hx of CABG    (I48.21) Permanent atrial fibrillation    82 year old man with CAD status post CABG, HFrEF secondary to ischemic cardiomyopathy (EF 30 to 35%) status post ICD, permanent atrial fibrillation on Eliquis, hypertension, hyperlipidemia who presents for follow-up and management of his chronic conditions.       Plan:       #HFrEF  Well compensated.  Will uptitrate GDMT.  NYHA class I. Echo 2/22/2022 with EF 30 to 35%, grade 3 diastolic dysfunction, moderately dilated LA, mildly dilated RA.  - continue Coreg 12.5 mg twice daily, losartan 50 mg  - Continue Lasix 40 mg twice daily  - no Jardiance given cost    #CAD status post CABG  Patient without any symptoms of angina.  - continue plavix 75 mg daily, lovastatin 40 mg daily    #Permanent atrial fibrillation  [id number] 5  -Continue Coreg as above, Eliquis 2.5 mg twice daily    Dr Corley, thank you very much for referring this kind patient to me. Please call me with any questions or concerns. I will see the patient again in the office in 6 weeks or earlier as needed.         Andrew Castañeda MD  05/29/24  [facility]    Outpatient Encounter Medications as of 5/29/2024   Medication Sig Dispense Refill    carvedilol (COREG) 12.5 MG tablet Take 1 tablet by mouth 2 (Two) Times a Day. 180 tablet 3    clopidogrel (PLAVIX) 75 MG tablet Take 1 tablet by mouth Daily.      Diclofenac Sodium (VOLTAREN) 1 % gel gel APPLY TO THE AFFECTED AREA(S) TWICE DAILY (Patient taking differently: Apply 4 g topically to the appropriate area as directed 2 (Two) Times a Day.) 100 g 3    ferrous sulfate 325 (65 FE) MG tablet Take 1 tablet by mouth 2 (Two) Times a Day.      fexofenadine (ALLEGRA) 180 MG tablet Take 1 tablet by mouth Daily.      fluticasone (FLONASE) 50 MCG/ACT nasal spray 1 spray into the nostril(s) as directed by provider Daily. 48 g 3    furosemide (LASIX) 40 MG tablet Take 1 tablet by mouth Daily.       HYDROcodone-acetaminophen (NORCO) 5-325 MG per tablet Take 1 tablet by mouth Daily.      lovastatin (MEVACOR) 40 MG tablet Take 1 tablet by mouth Every Evening. 90 tablet 1    metFORMIN (GLUCOPHAGE) 500 MG tablet Take 1 tablet by mouth Daily. 90 tablet 1    montelukast (SINGULAIR) 10 MG tablet Take 1 tablet by mouth Every Night. 90 tablet 3    nitroglycerin (NITROSTAT) 0.4 MG SL tablet DISSOLVE 1 TABLET UNDER THE TONGUE EVERY 5 MINUTES AS NEEDED FOR CHEST PAIN. DO NOT EXCEED A TOTAL OF 3 DOSES IN 15 MINUTES. IF NO RELIEF, CALL 911 (Patient taking differently: Place 1 tablet under the tongue Every 5 (Five) Minutes As Needed for Chest Pain.) 25 tablet 0    pantoprazole (PROTONIX) 40 MG EC tablet Take 1 tablet by mouth Daily. 90 tablet 1    potassium chloride (MICRO-K) 10 MEQ CR capsule Take 1 capsule by mouth Daily.      prochlorperazine (COMPAZINE) 10 MG tablet Take 1 tablet by mouth Every 8 (Eight) Hours As Needed for Nausea or Vomiting. 24 tablet 0    sucralfate (CARAFATE) 1 g tablet TAKE 1 TABLET BY MOUTH FOUR TIMES DAILY (Patient taking differently: Take 1 tablet by mouth 4 (Four) Times a Day.) 120 tablet 5    vitamin B-6 (PYRIDOXINE) 100 MG tablet Take 1 tablet by mouth Daily.      vitamin C (ASCORBIC ACID) 500 MG tablet Take 1 tablet by mouth Daily.      [DISCONTINUED] apixaban (ELIQUIS) 2.5 MG tablet tablet Take 1 tablet by mouth 2 (Two) Times a Day for 30 days. Indications: Atrial Fibrillation 60 tablet 0     No facility-administered encounter medications on file as of 5/29/2024.

## 2024-06-02 DIAGNOSIS — E11.59 TYPE 2 DIABETES MELLITUS WITH OTHER CIRCULATORY COMPLICATIONS: ICD-10-CM

## 2024-06-04 RX ORDER — SUCRALFATE 1 G/1
TABLET ORAL
Qty: 120 TABLET | Refills: 5 | Status: SHIPPED | OUTPATIENT
Start: 2024-06-04

## 2024-06-06 DIAGNOSIS — L89.152 PRESSURE ULCER OF SACRAL REGION, STAGE 2: ICD-10-CM

## 2024-06-06 RX ORDER — SILVER SULFADIAZINE 1 %
CREAM (GRAM) TOPICAL
Qty: 50 G | Refills: 0 | Status: SHIPPED | OUTPATIENT
Start: 2024-06-06

## 2024-07-03 ENCOUNTER — OFFICE VISIT (OUTPATIENT)
Dept: UROLOGY | Facility: CLINIC | Age: 84
End: 2024-07-03
Payer: MEDICARE

## 2024-07-03 ENCOUNTER — PATIENT MESSAGE (OUTPATIENT)
Age: 84
End: 2024-07-03
Payer: MEDICARE

## 2024-07-03 VITALS
SYSTOLIC BLOOD PRESSURE: 126 MMHG | WEIGHT: 137 LBS | DIASTOLIC BLOOD PRESSURE: 56 MMHG | BODY MASS INDEX: 21.5 KG/M2 | HEIGHT: 67 IN | HEART RATE: 51 BPM

## 2024-07-03 DIAGNOSIS — R31.9 HEMATURIA, UNSPECIFIED TYPE: Primary | ICD-10-CM

## 2024-07-03 PROBLEM — E87.1 HYPONATREMIA: Status: RESOLVED | Noted: 2024-05-18 | Resolved: 2024-07-03

## 2024-07-03 PROBLEM — M54.50 ACUTE BILATERAL LOW BACK PAIN WITHOUT SCIATICA: Status: RESOLVED | Noted: 2022-09-20 | Resolved: 2024-07-03

## 2024-07-03 PROBLEM — F11.20 CONTINUOUS OPIOID DEPENDENCE: Status: ACTIVE | Noted: 2023-05-18

## 2024-07-03 LAB
BILIRUB BLD-MCNC: NEGATIVE MG/DL
CLARITY, POC: CLEAR
COLOR UR: YELLOW
EXPIRATION DATE: ABNORMAL
GLUCOSE UR STRIP-MCNC: NEGATIVE MG/DL
KETONES UR QL: NEGATIVE
LEUKOCYTE EST, POC: ABNORMAL
Lab: ABNORMAL
NITRITE UR-MCNC: NEGATIVE MG/ML
PH UR: 7 [PH] (ref 5–8)
PROT UR STRIP-MCNC: NEGATIVE MG/DL
RBC # UR STRIP: ABNORMAL /UL
SP GR UR: 1.01 (ref 1–1.03)
UROBILINOGEN UR QL: NORMAL

## 2024-07-03 RX ORDER — LOSARTAN POTASSIUM 50 MG/1
50 TABLET ORAL DAILY
COMMUNITY
Start: 2024-03-28

## 2024-07-03 RX ORDER — LISINOPRIL 10 MG/1
10 TABLET ORAL DAILY
COMMUNITY

## 2024-07-03 RX ORDER — ISOSORBIDE DINITRATE 30 MG/1
30 TABLET ORAL 4 TIMES DAILY
COMMUNITY

## 2024-07-03 RX ORDER — ALBUTEROL SULFATE 90 UG/1
1 AEROSOL, METERED RESPIRATORY (INHALATION)
COMMUNITY
Start: 2024-01-08

## 2024-07-03 NOTE — PROGRESS NOTES
"Chief Complaint: Follow-up (Hematuria, unspecified type, UTI. )    Subjective         History of Present Illness  Marcello Larson is a 84 y.o. male presents to [facility] to be seen for f/u recurrent UTI.    He was seen in an urgent care  in 5/2024 he is unaware if he was having symptoms.     Did get admitted \"He presented to the [facility] ED initially with complaint of generalized weakness and was transferred to [facility] for further evaluation.  He was seen by Nephrology and sodium level improved.  Hemoglobin was low and decreasing.  He was found to have iron deficiency and received iron infusion x 2 while admitted.  He was discharged on p.o. iron supplementation.  His apixaban was decreased to 2.5 mg twice daily.  He was instructed to take Lasix 40 mg daily at the time of discharge but home losartan was discontinued.  He was instructed to follow-up with Nephrology as an outpatient.\"    No GH     He states he is doing well at this time.       Previous:     The patient states that he has been having issues with UTIs.     He states Uti symptoms are usually just back pain.     He has had no positive urine cx since 1/2021.     Most recent urine cx was with >100,000 cfu/ml of mixed ibrahima.     He states that he was seen in 2007 by Dr. Rojas [facility]  for cystectomy for bladder cancer high grade papillary TCC.    He has not had any f/u with them since 2009.     The concern at present is that he has had some blood in his urine.     He has had 0-2 rbc/ HPF for at least the last 2 years.    He has no complaints today.     Urine cultures:   2/20/2023 greater than 100,000 colony-forming units per milliliter mixed urogenital ibrahima.    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  LV systolic function is moderate to severely reduced.  EF 30-35%.  The transmittal spectral doppler flow pattern is suggestive of pseudonormalization.  There is moderate to severe global hypokinesis of the LV.  The left atrium is mildly dilated.  The mitral leaflets appear thickened, but open well.  Mild MR and AR.    Hx of CABG 08/13/2019 01/01/1991 - left internal mammary graft to the LAD, SVG to OM1, OM2 in the RCA    Hyperlipidemia 08/13/2019    Hypo-osmolality and hyponatremia     Low back pain     Myocardial infarct     Other long term (current) drug therapy     Palpitations 08/13/2019    Personal history of malignant neoplasm of bladder     Sleep apnea 08/13/2019    Stented coronary artery 08/13/2019 04/11/2014 - VISION bare metal stent to the proximal stenosis of the vein graft to the right and ISION bare metal stent in the mid to distal 80% stenosis.    Type 2 diabetes mellitus without complication     Unstable angina        Past Surgical History:   Procedure Laterality Date    AMPUTATION Left     LOWER EXTREMITY BKA    APPENDECTOMY      CARDIAC CATHETERIZATION      CARDIAC CATHETERIZATION N/A 10/13/2020    Procedure: Left Heart Cath;  Surgeon: Kofi Campoverde MD;  Location: [facility] INVASIVE LOCATION;  Service: Cardiology;  Laterality: N/A;    CARDIAC CATHETERIZATION N/A 10/13/2020    Procedure: Coronary angiography;  Surgeon:  Kofi Campoverde MD;  Location:  [facility];  Service: Cardiology;  Laterality: N/A;    CARDIAC CATHETERIZATION N/A 10/13/2020    Procedure: Saphenous Vein Graft;  Surgeon: Kofi Campoverde MD;  Location:  [facility];  Service: Cardiology;  Laterality: N/A;    CARDIAC DEFIBRILLATOR PLACEMENT      CARDIAC ELECTROPHYSIOLOGY PROCEDURE N/A 04/04/2022    Procedure: ICD DC new/SJM;  Surgeon: Mamadou Terry MD;  Location:  [facility];  Service: Cardiology;  Laterality: N/A;    CARDIAC VALVE REPLACEMENT      CAROTID STENT      CATARACT EXTRACTION      CHOLECYSTECTOMY      CORONARY ARTERY BYPASS GRAFT      1984, 1991 4 VESSEL    CORONARY STENT PLACEMENT      INSERT / REPLACE / REMOVE PACEMAKER      INTERNAL CARDIAC DEFIBRILLATOR INSERTION  04/2022    OTHER SURGICAL HISTORY      UROSTOMY S/P BLADDER CA         Current Outpatient Medications:     albuterol sulfate  (90 Base) MCG/ACT inhaler, Inhale 1 puff., Disp: , Rfl:     carvedilol (COREG) 12.5 MG tablet, Take 1 tablet by mouth 2 (Two) Times a Day., Disp: 180 tablet, Rfl: 3    Diclofenac Sodium (VOLTAREN) 1 % gel gel, APPLY TO THE AFFECTED AREA(S) TWICE DAILY (Patient taking differently: Apply 4 g topically to the appropriate area as directed 2 (Two) Times a Day.), Disp: 100 g, Rfl: 3    ferrous sulfate 325 (65 FE) MG tablet, Take 1 tablet by mouth 2 (Two) Times a Day., Disp: , Rfl:     fexofenadine (ALLEGRA) 180 MG tablet, Take 1 tablet by mouth Daily., Disp: , Rfl:     fluticasone (FLONASE) 50 MCG/ACT nasal spray, 1 spray into the nostril(s) as directed by provider Daily., Disp: 48 g, Rfl: 3    furosemide (LASIX) 40 MG tablet, Take 1 tablet by mouth Daily., Disp: , Rfl:     HYDROcodone-acetaminophen (NORCO) 5-325 MG per tablet, Take 1 tablet by mouth Daily., Disp: , Rfl:     isosorbide dinitrate (ISORDIL) 30 MG tablet, Take 1 tablet by mouth 4 (Four) Times a Day., Disp: , Rfl:     lisinopril  (PRINIVIL,ZESTRIL) 10 MG tablet, Take 1 tablet by mouth Daily., Disp: , Rfl:     losartan (COZAAR) 50 MG tablet, Take 1 tablet by mouth Daily., Disp: , Rfl:     lovastatin (MEVACOR) 40 MG tablet, Take 1 tablet by mouth Every Evening., Disp: 90 tablet, Rfl: 1    metFORMIN (GLUCOPHAGE) 500 MG tablet, Take 1 tablet by mouth Daily., Disp: 90 tablet, Rfl: 1    montelukast (SINGULAIR) 10 MG tablet, Take 1 tablet by mouth Every Night., Disp: 90 tablet, Rfl: 3    nitroglycerin (NITROSTAT) 0.4 MG SL tablet, DISSOLVE 1 TABLET UNDER THE TONGUE EVERY 5 MINUTES AS NEEDED FOR CHEST PAIN. DO NOT EXCEED A TOTAL OF 3 DOSES IN 15 MINUTES. IF NO RELIEF, CALL 911 (Patient taking differently: Place 1 tablet under the tongue Every 5 (Five) Minutes As Needed for Chest Pain.), Disp: 25 tablet, Rfl: 0    pantoprazole (PROTONIX) 40 MG EC tablet, Take 1 tablet by mouth Daily., Disp: 90 tablet, Rfl: 1    potassium chloride (MICRO-K) 10 MEQ CR capsule, Take 1 capsule by mouth Daily., Disp: , Rfl:     prochlorperazine (COMPAZINE) 10 MG tablet, Take 1 tablet by mouth Every 8 (Eight) Hours As Needed for Nausea or Vomiting., Disp: 24 tablet, Rfl: 0    SSD 1 % cream, APPLY TO THE AFFECTED AREA(S) TWICE DAILY, Disp: 50 g, Rfl: 0    sucralfate (CARAFATE) 1 g tablet, TAKE 1 TABLET BY MOUTH FOUR TIMES DAILY, Disp: 120 tablet, Rfl: 5    vitamin B-6 (PYRIDOXINE) 100 MG tablet, Take 1 tablet by mouth Daily., Disp: , Rfl:     vitamin C (ASCORBIC ACID) 500 MG tablet, Take 1 tablet by mouth Daily., Disp: , Rfl:     Allergies   Allergen Reactions    Iodinated Contrast Media Anaphylaxis    Lisinopril Other (See Comments)    Prednisone Cough        Family History   Problem Relation Age of Onset    No Known Problems Mother     No Known Problems Father        Social History     Socioeconomic History    Marital status:     Number of children: 2   Tobacco Use    Smoking status: Former     Current packs/day: 0.00     Average packs/day: 1 pack/day for 24.0  "years (24.0 ttl pk-yrs)     Types: Cigarettes     Start date: 1960     Quit date: 1984     Years since quittin.5     Passive exposure: Past    Smokeless tobacco: Never    Tobacco comments:     Have not smoked since    Vaping Use    Vaping status: Never Used   Substance and Sexual Activity    Alcohol use: Never    Drug use: Never    Sexual activity: Not Currently     Partners: Female       Vital Signs:   /56 (BP Location: Left arm, Patient Position: Sitting, Cuff Size: Adult)   Pulse 51   Ht 170.2 cm (67\")   Wt 62.1 kg (137 lb)   BMI 21.46 kg/m²      Physical Exam     Result Review :   The following data was reviewed by: RONALD Dean on 2024:  Results for orders placed or performed in visit on 24   POC Urinalysis Dipstick, Automated    Specimen: Urine   Result Value Ref Range    Color Yellow Yellow, Straw, Dark Yellow, [place]    Clarity, UA Clear Clear    Specific Gravity  1.010 1.005 - 1.030    pH, Urine 7.0 5.0 - 8.0    Leukocytes Small (1+) (A) Negative    Nitrite, UA Negative Negative    Protein, POC Negative Negative mg/dL    Glucose, UA Negative Negative mg/dL    Ketones, UA Negative Negative    Urobilinogen, UA Normal Normal, 0.2 E.U./dL    Bilirubin Negative Negative    Blood, UA Trace (A) Negative    Lot Number [id number]     Expiration Date              Procedures        Assessment and Plan    Diagnoses and all orders for this visit:    1. Hematuria, unspecified type (Primary)  -     POC Urinalysis Dipstick, Automated      We will get records from urgent care visit.    We will follow-up in 6 months or sooner if needed.    He will call the office with any signs or symptoms of UTI for an outpatient culture.    I spent 15 minutes caring for Marcello on this date of service. This time includes time spent by me in the following activities:reviewing tests, obtaining and/or reviewing a separately obtained history, performing a medically appropriate examination " and/or evaluation , counseling and educating the patient/family/caregiver, ordering medications, tests, or procedures, and documenting information in the medical record  Follow Up   Return in about 6 months (around 1/3/2025) for f/u UTi .  Patient was given instructions and counseling regarding his condition or for health maintenance advice. Please see specific information pulled into the AVS if appropriate.         This document has been electronically signed by RONALD Dean  July 3, 2024 09:34 EDT

## 2024-07-16 ENCOUNTER — PATIENT MESSAGE (OUTPATIENT)
Age: 84
End: 2024-07-16
Payer: MEDICARE

## 2024-07-21 ENCOUNTER — READMISSION MANAGEMENT (OUTPATIENT)
Dept: CALL CENTER | Facility: HOSPITAL | Age: 84
End: 2024-07-21
Payer: MEDICARE

## 2024-07-21 NOTE — OUTREACH NOTE
Prep Survey      Flowsheet Row Responses   [facility] facility patient discharged from? Non-BH   Is LACE score < 7 ? Non-BH Discharge   Eligibility [facility] - Inpatient   Date of Discharge 07/21/24   Discharge Disposition Home or Self Care   Discharge diagnosis Hypo-osmolality and hyponatremia   Does the patient have one of the following disease processes/diagnoses(primary or secondary)? Other   Does the patient have Home health ordered? No   Prep survey completed? Yes            Melinda Dorsey Registered Nurse

## 2024-07-22 ENCOUNTER — TRANSITIONAL CARE MANAGEMENT TELEPHONE ENCOUNTER (OUTPATIENT)
Dept: CALL CENTER | Facility: HOSPITAL | Age: 84
End: 2024-07-22
Payer: MEDICARE

## 2024-07-22 NOTE — OUTREACH NOTE
Call Center TCM Note      Flowsheet Row Responses   [facility] patient discharged from? Non-  [[facility]]   Does the patient have one of the following disease processes/diagnoses(primary or secondary)? Other   TCM attempt successful? Yes  [VR for Jr Marcello and shira Parisi]   Call start time 1255   Call end time 1305   Discharge diagnosis hyponatremia, HF   Medication alerts for this patient Pt reports he takes eliquis 2.5 mg bid--listed on [facility] records but not on  records, needs clarification   Meds reviewed with patient/caregiver? Yes   Is the patient having any side effects they believe may be caused by any medication additions or changes? No   Does the patient have all medications ordered at discharge? Yes   Is the patient taking all medications as directed (includes completed medication regime)? Yes   Medication comments Carvedilol decreased to 12.5 mg, take 0.5mg (6.25mg total) bid   Does the patient have an appointment with their PCP within 7-14 days of discharge? No appointments available   Nursing Interventions Routed TCM call to PCP office   Has home health visited the patient within 72 hours of discharge? N/A   Psychosocial issues? No   Did the patient receive a copy of their discharge instructions? Yes   Nursing interventions Reviewed instructions with patient   What is the patient's perception of their health status since discharge? Improving  [Pt reports he was found to have issues with electrolytes incuding potassium--reports he is feeling much better today and offers no complaints.  Pt has no questions today, will bring his BP readings to f/u appts,unable to provide current BP.]   Is the patient/caregiver able to teach back signs and symptoms related to disease process for when to call PCP? Yes   Is the patient/caregiver able to teach back signs and symptoms related to disease process for when to call 911? Yes   TCM call completed? Yes   Call end time 1305            Terese  JACLYN De Leon    7/22/2024, 13:06 EDT

## 2024-07-31 ENCOUNTER — TELEPHONE (OUTPATIENT)
Dept: FAMILY MEDICINE CLINIC | Age: 84
End: 2024-07-31
Payer: MEDICARE

## 2024-07-31 DIAGNOSIS — I50.42 CHRONIC COMBINED SYSTOLIC AND DIASTOLIC CONGESTIVE HEART FAILURE: ICD-10-CM

## 2024-07-31 RX ORDER — FUROSEMIDE 40 MG/1
40 TABLET ORAL 2 TIMES DAILY
Qty: 60 TABLET | Refills: 0 | Status: SHIPPED | OUTPATIENT
Start: 2024-07-31

## 2024-07-31 NOTE — TELEPHONE ENCOUNTER
Pharm request refill on Lasix 40mg BID.  Last filled by Dr Bautista.  Our med list says lasix 40mg daily.  Please advise.

## 2024-08-06 ENCOUNTER — OFFICE VISIT (OUTPATIENT)
Dept: FAMILY MEDICINE CLINIC | Age: 84
End: 2024-08-06
Payer: MEDICARE

## 2024-08-06 ENCOUNTER — LAB (OUTPATIENT)
Dept: LAB | Facility: HOSPITAL | Age: 84
End: 2024-08-06
Payer: MEDICARE

## 2024-08-06 VITALS
WEIGHT: 133.4 LBS | HEART RATE: 55 BPM | DIASTOLIC BLOOD PRESSURE: 61 MMHG | BODY MASS INDEX: 20.94 KG/M2 | OXYGEN SATURATION: 96 % | SYSTOLIC BLOOD PRESSURE: 113 MMHG | TEMPERATURE: 98 F | HEIGHT: 67 IN

## 2024-08-06 DIAGNOSIS — E83.42 HYPOMAGNESEMIA: ICD-10-CM

## 2024-08-06 DIAGNOSIS — E87.1 HYPONATREMIA: Primary | ICD-10-CM

## 2024-08-06 DIAGNOSIS — I25.5 ISCHEMIC CARDIOMYOPATHY: ICD-10-CM

## 2024-08-06 DIAGNOSIS — M46.97 UNSPECIFIED INFLAMMATORY SPONDYLOPATHY, LUMBOSACRAL REGION: ICD-10-CM

## 2024-08-06 DIAGNOSIS — E11.51 TYPE 2 DIABETES MELLITUS WITH DIABETIC PERIPHERAL ANGIOPATHY WITHOUT GANGRENE, WITHOUT LONG-TERM CURRENT USE OF INSULIN: ICD-10-CM

## 2024-08-06 DIAGNOSIS — I48.21 PERMANENT ATRIAL FIBRILLATION: ICD-10-CM

## 2024-08-06 DIAGNOSIS — I25.10 CORONARY ARTERY DISEASE INVOLVING NATIVE CORONARY ARTERY OF NATIVE HEART WITHOUT ANGINA PECTORIS: ICD-10-CM

## 2024-08-06 DIAGNOSIS — I10 PRIMARY HYPERTENSION: ICD-10-CM

## 2024-08-06 DIAGNOSIS — E87.1 HYPONATREMIA: ICD-10-CM

## 2024-08-06 DIAGNOSIS — Z85.51 HISTORY OF BLADDER CANCER: ICD-10-CM

## 2024-08-06 DIAGNOSIS — I73.9 PERIPHERAL VASCULAR DISEASE, UNSPECIFIED: ICD-10-CM

## 2024-08-06 DIAGNOSIS — E78.2 MIXED HYPERLIPIDEMIA: ICD-10-CM

## 2024-08-06 DIAGNOSIS — I50.42 CHRONIC COMBINED SYSTOLIC AND DIASTOLIC CONGESTIVE HEART FAILURE: ICD-10-CM

## 2024-08-06 PROBLEM — F11.20 CONTINUOUS OPIOID DEPENDENCE: Status: RESOLVED | Noted: 2023-05-18 | Resolved: 2024-08-06

## 2024-08-06 PROBLEM — D69.6 PLATELETS DECREASED: Status: RESOLVED | Noted: 2023-12-26 | Resolved: 2024-08-06

## 2024-08-06 LAB
ALBUMIN SERPL-MCNC: 3.8 G/DL (ref 3.5–5.2)
ALBUMIN/GLOB SERPL: 1 G/DL
ALP SERPL-CCNC: 72 U/L (ref 39–117)
ALT SERPL W P-5'-P-CCNC: 10 U/L (ref 1–41)
ANION GAP SERPL CALCULATED.3IONS-SCNC: 13 MMOL/L (ref 5–15)
AST SERPL-CCNC: 15 U/L (ref 1–40)
BASOPHILS # BLD AUTO: 0.03 10*3/MM3 (ref 0–0.2)
BASOPHILS NFR BLD AUTO: 0.5 % (ref 0–1.5)
BILIRUB SERPL-MCNC: 0.3 MG/DL (ref 0–1.2)
BUN SERPL-MCNC: 58 MG/DL (ref 8–23)
BUN/CREAT SERPL: 33.7 (ref 7–25)
CALCIUM SPEC-SCNC: 10 MG/DL (ref 8.6–10.5)
CHLORIDE SERPL-SCNC: 99 MMOL/L (ref 98–107)
CO2 SERPL-SCNC: 18 MMOL/L (ref 22–29)
CREAT SERPL-MCNC: 1.72 MG/DL (ref 0.76–1.27)
DEPRECATED RDW RBC AUTO: 51.2 FL (ref 37–54)
EGFRCR SERPLBLD CKD-EPI 2021: 38.7 ML/MIN/1.73
EOSINOPHIL # BLD AUTO: 0.33 10*3/MM3 (ref 0–0.4)
EOSINOPHIL NFR BLD AUTO: 5 % (ref 0.3–6.2)
ERYTHROCYTE [DISTWIDTH] IN BLOOD BY AUTOMATED COUNT: 15.6 % (ref 12.3–15.4)
GLOBULIN UR ELPH-MCNC: 3.7 GM/DL
GLUCOSE SERPL-MCNC: 114 MG/DL (ref 65–99)
HBA1C MFR BLD: 6.3 % (ref 4.8–5.6)
HCT VFR BLD AUTO: 35.5 % (ref 37.5–51)
HGB BLD-MCNC: 11.2 G/DL (ref 13–17.7)
IMM GRANULOCYTES # BLD AUTO: 0.01 10*3/MM3 (ref 0–0.05)
IMM GRANULOCYTES NFR BLD AUTO: 0.2 % (ref 0–0.5)
LYMPHOCYTES # BLD AUTO: 0.89 10*3/MM3 (ref 0.7–3.1)
LYMPHOCYTES NFR BLD AUTO: 13.6 % (ref 19.6–45.3)
MAGNESIUM SERPL-MCNC: 2 MG/DL (ref 1.6–2.4)
MCH RBC QN AUTO: 28.2 PG (ref 26.6–33)
MCHC RBC AUTO-ENTMCNC: 31.5 G/DL (ref 31.5–35.7)
MCV RBC AUTO: 89.4 FL (ref 79–97)
MONOCYTES # BLD AUTO: 0.46 10*3/MM3 (ref 0.1–0.9)
MONOCYTES NFR BLD AUTO: 7 % (ref 5–12)
NEUTROPHILS NFR BLD AUTO: 4.84 10*3/MM3 (ref 1.7–7)
NEUTROPHILS NFR BLD AUTO: 73.7 % (ref 42.7–76)
PLATELET # BLD AUTO: 167 10*3/MM3 (ref 140–450)
PMV BLD AUTO: 9.7 FL (ref 6–12)
POTASSIUM SERPL-SCNC: 4.2 MMOL/L (ref 3.5–5.2)
PROT SERPL-MCNC: 7.5 G/DL (ref 6–8.5)
RBC # BLD AUTO: 3.97 10*6/MM3 (ref 4.14–5.8)
SODIUM SERPL-SCNC: 130 MMOL/L (ref 136–145)
TSH SERPL DL<=0.05 MIU/L-ACNC: 4.65 UIU/ML (ref 0.27–4.2)
WBC NRBC COR # BLD AUTO: 6.56 10*3/MM3 (ref 3.4–10.8)

## 2024-08-06 PROCEDURE — 1160F RVW MEDS BY RX/DR IN RCRD: CPT | Performed by: FAMILY MEDICINE

## 2024-08-06 PROCEDURE — G2211 COMPLEX E/M VISIT ADD ON: HCPCS | Performed by: FAMILY MEDICINE

## 2024-08-06 PROCEDURE — 85025 COMPLETE CBC W/AUTO DIFF WBC: CPT

## 2024-08-06 PROCEDURE — 3074F SYST BP LT 130 MM HG: CPT | Performed by: FAMILY MEDICINE

## 2024-08-06 PROCEDURE — 84443 ASSAY THYROID STIM HORMONE: CPT

## 2024-08-06 PROCEDURE — 3078F DIAST BP <80 MM HG: CPT | Performed by: FAMILY MEDICINE

## 2024-08-06 PROCEDURE — 1159F MED LIST DOCD IN RCRD: CPT | Performed by: FAMILY MEDICINE

## 2024-08-06 PROCEDURE — 83735 ASSAY OF MAGNESIUM: CPT

## 2024-08-06 PROCEDURE — 83036 HEMOGLOBIN GLYCOSYLATED A1C: CPT

## 2024-08-06 PROCEDURE — 80053 COMPREHEN METABOLIC PANEL: CPT

## 2024-08-06 PROCEDURE — 99214 OFFICE O/P EST MOD 30 MIN: CPT | Performed by: FAMILY MEDICINE

## 2024-08-06 PROCEDURE — 36415 COLL VENOUS BLD VENIPUNCTURE: CPT

## 2024-08-06 NOTE — PROGRESS NOTES
"Chief Complaint  Diabetes (Follow up) and Night Sweats    Subjective          Marcello Larson presents to [facility] today for routine f/u of chronic issues.     He was hospitalized last month at [facility] for hyponatremia (Na+ 127).    He has had night sweats for the past year or two.  Does not matter winter or summer.      He is on Ranexa for CAD, s/p 2v. CABG.  He is on beta-blocker, ARB, and Imdur for CAD.  He is also on Eliquis for anticoagulation of a-fibrillation (through patient assistance).  He is on carvedilol (decreased to a half tab BID during his admission but he has been taking the full tab) and losartan for HTN.  BP has been well controlled.  He is on lovastatin for HLD.  He has prn NTG but uses rarely uses them.  He follows with Dr. Castañeda Cardiology.  He is on Lasix for chronic systolic heart failure.  +ICD.     He is on metformin for diabetes.  Dr. Castañeda wanted to try him on Jardiance but cost was too much.  Last A1c was 6.7 in April.  He does adhere to a diabetic diet.  He is UTD on eye exam (last done 8/2023).  He is on an ARB and statin.      He is on pantoprazole for GERD and h/o bleeding ulcer (NSAID-induced.)  He has prn sucralfate.  He is on iron supplement.     He is on Norco for chronic low back and leg pain.  He follows with Dr. [facility].  He is getting epidurals.  He has previously used gabapentin.  S/p physical therapy and RFA.  Just had a facet joint injection last week but \"it didn't take this time.\"        He is on Flonase and montelukast for chronic allergies.      Current Outpatient Medications:     albuterol sulfate  (90 Base) MCG/ACT inhaler, Inhale 1 puff., Disp: , Rfl:     apixaban (ELIQUIS) 5 MG tablet tablet, Take 0.5 tablets by mouth 2 (Two) Times a Day., Disp: , Rfl:     carvedilol (COREG) 12.5 MG tablet, Take 1 tablet by mouth 2 (Two) Times a Day., Disp: 180 tablet, Rfl: 3    Diclofenac Sodium (VOLTAREN) 1 % gel gel, " APPLY TO THE AFFECTED AREA(S) TWICE DAILY (Patient taking differently: Apply 4 g topically to the appropriate area as directed 2 (Two) Times a Day.), Disp: 100 g, Rfl: 3    ferrous sulfate 325 (65 FE) MG tablet, Take 1 tablet by mouth 2 (Two) Times a Day., Disp: , Rfl:     fexofenadine (ALLEGRA) 180 MG tablet, Take 1 tablet by mouth Daily., Disp: , Rfl:     fluticasone (FLONASE) 50 MCG/ACT nasal spray, 1 spray into the nostril(s) as directed by provider Daily., Disp: 48 g, Rfl: 3    furosemide (LASIX) 40 MG tablet, Take 1 tablet by mouth 2 (Two) Times a Day., Disp: 60 tablet, Rfl: 0    HYDROcodone-acetaminophen (NORCO) 5-325 MG per tablet, Take 1 tablet by mouth Daily., Disp: , Rfl:     isosorbide dinitrate (ISORDIL) 30 MG tablet, Take 1 tablet by mouth 4 (Four) Times a Day., Disp: , Rfl:     lisinopril (PRINIVIL,ZESTRIL) 10 MG tablet, Take 1 tablet by mouth Daily., Disp: , Rfl:     losartan (COZAAR) 50 MG tablet, Take 1 tablet by mouth Daily., Disp: , Rfl:     lovastatin (MEVACOR) 40 MG tablet, Take 1 tablet by mouth Every Evening., Disp: 90 tablet, Rfl: 1    metFORMIN (GLUCOPHAGE) 500 MG tablet, Take 1 tablet by mouth Daily., Disp: 90 tablet, Rfl: 1    montelukast (SINGULAIR) 10 MG tablet, Take 1 tablet by mouth Every Night., Disp: 90 tablet, Rfl: 3    nitroglycerin (NITROSTAT) 0.4 MG SL tablet, DISSOLVE 1 TABLET UNDER THE TONGUE EVERY 5 MINUTES AS NEEDED FOR CHEST PAIN. DO NOT EXCEED A TOTAL OF 3 DOSES IN 15 MINUTES. IF NO RELIEF, CALL 911 (Patient taking differently: Place 1 tablet under the tongue Every 5 (Five) Minutes As Needed for Chest Pain.), Disp: 25 tablet, Rfl: 0    pantoprazole (PROTONIX) 40 MG EC tablet, Take 1 tablet by mouth Daily., Disp: 90 tablet, Rfl: 1    potassium chloride (MICRO-K) 10 MEQ CR capsule, Take 1 capsule by mouth Daily., Disp: , Rfl:     prochlorperazine (COMPAZINE) 10 MG tablet, Take 1 tablet by mouth Every 8 (Eight) Hours As Needed for Nausea or Vomiting., Disp: 24 tablet, Rfl:  "0    SSD 1 % cream, APPLY TO THE AFFECTED AREA(S) TWICE DAILY, Disp: 50 g, Rfl: 0    sucralfate (CARAFATE) 1 g tablet, TAKE 1 TABLET BY MOUTH FOUR TIMES DAILY, Disp: 120 tablet, Rfl: 5    vitamin B-6 (PYRIDOXINE) 100 MG tablet, Take 1 tablet by mouth Daily., Disp: , Rfl:     vitamin C (ASCORBIC ACID) 500 MG tablet, Take 1 tablet by mouth Daily., Disp: , Rfl:     Allergies:  Iodinated contrast media, Lisinopril, and Prednisone      Objective   Vital Signs:   Vitals:    08/06/24 0749   BP: 113/61   BP Location: Left arm   Patient Position: Sitting   Cuff Size: Adult   Pulse: 55   Temp: 98 °F (36.7 °C)   TempSrc: Oral   SpO2: 96%   Weight: 60.5 kg (133 lb 6.4 oz)   Height: 170.2 cm (67.01\")   Body mass index is 20.89 kg/m².  BMI is within normal parameters. No other follow-up for BMI required.       Physical Exam  Vitals reviewed.   Constitutional:       General: He is not in acute distress.     Appearance: Normal appearance. He is well-developed.   HENT:      Head: Normocephalic and atraumatic.      Right Ear: External ear normal.      Left Ear: External ear normal.   Eyes:      Extraocular Movements: Extraocular movements intact.      Conjunctiva/sclera: Conjunctivae normal.      Pupils: Pupils are equal, round, and reactive to light.   Cardiovascular:      Rate and Rhythm: Normal rate. Rhythm irregularly irregular.      Heart sounds: No murmur heard.  Pulmonary:      Effort: Pulmonary effort is normal.      Breath sounds: Normal breath sounds. No wheezing, rhonchi or rales.   Abdominal:      General: Bowel sounds are normal. There is no distension.      Palpations: Abdomen is soft.      Tenderness: There is no abdominal tenderness.   Genitourinary:     Comments: Urostomy bag in place filled with clear urine, stoma appears normal  Musculoskeletal:         General: Normal range of motion.      Comments: +L BKA prosthesis   Skin:     General: Skin is warm and dry.   Neurological:      Mental Status: He is alert. "   Psychiatric:         Mood and Affect: Affect normal.        Lab Results   Component Value Date    GLUCOSE 133 (H) 05/21/2024    BUN 37 (H) 05/21/2024    CREATININE 1.18 05/21/2024    EGFR 60.8 05/21/2024    BCR 31.4 (H) 05/21/2024    K 4.1 05/21/2024    CO2 21.3 (L) 05/21/2024    CALCIUM 9.3 05/21/2024    ALBUMIN 3.1 (L) 05/19/2024    BILITOT 0.3 05/19/2024    AST 11 05/19/2024    ALT 11 05/19/2024       Lab Results   Component Value Date    CHOL 102 04/30/2024    CHLPL 129 02/20/2021    TRIG 77 04/30/2024    HDL 31 (L) 04/30/2024    LDL 55 04/30/2024       Lab Results   Component Value Date    WBC 8.11 05/21/2024    HGB 9.3 (L) 05/21/2024    HCT 29.9 (L) 05/21/2024    MCV 85.4 05/21/2024     05/21/2024     Lab Results   Component Value Date    HGBA1C 6.70 (H) 04/30/2024            Assessment and Plan    Assessment & Plan  Hyponatremia  Doing well post-discharge.  Checking labs today.  Type 2 diabetes mellitus with diabetic peripheral angiopathy without gangrene, without long-term current use of insulin    He is on metformin for diabetes.  No refills needed.  Dr. Castañeda wanted to try him on Jardiance but cost was too much.  Last A1c was 6.7 in April.  He does adhere to a diabetic diet.  He is UTD on eye exam (last done 8/2023).  He is on an ARB and statin.  Primary hypertension    Blood pressure has been running at goal.  Continue carvedilol 12.5 mg twice daily and lisinopril 10 mg daily.  Refills were not needed today.  Labs were needed today.  Continue to monitor.   Mixed hyperlipidemia   Stable on lovastatin 40 mg daily.  Refills were not needed today.  Labs were due today.  Continue to monitor.   Permanent atrial fibrillation  Following with Dr. Castañeda Cardiology.  On Eliquis 5 mg twice daily for anticoagulation and carvedilol 12.5 mg twice daily for rate control.  Coronary artery disease involving native coronary artery of native heart without angina pectoris    Follows with Dr. Castañeda Cardiology.  He is on  Eliquis for the combination of CAD, atrial fibrillation and systolic heart failure.  per Cardiology.  Status post ICD placement.  He is on statin, beta-blocker and ARB.    Ischemic cardiomyopathy  Status post ICD placement.  Following with Dr. Castañeda Cardiology.  Chronic combined systolic and diastolic congestive heart failure    Follows with Dr. Castañeda Cards.  Stable on ARB and beta-blocker.  Status post ICD placement.  Peripheral vascular disease, unspecified  Status post left BKA.  Stable on statin.  Unspecified inflammatory spondylopathy, lumbosacral region  Following with Dr. Charlette Rascon at [facility].  On hydrocodone.  They are monitoring.  History of bladder cancer  Remote, s/p bladder resection.  +Urostomy.  Hypomagnesemia  Checking labs.    Orders Placed This Encounter   Procedures    Comprehensive Metabolic Panel    Hemoglobin A1c    CBC Auto Differential    TSH    Magnesium                      Follow Up   Return in about 2 months (around 10/6/2024) for Recheck.  Patient was given instructions and counseling regarding his condition or for health maintenance advice. Please see specific information pulled into the AVS if appropriate.

## 2024-08-06 NOTE — ASSESSMENT & PLAN NOTE
He is on metformin for diabetes.  No refills needed.  Dr. Castañeda wanted to try him on Jardiance but cost was too much.  Last A1c was 6.7 in April.  He does adhere to a diabetic diet.  He is UTD on eye exam (last done 8/2023).  He is on an ARB and statin.

## 2024-08-06 NOTE — ASSESSMENT & PLAN NOTE
Following with Dr. Charlette Rascon at [facility].  On hydrocodone.  They are monitoring.

## 2024-08-06 NOTE — ASSESSMENT & PLAN NOTE
Following with Dr. Castañeda Cardiology.  On Eliquis 5 mg twice daily for anticoagulation and carvedilol 12.5 mg twice daily for rate control.

## 2024-08-06 NOTE — ASSESSMENT & PLAN NOTE
Blood pressure has been running at goal.  Continue carvedilol 12.5 mg twice daily and lisinopril 10 mg daily.  Refills were not needed today.  Labs were needed today.  Continue to monitor.

## 2024-08-06 NOTE — ASSESSMENT & PLAN NOTE
Follows with Dr. Castañeda Cardiology.  He is on Eliquis for the combination of CAD, atrial fibrillation and systolic heart failure.  per Cardiology.  Status post ICD placement.  He is on statin, beta-blocker and ARB.

## 2024-08-08 ENCOUNTER — CLINICAL SUPPORT NO REQUIREMENTS (OUTPATIENT)
Age: 84
End: 2024-08-08
Payer: MEDICARE

## 2024-08-08 DIAGNOSIS — I25.5 ISCHEMIC CARDIOMYOPATHY: ICD-10-CM

## 2024-08-08 DIAGNOSIS — Z95.810 ICD (IMPLANTABLE CARDIOVERTER-DEFIBRILLATOR), DUAL, IN SITU: Primary | ICD-10-CM

## 2024-08-20 DIAGNOSIS — M46.97 UNSPECIFIED INFLAMMATORY SPONDYLOPATHY, LUMBOSACRAL REGION: ICD-10-CM

## 2024-08-27 DIAGNOSIS — I50.42 CHRONIC COMBINED SYSTOLIC AND DIASTOLIC CONGESTIVE HEART FAILURE: ICD-10-CM

## 2024-08-27 RX ORDER — FUROSEMIDE 40 MG
40 TABLET ORAL 2 TIMES DAILY
Qty: 60 TABLET | Refills: 0 | Status: SHIPPED | OUTPATIENT
Start: 2024-08-27

## 2024-09-04 ENCOUNTER — TELEPHONE (OUTPATIENT)
Dept: FAMILY MEDICINE CLINIC | Age: 84
End: 2024-09-04
Payer: MEDICARE

## 2024-09-04 ENCOUNTER — OFFICE VISIT (OUTPATIENT)
Dept: CARDIOLOGY | Facility: CLINIC | Age: 84
End: 2024-09-04
Payer: MEDICARE

## 2024-09-04 VITALS
BODY MASS INDEX: 20.72 KG/M2 | DIASTOLIC BLOOD PRESSURE: 60 MMHG | HEART RATE: 50 BPM | SYSTOLIC BLOOD PRESSURE: 108 MMHG | WEIGHT: 132 LBS | OXYGEN SATURATION: 97 % | HEIGHT: 67 IN

## 2024-09-04 DIAGNOSIS — E03.9 ACQUIRED HYPOTHYROIDISM: Primary | ICD-10-CM

## 2024-09-04 DIAGNOSIS — I50.42 CHRONIC COMBINED SYSTOLIC AND DIASTOLIC CONGESTIVE HEART FAILURE: Primary | ICD-10-CM

## 2024-09-04 DIAGNOSIS — I25.10 CORONARY ARTERY DISEASE INVOLVING NATIVE CORONARY ARTERY OF NATIVE HEART WITHOUT ANGINA PECTORIS: ICD-10-CM

## 2024-09-04 DIAGNOSIS — I10 PRIMARY HYPERTENSION: ICD-10-CM

## 2024-09-04 DIAGNOSIS — I48.21 PERMANENT ATRIAL FIBRILLATION: ICD-10-CM

## 2024-09-04 NOTE — TELEPHONE ENCOUNTER
----- Message from Zeny Corley sent at 8/7/2024  9:23 AM EDT -----  Please call pt to come back in for TSH recheck.  Please place order and pend to me, dx hypothyroidism.  Thanks, MARISSA

## 2024-09-04 NOTE — PROGRESS NOTES
Subjective:     Encounter Date:05/29/2024      Patient ID: Marcello Larson is a 84 y.o. male.    Chief Complaint:  Management of HFrEF, CAD    HPI:   84 y.o. male with CAD status post CABG, HFrEF secondary to ischemic cardiomyopathy (EF 30 to 35%) status post ICD, permanent atrial fibrillation on Eliquis, hypertension, hyperlipidemia who presents for follow-up and management of his chronic conditions.  Since his last visit, patient been dealing with diaphoresis particularly at night.  He notes that one of his physicians pension intermittent hypoglycemia as a possible etiology.  He denies any other symptoms including shortness of breath, chest pain, lower extremity edema, palpitations. Of note, he presented to the ED in July with elevated blood pressure up to the 200s.  He had already taken his home medications and with monitoring in the ED his blood pressure improved.    Echo 2/22/2022 with an EF 30 to 35%, grade 3 diastolic dysfunction, moderately dilated LA, mildly dilated RA.        The following portions of the patient's history were reviewed and updated as appropriate: allergies, current medications, past family history, past medical history, past social history, past surgical history and problem list.     REVIEW OF SYSTEMS:   All systems reviewed.  Pertinent positives identified in HPI.  All other systems are negative.    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  LV systolic function is moderate to severely reduced.  EF 30-35%.  The transmittal spectral doppler flow pattern is suggestive of pseudonormalization.  There is moderate to severe global hypokinesis of the LV.  The left atrium is mildly dilated.  The mitral leaflets appear thickened, but open well.  Mild MR and AR.    Hx of CABG 2019 - left internal mammary graft to the LAD, SVG to OM1, OM2 in the RCA    Hyperlipidemia 2019    Hypo-osmolality and hyponatremia     Low back pain     Myocardial infarct     Other long term (current) drug therapy     Palpitations 2019    Personal history of malignant neoplasm of bladder     Sleep apnea 2019    Stented coronary artery 2019 - VISION bare metal stent to the proximal stenosis of the vein graft to the right and ISION bare metal stent in the mid to distal 80% stenosis.    Type 2 diabetes mellitus without complication     Unstable angina        Family History   Problem Relation Age of Onset    No Known Problems Mother     No Known Problems Father        Social History     Socioeconomic History    Marital status:     Number of children: 2   Tobacco Use    Smoking status: Former     Current packs/day: 0.00     Average packs/day: 1 pack/day for 24.0 years (24.0 ttl pk-yrs)     Types: Cigarettes     Start date: 1960     Quit date: 1984     Years since quittin.7     Passive exposure: Past    Smokeless tobacco: Never    Tobacco comments:     Have not smoked since    Vaping Use    Vaping status: Never Used   Substance and Sexual Activity    Alcohol use: Never    Drug use: Never    Sexual activity: Not Currently     Partners: Female       Allergies   Allergen Reactions    Iodinated  Contrast Media Anaphylaxis    Lisinopril Other (See Comments)    Prednisone Cough       Past Surgical History:   Procedure Laterality Date    AMPUTATION Left     LOWER EXTREMITY BKA    APPENDECTOMY      CARDIAC CATHETERIZATION      CARDIAC CATHETERIZATION N/A 10/13/2020    Procedure: Left Heart Cath;  Surgeon: Kofi Campoverde MD;  Location:  [facility];  Service: Cardiology;  Laterality: N/A;    CARDIAC CATHETERIZATION N/A 10/13/2020    Procedure: Coronary angiography;  Surgeon: Kofi Campoverde MD;  Location:  [facility];  Service: Cardiology;  Laterality: N/A;    CARDIAC CATHETERIZATION N/A 10/13/2020    Procedure: Saphenous Vein Graft;  Surgeon: Kofi Campoverde MD;  Location:  [facility];  Service: Cardiology;  Laterality: N/A;    CARDIAC DEFIBRILLATOR PLACEMENT      CARDIAC ELECTROPHYSIOLOGY PROCEDURE N/A 04/04/2022    Procedure: ICD DC new/SJM;  Surgeon: Mamadou Terry MD;  Location:  [facility];  Service: Cardiology;  Laterality: N/A;    CARDIAC VALVE REPLACEMENT      CAROTID STENT      CATARACT EXTRACTION      CHOLECYSTECTOMY      CORONARY ARTERY BYPASS GRAFT      1984, 1991 4 VESSEL    CORONARY STENT PLACEMENT      INSERT / REPLACE / REMOVE PACEMAKER      INTERNAL CARDIAC DEFIBRILLATOR INSERTION  04/2022    OTHER SURGICAL HISTORY      UROSTOMY S/P BLADDER CA       Procedures       Objective:         Vitals:    09/04/24 1032   BP: 108/60   Pulse: 50   SpO2: 97%         PHYSICAL EXAM:  GEN: well appearing, in NAD   HEENT: NCAT, EOMI, moist mucus membranes   Respiratory: CTAB, no wheezes, rales or rhonchi  CV: normal rate, regular rhythm, normal S1, S2, no murmurs, rubs, gallops, +2 radial pulses b/l  GI: soft, nontender, nondistended  MSK: Left leg BKA, no edema of right leg  Skin: no rash, warm, dry  Heme/Lymph: no bruising or bleeding  Psych: organized thought, normal behavior and affect  Neuro: Alert and Oriented x 3, grossly  normal motor function        Assessment:         (I50.42) Chronic combined systolic and diastolic congestive heart failure    (I25.10) Coronary artery disease involving native coronary artery of native heart without angina pectoris    (I10) Primary hypertension    (I48.21) Permanent atrial fibrillation    82 year old man with CAD status post CABG, HFrEF secondary to ischemic cardiomyopathy (EF 30 to 35%) status post ICD, permanent atrial fibrillation on Eliquis, hypertension, hyperlipidemia who presents for follow-up and management of his chronic conditions.       Plan:       #HFrEF  Well compensated. NYHA class I. Echo 2/22/2022 with EF 30 to 35%, grade 3 diastolic dysfunction, moderately dilated LA, mildly dilated RA.  Discussed that diaphoresis is a very rare side effect of carvedilol and we could consider switch to Toprol to reassess.  He preferred to continue current management as he is tolerating it well and will let me know if the diaphoresis worsens.  - continue Coreg 12.5 mg twice daily, losartan 50 mg  - Continue Lasix 40 mg twice daily  - no Jardiance given cost    #CAD status post CABG  Patient without any symptoms of angina.  - continue plavix 75 mg daily, lovastatin 40 mg daily    #Permanent atrial fibrillation  [id number] 5.  -Continue Coreg as above, Eliquis 2.5 mg twice daily(samples given)    Dr Corley, thank you very much for referring this kind patient to me. Please call me with any questions or concerns. I will see the patient again in the office in 6 months or earlier as needed.         Andrew Castañeda MD  09/04/24  [facility]    Outpatient Encounter Medications as of 9/4/2024   Medication Sig Dispense Refill    albuterol sulfate  (90 Base) MCG/ACT inhaler Inhale 1 puff.      apixaban (ELIQUIS) 5 MG tablet tablet Take 0.5 tablets by mouth 2 (Two) Times a Day.      carvedilol (COREG) 12.5 MG tablet Take 1 tablet by mouth 2 (Two) Times a Day. 180 tablet 3    Diclofenac  Sodium (VOLTAREN) 1 % gel gel Apply 4 g topically to the appropriate area as directed 2 (Two) Times a Day. 100 g 3    ferrous sulfate 325 (65 FE) MG tablet Take 1 tablet by mouth 2 (Two) Times a Day.      fexofenadine (ALLEGRA) 180 MG tablet Take 1 tablet by mouth Daily.      fluticasone (FLONASE) 50 MCG/ACT nasal spray 1 spray into the nostril(s) as directed by provider Daily. 48 g 3    furosemide (LASIX) 40 MG tablet TAKE 1 TABLET BY MOUTH TWICE DAILY 60 tablet 0    HYDROcodone-acetaminophen (NORCO) 5-325 MG per tablet Take 1 tablet by mouth Daily.      isosorbide dinitrate (ISORDIL) 30 MG tablet Take 1 tablet by mouth 4 (Four) Times a Day.      lisinopril (PRINIVIL,ZESTRIL) 10 MG tablet Take 1 tablet by mouth Daily.      losartan (COZAAR) 50 MG tablet Take 1 tablet by mouth Daily.      lovastatin (MEVACOR) 40 MG tablet Take 1 tablet by mouth Every Evening. 90 tablet 1    metFORMIN (GLUCOPHAGE) 500 MG tablet Take 1 tablet by mouth Daily. 90 tablet 1    montelukast (SINGULAIR) 10 MG tablet Take 1 tablet by mouth Every Night. 90 tablet 3    nitroglycerin (NITROSTAT) 0.4 MG SL tablet DISSOLVE 1 TABLET UNDER THE TONGUE EVERY 5 MINUTES AS NEEDED FOR CHEST PAIN. DO NOT EXCEED A TOTAL OF 3 DOSES IN 15 MINUTES. IF NO RELIEF, CALL 911 (Patient taking differently: Place 1 tablet under the tongue Every 5 (Five) Minutes As Needed for Chest Pain.) 25 tablet 0    pantoprazole (PROTONIX) 40 MG EC tablet Take 1 tablet by mouth Daily. 90 tablet 1    potassium chloride (MICRO-K) 10 MEQ CR capsule Take 1 capsule by mouth Daily.      prochlorperazine (COMPAZINE) 10 MG tablet Take 1 tablet by mouth Every 8 (Eight) Hours As Needed for Nausea or Vomiting. 24 tablet 0    SSD 1 % cream APPLY TO THE AFFECTED AREA(S) TWICE DAILY 50 g 0    sucralfate (CARAFATE) 1 g tablet TAKE 1 TABLET BY MOUTH FOUR TIMES DAILY 120 tablet 5    vitamin B-6 (PYRIDOXINE) 100 MG tablet Take 1 tablet by mouth Daily.      vitamin C (ASCORBIC ACID) 500 MG tablet  Take 1 tablet by mouth Daily.       No facility-administered encounter medications on file as of 9/4/2024.

## 2024-09-17 ENCOUNTER — LAB (OUTPATIENT)
Dept: LAB | Facility: HOSPITAL | Age: 84
End: 2024-09-17
Payer: MEDICARE

## 2024-09-17 DIAGNOSIS — E03.9 ACQUIRED HYPOTHYROIDISM: ICD-10-CM

## 2024-09-17 LAB — TSH SERPL DL<=0.05 MIU/L-ACNC: 8.8 UIU/ML (ref 0.27–4.2)

## 2024-09-17 PROCEDURE — 36415 COLL VENOUS BLD VENIPUNCTURE: CPT

## 2024-09-17 PROCEDURE — 84443 ASSAY THYROID STIM HORMONE: CPT

## 2024-09-23 ENCOUNTER — LAB (OUTPATIENT)
Dept: LAB | Facility: HOSPITAL | Age: 84
End: 2024-09-23
Payer: MEDICARE

## 2024-09-23 ENCOUNTER — TRANSCRIBE ORDERS (OUTPATIENT)
Dept: LAB | Facility: HOSPITAL | Age: 84
End: 2024-09-23
Payer: MEDICARE

## 2024-09-23 DIAGNOSIS — I10 HYPERTENSION, ESSENTIAL: ICD-10-CM

## 2024-09-23 DIAGNOSIS — D64.9 ANEMIA, UNSPECIFIED TYPE: Primary | ICD-10-CM

## 2024-09-23 DIAGNOSIS — D64.9 ANEMIA, UNSPECIFIED TYPE: ICD-10-CM

## 2024-09-23 LAB
ALBUMIN SERPL-MCNC: 3.4 G/DL (ref 3.5–5.2)
ALBUMIN/GLOB SERPL: 0.9 G/DL
ALP SERPL-CCNC: 66 U/L (ref 39–117)
ALT SERPL W P-5'-P-CCNC: 9 U/L (ref 1–41)
ANION GAP SERPL CALCULATED.3IONS-SCNC: 9.5 MMOL/L (ref 5–15)
AST SERPL-CCNC: 12 U/L (ref 1–40)
BASOPHILS # BLD AUTO: 0.03 10*3/MM3 (ref 0–0.2)
BASOPHILS NFR BLD AUTO: 0.4 % (ref 0–1.5)
BILIRUB SERPL-MCNC: 0.2 MG/DL (ref 0–1.2)
BUN SERPL-MCNC: 4 MG/DL (ref 8–23)
BUN/CREAT SERPL: 1.8 (ref 7–25)
CALCIUM SPEC-SCNC: 9.1 MG/DL (ref 8.6–10.5)
CHLORIDE SERPL-SCNC: 108 MMOL/L (ref 98–107)
CO2 SERPL-SCNC: 18.5 MMOL/L (ref 22–29)
CREAT SERPL-MCNC: 2.28 MG/DL (ref 0.76–1.27)
DEPRECATED RDW RBC AUTO: 50.5 FL (ref 37–54)
EGFRCR SERPLBLD CKD-EPI 2021: 27.6 ML/MIN/1.73
EOSINOPHIL # BLD AUTO: 0.19 10*3/MM3 (ref 0–0.4)
EOSINOPHIL NFR BLD AUTO: 2.2 % (ref 0.3–6.2)
ERYTHROCYTE [DISTWIDTH] IN BLOOD BY AUTOMATED COUNT: 14.7 % (ref 12.3–15.4)
GLOBULIN UR ELPH-MCNC: 3.7 GM/DL
GLUCOSE SERPL-MCNC: 140 MG/DL (ref 65–99)
HCT VFR BLD AUTO: 32.7 % (ref 37.5–51)
HGB BLD-MCNC: 10.5 G/DL (ref 13–17.7)
IMM GRANULOCYTES # BLD AUTO: 0.02 10*3/MM3 (ref 0–0.05)
IMM GRANULOCYTES NFR BLD AUTO: 0.2 % (ref 0–0.5)
IRON 24H UR-MRATE: 48 MCG/DL (ref 59–158)
IRON SATN MFR SERPL: 15 % (ref 20–50)
LYMPHOCYTES # BLD AUTO: 1.17 10*3/MM3 (ref 0.7–3.1)
LYMPHOCYTES NFR BLD AUTO: 13.8 % (ref 19.6–45.3)
MCH RBC QN AUTO: 29.6 PG (ref 26.6–33)
MCHC RBC AUTO-ENTMCNC: 32.1 G/DL (ref 31.5–35.7)
MCV RBC AUTO: 92.1 FL (ref 79–97)
MONOCYTES # BLD AUTO: 0.72 10*3/MM3 (ref 0.1–0.9)
MONOCYTES NFR BLD AUTO: 8.5 % (ref 5–12)
NEUTROPHILS NFR BLD AUTO: 6.37 10*3/MM3 (ref 1.7–7)
NEUTROPHILS NFR BLD AUTO: 74.9 % (ref 42.7–76)
PLATELET # BLD AUTO: 145 10*3/MM3 (ref 140–450)
PMV BLD AUTO: 9.5 FL (ref 6–12)
POTASSIUM SERPL-SCNC: 4.9 MMOL/L (ref 3.5–5.2)
PROT SERPL-MCNC: 7.1 G/DL (ref 6–8.5)
RBC # BLD AUTO: 3.55 10*6/MM3 (ref 4.14–5.8)
SODIUM SERPL-SCNC: 136 MMOL/L (ref 136–145)
TIBC SERPL-MCNC: 320 MCG/DL (ref 298–536)
TRANSFERRIN SERPL-MCNC: 215 MG/DL (ref 200–360)
WBC NRBC COR # BLD AUTO: 8.5 10*3/MM3 (ref 3.4–10.8)

## 2024-09-23 PROCEDURE — 84466 ASSAY OF TRANSFERRIN: CPT

## 2024-09-23 PROCEDURE — 36415 COLL VENOUS BLD VENIPUNCTURE: CPT

## 2024-09-23 PROCEDURE — 80053 COMPREHEN METABOLIC PANEL: CPT

## 2024-09-23 PROCEDURE — 83540 ASSAY OF IRON: CPT

## 2024-09-23 PROCEDURE — 85025 COMPLETE CBC W/AUTO DIFF WBC: CPT

## 2024-10-01 DIAGNOSIS — I50.42 CHRONIC COMBINED SYSTOLIC AND DIASTOLIC CONGESTIVE HEART FAILURE: ICD-10-CM

## 2024-10-02 RX ORDER — FUROSEMIDE 40 MG
40 TABLET ORAL 2 TIMES DAILY
Qty: 60 TABLET | Refills: 0 | Status: SHIPPED | OUTPATIENT
Start: 2024-10-02

## 2024-10-03 ENCOUNTER — TRANSCRIBE ORDERS (OUTPATIENT)
Dept: ADMINISTRATIVE | Facility: HOSPITAL | Age: 84
End: 2024-10-03
Payer: MEDICARE

## 2024-10-03 DIAGNOSIS — M54.50 LOW BACK PAIN, UNSPECIFIED BACK PAIN LATERALITY, UNSPECIFIED CHRONICITY, UNSPECIFIED WHETHER SCIATICA PRESENT: Primary | ICD-10-CM

## 2024-10-03 DIAGNOSIS — G89.29 OTHER CHRONIC PAIN: ICD-10-CM

## 2024-10-03 DIAGNOSIS — M48.062 PSEUDOCLAUDICATION SYNDROME: ICD-10-CM

## 2024-10-04 ENCOUNTER — TRANSCRIBE ORDERS (OUTPATIENT)
Dept: ADMINISTRATIVE | Facility: HOSPITAL | Age: 84
End: 2024-10-04
Payer: MEDICARE

## 2024-10-04 DIAGNOSIS — G89.29 CHRONIC LEFT SHOULDER PAIN: Primary | ICD-10-CM

## 2024-10-04 DIAGNOSIS — M25.512 CHRONIC LEFT SHOULDER PAIN: Primary | ICD-10-CM

## 2024-10-11 ENCOUNTER — LAB (OUTPATIENT)
Dept: LAB | Facility: HOSPITAL | Age: 84
End: 2024-10-11
Payer: MEDICARE

## 2024-10-11 ENCOUNTER — TRANSCRIBE ORDERS (OUTPATIENT)
Dept: ADMINISTRATIVE | Facility: HOSPITAL | Age: 84
End: 2024-10-11
Payer: MEDICARE

## 2024-10-11 DIAGNOSIS — I10 HTN (HYPERTENSION) WITH GOAL TO BE DETERMINED: Primary | ICD-10-CM

## 2024-10-11 DIAGNOSIS — I10 HTN (HYPERTENSION) WITH GOAL TO BE DETERMINED: ICD-10-CM

## 2024-10-11 LAB
ALBUMIN SERPL-MCNC: 3.6 G/DL (ref 3.5–5.2)
ALBUMIN UR-MCNC: <1.2 MG/DL
ALBUMIN/GLOB SERPL: 1 G/DL
ALP SERPL-CCNC: 74 U/L (ref 39–117)
ALT SERPL W P-5'-P-CCNC: 18 U/L (ref 1–41)
ANION GAP SERPL CALCULATED.3IONS-SCNC: 10 MMOL/L (ref 5–15)
AST SERPL-CCNC: 16 U/L (ref 1–40)
BACTERIA UR QL AUTO: ABNORMAL /HPF
BASOPHILS # BLD AUTO: 0.03 10*3/MM3 (ref 0–0.2)
BASOPHILS NFR BLD AUTO: 0.4 % (ref 0–1.5)
BILIRUB SERPL-MCNC: 0.4 MG/DL (ref 0–1.2)
BILIRUB UR QL STRIP: NEGATIVE
BUN SERPL-MCNC: 48 MG/DL (ref 8–23)
BUN/CREAT SERPL: 25.4 (ref 7–25)
CALCIUM SPEC-SCNC: 10.9 MG/DL (ref 8.6–10.5)
CHLORIDE SERPL-SCNC: 112 MMOL/L (ref 98–107)
CLARITY UR: CLEAR
CO2 SERPL-SCNC: 17 MMOL/L (ref 22–29)
COLOR UR: YELLOW
CREAT SERPL-MCNC: 1.89 MG/DL (ref 0.76–1.27)
CREAT UR-MCNC: 22.9 MG/DL
CREAT UR-MCNC: 24.5 MG/DL
DEPRECATED RDW RBC AUTO: 50.2 FL (ref 37–54)
EGFRCR SERPLBLD CKD-EPI 2021: 34.6 ML/MIN/1.73
EOSINOPHIL # BLD AUTO: 0.05 10*3/MM3 (ref 0–0.4)
EOSINOPHIL NFR BLD AUTO: 0.6 % (ref 0.3–6.2)
ERYTHROCYTE [DISTWIDTH] IN BLOOD BY AUTOMATED COUNT: 14.1 % (ref 12.3–15.4)
GLOBULIN UR ELPH-MCNC: 3.5 GM/DL
GLUCOSE SERPL-MCNC: 139 MG/DL (ref 65–99)
GLUCOSE UR STRIP-MCNC: NEGATIVE MG/DL
HCT VFR BLD AUTO: 30.1 % (ref 37.5–51)
HGB BLD-MCNC: 9.1 G/DL (ref 13–17.7)
HGB UR QL STRIP.AUTO: ABNORMAL
IMM GRANULOCYTES # BLD AUTO: 0.01 10*3/MM3 (ref 0–0.05)
IMM GRANULOCYTES NFR BLD AUTO: 0.1 % (ref 0–0.5)
KETONES UR QL STRIP: NEGATIVE
LEUKOCYTE ESTERASE UR QL STRIP.AUTO: NEGATIVE
LYMPHOCYTES # BLD AUTO: 0.55 10*3/MM3 (ref 0.7–3.1)
LYMPHOCYTES NFR BLD AUTO: 6.5 % (ref 19.6–45.3)
MCH RBC QN AUTO: 28.9 PG (ref 26.6–33)
MCHC RBC AUTO-ENTMCNC: 30.2 G/DL (ref 31.5–35.7)
MCV RBC AUTO: 95.6 FL (ref 79–97)
MICROALBUMIN/CREAT UR: NORMAL MG/G{CREAT}
MONOCYTES # BLD AUTO: 0.54 10*3/MM3 (ref 0.1–0.9)
MONOCYTES NFR BLD AUTO: 6.4 % (ref 5–12)
NEUTROPHILS NFR BLD AUTO: 7.28 10*3/MM3 (ref 1.7–7)
NEUTROPHILS NFR BLD AUTO: 86 % (ref 42.7–76)
NITRITE UR QL STRIP: NEGATIVE
PH UR STRIP.AUTO: 7 [PH] (ref 5–8)
PLATELET # BLD AUTO: 151 10*3/MM3 (ref 140–450)
PMV BLD AUTO: 9.2 FL (ref 6–12)
POTASSIUM SERPL-SCNC: 4.9 MMOL/L (ref 3.5–5.2)
PROT ?TM UR-MCNC: 15.1 MG/DL
PROT SERPL-MCNC: 7.1 G/DL (ref 6–8.5)
PROT UR QL STRIP: NEGATIVE
PROT/CREAT UR: 0.62 MG/G{CREAT}
RBC # BLD AUTO: 3.15 10*6/MM3 (ref 4.14–5.8)
RBC # UR STRIP: ABNORMAL /HPF
REF LAB TEST METHOD: ABNORMAL
SODIUM SERPL-SCNC: 139 MMOL/L (ref 136–145)
SP GR UR STRIP: 1.02 (ref 1–1.03)
SQUAMOUS #/AREA URNS HPF: ABNORMAL /HPF
UROBILINOGEN UR QL STRIP: ABNORMAL
WBC # UR STRIP: ABNORMAL /HPF
WBC NRBC COR # BLD AUTO: 8.46 10*3/MM3 (ref 3.4–10.8)

## 2024-10-11 PROCEDURE — 84156 ASSAY OF PROTEIN URINE: CPT

## 2024-10-11 PROCEDURE — 82570 ASSAY OF URINE CREATININE: CPT

## 2024-10-11 PROCEDURE — 36415 COLL VENOUS BLD VENIPUNCTURE: CPT

## 2024-10-11 PROCEDURE — 85025 COMPLETE CBC W/AUTO DIFF WBC: CPT

## 2024-10-11 PROCEDURE — 80053 COMPREHEN METABOLIC PANEL: CPT

## 2024-10-11 PROCEDURE — 82043 UR ALBUMIN QUANTITATIVE: CPT

## 2024-10-11 PROCEDURE — 81001 URINALYSIS AUTO W/SCOPE: CPT

## 2024-10-15 ENCOUNTER — OFFICE VISIT (OUTPATIENT)
Dept: FAMILY MEDICINE CLINIC | Age: 84
End: 2024-10-15
Payer: MEDICARE

## 2024-10-15 VITALS
TEMPERATURE: 97.9 F | DIASTOLIC BLOOD PRESSURE: 63 MMHG | HEIGHT: 67 IN | BODY MASS INDEX: 20.34 KG/M2 | HEART RATE: 55 BPM | OXYGEN SATURATION: 96 % | WEIGHT: 129.6 LBS | SYSTOLIC BLOOD PRESSURE: 112 MMHG

## 2024-10-15 DIAGNOSIS — M46.97 UNSPECIFIED INFLAMMATORY SPONDYLOPATHY, LUMBOSACRAL REGION: ICD-10-CM

## 2024-10-15 DIAGNOSIS — I73.9 PERIPHERAL VASCULAR DISEASE, UNSPECIFIED: ICD-10-CM

## 2024-10-15 DIAGNOSIS — Z89.512 ACQUIRED ABSENCE OF LEFT LEG BELOW KNEE: ICD-10-CM

## 2024-10-15 DIAGNOSIS — I10 PRIMARY HYPERTENSION: ICD-10-CM

## 2024-10-15 DIAGNOSIS — I50.42 CHRONIC COMBINED SYSTOLIC AND DIASTOLIC CONGESTIVE HEART FAILURE: ICD-10-CM

## 2024-10-15 DIAGNOSIS — I25.10 CORONARY ARTERY DISEASE INVOLVING NATIVE CORONARY ARTERY OF NATIVE HEART WITHOUT ANGINA PECTORIS: ICD-10-CM

## 2024-10-15 DIAGNOSIS — I48.21 PERMANENT ATRIAL FIBRILLATION: ICD-10-CM

## 2024-10-15 DIAGNOSIS — E11.51 TYPE 2 DIABETES MELLITUS WITH DIABETIC PERIPHERAL ANGIOPATHY WITHOUT GANGRENE, WITHOUT LONG-TERM CURRENT USE OF INSULIN: Primary | ICD-10-CM

## 2024-10-15 DIAGNOSIS — Z93.6 OTHER ARTIFICIAL OPENINGS OF URINARY TRACT STATUS: ICD-10-CM

## 2024-10-15 DIAGNOSIS — E78.2 MIXED HYPERLIPIDEMIA: ICD-10-CM

## 2024-10-15 DIAGNOSIS — I25.5 ISCHEMIC CARDIOMYOPATHY: ICD-10-CM

## 2024-10-15 PROCEDURE — 1159F MED LIST DOCD IN RCRD: CPT | Performed by: FAMILY MEDICINE

## 2024-10-15 PROCEDURE — 1160F RVW MEDS BY RX/DR IN RCRD: CPT | Performed by: FAMILY MEDICINE

## 2024-10-15 PROCEDURE — G2211 COMPLEX E/M VISIT ADD ON: HCPCS | Performed by: FAMILY MEDICINE

## 2024-10-15 PROCEDURE — 3078F DIAST BP <80 MM HG: CPT | Performed by: FAMILY MEDICINE

## 2024-10-15 PROCEDURE — 3074F SYST BP LT 130 MM HG: CPT | Performed by: FAMILY MEDICINE

## 2024-10-15 PROCEDURE — 99214 OFFICE O/P EST MOD 30 MIN: CPT | Performed by: FAMILY MEDICINE

## 2024-10-15 RX ORDER — CANAGLIFLOZIN 100 MG/1
100 TABLET, FILM COATED ORAL DAILY
Qty: 30 TABLET | Refills: 5 | Status: SHIPPED | OUTPATIENT
Start: 2024-10-15

## 2024-10-15 RX ORDER — LOVASTATIN 40 MG
40 TABLET ORAL EVERY EVENING
Qty: 90 TABLET | Refills: 1 | Status: SHIPPED | OUTPATIENT
Start: 2024-10-15

## 2024-10-15 NOTE — ASSESSMENT & PLAN NOTE
Kidney function has dropped to GFR 34.6.  We are going to stop metformin and start Invokana 100 mg daily (which is what his insurance covers -- Farxiga and Jardiance are both not covered).  Labs are reviewed and UTD.  Last A1c was 6.3 in August.  He does adhere to a diabetic diet.  He is UTD on eye exam (last done 9/2024).  He is on an ARB and statin.

## 2024-10-15 NOTE — ASSESSMENT & PLAN NOTE
Following with Dr. Castañeda Cardiology.  Stable on Eliquis 5 mg twice daily for anticoagulation and carvedilol 12.5 mg twice daily for rate control.

## 2024-10-15 NOTE — ASSESSMENT & PLAN NOTE
Follows with Dr. Castañeda Cardiology.  He is on Eliquis for the combination of CAD, atrial fibrillation and systolic heart failure. Status post ICD placement.  He is on statin, beta-blocker and ARB.

## 2024-10-15 NOTE — PROGRESS NOTES
Chief Complaint  Follow-up (2 month check )    Subjective          Marcello Larson presents to [facility] today for follow-up on chronic issues.     He is on beta-blocker (carvedilol) and ARB (losartan) for HTN and CAD, status post two-vessel CABG.  Blood pressure has been well-controlled.  No chest pain, palpitations or shortness of breath.  He has previously been on Ranexa but is no longer using this.  He is on Eliquis for anticoagulation of a-fib, through patient assistance.  He is on lovastatin for hyperlipidemia.  He has NTG for as needed use.  He is following with Dr. Castañeda Cardiology.  He is on Lasix for systolic CHF.  Status post ICD.     He is on metformin for diabetes.  GFR is too low to continue metformin.  Jardiance was too expensive.  Last A1c was 6.3 in August.  He does adhere to a diabetic diet.  He is UTD on eye exam (last done 9/2024).  He is on an ARB and statin.    He is now following with Dr. Espinoza for acute kidney injury.      He is on pantoprazole for GERD and h/o bleeding ulcer (NSAID-induced.)  He has prn sucralfate.  He is on iron supplement.     He is on hydrocodone/APAP for chronic low back and leg pain.  Back pain has been quite severe as well as the shoulders.  He is going for MRI in December.  He following with [facility]. [facility] for medication management and epidurals.  He has previously used gabapentin.  S/p physical therapy and RFA.     He has a left BKA with prosthesis.        He is on Flonase and montelukast for chronic allergies.      Current Outpatient Medications:   •  apixaban (ELIQUIS) 5 MG tablet tablet, Take 0.5 tablets by mouth 2 (Two) Times a Day., Disp: , Rfl:   •  carvedilol (COREG) 12.5 MG tablet, Take 1 tablet by mouth 2 (Two) Times a Day., Disp: 180 tablet, Rfl: 3  •  Diclofenac Sodium (VOLTAREN) 1 % gel gel, Apply 4 g topically to the appropriate area as directed 2 (Two) Times a Day., Disp: 100 g, Rfl: 3  •  ferrous  sulfate 325 (65 FE) MG tablet, Take 1 tablet by mouth 2 (Two) Times a Day., Disp: , Rfl:   •  fexofenadine (ALLEGRA) 180 MG tablet, Take 1 tablet by mouth Daily., Disp: , Rfl:   •  fluticasone (FLONASE) 50 MCG/ACT nasal spray, 1 spray into the nostril(s) as directed by provider Daily., Disp: 48 g, Rfl: 3  •  furosemide (LASIX) 40 MG tablet, TAKE 1 TABLET BY MOUTH TWICE DAILY, Disp: 60 tablet, Rfl: 0  •  HYDROcodone-acetaminophen (NORCO) 5-325 MG per tablet, Take 1 tablet by mouth Daily., Disp: , Rfl:   •  levothyroxine (SYNTHROID, LEVOTHROID) 25 MCG tablet, Take 1 tablet by mouth Daily., Disp: 30 tablet, Rfl: 5  •  losartan (COZAAR) 50 MG tablet, Take 1 tablet by mouth Daily., Disp: , Rfl:   •  lovastatin (MEVACOR) 40 MG tablet, Take 1 tablet by mouth Every Evening., Disp: 90 tablet, Rfl: 1  •  metFORMIN (GLUCOPHAGE) 500 MG tablet, Take 1 tablet by mouth Daily., Disp: 90 tablet, Rfl: 1  •  montelukast (SINGULAIR) 10 MG tablet, Take 1 tablet by mouth Every Night., Disp: 90 tablet, Rfl: 3  •  nitroglycerin (NITROSTAT) 0.4 MG SL tablet, DISSOLVE 1 TABLET UNDER THE TONGUE EVERY 5 MINUTES AS NEEDED FOR CHEST PAIN. DO NOT EXCEED A TOTAL OF 3 DOSES IN 15 MINUTES. IF NO RELIEF, CALL 911 (Patient taking differently: Place 1 tablet under the tongue Every 5 (Five) Minutes As Needed for Chest Pain.), Disp: 25 tablet, Rfl: 0  •  pantoprazole (PROTONIX) 40 MG EC tablet, Take 1 tablet by mouth Daily., Disp: 90 tablet, Rfl: 1  •  potassium chloride (MICRO-K) 10 MEQ CR capsule, Take 1 capsule by mouth 2 (Two) Times a Day., Disp: , Rfl:   •  prochlorperazine (COMPAZINE) 10 MG tablet, Take 1 tablet by mouth Every 8 (Eight) Hours As Needed for Nausea or Vomiting., Disp: 24 tablet, Rfl: 0  •  SSD 1 % cream, APPLY TO THE AFFECTED AREA(S) TWICE DAILY, Disp: 50 g, Rfl: 0  •  sucralfate (CARAFATE) 1 g tablet, TAKE 1 TABLET BY MOUTH FOUR TIMES DAILY, Disp: 120 tablet, Rfl: 5  •  vitamin B-6 (PYRIDOXINE) 100 MG tablet, Take 1 tablet by mouth  "Daily., Disp: , Rfl:   •  vitamin C (ASCORBIC ACID) 500 MG tablet, Take 1 tablet by mouth Daily., Disp: , Rfl:   •  Canagliflozin (Invokana) 100 MG tablet tablet, Take 1 tablet by mouth Daily., Disp: 30 tablet, Rfl: 5    Allergies:  Iodinated contrast media, Lisinopril, and Prednisone      Objective   Vital Signs:   Vitals:    10/15/24 0907   BP: 112/63   BP Location: Left arm   Patient Position: Sitting   Cuff Size: Adult   Pulse: 55   Temp: 97.9 °F (36.6 °C)   TempSrc: Oral   SpO2: 96%   Weight: 58.8 kg (129 lb 9.6 oz)   Height: 170.2 cm (67.01\")     Body mass index is 20.29 kg/m².  BMI is within normal parameters. No other follow-up for BMI required.       Physical Exam  Vitals reviewed.   Constitutional:       General: He is not in acute distress.     Appearance: Normal appearance. He is well-developed.   HENT:      Head: Normocephalic and atraumatic.      Right Ear: External ear normal.      Left Ear: External ear normal.   Eyes:      Extraocular Movements: Extraocular movements intact.      Conjunctiva/sclera: Conjunctivae normal.      Pupils: Pupils are equal, round, and reactive to light.   Cardiovascular:      Rate and Rhythm: Normal rate. Rhythm irregularly irregular.      Heart sounds: No murmur heard.  Pulmonary:      Effort: Pulmonary effort is normal.      Breath sounds: Normal breath sounds. No wheezing, rhonchi or rales.   Abdominal:      General: Bowel sounds are normal. There is no distension.      Palpations: Abdomen is soft.      Tenderness: There is no abdominal tenderness.   Genitourinary:     Comments: Urostomy bag in place filled with clear urine, stoma appears normal  Musculoskeletal:         General: Normal range of motion.      Comments: +L BKA prosthesis   Skin:     General: Skin is warm and dry.   Neurological:      Mental Status: He is alert.   Psychiatric:         Mood and Affect: Affect normal.      Lab Results   Component Value Date    GLUCOSE 139 (H) 10/11/2024    BUN 48 (H) " 10/11/2024    CREATININE 1.89 (H) 10/11/2024    EGFR 34.6 (L) 10/11/2024    BCR 25.4 (H) 10/11/2024    K 4.9 10/11/2024    CO2 17.0 (L) 10/11/2024    CALCIUM 10.9 (H) 10/11/2024    ALBUMIN 3.6 10/11/2024    BILITOT 0.4 10/11/2024    AST 16 10/11/2024    ALT 18 10/11/2024       Lab Results   Component Value Date    CHOL 102 04/30/2024    CHLPL 129 02/20/2021    TRIG 77 04/30/2024    HDL 31 (L) 04/30/2024    LDL 55 04/30/2024       Lab Results   Component Value Date    WBC 8.46 10/11/2024    HGB 9.1 (L) 10/11/2024    HCT 30.1 (L) 10/11/2024    MCV 95.6 10/11/2024     10/11/2024     Lab Results   Component Value Date    HGBA1C 6.30 (H) 08/06/2024            Assessment and Plan    Assessment & Plan  Type 2 diabetes mellitus with diabetic peripheral angiopathy without gangrene, without long-term current use of insulin    Kidney function has dropped to GFR 34.6.  We are going to stop metformin and start Invokana 100 mg daily (which is what his insurance covers -- Farxiga and Jardiance are both not covered).  Labs are reviewed and UTD.  Last A1c was 6.3 in August.  He does adhere to a diabetic diet.  He is UTD on eye exam (last done 9/2024).  He is on an ARB and statin.    Primary hypertension  Blood pressure has been running at goal.  Continue losartan 50 mg daily and carvedilol 12.5 mg twice daily.  Refills were not needed today.  Labs were not needed today.  Continue to monitor.  Mixed hyperlipidemia   Stable on lovastatin 40 mg daily.  Refills were needed today.  Labs were not due today.  Continue to monitor.  Permanent atrial fibrillation  Following with Dr. Castañeda [facility].  Stable on Eliquis 5 mg twice daily for anticoagulation and carvedilol 12.5 mg twice daily for rate control.  Coronary artery disease involving native coronary artery of native heart without angina pectoris  Follows with Dr. Cast[facility]eda [facility].  He is on Eliquis for the combination of CAD, atrial fibrillation and systolic heart failure. Status  post ICD placement.  He is on statin, beta-blocker and ARB.    Chronic combined systolic and diastolic congestive heart failure    Follows with Dr. Cast[facility].  Stable on ARB and beta-blocker.  Status post ICD placement.  Ischemic cardiomyopathy  Status post ICD placement. Following with Dr. Castañeda Cardiology.    Peripheral vascular disease, unspecified  Status post left BKA. Stable on statin.           Unspecified inflammatory spondylopathy, lumbosacral region  Following with Dr. Charlette Rascon at [facility].  Stable on hydrocodone.  They are monitoring.  Also doing epidurals.  Other artificial openings of urinary tract status  S/p urostomy d/t bladder cancer.  Functioning well.  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             New Medications Ordered This Visit   Medications   • lovastatin (MEVACOR) 40 MG tablet     Sig: Take 1 tablet by mouth Every Evening.     Dispense:  90 tablet     Refill:  1     This prescription was filled on 08/30/2023. Any refills authorized will be placed on file.   • Canagliflozin (Invokana) 100 MG tablet tablet     Sig: Take 1 tablet by mouth Daily.     Dispense:  30 tablet     Refill:  5               Follow Up   Return in about 2 months (around 12/15/2024) for Recheck.  Patient was given instructions and counseling regarding his condition or for health maintenance advice. Please see specific information pulled into the AVS if appropriate.

## 2024-10-15 NOTE — ASSESSMENT & PLAN NOTE
Following with Dr. Charlette Rascon at [facility].  Stable on hydrocodone.  They are monitoring.  Also doing epidurals.

## 2024-10-27 ENCOUNTER — READMISSION MANAGEMENT (OUTPATIENT)
Dept: CALL CENTER | Facility: HOSPITAL | Age: 84
End: 2024-10-27
Payer: MEDICARE

## 2024-10-27 NOTE — OUTREACH NOTE
Prep Survey      Flowsheet Row Responses   [facility] facility patient discharged from? Non-[facility]   Is LACE score < 7 ? Non-BH Discharge   Eligibility [facility]   Date of Admission 10/25/24   Date of Discharge 10/27/24   Discharge Disposition Home or Self Care   Discharge diagnosis PNA (pneumonia)   Does the patient have one of the following disease processes/diagnoses(primary or secondary)? Pneumonia   Does the patient have Home health ordered? No   Is there a DME ordered? No   Prep survey completed? Yes            RAFA DAVIS - Registered Nurse

## 2024-10-28 ENCOUNTER — TRANSITIONAL CARE MANAGEMENT TELEPHONE ENCOUNTER (OUTPATIENT)
Dept: CALL CENTER | Facility: HOSPITAL | Age: 84
End: 2024-10-28
Payer: MEDICARE

## 2024-10-28 DIAGNOSIS — J30.1 SEASONAL ALLERGIC RHINITIS DUE TO POLLEN: ICD-10-CM

## 2024-10-28 DIAGNOSIS — E11.59 TYPE 2 DIABETES MELLITUS WITH OTHER CIRCULATORY COMPLICATIONS: ICD-10-CM

## 2024-10-28 RX ORDER — FLUTICASONE PROPIONATE 50 MCG
2 SPRAY, SUSPENSION (ML) NASAL DAILY
Qty: 48 G | Refills: 3 | Status: SHIPPED | OUTPATIENT
Start: 2024-10-28

## 2024-10-28 NOTE — OUTREACH NOTE
Call Center TCM Note      Flowsheet Row Responses   [facility] facility patient discharged from? Non-  [[facility]]   Does the patient have one of the following disease processes/diagnoses(primary or secondary)? Pneumonia   TCM attempt successful? No  [VR for Jr Marcello and shira Parisi]   Unsuccessful attempts Attempt 1  [spoke with dtr who requested a call after 1pm today]   Call Status Left message            Terese De Leon RN    10/28/2024, 09:48 EDT

## 2024-10-29 ENCOUNTER — TELEPHONE (OUTPATIENT)
Dept: FAMILY MEDICINE CLINIC | Age: 84
End: 2024-10-29
Payer: MEDICARE

## 2024-10-29 DIAGNOSIS — E11.51 TYPE 2 DIABETES MELLITUS WITH DIABETIC PERIPHERAL ANGIOPATHY WITHOUT GANGRENE, WITHOUT LONG-TERM CURRENT USE OF INSULIN: Primary | ICD-10-CM

## 2024-10-29 DIAGNOSIS — I50.42 CHRONIC COMBINED SYSTOLIC AND DIASTOLIC CONGESTIVE HEART FAILURE: ICD-10-CM

## 2024-10-29 RX ORDER — DAPAGLIFLOZIN 5 MG/1
5 TABLET, FILM COATED ORAL DAILY
Qty: 30 TABLET | Refills: 5 | Status: SHIPPED | OUTPATIENT
Start: 2024-10-29 | End: 2024-10-29

## 2024-10-29 RX ORDER — DAPAGLIFLOZIN 5 MG/1
5 TABLET, FILM COATED ORAL DAILY
Qty: 30 TABLET | Refills: 5 | Status: SHIPPED | OUTPATIENT
Start: 2024-10-29

## 2024-10-29 RX ORDER — FUROSEMIDE 40 MG/1
40 TABLET ORAL 2 TIMES DAILY
Qty: 60 TABLET | Refills: 0 | Status: SHIPPED | OUTPATIENT
Start: 2024-10-29

## 2024-10-29 NOTE — TELEPHONE ENCOUNTER
Caller: Marcello Larson    Relationship: Self    Best call back number:     [phone]        What is the best time to reach you: ANYTIME    Who are you requesting to speak with (clinical staff, provider,  specific staff member): EDGAR    Do you know the name of the person who called: PATIENT    What was the call regarding: PATIENT STATES IT IS REGARDING NEW DIABETIC MEDICATION THAT MD CLIFTON HAS RECENTLY PRESCRIBED. PATIENT STATES HAS NOT RECEIVED MEDICATION AND UNABLE TO SUCCESSFULLY GET PHARMACY ON THE PHONE TO CHECK STATUS. PATIENT DID REACH OUT TO THE LOCAL PHARMACY OF [facility] TO CHECK PRICE OF MEDICATION AND WAS ADVISED IT IS COSTLY. SO A DIFFERENT MEDICATION MAY NEED TO BE PRESCRIBED.     Is it okay if the provider responds through MyChart: CALLBACK

## 2024-10-29 NOTE — TELEPHONE ENCOUNTER
I ran the new estimate tool on the Farxiga, and it looks like it actually might be covered after all, so we can give it a try.  If it's not, though, we will try Januvia.  It doesn't have the good heart protective effects but will hopefully still work for the blood sugar control.  Thanks, MARISSA

## 2024-10-31 ENCOUNTER — TELEPHONE (OUTPATIENT)
Dept: FAMILY MEDICINE CLINIC | Age: 84
End: 2024-10-31
Payer: MEDICARE

## 2024-10-31 DIAGNOSIS — D64.9 ANEMIA, UNSPECIFIED TYPE: ICD-10-CM

## 2024-10-31 DIAGNOSIS — E61.1 IRON DEFICIENCY: ICD-10-CM

## 2024-10-31 DIAGNOSIS — E03.9 ACQUIRED HYPOTHYROIDISM: Primary | ICD-10-CM

## 2024-10-31 NOTE — TELEPHONE ENCOUNTER
----- Message from Zeny Corley sent at 9/18/2024  4:59 PM EDT -----  Please call pt to come back in for TSH recheck.  Please place order and pend to me, dx hypothyroidism.  Thanks, MARISSA

## 2024-10-31 NOTE — TELEPHONE ENCOUNTER
Pt daughter inf re tickle, orders placed, is also requesting pt iron levels checked, states pt has been low and has gotten 3 iron infusions in the last few weeks, please adivse.

## 2024-11-01 ENCOUNTER — LAB (OUTPATIENT)
Dept: LAB | Facility: HOSPITAL | Age: 84
End: 2024-11-01
Payer: MEDICARE

## 2024-11-01 DIAGNOSIS — E61.1 IRON DEFICIENCY: ICD-10-CM

## 2024-11-01 DIAGNOSIS — E03.9 ACQUIRED HYPOTHYROIDISM: ICD-10-CM

## 2024-11-01 LAB
FERRITIN SERPL-MCNC: 1139 NG/ML (ref 30–400)
HCT VFR BLD AUTO: 30.5 % (ref 37.5–51)
HGB BLD-MCNC: 9.3 G/DL (ref 13–17.7)
IRON 24H UR-MRATE: 38 MCG/DL (ref 59–158)
IRON SATN MFR SERPL: 15 % (ref 20–50)
TIBC SERPL-MCNC: 259 MCG/DL (ref 298–536)
TRANSFERRIN SERPL-MCNC: 174 MG/DL (ref 200–360)
TSH SERPL DL<=0.05 MIU/L-ACNC: 2.98 UIU/ML (ref 0.27–4.2)

## 2024-11-01 PROCEDURE — 84466 ASSAY OF TRANSFERRIN: CPT

## 2024-11-01 PROCEDURE — 36415 COLL VENOUS BLD VENIPUNCTURE: CPT

## 2024-11-01 PROCEDURE — 83540 ASSAY OF IRON: CPT

## 2024-11-01 PROCEDURE — 85014 HEMATOCRIT: CPT

## 2024-11-01 PROCEDURE — 85018 HEMOGLOBIN: CPT

## 2024-11-01 PROCEDURE — 82728 ASSAY OF FERRITIN: CPT

## 2024-11-01 PROCEDURE — 84443 ASSAY THYROID STIM HORMONE: CPT

## 2024-11-05 ENCOUNTER — OFFICE VISIT (OUTPATIENT)
Dept: FAMILY MEDICINE CLINIC | Age: 84
End: 2024-11-05
Payer: MEDICARE

## 2024-11-05 VITALS
HEART RATE: 50 BPM | OXYGEN SATURATION: 94 % | SYSTOLIC BLOOD PRESSURE: 122 MMHG | WEIGHT: 130.8 LBS | TEMPERATURE: 98 F | BODY MASS INDEX: 20.53 KG/M2 | HEIGHT: 67 IN | DIASTOLIC BLOOD PRESSURE: 61 MMHG

## 2024-11-05 DIAGNOSIS — I10 PRIMARY HYPERTENSION: ICD-10-CM

## 2024-11-05 DIAGNOSIS — I50.42 CHRONIC COMBINED SYSTOLIC AND DIASTOLIC CONGESTIVE HEART FAILURE: Primary | ICD-10-CM

## 2024-11-05 DIAGNOSIS — K29.50 CHRONIC GASTRITIS WITHOUT BLEEDING, UNSPECIFIED GASTRITIS TYPE: ICD-10-CM

## 2024-11-05 DIAGNOSIS — E78.2 MIXED HYPERLIPIDEMIA: ICD-10-CM

## 2024-11-05 DIAGNOSIS — E11.51 TYPE 2 DIABETES MELLITUS WITH DIABETIC PERIPHERAL ANGIOPATHY WITHOUT GANGRENE, WITHOUT LONG-TERM CURRENT USE OF INSULIN: ICD-10-CM

## 2024-11-05 DIAGNOSIS — D50.9 IRON DEFICIENCY ANEMIA, UNSPECIFIED IRON DEFICIENCY ANEMIA TYPE: ICD-10-CM

## 2024-11-05 DIAGNOSIS — N18.32 STAGE 3B CHRONIC KIDNEY DISEASE: ICD-10-CM

## 2024-11-05 PROCEDURE — 1111F DSCHRG MED/CURRENT MED MERGE: CPT | Performed by: FAMILY MEDICINE

## 2024-11-05 PROCEDURE — 1160F RVW MEDS BY RX/DR IN RCRD: CPT | Performed by: FAMILY MEDICINE

## 2024-11-05 PROCEDURE — 1159F MED LIST DOCD IN RCRD: CPT | Performed by: FAMILY MEDICINE

## 2024-11-05 PROCEDURE — 99495 TRANSJ CARE MGMT MOD F2F 14D: CPT | Performed by: FAMILY MEDICINE

## 2024-11-05 PROCEDURE — 3078F DIAST BP <80 MM HG: CPT | Performed by: FAMILY MEDICINE

## 2024-11-05 PROCEDURE — 3074F SYST BP LT 130 MM HG: CPT | Performed by: FAMILY MEDICINE

## 2024-11-05 RX ORDER — LOVASTATIN 40 MG/1
40 TABLET ORAL EVERY EVENING
Qty: 90 TABLET | Refills: 1 | Status: SHIPPED | OUTPATIENT
Start: 2024-11-05

## 2024-11-05 RX ORDER — LOSARTAN POTASSIUM 50 MG/1
50 TABLET ORAL DAILY
Qty: 90 TABLET | Refills: 1 | Status: SHIPPED | OUTPATIENT
Start: 2024-11-05

## 2024-11-05 RX ORDER — FUROSEMIDE 40 MG/1
40 TABLET ORAL 2 TIMES DAILY
Qty: 60 TABLET | Refills: 0 | Status: SHIPPED | OUTPATIENT
Start: 2024-11-05

## 2024-11-05 RX ORDER — PANTOPRAZOLE SODIUM 40 MG/1
40 TABLET, DELAYED RELEASE ORAL DAILY
Qty: 90 TABLET | Refills: 1 | Status: SHIPPED | OUTPATIENT
Start: 2024-11-05

## 2024-11-05 NOTE — ASSESSMENT & PLAN NOTE
Stable on pantoprazole 40 mg daily.  Refills were needed today.  Continue to monitor.  Wean PPI as able.  Orders:    pantoprazole (PROTONIX) 40 MG EC tablet; Take 1 tablet by mouth Daily.

## 2024-11-05 NOTE — ASSESSMENT & PLAN NOTE
Discussed need to discontinue metformin unless we see some improvement in the kidney function and GFR.  Unfortunately, we were unable to get the SGLT 2 blocker due to cost, so we will instead try him on Januvia at 25 mg daily for renal dosing.  When the new year rolls over, we may be able to get him on some Jardiance for both kidney and heart protection as well as diabetes control after all.  Orders:    SITagliptin (Januvia) 25 MG tablet; Take 1 tablet by mouth Daily.

## 2024-11-05 NOTE — ASSESSMENT & PLAN NOTE
Stable on lovastatin 40 mg daily.  Refills were needed today.  Labs were not due today.  Continue to monitor.  Orders:    lovastatin (MEVACOR) 40 MG tablet; Take 1 tablet by mouth Every Evening.

## 2024-11-05 NOTE — ASSESSMENT & PLAN NOTE
He is euvolemic today on exam.  Currently on Lasix 40 mg twice daily.  He is following with Dr. Oliveira Cardiology but has not seen him since discharge.  He is currently on beta-blocker and ARB.  Continue to monitor closely.    Orders:    furosemide (LASIX) 40 MG tablet; Take 1 tablet by mouth 2 (Two) Times a Day.

## 2024-11-05 NOTE — ASSESSMENT & PLAN NOTE
Blood pressure has been running at goal.  Continue losartan 50 mg daily and carvedilol 12.5 mg twice.  Refills were needed today.  Labs were not needed today.  Continue to monitor.  Orders:    losartan (COZAAR) 50 MG tablet; Take 1 tablet by mouth Daily.

## 2024-11-05 NOTE — ASSESSMENT & PLAN NOTE
Marcello has had 2 iron infusions at this point with no significant improvement in his labs.  I am going to get him set up with Dr. Valdez Hematology at the [facility] for further evaluation.  Orders:    Ambulatory Referral to Hematology / Oncology

## 2024-11-05 NOTE — PROGRESS NOTES
Transitional Care Follow Up Visit  Subjective     Marcello Larson is a 84 y.o. male who presents for a transitional care management visit.    Within 48 business hours after discharge our office contacted him via telephone to coordinate his care and needs.      I reviewed and discussed the details of that call along with the discharge summary, hospital problems, inpatient lab results, inpatient diagnostic studies, and consultation reports with Marcello.     Current outpatient and discharge medications have been reconciled for the patient.  Reviewed by: Zeny Corley MD          10/27/2024     6:25 PM   Date of TCM Phone Call   [facility]   Date of Admission 10/25/2024   Date of Discharge 10/27/2024   Discharge Disposition Home or Self Care     Risk for Readmission (LACE) No data recorded    History of Present Illness   Course During Hospital Stay:  Marcello is here today for transition of care appointment following admission to [facility] from 10/25/2024 to 10/27/2024 for diagnosis of CHF exacerbation and iron deficiency.  He is accompanied today by his daughter.     He presented to the ED initially with complaint of shortness of breath.  Workup included a BNP of 17,000 and chest x-ray showing opacities.  Follow-up CT chest showed diffuse interstitial opacities with right upper lobe groundglass opacity.  This was initially thought to be pneumonia.  However, Marcello and Ailin report this was ultimately felt to be secondary to fluid buildup and he was not discharged home on any antibiotics.  He has had 2 iron infusions (once while admitted and once since discharge).  Despite this, his iron levels and hemoglobin did not noticeably improve on his last labs.  He has not had any black or sticky stools.    Today, he is fatigued but feeling somewhat better.  He does have some persistent shortness of breath but this is overall improved.  No pedal edema in the right lower extremity.  He has been using his Lasix 40  mg twice daily.  He has not started on Farxiga due to cost.  Previously tried to get him both Invokana and Jardiance but these were likewise too expensive.  His metformin was discontinued due to GFR below 45, but since he was unable to get the SGLT2 medication, and he has not stopped the metformin quite yet.     The following portions of the patient's history were reviewed and updated as appropriate: allergies, current medications, past family history, past medical history, past social history, past surgical history, and problem list.    Review of Systems   Constitutional:  Positive for fatigue. Negative for chills and fever.   Respiratory:  Positive for shortness of breath (improving). Negative for cough.    Cardiovascular:  Negative for chest pain and palpitations.   Gastrointestinal:  Negative for abdominal pain, constipation, diarrhea, nausea and vomiting.   Musculoskeletal:  Positive for back pain. Negative for arthralgias and myalgias.   Neurological:  Positive for weakness (generalized). Negative for numbness.         Current Outpatient Medications:     apixaban (ELIQUIS) 5 MG tablet tablet, Take 0.5 tablets by mouth 2 (Two) Times a Day., Disp: , Rfl:     carvedilol (COREG) 12.5 MG tablet, Take 1 tablet by mouth 2 (Two) Times a Day., Disp: 180 tablet, Rfl: 3    Diclofenac Sodium (VOLTAREN) 1 % gel gel, Apply 4 g topically to the appropriate area as directed 2 (Two) Times a Day., Disp: 100 g, Rfl: 3    ferrous sulfate 325 (65 FE) MG tablet, Take 1 tablet by mouth 2 (Two) Times a Day., Disp: , Rfl:     fexofenadine (ALLEGRA) 180 MG tablet, Take 1 tablet by mouth Daily., Disp: , Rfl:     fluticasone (FLONASE) 50 MCG/ACT nasal spray, USE 2 SPRAYS NASALLY EVERY DAY AS DIRECTED BY PROVIDER, Disp: 48 g, Rfl: 3    furosemide (LASIX) 40 MG tablet, Take 1 tablet by mouth 2 (Two) Times a Day., Disp: 60 tablet, Rfl: 0    HYDROcodone-acetaminophen (NORCO) 5-325 MG per tablet, Take 1 tablet by mouth Daily., Disp: , Rfl:      levothyroxine (SYNTHROID, LEVOTHROID) 25 MCG tablet, Take 1 tablet by mouth Daily., Disp: 30 tablet, Rfl: 5    losartan (COZAAR) 50 MG tablet, Take 1 tablet by mouth Daily., Disp: 90 tablet, Rfl: 1    lovastatin (MEVACOR) 40 MG tablet, Take 1 tablet by mouth Every Evening., Disp: 90 tablet, Rfl: 1    metFORMIN (GLUCOPHAGE) 500 MG tablet, Take 1 tablet by mouth Daily., Disp: 90 tablet, Rfl: 1    montelukast (SINGULAIR) 10 MG tablet, Take 1 tablet by mouth Every Night., Disp: 90 tablet, Rfl: 3    nitroglycerin (NITROSTAT) 0.4 MG SL tablet, DISSOLVE 1 TABLET UNDER THE TONGUE EVERY 5 MINUTES AS NEEDED FOR CHEST PAIN. DO NOT EXCEED A TOTAL OF 3 DOSES IN 15 MINUTES. IF NO RELIEF, CALL 911 (Patient taking differently: Place 1 tablet under the tongue Every 5 (Five) Minutes As Needed for Chest Pain.), Disp: 25 tablet, Rfl: 0    pantoprazole (PROTONIX) 40 MG EC tablet, Take 1 tablet by mouth Daily., Disp: 90 tablet, Rfl: 1    potassium chloride (MICRO-K) 10 MEQ CR capsule, Take 1 capsule by mouth 2 (Two) Times a Day., Disp: , Rfl:     prochlorperazine (COMPAZINE) 10 MG tablet, Take 1 tablet by mouth Every 8 (Eight) Hours As Needed for Nausea or Vomiting., Disp: 24 tablet, Rfl: 0    SSD 1 % cream, APPLY TO THE AFFECTED AREA(S) TWICE DAILY, Disp: 50 g, Rfl: 0    sucralfate (CARAFATE) 1 g tablet, TAKE 1 TABLET BY MOUTH FOUR TIMES DAILY, Disp: 120 tablet, Rfl: 5    vitamin B-6 (PYRIDOXINE) 100 MG tablet, Take 1 tablet by mouth Daily., Disp: , Rfl:     vitamin C (ASCORBIC ACID) 500 MG tablet, Take 1 tablet by mouth Daily., Disp: , Rfl:     SITagliptin (Januvia) 25 MG tablet, Take 1 tablet by mouth Daily., Disp: 30 tablet, Rfl: 5  Medications Discontinued During This Encounter   Medication Reason    dapagliflozin (Farxiga) 5 MG tablet tablet Cost of medication    pantoprazole (PROTONIX) 40 MG EC tablet Reorder    losartan (COZAAR) 50 MG tablet Reorder    lovastatin (MEVACOR) 40 MG tablet Reorder    furosemide (LASIX) 40 MG tablet  "Reorder         Objective   Vitals:    11/05/24 1431 11/05/24 1519   BP: 152/72 122/61   BP Location: Left arm    Patient Position: Sitting    Cuff Size: Adult    Pulse: 50    Temp: 98 °F (36.7 °C)    TempSrc: Oral    SpO2: 94%    Weight: 59.3 kg (130 lb 12.8 oz)    Height: 170.2 cm (67.01\")      Body mass index is 20.48 kg/m².  BMI is within normal parameters. No other follow-up for BMI required.    Physical Exam  Vitals reviewed.   Constitutional:       General: He is not in acute distress.     Appearance: Normal appearance. He is well-developed.   HENT:      Head: Normocephalic and atraumatic.      Right Ear: External ear normal.      Left Ear: External ear normal.   Eyes:      Extraocular Movements: Extraocular movements intact.      Conjunctiva/sclera: Conjunctivae normal.      Pupils: Pupils are equal, round, and reactive to light.   Cardiovascular:      Rate and Rhythm: Normal rate. Rhythm irregularly irregular.      Heart sounds: No murmur heard.  Pulmonary:      Effort: Pulmonary effort is normal.      Breath sounds: Normal breath sounds. No wheezing, rhonchi or rales.   Abdominal:      General: Bowel sounds are normal. There is no distension.      Palpations: Abdomen is soft.      Tenderness: There is no abdominal tenderness.   Musculoskeletal:         General: Normal range of motion.   Neurological:      Mental Status: He is alert.   Psychiatric:         Mood and Affect: Affect normal.         Lab Results   Component Value Date    GLUCOSE 139 (H) 10/11/2024    BUN 48 (H) 10/11/2024    CREATININE 1.89 (H) 10/11/2024    EGFR 34.6 (L) 10/11/2024    BCR 25.4 (H) 10/11/2024    K 4.9 10/11/2024    CO2 17.0 (L) 10/11/2024    CALCIUM 10.9 (H) 10/11/2024    ALBUMIN 3.6 10/11/2024    BILITOT 0.4 10/11/2024    AST 16 10/11/2024    ALT 18 10/11/2024       Lab Results   Component Value Date    CHOL 102 04/30/2024    CHLPL 129 02/20/2021    TRIG 77 04/30/2024    HDL 31 (L) 04/30/2024    LDL 55 04/30/2024       Lab " Results   Component Value Date    WBC 8.46 10/11/2024    HGB 9.3 (L) 11/01/2024    HCT 30.5 (L) 11/01/2024    MCV 95.6 10/11/2024     10/11/2024         Assessment & Plan   Assessment & Plan  Chronic combined systolic and diastolic congestive heart failure    He is euvolemic today on exam.  Currently on Lasix 40 mg twice daily.  He is following with Dr. Oliveira Cardiology but has not seen him since discharge.  He is currently on beta-blocker and ARB.  Continue to monitor closely.    Orders:    furosemide (LASIX) 40 MG tablet; Take 1 tablet by mouth 2 (Two) Times a Day.    Iron deficiency anemia, unspecified iron deficiency anemia type  Marcello has had 2 iron infusions at this point with no significant improvement in his labs.  I am going to get him set up with Dr. Valdez Hematology at the [facility] for further evaluation.  Orders:    Ambulatory Referral to Hematology / Oncology    Type 2 diabetes mellitus with diabetic peripheral angiopathy without gangrene, without long-term current use of insulin    Discussed need to discontinue metformin unless we see some improvement in the kidney function and GFR.  Unfortunately, we were unable to get the SGLT 2 blocker due to cost, so we will instead try him on Januvia at 25 mg daily for renal dosing.  When the new year rolls over, we may be able to get him on some Jardiance for both kidney and heart protection as well as diabetes control after all.  Orders:    SITagliptin (Januvia) 25 MG tablet; Take 1 tablet by mouth Daily.    Stage 3b chronic kidney disease    Following now with Nephrology.  They have been working on his iron deficiency, presumed secondary to CKD.  See iron deficiency plan.       Chronic gastritis without bleeding, unspecified gastritis type  Stable on pantoprazole 40 mg daily.  Refills were needed today.  Continue to monitor.  Wean PPI as able.  Orders:    pantoprazole (PROTONIX) 40 MG EC tablet; Take 1 tablet by mouth Daily.    Mixed  hyperlipidemia     Stable on lovastatin 40 mg daily.  Refills were needed today.  Labs were not due today.  Continue to monitor.  Orders:    lovastatin (MEVACOR) 40 MG tablet; Take 1 tablet by mouth Every Evening.    Primary hypertension    Blood pressure has been running at goal.  Continue losartan 50 mg daily and carvedilol 12.5 mg twice.  Refills were needed today.  Labs were not needed today.  Continue to monitor.  Orders:    losartan (COZAAR) 50 MG tablet; Take 1 tablet by mouth Daily.

## 2024-11-14 ENCOUNTER — TELEPHONE (OUTPATIENT)
Dept: FAMILY MEDICINE CLINIC | Age: 84
End: 2024-11-14
Payer: MEDICARE

## 2024-11-14 NOTE — TELEPHONE ENCOUNTER
Caller: Lino MAKI    Relationship: Emergency Contact    Best call back number: [phone] OK TO LEAVE A MESSAGE    What form or medical record are you requesting: LIST OF MEDICATIONS HE IS ALLERGIC TO    Who is requesting this form or medical record from you: DAUGHTER    How would you like to receive the form or medical records (pick-up, mail, fax): CALL BACK  If fax, what is the fax number: N/A  If mail, what is the address: N/A  If pick-up, provide patient with address and location details    Timeframe paperwork needed: ASAP    Additional notes: PATIENT'S DAUGHTER JUST NEEDS A CALL BACK TO FIND OUT WHAT MEDICATIONS HER DAD IS ALLERGIC TO. HE CAN ONLY REMEMBER ONE AND SHE IS FILLING OUT SOME PAPERWORK FOR THE HEMATOLOGIST APPT ON MONDAY. PLEASE CALL LINO BACK AND ADVISE.

## 2024-11-25 ENCOUNTER — LAB (OUTPATIENT)
Dept: LAB | Facility: HOSPITAL | Age: 84
End: 2024-11-25
Payer: MEDICARE

## 2024-11-25 ENCOUNTER — TRANSCRIBE ORDERS (OUTPATIENT)
Dept: ADMINISTRATIVE | Facility: HOSPITAL | Age: 84
End: 2024-11-25
Payer: MEDICARE

## 2024-11-25 DIAGNOSIS — I10 HTN (HYPERTENSION) WITH GOAL TO BE DETERMINED: ICD-10-CM

## 2024-11-25 DIAGNOSIS — N18.30 STAGE 3 CHRONIC KIDNEY DISEASE, UNSPECIFIED WHETHER STAGE 3A OR 3B CKD: Primary | ICD-10-CM

## 2024-11-25 DIAGNOSIS — N18.30 STAGE 3 CHRONIC KIDNEY DISEASE, UNSPECIFIED WHETHER STAGE 3A OR 3B CKD: ICD-10-CM

## 2024-11-25 LAB
ALBUMIN SERPL-MCNC: 3.4 G/DL (ref 3.5–5.2)
ALBUMIN/GLOB SERPL: 0.9 G/DL
ALP SERPL-CCNC: 68 U/L (ref 39–117)
ALT SERPL W P-5'-P-CCNC: 11 U/L (ref 1–41)
ANION GAP SERPL CALCULATED.3IONS-SCNC: 9 MMOL/L (ref 5–15)
AST SERPL-CCNC: 12 U/L (ref 1–40)
BASOPHILS # BLD AUTO: 0.02 10*3/MM3 (ref 0–0.2)
BASOPHILS NFR BLD AUTO: 0.3 % (ref 0–1.5)
BILIRUB SERPL-MCNC: 0.6 MG/DL (ref 0–1.2)
BUN SERPL-MCNC: 38 MG/DL (ref 8–23)
BUN/CREAT SERPL: 21.6 (ref 7–25)
CALCIUM SPEC-SCNC: 9 MG/DL (ref 8.6–10.5)
CHLORIDE SERPL-SCNC: 97 MMOL/L (ref 98–107)
CO2 SERPL-SCNC: 30 MMOL/L (ref 22–29)
CREAT SERPL-MCNC: 1.76 MG/DL (ref 0.76–1.27)
DEPRECATED RDW RBC AUTO: 48.9 FL (ref 37–54)
EGFRCR SERPLBLD CKD-EPI 2021: 37.7 ML/MIN/1.73
EOSINOPHIL # BLD AUTO: 0.17 10*3/MM3 (ref 0–0.4)
EOSINOPHIL NFR BLD AUTO: 2.2 % (ref 0.3–6.2)
ERYTHROCYTE [DISTWIDTH] IN BLOOD BY AUTOMATED COUNT: 13.7 % (ref 12.3–15.4)
GLOBULIN UR ELPH-MCNC: 3.6 GM/DL
GLUCOSE SERPL-MCNC: 137 MG/DL (ref 65–99)
HCT VFR BLD AUTO: 32.7 % (ref 37.5–51)
HGB BLD-MCNC: 9.9 G/DL (ref 13–17.7)
IMM GRANULOCYTES # BLD AUTO: 0.01 10*3/MM3 (ref 0–0.05)
IMM GRANULOCYTES NFR BLD AUTO: 0.1 % (ref 0–0.5)
LYMPHOCYTES # BLD AUTO: 0.65 10*3/MM3 (ref 0.7–3.1)
LYMPHOCYTES NFR BLD AUTO: 8.6 % (ref 19.6–45.3)
MCH RBC QN AUTO: 29.1 PG (ref 26.6–33)
MCHC RBC AUTO-ENTMCNC: 30.3 G/DL (ref 31.5–35.7)
MCV RBC AUTO: 96.2 FL (ref 79–97)
MONOCYTES # BLD AUTO: 0.5 10*3/MM3 (ref 0.1–0.9)
MONOCYTES NFR BLD AUTO: 6.6 % (ref 5–12)
NEUTROPHILS NFR BLD AUTO: 6.22 10*3/MM3 (ref 1.7–7)
NEUTROPHILS NFR BLD AUTO: 82.2 % (ref 42.7–76)
PLATELET # BLD AUTO: 146 10*3/MM3 (ref 140–450)
PMV BLD AUTO: 9.2 FL (ref 6–12)
POTASSIUM SERPL-SCNC: 3.8 MMOL/L (ref 3.5–5.2)
PROT SERPL-MCNC: 7 G/DL (ref 6–8.5)
RBC # BLD AUTO: 3.4 10*6/MM3 (ref 4.14–5.8)
SODIUM SERPL-SCNC: 136 MMOL/L (ref 136–145)
WBC NRBC COR # BLD AUTO: 7.57 10*3/MM3 (ref 3.4–10.8)

## 2024-11-25 PROCEDURE — 85025 COMPLETE CBC W/AUTO DIFF WBC: CPT

## 2024-11-25 PROCEDURE — 36415 COLL VENOUS BLD VENIPUNCTURE: CPT

## 2024-11-25 PROCEDURE — 80053 COMPREHEN METABOLIC PANEL: CPT

## 2024-11-29 ENCOUNTER — READMISSION MANAGEMENT (OUTPATIENT)
Dept: CALL CENTER | Facility: HOSPITAL | Age: 84
End: 2024-11-29
Payer: MEDICARE

## 2024-11-29 PROCEDURE — 93296 REM INTERROG EVL PM/IDS: CPT | Performed by: INTERNAL MEDICINE

## 2024-11-29 PROCEDURE — 93295 DEV INTERROG REMOTE 1/2/MLT: CPT | Performed by: INTERNAL MEDICINE

## 2024-11-30 ENCOUNTER — TRANSITIONAL CARE MANAGEMENT TELEPHONE ENCOUNTER (OUTPATIENT)
Dept: CALL CENTER | Facility: HOSPITAL | Age: 84
End: 2024-11-30
Payer: MEDICARE

## 2024-11-30 NOTE — OUTREACH NOTE
Prep Survey      Flowsheet Row Responses   [facility] patient discharged from? Non-[facility]   Is LACE score < 7 ? Non-BH Discharge   Eligibility [facility]   [facility]   Date of Admission 11/26/24   Date of Discharge 11/29/24   Discharge Disposition Home or Self Care   Discharge diagnosis a/c CHF   Does the patient have one of the following disease processes/diagnoses(primary or secondary)? CHF   Does the patient have Home health ordered? No   Prep survey completed? Yes            NARESH CLARK - Registered Nurse

## 2024-11-30 NOTE — OUTREACH NOTE
Call Center TCM Note      Flowsheet Row Responses   [facility] patient discharged from? Non-BH  [[facility]]   Does the patient have one of the following disease processes/diagnoses(primary or secondary)? Other   TCM attempt successful? Yes   Call start time 1255   Call end time 1304   Discharge diagnosis a/c CHF   Is patient permission given to speak with other caregiver? Yes   List who call center can speak with Ailin Becker---Daughter (called due to perm comment to call daughter to schedule appts)   Person spoke with today (if not patient) and relationship Ailin Becker---Daughter (called due to perm comment to call daughter to schedule appts)   Medication alerts for this patient Bumex   Meds reviewed with patient/caregiver? Yes   Is the patient having any side effects they believe may be caused by any medication additions or changes? No   Does the patient have all medications ordered at discharge? Yes   Is the patient taking all medications as directed (includes completed medication regime)? Yes   Comments Daughter states she will stop by the PCP office with her father on Monday, 12/2/24 to schedule a [facility] f/u appt. Patient is seeing Nephrology in the same building on Monday.   Does the patient have an appointment with their PCP within 7-14 days of discharge? No   Nursing Interventions Patient declined scheduling/rescheduling appointment at this time   Has home health visited the patient within 72 hours of discharge? N/A   Psychosocial issues? No   Comments Daughter states patient is doing well. She states he stays active even as sick as he is. Swelling in right leg has improved, patient now on Bumex instead of Lasix and has helped. No shortness of breath per daughter. Patient will see Kidney doctor on Monday, 12/2/24.   Did the patient receive a copy of their discharge instructions? Yes   Nursing interventions Reviewed instructions with patient  [with daughter---Ailin]   What is the  patient's perception of their health status since discharge? Improving   Is the patient/caregiver able to teach back signs and symptoms related to disease process for when to call PCP? Yes   Is the patient/caregiver able to teach back signs and symptoms related to disease process for when to call 911? Yes   Is the patient/caregiver able to teach back the hierarchy of who to call/visit for symptoms/problems? PCP, Specialist, Home health nurse, Urgent Care, ED, 911 Yes   If the patient is a current smoker, are they able to teach back resources for cessation? Not a smoker   TCM call completed? Yes   Wrap up additional comments Daughter states she will stop by the PCP office with her father on Monday, 12/2/24 to schedule a PCP [facility] f/u appt. Patient is seeing Nephrology in the same building on Monday.   Call end time 1304   Would this patient benefit from a Referral to Amb Social Work? No   Is the patient interested in additional calls from an ambulatory ? No            Meaghan No RN    11/30/2024, 13:04 EST

## 2024-12-06 ENCOUNTER — TELEPHONE (OUTPATIENT)
Dept: CARDIOLOGY | Facility: CLINIC | Age: 84
End: 2024-12-06
Payer: MEDICARE

## 2024-12-06 NOTE — TELEPHONE ENCOUNTER
Caller: Ailin MAKI    Relationship to patient: Emergency Contact    Best call back number: [phone]    Patient is needing: PATIENTS DAUGHTER SENDING ECHO NOTES THROUGH FAX AND SENT DISC IMAGES IN MAIL TO OFFICE

## 2024-12-07 ENCOUNTER — TELEPHONE (OUTPATIENT)
Dept: CARDIOLOGY | Facility: CLINIC | Age: 84
End: 2024-12-07

## 2024-12-07 NOTE — TELEPHONE ENCOUNTER
[facility] received a fax that requires your attention. The document has been indexed to the patient’s chart for your review.      Reason for sending: EXTERNAL MEDICAL RECORD NOTIFICATION     Documents Description: MEDICAL RECORDS SHARED, LISTED BELOW     Name of Sender: [facility]     Date Indexed:   ECHO 12.3.24  CXR 1VW 11.26.24  CXR 1VW 11.28.24  CXR 1VW 11.29.24  H&P 11.26.24

## 2024-12-10 ENCOUNTER — HOSPITAL ENCOUNTER (OUTPATIENT)
Dept: MRI IMAGING | Facility: HOSPITAL | Age: 84
Discharge: HOME | End: 2024-12-10
Payer: MEDICARE

## 2024-12-10 VITALS
SYSTOLIC BLOOD PRESSURE: 142 MMHG | HEART RATE: 76 BPM | OXYGEN SATURATION: 96 % | TEMPERATURE: 97.1 F | DIASTOLIC BLOOD PRESSURE: 73 MMHG | RESPIRATION RATE: 16 BRPM

## 2024-12-10 DIAGNOSIS — M25.512 CHRONIC LEFT SHOULDER PAIN: ICD-10-CM

## 2024-12-10 DIAGNOSIS — G89.29 OTHER CHRONIC PAIN: ICD-10-CM

## 2024-12-10 DIAGNOSIS — M48.062 PSEUDOCLAUDICATION SYNDROME: ICD-10-CM

## 2024-12-10 DIAGNOSIS — M54.50 LOW BACK PAIN, UNSPECIFIED BACK PAIN LATERALITY, UNSPECIFIED CHRONICITY, UNSPECIFIED WHETHER SCIATICA PRESENT: ICD-10-CM

## 2024-12-10 DIAGNOSIS — G89.29 CHRONIC LEFT SHOULDER PAIN: ICD-10-CM

## 2024-12-10 PROCEDURE — 72148 MRI LUMBAR SPINE W/O DYE: CPT

## 2024-12-10 PROCEDURE — 73221 MRI JOINT UPR EXTREM W/O DYE: CPT

## 2024-12-10 NOTE — NURSING NOTE
Patient arrived to triage radiology bay 9 for MRI with AICD, [employer] brand. Daughter, Ailin, here with him.

## 2024-12-13 ENCOUNTER — OFFICE VISIT (OUTPATIENT)
Dept: FAMILY MEDICINE CLINIC | Age: 84
End: 2024-12-13
Payer: MEDICARE

## 2024-12-13 VITALS
BODY MASS INDEX: 20.34 KG/M2 | OXYGEN SATURATION: 97 % | WEIGHT: 129.6 LBS | SYSTOLIC BLOOD PRESSURE: 131 MMHG | TEMPERATURE: 98.1 F | DIASTOLIC BLOOD PRESSURE: 69 MMHG | HEART RATE: 61 BPM | HEIGHT: 67 IN

## 2024-12-13 DIAGNOSIS — I25.10 CORONARY ARTERY DISEASE INVOLVING NATIVE CORONARY ARTERY OF NATIVE HEART WITHOUT ANGINA PECTORIS: ICD-10-CM

## 2024-12-13 DIAGNOSIS — E11.51 TYPE 2 DIABETES MELLITUS WITH DIABETIC PERIPHERAL ANGIOPATHY WITHOUT GANGRENE, WITHOUT LONG-TERM CURRENT USE OF INSULIN: ICD-10-CM

## 2024-12-13 DIAGNOSIS — I50.42 CHRONIC COMBINED SYSTOLIC AND DIASTOLIC CONGESTIVE HEART FAILURE: Primary | ICD-10-CM

## 2024-12-13 RX ORDER — CANAGLIFLOZIN 100 MG/1
100 TABLET, FILM COATED ORAL DAILY
COMMUNITY
End: 2024-12-13

## 2024-12-13 RX ORDER — FUROSEMIDE 40 MG/1
40 TABLET ORAL 2 TIMES DAILY
Qty: 180 TABLET | Refills: 1 | Status: SHIPPED | OUTPATIENT
Start: 2024-12-13

## 2024-12-13 NOTE — ASSESSMENT & PLAN NOTE
We have been trying to get him on an SGLT2 blocker but have been unsuccessful secondary to cost.  I would like to make another attempt at this in the new year.

## 2024-12-13 NOTE — ASSESSMENT & PLAN NOTE
Recurrent heart failure exacerbation.  He is monitoring his weights diligently at home.  He is to take Lasix 40 mg twice daily at baseline.  His cardiologist Dr. Oliveira instructed him to take 1/3 tablet of Lasix if his weight goes up more than 5 pounds in 3 days.  He does need a refill on his furosemide today.  Echo done while admitted showed a decrease in LVEF to 15 to 20% (from 30 to 35% previously).  He has follow-up scheduled with Dr. Oliveira in January.  Orders:    furosemide (LASIX) 40 MG tablet; Take 1 tablet by mouth 2 (Two) Times a Day.

## 2024-12-13 NOTE — PROGRESS NOTES
Transitional Care Follow Up Visit  Subjective     Marcello Larson is a 84 y.o. male who presents for a transitional care management visit.    Within 48 business hours after discharge our office contacted him via telephone to coordinate his care and needs.      I reviewed and discussed the details of that call along with the discharge summary, hospital problems, inpatient lab results, inpatient diagnostic studies, and consultation reports with Marcello.     Current outpatient and discharge medications have been reconciled for the patient.  Reviewed by: Zeny Corley MD          11/29/2024     9:16 PM   Date of TCM Phone Call   Hospital [facility]   Date of Admission 11/26/2024   Date of Discharge 11/29/2024   Discharge Disposition Home or Self Care     Risk for Readmission (LACE) No data recorded    History of Present Illness   Course During Hospital Stay:  Marcello is here today for transition of care appointment following admission to [facility] from 11/26/2024 to 11/29/2024 for diagnosis of CHF exacerbation.  He is accompanied today by his daughter Ailin.     He presented to the ED initially with complaint of weight gain and peripheral edema.  He was treated with IV Lasix in the ED and it was decided to admit him due to acute hypoxic respiratory failure.  He was surprisingly never short of breath but was started on oxygen to keep O2 sat above 92%.  He was seen by Cardiology while admitted.  Echo done during admission showed decreased EF 15-20%.  He follows with Dr. Tee Bush at baseline.  Next appointment there is on 1/22/2025.  He is on carvedilol and losartan for hypertension, atrial fibs and CAD, s/p 2-vessel CABG. his blood pressure has been well-controlled.  Denies chest pain, palpitations or shortness of breath.  He has previously been on Ranexa.  He is on Eliquis for anticoagulation of a-fib, through patient assistance.  He is on lovastatin for HLD.  He has NTG for as needed use.  He is on furosemide  for systolic CHF.  S/p ICD.  He is checking his weights daily.    Today, he still has some fatigue since discharge.  He was told to take his furosemide 40 mg BID unless he has increased swelling (in which case to take TID).    He is on Januvia for diabetes.  GFR is too low to continue metformin.  He has previously been on metformin (kidney function) and Jardiance/Invokana/Farxiga (cost).  Last A1c was 6.3 in August.  He does adhere to a diabetic diet.  He is UTD on eye exam (last done 9/2024).  He is on an ARB and statin.    He went to see Hematology and had a lot of blood drawn.  Going back for those results on 12/30/2024.       He is now following with Dr. Espinoza for acute kidney injury.      He is on pantoprazole for GERD and h/o bleeding ulcer (NSAID-induced.)  He has prn sucralfate.  He is on iron supplement.     He is on Norco for chronic low back and leg pain.  Follows with Dr. [facility] for medication management and epidurals.  He has previously used gabapentin.  S/p physical therapy and RFA.      +L BKA with prosthesis.        He is on Flonase and montelukast for chronic allergies.     The following portions of the patient's history were reviewed and updated as appropriate: allergies, current medications, past family history, past medical history, past social history, past surgical history, and problem list.    Review of Systems   Constitutional:  Positive for fatigue. Negative for chills and fever.   Respiratory:  Negative for cough and shortness of breath.    Cardiovascular:  Negative for chest pain and palpitations.   Gastrointestinal:  Negative for abdominal pain, constipation, diarrhea, nausea and vomiting.   Skin:  Negative for rash.   Neurological:  Negative for weakness.         Current Outpatient Medications:     apixaban (ELIQUIS) 5 MG tablet tablet, Take 0.5 tablets by mouth 2 (Two) Times a Day., Disp: , Rfl:     carvedilol (COREG) 12.5 MG tablet, Take 1 tablet by mouth 2  (Two) Times a Day., Disp: 180 tablet, Rfl: 3    Diclofenac Sodium (VOLTAREN) 1 % gel gel, Apply 4 g topically to the appropriate area as directed 2 (Two) Times a Day., Disp: 100 g, Rfl: 3    ferrous sulfate 325 (65 FE) MG tablet, Take 1 tablet by mouth 2 (Two) Times a Day., Disp: , Rfl:     fexofenadine (ALLEGRA) 180 MG tablet, Take 1 tablet by mouth Daily., Disp: , Rfl:     fluticasone (FLONASE) 50 MCG/ACT nasal spray, USE 2 SPRAYS NASALLY EVERY DAY AS DIRECTED BY PROVIDER, Disp: 48 g, Rfl: 3    furosemide (LASIX) 40 MG tablet, Take 1 tablet by mouth 2 (Two) Times a Day., Disp: 180 tablet, Rfl: 1    HYDROcodone-acetaminophen (NORCO) 5-325 MG per tablet, Take 1 tablet by mouth Daily., Disp: , Rfl:     levothyroxine (SYNTHROID, LEVOTHROID) 25 MCG tablet, Take 1 tablet by mouth Daily., Disp: 30 tablet, Rfl: 5    losartan (COZAAR) 50 MG tablet, Take 1 tablet by mouth Daily., Disp: 90 tablet, Rfl: 1    lovastatin (MEVACOR) 40 MG tablet, Take 1 tablet by mouth Every Evening., Disp: 90 tablet, Rfl: 1    metFORMIN (GLUCOPHAGE) 500 MG tablet, Take 1 tablet by mouth Daily., Disp: 90 tablet, Rfl: 1    montelukast (SINGULAIR) 10 MG tablet, Take 1 tablet by mouth Every Night., Disp: 90 tablet, Rfl: 3    nitroglycerin (NITROSTAT) 0.4 MG SL tablet, DISSOLVE 1 TABLET UNDER THE TONGUE EVERY 5 MINUTES AS NEEDED FOR CHEST PAIN. DO NOT EXCEED A TOTAL OF 3 DOSES IN 15 MINUTES. IF NO RELIEF, CALL 911 (Patient taking differently: Place 1 tablet under the tongue Every 5 (Five) Minutes As Needed for Chest Pain.), Disp: 25 tablet, Rfl: 0    pantoprazole (PROTONIX) 40 MG EC tablet, Take 1 tablet by mouth Daily., Disp: 90 tablet, Rfl: 1    potassium chloride (MICRO-K) 10 MEQ CR capsule, Take 1 capsule by mouth 2 (Two) Times a Day., Disp: , Rfl:     prochlorperazine (COMPAZINE) 10 MG tablet, Take 1 tablet by mouth Every 8 (Eight) Hours As Needed for Nausea or Vomiting., Disp: 24 tablet, Rfl: 0    SITagliptin (Januvia) 25 MG tablet, Take 1  "tablet by mouth Daily., Disp: 30 tablet, Rfl: 5    SSD 1 % cream, APPLY TO THE AFFECTED AREA(S) TWICE DAILY, Disp: 50 g, Rfl: 0    sucralfate (CARAFATE) 1 g tablet, TAKE 1 TABLET BY MOUTH FOUR TIMES DAILY, Disp: 120 tablet, Rfl: 5    vitamin B-6 (PYRIDOXINE) 100 MG tablet, Take 1 tablet by mouth Daily., Disp: , Rfl:     vitamin C (ASCORBIC ACID) 500 MG tablet, Take 1 tablet by mouth Daily., Disp: , Rfl:   Medications Discontinued During This Encounter   Medication Reason    Canagliflozin (Invokana) 100 MG tablet tablet Historical Med - Therapy completed    furosemide (LASIX) 40 MG tablet Reorder         Objective   Vitals:    12/13/24 1445   BP: 131/69   BP Location: Left arm   Patient Position: Sitting   Cuff Size: Adult   Pulse: 61   Temp: 98.1 °F (36.7 °C)   TempSrc: Oral   SpO2: 97%   Weight: 58.8 kg (129 lb 9.6 oz)   Height: 170.2 cm (67.01\")     Body mass index is 20.29 kg/m².  BMI is within normal parameters. No other follow-up for BMI required.    Physical Exam  Vitals reviewed.   Constitutional:       General: He is not in acute distress.     Appearance: Normal appearance. He is well-developed.   HENT:      Head: Normocephalic and atraumatic.      Right Ear: External ear normal.      Left Ear: External ear normal.   Eyes:      Extraocular Movements: Extraocular movements intact.      Conjunctiva/sclera: Conjunctivae normal.      Pupils: Pupils are equal, round, and reactive to light.   Cardiovascular:      Rate and Rhythm: Normal rate. Rhythm irregularly irregular.      Heart sounds: No murmur heard.  Pulmonary:      Effort: Pulmonary effort is normal.      Breath sounds: Normal breath sounds. No wheezing, rhonchi or rales.   Abdominal:      General: Bowel sounds are normal. There is no distension.      Palpations: Abdomen is soft.      Tenderness: There is no abdominal tenderness.   Musculoskeletal:         General: Normal range of motion.      Comments: LLE below knee absent   Neurological:      Mental " Status: He is alert.   Psychiatric:         Mood and Affect: Affect normal.         Lab Results   Component Value Date    GLUCOSE 137 (H) 11/25/2024    BUN 38 (H) 11/25/2024    CREATININE 1.76 (H) 11/25/2024    EGFR 37.7 (L) 11/25/2024    BCR 21.6 11/25/2024    K 3.8 11/25/2024    CO2 30.0 (H) 11/25/2024    CALCIUM 9.0 11/25/2024    ALBUMIN 3.4 (L) 11/25/2024    BILITOT 0.6 11/25/2024    AST 12 11/25/2024    ALT 11 11/25/2024       Lab Results   Component Value Date    CHOL 102 04/30/2024    CHLPL 129 02/20/2021    TRIG 77 04/30/2024    HDL 31 (L) 04/30/2024    LDL 55 04/30/2024       Lab Results   Component Value Date    WBC 7.57 11/25/2024    HGB 9.9 (L) 11/25/2024    HCT 32.7 (L) 11/25/2024    MCV 96.2 11/25/2024     11/25/2024         Assessment & Plan   Assessment & Plan  Chronic combined systolic and diastolic congestive heart failure  Recurrent heart failure exacerbation.  He is monitoring his weights diligently at home.  He is to take Lasix 40 mg twice daily at baseline.  His cardiologist Dr. Oliveira instructed him to take 1/3 tablet of Lasix if his weight goes up more than 5 pounds in 3 days.  He does need a refill on his furosemide today.  Echo done while admitted showed a decrease in LVEF to 15 to 20% (from 30 to 35% previously).  He has follow-up scheduled with Dr. Oliveira in January.  Orders:    furosemide (LASIX) 40 MG tablet; Take 1 tablet by mouth 2 (Two) Times a Day.    Type 2 diabetes mellitus with diabetic peripheral angiopathy without gangrene, without long-term current use of insulin    We have been trying to get him on an SGLT2 blocker but have been unsuccessful secondary to cost.  I would like to make another attempt at this in the new year.       Coronary artery disease involving native coronary artery of native heart without angina pectoris    Follows with Dr. Oliveira Cardiology. He is on Eliquis for the combination of CAD, atrial fibrillation and systolic heart failure. Status post ICD  placement. He is on statin, beta-blocker and ARB.

## 2024-12-13 NOTE — ASSESSMENT & PLAN NOTE
Follows with Dr. Oliveira Cardiology. He is on Eliquis for the combination of CAD, atrial fibrillation and systolic heart failure. Status post ICD placement. He is on statin, beta-blocker and ARB.

## 2024-12-27 ENCOUNTER — OFFICE VISIT (OUTPATIENT)
Dept: FAMILY MEDICINE CLINIC | Age: 84
End: 2024-12-27
Payer: MEDICARE

## 2024-12-27 VITALS
BODY MASS INDEX: 20.25 KG/M2 | SYSTOLIC BLOOD PRESSURE: 110 MMHG | DIASTOLIC BLOOD PRESSURE: 55 MMHG | TEMPERATURE: 98 F | HEIGHT: 67 IN | WEIGHT: 129 LBS | HEART RATE: 53 BPM | OXYGEN SATURATION: 96 %

## 2024-12-27 DIAGNOSIS — L89.152 PRESSURE ULCER OF SACRAL REGION, STAGE 2: ICD-10-CM

## 2024-12-27 DIAGNOSIS — Z00.00 ANNUAL PHYSICAL EXAM: Primary | ICD-10-CM

## 2024-12-27 PROCEDURE — 1126F AMNT PAIN NOTED NONE PRSNT: CPT | Performed by: FAMILY MEDICINE

## 2024-12-27 PROCEDURE — 1160F RVW MEDS BY RX/DR IN RCRD: CPT | Performed by: FAMILY MEDICINE

## 2024-12-27 PROCEDURE — 1159F MED LIST DOCD IN RCRD: CPT | Performed by: FAMILY MEDICINE

## 2024-12-27 PROCEDURE — 99213 OFFICE O/P EST LOW 20 MIN: CPT | Performed by: FAMILY MEDICINE

## 2024-12-27 PROCEDURE — 3078F DIAST BP <80 MM HG: CPT | Performed by: FAMILY MEDICINE

## 2024-12-27 PROCEDURE — 3074F SYST BP LT 130 MM HG: CPT | Performed by: FAMILY MEDICINE

## 2024-12-27 PROCEDURE — 1170F FXNL STATUS ASSESSED: CPT | Performed by: FAMILY MEDICINE

## 2024-12-27 PROCEDURE — G0439 PPPS, SUBSEQ VISIT: HCPCS | Performed by: FAMILY MEDICINE

## 2024-12-27 NOTE — PROGRESS NOTES
Subjective   The ABCs of the Annual Wellness Visit  Medicare Wellness Visit      Marcello Larson is a 84 y.o. patient who presents for a Medicare Wellness Visit.    Colonoscopy is no longer indicated by age and history (last done 8/2019 and this showed diverticulosis).  Prostate cancer screening is no longer indicated by age and history; s/p prostatectomy d/t bladder cancer.  He is UTD on COVID (9/2024), Pneumovax, (10/2008), Yjebsmi26 (2/2024), Dhzudzw35 (9/2015), Zostavax (10/2006), Shingrix (12/2019, 5/2020), Tdap (2/2023), and flu (9/2024).  He is UTD on routine labs including diabetes panel.     The following portions of the patient's history were reviewed and   updated as appropriate: allergies, current medications, past family history, past medical history, past social history, past surgical history, and problem list.    Compared to one year ago, the patient's physical   health is the same.  Compared to one year ago, the patient's mental   health is the same.    Recent Hospitalizations:  This patient has had a [facility] admission record on file within the last 365 days.  Current Medical Providers:  Patient Care Team:  Zeny Corley MD as PCP - General (Family Medicine)  Andrew Castañeda MD as Consulting Physician (Cardiology)  Oksana Espinoza MD as Consulting Physician (Nephrology)    Outpatient Medications Prior to Visit   Medication Sig Dispense Refill    apixaban (ELIQUIS) 5 MG tablet tablet Take 0.5 tablets by mouth 2 (Two) Times a Day.      carvedilol (COREG) 12.5 MG tablet Take 1 tablet by mouth 2 (Two) Times a Day. 180 tablet 3    Diclofenac Sodium (VOLTAREN) 1 % gel gel Apply 4 g topically to the appropriate area as directed 2 (Two) Times a Day. 100 g 3    ferrous sulfate 325 (65 FE) MG tablet Take 1 tablet by mouth 2 (Two) Times a Day.      fexofenadine (ALLEGRA) 180 MG tablet Take 1 tablet by mouth Daily.      fluticasone (FLONASE) 50 MCG/ACT nasal spray USE 2 SPRAYS NASALLY EVERY  DAY AS DIRECTED BY PROVIDER 48 g 3    furosemide (LASIX) 40 MG tablet Take 1 tablet by mouth 2 (Two) Times a Day. 180 tablet 1    HYDROcodone-acetaminophen (NORCO) 5-325 MG per tablet Take 1 tablet by mouth Daily.      levothyroxine (SYNTHROID, LEVOTHROID) 25 MCG tablet Take 1 tablet by mouth Daily. 30 tablet 5    losartan (COZAAR) 50 MG tablet Take 1 tablet by mouth Daily. 90 tablet 1    lovastatin (MEVACOR) 40 MG tablet Take 1 tablet by mouth Every Evening. 90 tablet 1    metFORMIN (GLUCOPHAGE) 500 MG tablet Take 1 tablet by mouth Daily. 90 tablet 1    montelukast (SINGULAIR) 10 MG tablet Take 1 tablet by mouth Every Night. 90 tablet 3    nitroglycerin (NITROSTAT) 0.4 MG SL tablet DISSOLVE 1 TABLET UNDER THE TONGUE EVERY 5 MINUTES AS NEEDED FOR CHEST PAIN. DO NOT EXCEED A TOTAL OF 3 DOSES IN 15 MINUTES. IF NO RELIEF, CALL 911 (Patient taking differently: Place 1 tablet under the tongue Every 5 (Five) Minutes As Needed for Chest Pain.) 25 tablet 0    pantoprazole (PROTONIX) 40 MG EC tablet Take 1 tablet by mouth Daily. 90 tablet 1    potassium chloride (MICRO-K) 10 MEQ CR capsule Take 1 capsule by mouth 2 (Two) Times a Day.      prochlorperazine (COMPAZINE) 10 MG tablet Take 1 tablet by mouth Every 8 (Eight) Hours As Needed for Nausea or Vomiting. 24 tablet 0    SITagliptin (Januvia) 25 MG tablet Take 1 tablet by mouth Daily. 30 tablet 5    SSD 1 % cream APPLY TO THE AFFECTED AREA(S) TWICE DAILY 50 g 0    sucralfate (CARAFATE) 1 g tablet TAKE 1 TABLET BY MOUTH FOUR TIMES DAILY 120 tablet 5    vitamin B-6 (PYRIDOXINE) 100 MG tablet Take 1 tablet by mouth Daily.      vitamin C (ASCORBIC ACID) 500 MG tablet Take 1 tablet by mouth Daily.       No facility-administered medications prior to visit.     Opioid medication/s are on active medication list.  and I have evaluated his active treatment plan and pain score trends (see table).  Vitals:    12/27/24 0909   PainSc: 0-No pain     I have reviewed the chart for potential  "of high risk medication and harmful drug interactions in the elderly.        Aspirin is not on active medication list.  Aspirin use is not indicated based on review of current medical condition/s. Risk of harm outweighs potential benefits.  .    Patient Active Problem List   Diagnosis    Mixed hyperlipidemia    Primary hypertension    Coronary artery disease involving native coronary artery of native heart without angina pectoris    Ischemic cardiomyopathy    Obstructive sleep apnea syndrome    Palpitations    Hx of CABG    Stented coronary artery    Type 2 diabetes mellitus with diabetic peripheral angiopathy without gangrene, without long-term current use of insulin    Chronic combined systolic and diastolic congestive heart failure    ICD (implantable cardioverter-defibrillator), dual, in situ    Other artificial openings of urinary tract status    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  ACP discussion was held with the patient during this visit. Patient has an advance directive in EMR which is still valid.             Objective   Vitals:    12/27/24 0909   BP: 110/55   BP Location: Right arm   Patient Position: Sitting   Cuff Size: Adult   Pulse: 53   Temp: 98 °F (36.7 °C)   TempSrc: Oral   SpO2: 96%   Weight: 58.5 kg (129 lb)   Height: 170.2 cm (67.01\")   PainSc: 0-No pain       Estimated body mass index is 20.2 kg/m² as calculated from the following:    Height as of this encounter: 170.2 cm (67.01\").    Weight as of this encounter: 58.5 kg (129 lb).    BMI is within normal parameters. No other follow-up for BMI required.           Does the patient have " evidence of cognitive impairment? No                                                                                                Health  Risk Assessment    Smoking Status:  Social History     Tobacco Use   Smoking Status Former    Current packs/day: 0.00    Average packs/day: 1 pack/day for 24.0 years (24.0 ttl pk-yrs)    Types: Cigarettes    Start date: 1960    Quit date: 1984    Years since quittin.0    Passive exposure: Past   Smokeless Tobacco Never   Tobacco Comments    Have not smoked since      Alcohol Consumption:  Social History     Substance and Sexual Activity   Alcohol Use Never       Fall Risk Screen  STEADI Fall Risk Assessment was completed, and patient is at LOW risk for falls.Assessment completed on:2024    Depression Screening   Little interest or pleasure in doing things? Not at all   Feeling down, depressed, or hopeless? Not at all   PHQ-2 Total Score 0      Health Habits and Functional and Cognitive Screenin/27/2024     9:13 AM   Functional & Cognitive Status   Do you have difficulty preparing food and eating? No   Do you have difficulty bathing yourself, getting dressed or grooming yourself? No   Do you have difficulty using the toilet? No   Do you have difficulty moving around from place to place? No   Do you have trouble with steps or getting out of a bed or a chair? No   Current Diet Well Balanced Diet   Dental Exam Up to date   Eye Exam Up to date   Exercise (times per week) 3 times per week   Current Exercises Include Home Exercise Program (TV, Computer, Etc.)   Do you need help using the phone?  No   Are you deaf or do you have serious difficulty hearing?  No   Do you need help to go to places out of walking distance? No   Do you need help shopping? No   Do you need help preparing meals?  No   Do you need help with housework?  No   Do you need help with laundry? No   Do you need help taking your medications? No   Do you need help managing money? No   Do  you ever drive or ride in a car without wearing a seat belt? No   Have you felt unusual stress, anger or loneliness in the last month? No   Who do you live with? Alone   If you need help, do you have trouble finding someone available to you? No   Have you been bothered in the last four weeks by sexual problems? No   Do you have difficulty concentrating, remembering or making decisions? No           Age-appropriate Screening Schedule:  Refer to the list below for future screening recommendations based on patient's age, sex and/or medical conditions. Orders for these recommended tests are listed in the plan section. The patient has been provided with a written plan.    Health Maintenance List  Health Maintenance   Topic Date Due    LIPID PANEL  04/30/2025    HEMOGLOBIN A1C  05/27/2025    DIABETIC EYE EXAM  09/25/2025    ANNUAL WELLNESS VISIT  12/27/2025    TDAP/TD VACCINES (4 - Td or Tdap) 02/10/2033    COVID-19 Vaccine  Completed    RSV Vaccine - Adults  Completed    INFLUENZA VACCINE  Completed    Pneumococcal Vaccine 65+  Completed    ZOSTER VACCINE  Completed    URINE MICROALBUMIN  Discontinued                                                                                                                                                CMS Preventative Services Quick Reference  Risk Factors Identified During Encounter  Chronic Pain:  continue to follow with Pain Management    The above risks/problems have been discussed with the patient.  Pertinent information has been shared with the patient in the After Visit Summary.  An After Visit Summary and PPPS were made available to the patient.    Follow Up:   Next Medicare Wellness visit to be scheduled in 1 year.         Additional E&M Note during same encounter follows:  Patient has additional, significant, and separately identifiable condition(s)/problem(s) that require work above and beyond the Medicare Wellness Visit     Chief Complaint  Medicare  "Wellness-subsequent    Subjective   HPI  Marcello is also being seen today for additional medical problem/s.    He has a little sore on his bottom that he has had for the past 3 weeks.  He thinks his jeans may be rubbing.  He has been bandaging it and that seems to be helping.      Review of Systems   Constitutional:  Negative for chills, fatigue and fever.   HENT:  Negative for congestion, hearing loss and rhinorrhea.    Eyes:  Negative for pain and visual disturbance.   Respiratory:  Negative for cough and shortness of breath.    Cardiovascular:  Negative for chest pain and palpitations.   Gastrointestinal:  Negative for abdominal pain, constipation, diarrhea, nausea and vomiting.   Genitourinary:  Negative for difficulty urinating and dysuria.   Musculoskeletal:  Positive for back pain. Negative for arthralgias and myalgias.   Neurological:  Negative for weakness and numbness.   Psychiatric/Behavioral:  Negative for dysphoric mood and sleep disturbance. The patient is not nervous/anxious.               Objective   Vital Signs:  /55 (BP Location: Right arm, Patient Position: Sitting, Cuff Size: Adult)   Pulse 53   Temp 98 °F (36.7 °C) (Oral)   Ht 170.2 cm (67.01\")   Wt 58.5 kg (129 lb)   SpO2 96%   BMI 20.20 kg/m²   Physical Exam  Vitals reviewed.   Constitutional:       General: He is not in acute distress.     Appearance: Normal appearance. He is well-developed.   HENT:      Head: Normocephalic and atraumatic.      Right Ear: External ear normal.      Left Ear: External ear normal.      Mouth/Throat:      Mouth: Mucous membranes are moist.   Eyes:      Extraocular Movements: Extraocular movements intact.      Conjunctiva/sclera: Conjunctivae normal.      Pupils: Pupils are equal, round, and reactive to light.   Cardiovascular:      Rate and Rhythm: Normal rate. Rhythm irregularly irregular.      Heart sounds: No murmur heard.  Pulmonary:      Effort: Pulmonary effort is normal.      Breath sounds: " Normal breath sounds. No wheezing, rhonchi or rales.   Abdominal:      General: Bowel sounds are normal. There is no distension.      Palpations: Abdomen is soft.      Tenderness: There is no abdominal tenderness.   Genitourinary:     Comments: Urostomy bag in place filled with clear urine, stoma appears normal and pink  Musculoskeletal:         General: Normal range of motion.      Comments: +L BKA prosthesis   Skin:     General: Skin is warm and dry.      Comments: Stage 2 pressure ulcer, R buttock just medial to the gluteal cleft with small fissure in area erythema surrounding   Neurological:      Mental Status: He is alert and oriented to person, place, and time.      Deep Tendon Reflexes: Reflexes normal.   Psychiatric:         Mood and Affect: Mood and affect normal.         Behavior: Behavior normal.         Thought Content: Thought content normal.         Judgment: Judgment normal.       Lab Results   Component Value Date    GLUCOSE 137 (H) 11/25/2024    BUN 38 (H) 11/25/2024    CREATININE 1.76 (H) 11/25/2024    EGFR 37.7 (L) 11/25/2024    BCR 21.6 11/25/2024    K 3.8 11/25/2024    CO2 30.0 (H) 11/25/2024    CALCIUM 9.0 11/25/2024    ALBUMIN 3.4 (L) 11/25/2024    BILITOT 0.6 11/25/2024    AST 12 11/25/2024    ALT 11 11/25/2024       Lab Results   Component Value Date    CHOL 102 04/30/2024    CHLPL 129 02/20/2021    TRIG 77 04/30/2024    HDL 31 (L) 04/30/2024    LDL 55 04/30/2024       Lab Results   Component Value Date    WBC 7.57 11/25/2024    HGB 9.9 (L) 11/25/2024    HCT 32.7 (L) 11/25/2024    MCV 96.2 11/25/2024     11/25/2024                     Assessment and Plan            Annual physical exam  Colonoscopy is no longer indicated by age and history (last done 8/2019 and this showed diverticulosis).  Prostate cancer screening is no longer indicated by age and history; s/p prostatectomy d/t bladder cancer.  He is UTD on COVID (9/2024), Pneumovax, (10/2008), Qvhtfbv47 (2/2024), Ugwvkor73  (9/2015), Zostavax (10/2006), Shingrix (12/2019, 5/2020), Tdap (2/2023), and flu (9/2024).  He is UTD on routine labs including diabetes panel.        Pressure ulcer of sacral region, stage 2  Use Silvadene cream to the area (he already has at home).  Discussed offsetting pressure when he is sitting in his recliner by using a pillow under one side to shift his weight and switching every hour or so.                 Follow Up   Return in about 2 months (around 2/27/2025) for Recheck.  Patient was given instructions and counseling regarding his condition or for health maintenance advice. Please see specific information pulled into the AVS if appropriate.

## 2024-12-28 DIAGNOSIS — M46.97 UNSPECIFIED INFLAMMATORY SPONDYLOPATHY, LUMBOSACRAL REGION: ICD-10-CM

## 2025-01-13 ENCOUNTER — OFFICE VISIT (OUTPATIENT)
Dept: UROLOGY | Facility: CLINIC | Age: 85
End: 2025-01-13
Payer: MEDICARE

## 2025-01-13 VITALS — HEIGHT: 67 IN | WEIGHT: 123 LBS | BODY MASS INDEX: 19.3 KG/M2 | RESPIRATION RATE: 19 BRPM

## 2025-01-13 DIAGNOSIS — R31.9 HEMATURIA, UNSPECIFIED TYPE: Primary | ICD-10-CM

## 2025-01-13 DIAGNOSIS — N39.0 RECURRENT UTI: ICD-10-CM

## 2025-01-13 DIAGNOSIS — Z85.51 PERSONAL HISTORY OF BLADDER CANCER: ICD-10-CM

## 2025-01-13 LAB
BILIRUB BLD-MCNC: NEGATIVE MG/DL
CLARITY, POC: CLEAR
COLOR UR: YELLOW
EXPIRATION DATE: ABNORMAL
GLUCOSE UR STRIP-MCNC: NEGATIVE MG/DL
KETONES UR QL: NEGATIVE
LEUKOCYTE EST, POC: ABNORMAL
Lab: ABNORMAL
NITRITE UR-MCNC: POSITIVE MG/ML
PH UR: 7 [PH] (ref 5–8)
PROT UR STRIP-MCNC: NEGATIVE MG/DL
RBC # UR STRIP: ABNORMAL /UL
SP GR UR: 1.01 (ref 1–1.03)
UROBILINOGEN UR QL: ABNORMAL

## 2025-01-13 PROCEDURE — 1159F MED LIST DOCD IN RCRD: CPT | Performed by: NURSE PRACTITIONER

## 2025-01-13 PROCEDURE — 1160F RVW MEDS BY RX/DR IN RCRD: CPT | Performed by: NURSE PRACTITIONER

## 2025-01-13 PROCEDURE — 81003 URINALYSIS AUTO W/O SCOPE: CPT | Performed by: NURSE PRACTITIONER

## 2025-01-13 PROCEDURE — 99213 OFFICE O/P EST LOW 20 MIN: CPT | Performed by: NURSE PRACTITIONER

## 2025-01-13 RX ORDER — GUAIFENESIN 600 MG/1
1200 TABLET, EXTENDED RELEASE ORAL
COMMUNITY

## 2025-01-13 NOTE — PROGRESS NOTES
"Chief Complaint: Follow-up (Hematuria, unspecified type)    Subjective         History of Present Illness  Marcello Larson is a 84 y.o. male presents to [facility] to be seen for f/u .      He states no blood in the urine recently.     He does feel as if he has been developing a UTI over the last few days. Ua nitrite positive in office today.    He states aside from the last few days he has been doing well at this time.    Does not appear that he has had any urinary tract infections since we last saw him.  He did get admitted to the hospital for acute on chronic CHF and hypoxia in November 2024 as well as an admission to [facility] in November for pneumonia however does not appear that he has had any further urinary issues since we last saw him.        Previous:     He was seen in an urgent care  in 5/2024 he is unaware if he was having symptoms.      Did get admitted \"He presented to the [facility] ED initially with complaint of generalized weakness and was transferred to [facility] for further evaluation.  He was seen by Nephrology and sodium level improved.  Hemoglobin was low and decreasing.  He was found to have iron deficiency and received iron infusion x 2 while admitted.  He was discharged on p.o. iron supplementation.  His apixaban was decreased to 2.5 mg twice daily.  He was instructed to take Lasix 40 mg daily at the time of discharge but home losartan was discontinued.  He was instructed to follow-up with Nephrology as an outpatient.\"     No GH      He states he is doing well at this time.         Previous:      The patient states that he has been having issues with UTIs.      He states Uti symptoms are usually just back pain.      He has had no positive urine cx since 1/2021.      Most recent urine cx was with >100,000 cfu/ml of mixed ibrahima.      He states that he was seen in 2007 by Dr. 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     He has not had any f/u with them since 2009.      The concern at present is that he has had some blood in his urine.      He has had 0-2 rbc/ HPF for at least the last 2 years.     He has no complaints today.      Urine cultures:   2/20/2023 greater than 100,000 colony-forming units per milliliter mixed urogenital ibrahima.       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  LV systolic function is moderate to severely reduced.  EF 30-35%.  The transmittal spectral doppler flow pattern is suggestive of pseudonormalization.  There is moderate to severe global hypokinesis of the LV.  The left atrium is mildly dilated.  The mitral leaflets appear thickened, but open well.  Mild MR and AR.    Hx of CABG 08/13/2019 01/01/1991 - left internal mammary graft to the LAD, SVG to OM1, OM2 in the RCA    Hyperlipidemia 08/13/2019    Hypo-osmolality and hyponatremia     Low back pain     Myocardial infarct     Other long term (current) drug therapy     Palpitations 08/13/2019    Personal history of malignant neoplasm of bladder     Sleep apnea 08/13/2019    Stented coronary artery 08/13/2019 04/11/2014 - VISION bare metal stent to the proximal  stenosis of the vein graft to the right and ISION bare metal stent in the mid to distal 80% stenosis.    Type 2 diabetes mellitus without complication     Unstable angina        Past Surgical History:   Procedure Laterality Date    AMPUTATION Left     LOWER EXTREMITY BKA    APPENDECTOMY      CARDIAC CATHETERIZATION      CARDIAC CATHETERIZATION N/A 10/13/2020    Procedure: Left Heart Cath;  Surgeon: Kofi Campoverde MD;  Location:  [facility];  Service: Cardiology;  Laterality: N/A;    CARDIAC CATHETERIZATION N/A 10/13/2020    Procedure: Coronary angiography;  Surgeon: Kofi Campoverde MD;  Location:  [facility];  Service: Cardiology;  Laterality: N/A;    CARDIAC CATHETERIZATION N/A 10/13/2020    Procedure: Saphenous Vein Graft;  Surgeon: Kofi Campoverde MD;  Location:  [facility];  Service: Cardiology;  Laterality: N/A;    CARDIAC DEFIBRILLATOR PLACEMENT      CARDIAC ELECTROPHYSIOLOGY PROCEDURE N/A 04/04/2022    Procedure: ICD DC new/SJM;  Surgeon: Mamadou Terry MD;  Location:  [facility];  Service: Cardiology;  Laterality: N/A;    CARDIAC VALVE REPLACEMENT      CAROTID STENT      CATARACT EXTRACTION      CHOLECYSTECTOMY      CORONARY ARTERY BYPASS GRAFT      1984, 1991 4 VESSEL    CORONARY STENT PLACEMENT      INSERT / REPLACE / REMOVE PACEMAKER      INTERNAL CARDIAC DEFIBRILLATOR INSERTION  04/2022    OTHER SURGICAL HISTORY      UROSTOMY S/P BLADDER CA         Current Outpatient Medications:     apixaban (ELIQUIS) 5 MG tablet tablet, Take 0.5 tablets by mouth 2 (Two) Times a Day., Disp: , Rfl:     carvedilol (COREG) 12.5 MG tablet, Take 1 tablet by mouth 2 (Two) Times a Day., Disp: 180 tablet, Rfl: 3    Diclofenac Sodium (VOLTAREN) 1 % gel gel, APPLY 4 grams TO THE AFFECTED AREA TWICE DAILY, Disp: 100 g, Rfl: 3    ferrous sulfate 325 (65 FE) MG tablet, Take 1 tablet by mouth 2 (Two) Times a Day., Disp: , Rfl:     fexofenadine (ALLEGRA)  180 MG tablet, Take 1 tablet by mouth Daily., Disp: , Rfl:     fluticasone (FLONASE) 50 MCG/ACT nasal spray, USE 2 SPRAYS NASALLY EVERY DAY AS DIRECTED BY PROVIDER, Disp: 48 g, Rfl: 3    furosemide (LASIX) 40 MG tablet, Take 1 tablet by mouth 2 (Two) Times a Day., Disp: 180 tablet, Rfl: 1    guaiFENesin (MUCINEX) 600 MG 12 hr tablet, Take 2 tablets by mouth., Disp: , Rfl:     HYDROcodone-acetaminophen (NORCO) 5-325 MG per tablet, Take 1 tablet by mouth Daily., Disp: , Rfl:     levothyroxine (SYNTHROID, LEVOTHROID) 25 MCG tablet, Take 1 tablet by mouth Daily., Disp: 30 tablet, Rfl: 5    losartan (COZAAR) 50 MG tablet, Take 1 tablet by mouth Daily., Disp: 90 tablet, Rfl: 1    lovastatin (MEVACOR) 40 MG tablet, Take 1 tablet by mouth Every Evening., Disp: 90 tablet, Rfl: 1    metFORMIN (GLUCOPHAGE) 500 MG tablet, Take 1 tablet by mouth Daily., Disp: 90 tablet, Rfl: 1    montelukast (SINGULAIR) 10 MG tablet, Take 1 tablet by mouth Every Night., Disp: 90 tablet, Rfl: 3    nitroglycerin (NITROSTAT) 0.4 MG SL tablet, DISSOLVE 1 TABLET UNDER THE TONGUE EVERY 5 MINUTES AS NEEDED FOR CHEST PAIN. DO NOT EXCEED A TOTAL OF 3 DOSES IN 15 MINUTES. IF NO RELIEF, CALL 911 (Patient taking differently: Place 1 tablet under the tongue Every 5 (Five) Minutes As Needed for Chest Pain.), Disp: 25 tablet, Rfl: 0    pantoprazole (PROTONIX) 40 MG EC tablet, Take 1 tablet by mouth Daily., Disp: 90 tablet, Rfl: 1    potassium chloride (MICRO-K) 10 MEQ CR capsule, Take 1 capsule by mouth 2 (Two) Times a Day., Disp: , Rfl:     prochlorperazine (COMPAZINE) 10 MG tablet, Take 1 tablet by mouth Every 8 (Eight) Hours As Needed for Nausea or Vomiting., Disp: 24 tablet, Rfl: 0    SITagliptin (Januvia) 25 MG tablet, Take 1 tablet by mouth Daily., Disp: 30 tablet, Rfl: 5    SSD 1 % cream, APPLY TO THE AFFECTED AREA(S) TWICE DAILY, Disp: 50 g, Rfl: 0    sucralfate (CARAFATE) 1 g tablet, TAKE 1 TABLET BY MOUTH FOUR TIMES DAILY, Disp: 120 tablet, Rfl: 5     "vitamin B-6 (PYRIDOXINE) 100 MG tablet, Take 1 tablet by mouth Daily., Disp: , Rfl:     vitamin C (ASCORBIC ACID) 500 MG tablet, Take 1 tablet by mouth Daily., Disp: , Rfl:     Allergies   Allergen Reactions    Iodinated Contrast Media Anaphylaxis    Lisinopril Other (See Comments)    Prednisone Cough        Family History   Problem Relation Age of Onset    No Known Problems Mother     No Known Problems Father        Social History     Socioeconomic History    Marital status:     Number of children: 2   Tobacco Use    Smoking status: Former     Current packs/day: 0.00     Average packs/day: 1 pack/day for 24.0 years (24.0 ttl pk-yrs)     Types: Cigarettes     Start date: 1960     Quit date: 1984     Years since quittin.0     Passive exposure: Past    Smokeless tobacco: Never    Tobacco comments:     Have not smoked since    Vaping Use    Vaping status: Never Used   Substance and Sexual Activity    Alcohol use: Never    Drug use: Never    Sexual activity: Not Currently     Partners: Female       Vital Signs:   Resp 19   Ht 170.2 cm (67.01\")   Wt 55.8 kg (123 lb)   BMI 19.26 kg/m²      Physical Exam     Result Review :   The following data was reviewed by: RONALD Dean on 2025:  Results for orders placed or performed in visit on 25   POC Urinalysis Dipstick, Automated    Collection Time: 25  8:18 AM    Specimen: Urine   Result Value Ref Range    Color Yellow Yellow, Straw, Dark Yellow, [place]    Clarity, UA Clear Clear    Specific Gravity  1.015 1.005 - 1.030    pH, Urine 7.0 5.0 - 8.0    Leukocytes Trace (A) Negative    Nitrite, UA Positive (A) Negative    Protein, POC Negative Negative mg/dL    Glucose, UA Negative Negative mg/dL    Ketones, UA Negative Negative    Urobilinogen, UA 0.2 E.U./dL Normal, 0.2 E.U./dL    Bilirubin Negative Negative    Blood, UA Trace (A) Negative    Lot Number [id number]     Expiration Date 10/2,025             Procedures      "   Assessment and Plan    Diagnoses and all orders for this visit:    1. Hematuria, unspecified type (Primary)  -     POC Urinalysis Dipstick, Automated  -     Pathnostics Guidance UTI -; Future    2. Personal history of bladder cancer    3. Recurrent UTI      We will send urine for culture today.    Will plan for a follow-up appointment in 3 months or sooner if needed.    I spent 10 minutes caring for Marcello on this date of service. This time includes time spent by me in the following activities:reviewing tests, obtaining and/or reviewing a separately obtained history, performing a medically appropriate examination and/or evaluation , counseling and educating the patient/family/caregiver, ordering medications, tests, or procedures, and documenting information in the medical record  Follow Up   Return in about 3 months (around 4/13/2025) for Follow-up UTIs.  Patient was given instructions and counseling regarding his condition or for health maintenance advice. Please see specific information pulled into the AVS if appropriate.         This document has been electronically signed by RONALD Dean  January 13, 2025 10:57 EST

## 2025-01-16 DIAGNOSIS — I10 PRIMARY HYPERTENSION: ICD-10-CM

## 2025-01-16 RX ORDER — LOSARTAN POTASSIUM 50 MG/1
50 TABLET ORAL DAILY
Qty: 90 TABLET | Refills: 1 | Status: SHIPPED | OUTPATIENT
Start: 2025-01-16

## 2025-01-17 ENCOUNTER — TELEPHONE (OUTPATIENT)
Dept: UROLOGY | Age: 85
End: 2025-01-17
Payer: MEDICARE

## 2025-01-17 NOTE — TELEPHONE ENCOUNTER
----- Message from Brenda Arroyo sent at 1/16/2025  4:11 PM EST -----  Regarding: FW:  Please let the patient know that it means your culture did come back positive I sent in medication to ana [facility] in [place]  ----- Message -----  From: Alisson, Scans Incoming  Sent: 1/16/2025   7:25 AM EST  To: RONALD Dean

## 2025-01-17 NOTE — TELEPHONE ENCOUNTER
I spoke with the patients daughter about her fathers lab results. She stated that they will  the antibiotic today and get that started. She had no other further questions. Uma Kauffman MA

## 2025-01-22 ENCOUNTER — OFFICE VISIT (OUTPATIENT)
Dept: CARDIOLOGY | Facility: CLINIC | Age: 85
End: 2025-01-22
Payer: MEDICARE

## 2025-01-22 VITALS
OXYGEN SATURATION: 97 % | DIASTOLIC BLOOD PRESSURE: 58 MMHG | WEIGHT: 128.6 LBS | BODY MASS INDEX: 20.18 KG/M2 | HEART RATE: 58 BPM | SYSTOLIC BLOOD PRESSURE: 118 MMHG | HEIGHT: 67 IN

## 2025-01-22 DIAGNOSIS — I10 PRIMARY HYPERTENSION: ICD-10-CM

## 2025-01-22 DIAGNOSIS — I50.42 CHRONIC COMBINED SYSTOLIC AND DIASTOLIC CONGESTIVE HEART FAILURE: Primary | ICD-10-CM

## 2025-01-22 RX ORDER — HYDROCODONE BITARTRATE AND ACETAMINOPHEN 5; 325 MG/1; MG/1
1 TABLET ORAL
COMMUNITY
Start: 2025-01-22 | End: 2025-03-19

## 2025-01-22 RX ORDER — DAPAGLIFLOZIN 10 MG/1
10 TABLET, FILM COATED ORAL DAILY
Qty: 90 TABLET | Refills: 3 | Status: SHIPPED | OUTPATIENT
Start: 2025-01-22

## 2025-01-22 RX ORDER — PREDNISONE 50 MG/1
TABLET ORAL
Qty: 3 TABLET | Refills: 0 | Status: SHIPPED | OUTPATIENT
Start: 2025-01-22

## 2025-01-22 RX ORDER — CARVEDILOL 12.5 MG/1
12.5 TABLET ORAL 2 TIMES DAILY
Qty: 180 TABLET | Refills: 1 | Status: SHIPPED | OUTPATIENT
Start: 2025-01-22

## 2025-01-22 RX ORDER — LOSARTAN POTASSIUM 100 MG/1
100 TABLET ORAL DAILY
Qty: 90 TABLET | Refills: 3 | Status: SHIPPED | OUTPATIENT
Start: 2025-01-22

## 2025-01-22 NOTE — PROGRESS NOTES
Subjective:     Encounter Date:01/22/2025      Patient ID: Marcello Larson is a 84 y.o. male.    Chief Complaint:  Management of HFrEF, CAD    HPI:   84 y.o. male with CAD status post CABG, HFrEF secondary to ischemic cardiomyopathy (EF 30 to 35%) status post ICD, permanent atrial fibrillation on Eliquis, hypertension, hyperlipidemia who presents for follow-up and management of his chronic conditions.  I last saw the patient in September and at the time he was doing well.  We did not make any medication changes.  Since our last visit he presented to an outside hospital with volume overload secondary to acute on chronic HFrEF exacerbation.  Echocardiogram performed at that time revealed severe hypokinesis of the LV with EF of 15 to 20% as well as moderate MR.  He was aggressively diuresed and Lasix was increased to 60 mg twice daily. Since then he has felt well.      Echo 2/22/2022 with an EF 30 to 35%, grade 3 diastolic dysfunction, moderately dilated LA, mildly dilated RA.    Echocardiogram late Nov 2024 performed at that time revealed severe hypokinesis of the LV with EF of 15 to 20% as well as moderate MR.    The following portions of the patient's history were reviewed and updated as appropriate: allergies, current medications, past family history, past medical history, past social history, past surgical history and problem list.     REVIEW OF SYSTEMS:   All systems reviewed.  Pertinent positives identified in HPI.  All other systems are negative.    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  LV systolic function is moderate to severely reduced.  EF 30-35%.  The transmittal spectral doppler flow pattern is suggestive of pseudonormalization.  There is moderate to severe global hypokinesis of the LV.  The left atrium is mildly dilated.  The mitral leaflets appear thickened, but open well.  Mild MR and AR.    Hx of CABG 2019 - left internal mammary graft to the LAD, SVG to OM1, OM2 in the RCA    Hyperlipidemia 2019    Hypo-osmolality and hyponatremia     Low back pain     Myocardial infarct     Other long term (current) drug therapy     Palpitations 2019    Personal history of malignant neoplasm of bladder     Sleep apnea 2019    Stented coronary artery 2019 - VISION bare metal stent to the proximal stenosis of the vein graft to the right and ISION bare metal stent in the mid to distal 80% stenosis.    Type 2 diabetes mellitus without complication     Unstable angina        Family History   Problem Relation Age of Onset    No Known Problems Mother     No Known Problems Father        Social History     Socioeconomic History    Marital status:     Number of children: 2   Tobacco Use    Smoking status: Former     Current packs/day: 0.00     Average packs/day: 1 pack/day for 24.0 years (24.0 ttl pk-yrs)     Types: Cigarettes     Start date: 1960     Quit date: 1984     Years since quittin.0     Passive exposure: Past    Smokeless tobacco: Never    Tobacco comments:     Have not smoked since    Vaping Use    Vaping status: Never Used   Substance and Sexual Activity    Alcohol use:  Never    Drug use: Never    Sexual activity: Not Currently     Partners: Female       Allergies   Allergen Reactions    Iodinated Contrast Media Anaphylaxis    Lisinopril Other (See Comments)    Prednisone Cough       Past Surgical History:   Procedure Laterality Date    AMPUTATION Left     LOWER EXTREMITY BKA    APPENDECTOMY      CARDIAC CATHETERIZATION      CARDIAC CATHETERIZATION N/A 10/13/2020    Procedure: Left Heart Cath;  Surgeon: Kofi Campoverde MD;  Location:  [facility];  Service: Cardiology;  Laterality: N/A;    CARDIAC CATHETERIZATION N/A 10/13/2020    Procedure: Coronary angiography;  Surgeon: Kofi Campoverde MD;  Location:  [facility];  Service: Cardiology;  Laterality: N/A;    CARDIAC CATHETERIZATION N/A 10/13/2020    Procedure: Saphenous Vein Graft;  Surgeon: Kofi Campoverde MD;  Location:  [facility];  Service: Cardiology;  Laterality: N/A;    CARDIAC DEFIBRILLATOR PLACEMENT      CARDIAC ELECTROPHYSIOLOGY PROCEDURE N/A 04/04/2022    Procedure: ICD DC new/SJM;  Surgeon: Mamadou Terry MD;  [facility];  Service: Cardiology;  Laterality: N/A;    CARDIAC VALVE REPLACEMENT      CAROTID STENT      CATARACT EXTRACTION      CHOLECYSTECTOMY      CORONARY ARTERY BYPASS GRAFT      1984, 1991 4 VESSEL    CORONARY STENT PLACEMENT      INSERT / REPLACE / REMOVE PACEMAKER      INTERNAL CARDIAC DEFIBRILLATOR INSERTION  04/2022    OTHER SURGICAL HISTORY      UROSTOMY S/P BLADDER CA       Procedures       Objective:         Vitals:    01/22/25 1010   BP: 118/58   Pulse: 58   SpO2: 97%         PHYSICAL EXAM:  GEN: well appearing, in NAD   HEENT: NCAT, EOMI, moist mucus membranes   Respiratory: CTAB, no wheezes, rales or rhonchi  CV: normal rate, regular rhythm, normal S1, S2, no murmurs, rubs, gallops, +2 radial pulses b/l  GI: soft, nontender, nondistended  MSK: Left leg BKA, no edema of right leg  Skin: no rash, warm,  dry  Heme/Lymph: no bruising or bleeding  Psych: organized thought, normal behavior and affect  Neuro: Alert and Oriented x 3, grossly normal motor function        Assessment:         (I50.42) Chronic combined systolic and diastolic congestive heart failure - Plan: Case Request Cath Lab: Left Heart Cath    (I10) Primary hypertension - Plan: losartan (COZAAR) 100 MG tablet    82 year old man with CAD status post CABG, HFrEF secondary to ischemic cardiomyopathy (EF 30 to 35%) status post ICD, permanent atrial fibrillation on Eliquis, hypertension, hyperlipidemia who presents for follow-up and management of his chronic conditions.       Plan:       #HFrEF  Well compensated. NYHA class I. Echo 2/22/2022 with EF 30 to 35%, grade 3 diastolic dysfunction, moderately dilated LA, mildly dilated RA.   - continue Coreg 12.5 mg twice daily  - increase losartan to 100 mg  - start Farxiga 10 mg daily  - Continue Lasix 60 mg twice daily  - no Jardiance given cost  - LHC to eval worsening EF    #CAD status post CABG  Patient without any symptoms of angina.  - continue plavix 75 mg daily, lovastatin 40 mg daily    #Permanent atrial fibrillation  [id number].  -Continue Coreg as above, Eliquis 2.5 mg twice daily    Dr Corley, thank you very much for referring this kind patient to me. Please call me with any questions or concerns. I will see the patient again in the office in 6 months or earlier as needed.         Andrew Castañeda MD  01/22/25  [facility]    Outpatient Encounter Medications as of 1/22/2025   Medication Sig Dispense Refill    apixaban (ELIQUIS) 5 MG tablet tablet Take 0.5 tablets by mouth 2 (Two) Times a Day.      carvedilol (COREG) 12.5 MG tablet Take 1 tablet by mouth 2 (Two) Times a Day. 180 tablet 1    Diclofenac Sodium (VOLTAREN) 1 % gel gel APPLY 4 grams TO THE AFFECTED AREA TWICE DAILY 100 g 3    doxycycline (VIBRAMYCIN) 100 MG capsule Take 1 capsule by mouth 2 (Two) Times a Day for 10 days. 20  capsule 0    ferrous sulfate 325 (65 FE) MG tablet Take 1 tablet by mouth 2 (Two) Times a Day.      fexofenadine (ALLEGRA) 180 MG tablet Take 1 tablet by mouth Daily.      fluticasone (FLONASE) 50 MCG/ACT nasal spray USE 2 SPRAYS NASALLY EVERY DAY AS DIRECTED BY PROVIDER 48 g 3    furosemide (LASIX) 40 MG tablet Take 1 tablet by mouth 2 (Two) Times a Day. 180 tablet 1    guaiFENesin (MUCINEX) 600 MG 12 hr tablet Take 2 tablets by mouth.      HYDROcodone-acetaminophen (NORCO) 5-325 MG per tablet Take 1 tablet by mouth.      levothyroxine (SYNTHROID, LEVOTHROID) 25 MCG tablet Take 1 tablet by mouth Daily. 30 tablet 5    losartan (COZAAR) 100 MG tablet Take 1 tablet by mouth Daily. 90 tablet 3    lovastatin (MEVACOR) 40 MG tablet Take 1 tablet by mouth Every Evening. 90 tablet 1    metFORMIN (GLUCOPHAGE) 500 MG tablet Take 1 tablet by mouth Daily. 90 tablet 1    montelukast (SINGULAIR) 10 MG tablet Take 1 tablet by mouth Every Night. 90 tablet 3    nitroglycerin (NITROSTAT) 0.4 MG SL tablet DISSOLVE 1 TABLET UNDER THE TONGUE EVERY 5 MINUTES AS NEEDED FOR CHEST PAIN. DO NOT EXCEED A TOTAL OF 3 DOSES IN 15 MINUTES. IF NO RELIEF, CALL 911 (Patient taking differently: Place 1 tablet under the tongue Every 5 (Five) Minutes As Needed for Chest Pain.) 25 tablet 0    pantoprazole (PROTONIX) 40 MG EC tablet Take 1 tablet by mouth Daily. 90 tablet 1    potassium chloride (MICRO-K) 10 MEQ CR capsule Take 1 capsule by mouth 2 (Two) Times a Day.      prochlorperazine (COMPAZINE) 10 MG tablet Take 1 tablet by mouth Every 8 (Eight) Hours As Needed for Nausea or Vomiting. 24 tablet 0    SITagliptin (Januvia) 25 MG tablet Take 1 tablet by mouth Daily. 30 tablet 5    SSD 1 % cream APPLY TO THE AFFECTED AREA(S) TWICE DAILY 50 g 0    sucralfate (CARAFATE) 1 g tablet TAKE 1 TABLET BY MOUTH FOUR TIMES DAILY 120 tablet 5    vitamin B-6 (PYRIDOXINE) 100 MG tablet Take 1 tablet by mouth Daily.      vitamin C (ASCORBIC ACID) 500 MG tablet Take  1 tablet by mouth Daily.      [DISCONTINUED] HYDROcodone-acetaminophen (NORCO) 5-325 MG per tablet Take 1 tablet by mouth Daily.      [DISCONTINUED] losartan (COZAAR) 50 MG tablet Take 1 tablet by mouth Daily. 90 tablet 1    dapagliflozin Propanediol (Farxiga) 10 MG tablet Take 10 mg by mouth Daily. 90 tablet 3    predniSONE (DELTASONE) 50 MG tablet Take one tablet 13, 7 and 1 our prior to procedure. 3 tablet 0    [DISCONTINUED] carvedilol (COREG) 12.5 MG tablet Take 1 tablet by mouth 2 (Two) Times a Day. 180 tablet 3     No facility-administered encounter medications on file as of 1/22/2025.

## 2025-01-22 NOTE — H&P (VIEW-ONLY)
Subjective:     Encounter Date:01/22/2025      Patient ID: Marcello Larson is a 84 y.o. male.    Chief Complaint:  Management of HFrEF, CAD    HPI:   84 y.o. male with CAD status post CABG, HFrEF secondary to ischemic cardiomyopathy (EF 30 to 35%) status post ICD, permanent atrial fibrillation on Eliquis, hypertension, hyperlipidemia who presents for follow-up and management of his chronic conditions.  I last saw the patient in September and at the time he was doing well.  We did not make any medication changes.  Since our last visit he presented to an outside hospital with volume overload secondary to acute on chronic HFrEF exacerbation.  Echocardiogram performed at that time revealed severe hypokinesis of the LV with EF of 15 to 20% as well as moderate MR.  He was aggressively diuresed and Lasix was increased to 60 mg twice daily. Since then he has felt well.      Echo 2/22/2022 with an EF 30 to 35%, grade 3 diastolic dysfunction, moderately dilated LA, mildly dilated RA.    Echocardiogram late Nov 2024 performed at that time revealed severe hypokinesis of the LV with EF of 15 to 20% as well as moderate MR.    The following portions of the patient's history were reviewed and updated as appropriate: allergies, current medications, past family history, past medical history, past social history, past surgical history and problem list.     REVIEW OF SYSTEMS:   All systems reviewed.  Pertinent positives identified in HPI.  All other systems are negative.    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  LV systolic function is moderate to severely reduced.  EF 30-35%.  The transmittal spectral doppler flow pattern is suggestive of pseudonormalization.  There is moderate to severe global hypokinesis of the LV.  The left atrium is mildly dilated.  The mitral leaflets appear thickened, but open well.  Mild MR and AR.    Hx of CABG 2019 - left internal mammary graft to the LAD, SVG to OM1, OM2 in the RCA    Hyperlipidemia 2019    Hypo-osmolality and hyponatremia     Low back pain     Myocardial infarct     Other long term (current) drug therapy     Palpitations 2019    Personal history of malignant neoplasm of bladder     Sleep apnea 2019    Stented coronary artery 2019 - VISION bare metal stent to the proximal stenosis of the vein graft to the right and ISION bare metal stent in the mid to distal 80% stenosis.    Type 2 diabetes mellitus without complication     Unstable angina        Family History   Problem Relation Age of Onset    No Known Problems Mother     No Known Problems Father        Social History     Socioeconomic History    Marital status:     Number of children: 2   Tobacco Use    Smoking status: Former     Current packs/day: 0.00     Average packs/day: 1 pack/day for 24.0 years (24.0 ttl pk-yrs)     Types: Cigarettes     Start date: 1960     Quit date: 1984     Years since quittin.0     Passive exposure: Past    Smokeless tobacco: Never    Tobacco comments:     Have not smoked since    Vaping Use    Vaping status: Never Used   Substance and Sexual Activity    Alcohol use:  Never    Drug use: Never    Sexual activity: Not Currently     Partners: Female       Allergies   Allergen Reactions    Iodinated Contrast Media Anaphylaxis    Lisinopril Other (See Comments)    Prednisone Cough       Past Surgical History:   Procedure Laterality Date    AMPUTATION Left     LOWER EXTREMITY BKA    APPENDECTOMY      CARDIAC CATHETERIZATION      CARDIAC CATHETERIZATION N/A 10/13/2020    Procedure: Left Heart Cath;  Surgeon: Kofi Campoverde MD;  Location:  [facility];  Service: Cardiology;  Laterality: N/A;    CARDIAC CATHETERIZATION N/A 10/13/2020    Procedure: Coronary angiography;  Surgeon: Kofi Campoverde MD;  Location:  [facility];  Service: Cardiology;  Laterality: N/A;    CARDIAC CATHETERIZATION N/A 10/13/2020    Procedure: Saphenous Vein Graft;  Surgeon: Kofi Campoverde MD;  Location:  [facility];  Service: Cardiology;  Laterality: N/A;    CARDIAC DEFIBRILLATOR PLACEMENT      CARDIAC ELECTROPHYSIOLOGY PROCEDURE N/A 04/04/2022    Procedure: ICD DC new/SJM;  Surgeon: Mamadou Terry MD;  [facility];  Service: Cardiology;  Laterality: N/A;    CARDIAC VALVE REPLACEMENT      CAROTID STENT      CATARACT EXTRACTION      CHOLECYSTECTOMY      CORONARY ARTERY BYPASS GRAFT      1984, 1991 4 VESSEL    CORONARY STENT PLACEMENT      INSERT / REPLACE / REMOVE PACEMAKER      INTERNAL CARDIAC DEFIBRILLATOR INSERTION  04/2022    OTHER SURGICAL HISTORY      UROSTOMY S/P BLADDER CA       Procedures       Objective:         Vitals:    01/22/25 1010   BP: 118/58   Pulse: 58   SpO2: 97%         PHYSICAL EXAM:  GEN: well appearing, in NAD   HEENT: NCAT, EOMI, moist mucus membranes   Respiratory: CTAB, no wheezes, rales or rhonchi  CV: normal rate, regular rhythm, normal S1, S2, no murmurs, rubs, gallops, +2 radial pulses b/l  GI: soft, nontender, nondistended  MSK: Left leg BKA, no edema of right leg  Skin: no rash, warm,  dry  Heme/Lymph: no bruising or bleeding  Psych: organized thought, normal behavior and affect  Neuro: Alert and Oriented x 3, grossly normal motor function        Assessment:         (I50.42) Chronic combined systolic and diastolic congestive heart failure - Plan: Case Request Cath Lab: Left Heart Cath    (I10) Primary hypertension - Plan: losartan (COZAAR) 100 MG tablet    82 year old man with CAD status post CABG, HFrEF secondary to ischemic cardiomyopathy (EF 30 to 35%) status post ICD, permanent atrial fibrillation on Eliquis, hypertension, hyperlipidemia who presents for follow-up and management of his chronic conditions.       Plan:       #HFrEF  Well compensated. NYHA class I. Echo 2/22/2022 with EF 30 to 35%, grade 3 diastolic dysfunction, moderately dilated LA, mildly dilated RA.   - continue Coreg 12.5 mg twice daily  - increase losartan to 100 mg  - start Farxiga 10 mg daily  - Continue Lasix 60 mg twice daily  - no Jardiance given cost  - [facility] to eval worsening EF    #CAD status post CABG  Patient without any symptoms of angina.  - continue plavix 75 mg daily, lovastatin 40 mg daily    #Permanent atrial fibrillation  [id number].  -Continue Coreg as above, Eliquis 2.5 mg twice daily    Dr Corley, thank you very much for referring this kind patient to me. Please call me with any questions or concerns. I will see the patient again in the office in 6 months or earlier as needed.         Andrew Castañeda MD  01/22/25  [facility]    Outpatient Encounter Medications as of 1/22/2025   Medication Sig Dispense Refill    apixaban (ELIQUIS) 5 MG tablet tablet Take 0.5 tablets by mouth 2 (Two) Times a Day.      carvedilol (COREG) 12.5 MG tablet Take 1 tablet by mouth 2 (Two) Times a Day. 180 tablet 1    Diclofenac Sodium (VOLTAREN) 1 % gel gel APPLY 4 grams TO THE AFFECTED AREA TWICE DAILY 100 g 3    doxycycline (VIBRAMYCIN) 100 MG capsule Take 1 capsule by mouth 2 (Two) Times a Day for 10 days. 20  capsule 0    ferrous sulfate 325 (65 FE) MG tablet Take 1 tablet by mouth 2 (Two) Times a Day.      fexofenadine (ALLEGRA) 180 MG tablet Take 1 tablet by mouth Daily.      fluticasone (FLONASE) 50 MCG/ACT nasal spray USE 2 SPRAYS NASALLY EVERY DAY AS DIRECTED BY PROVIDER 48 g 3    furosemide (LASIX) 40 MG tablet Take 1 tablet by mouth 2 (Two) Times a Day. 180 tablet 1    guaiFENesin (MUCINEX) 600 MG 12 hr tablet Take 2 tablets by mouth.      HYDROcodone-acetaminophen (NORCO) 5-325 MG per tablet Take 1 tablet by mouth.      levothyroxine (SYNTHROID, LEVOTHROID) 25 MCG tablet Take 1 tablet by mouth Daily. 30 tablet 5    losartan (COZAAR) 100 MG tablet Take 1 tablet by mouth Daily. 90 tablet 3    lovastatin (MEVACOR) 40 MG tablet Take 1 tablet by mouth Every Evening. 90 tablet 1    metFORMIN (GLUCOPHAGE) 500 MG tablet Take 1 tablet by mouth Daily. 90 tablet 1    montelukast (SINGULAIR) 10 MG tablet Take 1 tablet by mouth Every Night. 90 tablet 3    nitroglycerin (NITROSTAT) 0.4 MG SL tablet DISSOLVE 1 TABLET UNDER THE TONGUE EVERY 5 MINUTES AS NEEDED FOR CHEST PAIN. DO NOT EXCEED A TOTAL OF 3 DOSES IN 15 MINUTES. IF NO RELIEF, CALL 911 (Patient taking differently: Place 1 tablet under the tongue Every 5 (Five) Minutes As Needed for Chest Pain.) 25 tablet 0    pantoprazole (PROTONIX) 40 MG EC tablet Take 1 tablet by mouth Daily. 90 tablet 1    potassium chloride (MICRO-K) 10 MEQ CR capsule Take 1 capsule by mouth 2 (Two) Times a Day.      prochlorperazine (COMPAZINE) 10 MG tablet Take 1 tablet by mouth Every 8 (Eight) Hours As Needed for Nausea or Vomiting. 24 tablet 0    SITagliptin (Januvia) 25 MG tablet Take 1 tablet by mouth Daily. 30 tablet 5    SSD 1 % cream APPLY TO THE AFFECTED AREA(S) TWICE DAILY 50 g 0    sucralfate (CARAFATE) 1 g tablet TAKE 1 TABLET BY MOUTH FOUR TIMES DAILY 120 tablet 5    vitamin B-6 (PYRIDOXINE) 100 MG tablet Take 1 tablet by mouth Daily.      vitamin C (ASCORBIC ACID) 500 MG tablet Take  1 tablet by mouth Daily.      [DISCONTINUED] HYDROcodone-acetaminophen (NORCO) 5-325 MG per tablet Take 1 tablet by mouth Daily.      [DISCONTINUED] losartan (COZAAR) 50 MG tablet Take 1 tablet by mouth Daily. 90 tablet 1    dapagliflozin Propanediol (Farxiga) 10 MG tablet Take 10 mg by mouth Daily. 90 tablet 3    predniSONE (DELTASONE) 50 MG tablet Take one tablet 13, 7 and 1 our prior to procedure. 3 tablet 0    [DISCONTINUED] carvedilol (COREG) 12.5 MG tablet Take 1 tablet by mouth 2 (Two) Times a Day. 180 tablet 3     No facility-administered encounter medications on file as of 1/22/2025.

## 2025-01-28 ENCOUNTER — TELEPHONE (OUTPATIENT)
Dept: CARDIOLOGY | Facility: CLINIC | Age: 85
End: 2025-01-28
Payer: MEDICARE

## 2025-01-28 NOTE — TELEPHONE ENCOUNTER
Patient showed up for lab work for CATH scheduled 02/06/25. Patient advised to wait until 24-48 hours prior to cath due to accuracy of blood work prior to procedure.     Patient verbalized understanding. Labs are not in chart at this time.    No

## 2025-01-31 ENCOUNTER — LAB (OUTPATIENT)
Dept: LAB | Facility: HOSPITAL | Age: 85
End: 2025-01-31
Payer: MEDICARE

## 2025-01-31 ENCOUNTER — TELEPHONE (OUTPATIENT)
Dept: CARDIOLOGY | Facility: CLINIC | Age: 85
End: 2025-01-31
Payer: MEDICARE

## 2025-01-31 DIAGNOSIS — I25.10 CORONARY ARTERY DISEASE INVOLVING NATIVE CORONARY ARTERY OF NATIVE HEART WITHOUT ANGINA PECTORIS: ICD-10-CM

## 2025-01-31 DIAGNOSIS — I25.10 CORONARY ARTERY DISEASE INVOLVING NATIVE CORONARY ARTERY OF NATIVE HEART WITHOUT ANGINA PECTORIS: Primary | ICD-10-CM

## 2025-01-31 LAB
ANION GAP SERPL CALCULATED.3IONS-SCNC: 11.5 MMOL/L (ref 5–15)
BUN SERPL-MCNC: 56 MG/DL (ref 8–23)
BUN/CREAT SERPL: 28.4 (ref 7–25)
CALCIUM SPEC-SCNC: 8.9 MG/DL (ref 8.6–10.5)
CHLORIDE SERPL-SCNC: 103 MMOL/L (ref 98–107)
CO2 SERPL-SCNC: 21.5 MMOL/L (ref 22–29)
CREAT SERPL-MCNC: 1.97 MG/DL (ref 0.76–1.27)
DEPRECATED RDW RBC AUTO: 51.7 FL (ref 37–54)
EGFRCR SERPLBLD CKD-EPI 2021: 32.9 ML/MIN/1.73
ERYTHROCYTE [DISTWIDTH] IN BLOOD BY AUTOMATED COUNT: 15 % (ref 12.3–15.4)
GLUCOSE SERPL-MCNC: 152 MG/DL (ref 65–99)
HCT VFR BLD AUTO: 34.3 % (ref 37.5–51)
HGB BLD-MCNC: 10.7 G/DL (ref 13–17.7)
MCH RBC QN AUTO: 28.6 PG (ref 26.6–33)
MCHC RBC AUTO-ENTMCNC: 31.2 G/DL (ref 31.5–35.7)
MCV RBC AUTO: 91.7 FL (ref 79–97)
PLATELET # BLD AUTO: 135 10*3/MM3 (ref 140–450)
PMV BLD AUTO: 10.1 FL (ref 6–12)
POTASSIUM SERPL-SCNC: 4.7 MMOL/L (ref 3.5–5.2)
RBC # BLD AUTO: 3.74 10*6/MM3 (ref 4.14–5.8)
SODIUM SERPL-SCNC: 136 MMOL/L (ref 136–145)
WBC NRBC COR # BLD AUTO: 6.84 10*3/MM3 (ref 3.4–10.8)

## 2025-01-31 PROCEDURE — 85027 COMPLETE CBC AUTOMATED: CPT

## 2025-01-31 PROCEDURE — 80048 BASIC METABOLIC PNL TOTAL CA: CPT

## 2025-01-31 PROCEDURE — 36415 COLL VENOUS BLD VENIPUNCTURE: CPT

## 2025-01-31 NOTE — TELEPHONE ENCOUNTER
Returned call to outpatient lab and notified that lab orders have been placed.  Estelita verbalized understanding and stated she thinks [facility] just called the patient up for his labs.    Thank you,  Ria ODONNELL RN  [place] Nurse [facility]  01/31/25 09:43 EST

## 2025-01-31 NOTE — TELEPHONE ENCOUNTER
[facility] (outpatient lab, [phone]) called regarding Marcello Larson.    Patient is in facility for lab work however there are no orders placed.  Patient is scheduled for a heart cath with Dr. Oliveira on 2/6/25.    Please let me know how you would like to proceed.    Thank you,  Ria ODONNELL RN  Triage Nurse [facility]  01/31/25  09:17 EST

## 2025-02-06 ENCOUNTER — HOSPITAL ENCOUNTER (OUTPATIENT)
Facility: HOSPITAL | Age: 85
Setting detail: HOSPITAL OUTPATIENT SURGERY
Discharge: HOME | End: 2025-02-06
Attending: STUDENT IN AN ORGANIZED HEALTH CARE EDUCATION/TRAINING PROGRAM | Admitting: STUDENT IN AN ORGANIZED HEALTH CARE EDUCATION/TRAINING PROGRAM
Payer: MEDICARE

## 2025-02-06 VITALS
HEART RATE: 61 BPM | SYSTOLIC BLOOD PRESSURE: 151 MMHG | TEMPERATURE: 97.4 F | OXYGEN SATURATION: 95 % | BODY MASS INDEX: 19.93 KG/M2 | RESPIRATION RATE: 16 BRPM | DIASTOLIC BLOOD PRESSURE: 68 MMHG | WEIGHT: 127 LBS | HEIGHT: 67 IN

## 2025-02-06 DIAGNOSIS — Z95.5 S/P DRUG ELUTING CORONARY STENT PLACEMENT: Primary | ICD-10-CM

## 2025-02-06 DIAGNOSIS — I50.42 CHRONIC COMBINED SYSTOLIC AND DIASTOLIC CONGESTIVE HEART FAILURE: ICD-10-CM

## 2025-02-06 LAB
ACT BLD: 239 SECONDS (ref 82–152)
ACT BLD: 273 SECONDS (ref 82–152)
ACT BLD: 291 SECONDS (ref 82–152)
ACT BLD: 366 SECONDS (ref 82–152)
GLUCOSE BLDC GLUCOMTR-MCNC: 177 MG/DL (ref 70–130)

## 2025-02-06 PROCEDURE — C1769 GUIDE WIRE: HCPCS | Performed by: STUDENT IN AN ORGANIZED HEALTH CARE EDUCATION/TRAINING PROGRAM

## 2025-02-06 PROCEDURE — 85347 COAGULATION TIME ACTIVATED: CPT

## 2025-02-06 PROCEDURE — 25010000002 HEPARIN (PORCINE) PER 1000 UNITS: Performed by: STUDENT IN AN ORGANIZED HEALTH CARE EDUCATION/TRAINING PROGRAM

## 2025-02-06 PROCEDURE — 92972 PERQ TRLUML CORONRY LITHOTRP: CPT | Performed by: STUDENT IN AN ORGANIZED HEALTH CARE EDUCATION/TRAINING PROGRAM

## 2025-02-06 PROCEDURE — C1725 CATH, TRANSLUMIN NON-LASER: HCPCS | Performed by: STUDENT IN AN ORGANIZED HEALTH CARE EDUCATION/TRAINING PROGRAM

## 2025-02-06 PROCEDURE — C1874 STENT, COATED/COV W/DEL SYS: HCPCS | Performed by: STUDENT IN AN ORGANIZED HEALTH CARE EDUCATION/TRAINING PROGRAM

## 2025-02-06 PROCEDURE — 92938 PR PRQ TRLUML CORONARY BYP GRFT REVASC ADDL VESSEL: CPT | Performed by: STUDENT IN AN ORGANIZED HEALTH CARE EDUCATION/TRAINING PROGRAM

## 2025-02-06 PROCEDURE — 25010000002 FENTANYL CITRATE (PF) 50 MCG/ML SOLUTION: Performed by: STUDENT IN AN ORGANIZED HEALTH CARE EDUCATION/TRAINING PROGRAM

## 2025-02-06 PROCEDURE — 82948 REAGENT STRIP/BLOOD GLUCOSE: CPT

## 2025-02-06 PROCEDURE — 93459 L HRT ART/GRFT ANGIO: CPT | Performed by: STUDENT IN AN ORGANIZED HEALTH CARE EDUCATION/TRAINING PROGRAM

## 2025-02-06 PROCEDURE — C9604 PERC D-E COR REVASC T CABG S: HCPCS | Performed by: STUDENT IN AN ORGANIZED HEALTH CARE EDUCATION/TRAINING PROGRAM

## 2025-02-06 PROCEDURE — 25810000003 SODIUM CHLORIDE 0.9 % SOLUTION: Performed by: STUDENT IN AN ORGANIZED HEALTH CARE EDUCATION/TRAINING PROGRAM

## 2025-02-06 PROCEDURE — 25010000002 MIDAZOLAM PER 1 MG: Performed by: STUDENT IN AN ORGANIZED HEALTH CARE EDUCATION/TRAINING PROGRAM

## 2025-02-06 PROCEDURE — 25010000002 LIDOCAINE 2% SOLUTION: Performed by: STUDENT IN AN ORGANIZED HEALTH CARE EDUCATION/TRAINING PROGRAM

## 2025-02-06 PROCEDURE — C9605 PERC D-E COR REVASC T CABG B: HCPCS | Performed by: STUDENT IN AN ORGANIZED HEALTH CARE EDUCATION/TRAINING PROGRAM

## 2025-02-06 PROCEDURE — 92937 PRQ TRLUML REVSC CAB GRF 1: CPT | Performed by: STUDENT IN AN ORGANIZED HEALTH CARE EDUCATION/TRAINING PROGRAM

## 2025-02-06 PROCEDURE — C1887 CATHETER, GUIDING: HCPCS | Performed by: STUDENT IN AN ORGANIZED HEALTH CARE EDUCATION/TRAINING PROGRAM

## 2025-02-06 PROCEDURE — 92978 ENDOLUMINL IVUS OCT C 1ST: CPT | Performed by: STUDENT IN AN ORGANIZED HEALTH CARE EDUCATION/TRAINING PROGRAM

## 2025-02-06 PROCEDURE — C1894 INTRO/SHEATH, NON-LASER: HCPCS | Performed by: STUDENT IN AN ORGANIZED HEALTH CARE EDUCATION/TRAINING PROGRAM

## 2025-02-06 PROCEDURE — 25510000001 IOPAMIDOL PER 1 ML: Performed by: STUDENT IN AN ORGANIZED HEALTH CARE EDUCATION/TRAINING PROGRAM

## 2025-02-06 PROCEDURE — C1761: HCPCS | Performed by: STUDENT IN AN ORGANIZED HEALTH CARE EDUCATION/TRAINING PROGRAM

## 2025-02-06 PROCEDURE — C1753 CATH, INTRAVAS ULTRASOUND: HCPCS | Performed by: STUDENT IN AN ORGANIZED HEALTH CARE EDUCATION/TRAINING PROGRAM

## 2025-02-06 PROCEDURE — 25010000002 NICARDIPINE 2.5 MG/ML SOLUTION: Performed by: STUDENT IN AN ORGANIZED HEALTH CARE EDUCATION/TRAINING PROGRAM

## 2025-02-06 DEVICE — EVEROLIMUS-ELUTING PLATINUM CHROMIUM CORONARY STENT SYSTEM
Type: IMPLANTABLE DEVICE | Site: CORONARY | Status: FUNCTIONAL
Brand: SYNERGY MEGATRON™

## 2025-02-06 DEVICE — XIENCE SKYPOINT™ EVEROLIMUS ELUTING CORONARY STENT SYSTEM 2.75 MM X 23 MM / RAPID-EXCHANGE
Type: IMPLANTABLE DEVICE | Site: CORONARY | Status: FUNCTIONAL
Brand: XIENCE SKYPOINT™

## 2025-02-06 RX ORDER — CLOPIDOGREL BISULFATE 75 MG/1
75 TABLET ORAL DAILY
Qty: 90 TABLET | Refills: 3 | Status: SHIPPED | OUTPATIENT
Start: 2025-02-06 | End: 2025-02-06

## 2025-02-06 RX ORDER — MIDAZOLAM HYDROCHLORIDE 1 MG/ML
INJECTION, SOLUTION INTRAMUSCULAR; INTRAVENOUS
Status: DISCONTINUED | OUTPATIENT
Start: 2025-02-06 | End: 2025-02-06 | Stop reason: HOSPADM

## 2025-02-06 RX ORDER — NICARDIPINE HYDROCHLORIDE 2.5 MG/ML
INJECTION INTRAVENOUS
Status: DISCONTINUED | OUTPATIENT
Start: 2025-02-06 | End: 2025-02-06 | Stop reason: HOSPADM

## 2025-02-06 RX ORDER — SODIUM CHLORIDE 9 MG/ML
75 INJECTION, SOLUTION INTRAVENOUS CONTINUOUS
Status: ACTIVE | OUTPATIENT
Start: 2025-02-06 | End: 2025-02-06

## 2025-02-06 RX ORDER — ACETAMINOPHEN 325 MG/1
650 TABLET ORAL EVERY 4 HOURS PRN
Status: DISCONTINUED | OUTPATIENT
Start: 2025-02-06 | End: 2025-02-06 | Stop reason: HOSPADM

## 2025-02-06 RX ORDER — NITROGLYCERIN 0.4 MG/1
0.4 TABLET SUBLINGUAL
Status: DISCONTINUED | OUTPATIENT
Start: 2025-02-06 | End: 2025-02-06 | Stop reason: HOSPADM

## 2025-02-06 RX ORDER — IOPAMIDOL 755 MG/ML
INJECTION, SOLUTION INTRAVASCULAR
Status: DISCONTINUED | OUTPATIENT
Start: 2025-02-06 | End: 2025-02-06 | Stop reason: HOSPADM

## 2025-02-06 RX ORDER — ASPIRIN 325 MG
TABLET ORAL
Status: DISCONTINUED | OUTPATIENT
Start: 2025-02-06 | End: 2025-02-06 | Stop reason: HOSPADM

## 2025-02-06 RX ORDER — SODIUM CHLORIDE 0.9 % (FLUSH) 0.9 %
10 SYRINGE (ML) INJECTION EVERY 12 HOURS SCHEDULED
Status: DISCONTINUED | OUTPATIENT
Start: 2025-02-06 | End: 2025-02-06 | Stop reason: HOSPADM

## 2025-02-06 RX ORDER — CLOPIDOGREL BISULFATE 75 MG/1
75 TABLET ORAL DAILY
Qty: 90 TABLET | Refills: 3 | Status: SHIPPED | OUTPATIENT
Start: 2025-02-06

## 2025-02-06 RX ORDER — FENTANYL CITRATE 50 UG/ML
INJECTION, SOLUTION INTRAMUSCULAR; INTRAVENOUS
Status: DISCONTINUED | OUTPATIENT
Start: 2025-02-06 | End: 2025-02-06 | Stop reason: HOSPADM

## 2025-02-06 RX ORDER — HEPARIN SODIUM 1000 [USP'U]/ML
INJECTION, SOLUTION INTRAVENOUS; SUBCUTANEOUS
Status: DISCONTINUED | OUTPATIENT
Start: 2025-02-06 | End: 2025-02-06 | Stop reason: HOSPADM

## 2025-02-06 RX ORDER — SODIUM CHLORIDE 0.9 % (FLUSH) 0.9 %
10 SYRINGE (ML) INJECTION AS NEEDED
Status: DISCONTINUED | OUTPATIENT
Start: 2025-02-06 | End: 2025-02-06 | Stop reason: HOSPADM

## 2025-02-06 RX ORDER — CLOPIDOGREL BISULFATE 75 MG/1
TABLET ORAL
Status: DISCONTINUED | OUTPATIENT
Start: 2025-02-06 | End: 2025-02-06 | Stop reason: HOSPADM

## 2025-02-06 RX ORDER — FENTANYL CITRATE 50 UG/ML
25 INJECTION, SOLUTION INTRAMUSCULAR; INTRAVENOUS ONCE
Status: COMPLETED | OUTPATIENT
Start: 2025-02-06 | End: 2025-02-06

## 2025-02-06 RX ORDER — VERAPAMIL HYDROCHLORIDE 2.5 MG/ML
INJECTION, SOLUTION INTRAVENOUS
Status: DISCONTINUED | OUTPATIENT
Start: 2025-02-06 | End: 2025-02-06 | Stop reason: HOSPADM

## 2025-02-06 RX ORDER — LIDOCAINE HYDROCHLORIDE 20 MG/ML
INJECTION, SOLUTION INFILTRATION; PERINEURAL
Status: DISCONTINUED | OUTPATIENT
Start: 2025-02-06 | End: 2025-02-06 | Stop reason: HOSPADM

## 2025-02-06 RX ADMIN — SODIUM CHLORIDE 75 ML/HR: 9 INJECTION, SOLUTION INTRAVENOUS at 07:50

## 2025-02-06 RX ADMIN — FENTANYL CITRATE 25 MCG: 50 INJECTION, SOLUTION INTRAMUSCULAR; INTRAVENOUS at 10:21

## 2025-02-06 NOTE — Clinical Note
First balloon inflation max pressure = 15 nader. Second inflation of balloon - Max pressure = 22 nader. 2nd inflation was done at 09:35 EST. Third inflation of balloon - Max pressure = 25 nader. 3rd inflation was done at 09:37 EST. Fourth inflation of balloon - Max pressure = 25 nader. 4th inflation was done at 09:39 EST.

## 2025-02-06 NOTE — DISCHARGE INSTRUCTIONS
[facility]  [address] [place] [address]    Coronary Angioplasty with or without  Stent (Radial Approach) After Care    Refer to this sheet in the next few weeks. These instructions provide you with information on caring for yourself after your procedure. Your health care provider may also give you more specific instructions. Your treatment has been planned according to current medical practices, but problems sometimes occur. Call your health care provider if you have any problems or questions after your procedure.       Home Care Instructions:  You may shower the day after the procedure. Remove the bandage (dressing) and gently wash the site with plain soap and water. Gently pat the site dry. You may apply a band aid daily for 2 days if desired.    Do not apply powder or lotion to the site.  Do not submerge the affected site in water for 3 to 5 days or until the site is completely healed.   Do not flex or bend at the wrist with affected arm for 24 hours.  Do not lift, push or pull anything over 5 pounds for 5 days after your procedure or as directed by your physician.  For a reference, a gallon of milk weighs 8 pounds.    Do not operate machinery or power tools for 24 hours.  Inspect the site at least twice daily. You may notice some bruising at the site and it may be tender for 1 to 2 weeks.     Increase your fluid intake for the next 2 days.    Keep arm elevated for 24 hours.  For the remainder of the day, keep your arm in the “Pledge of Allegiance” position when up and about.    Limit your activity for the next 48 hours and avoid strenuous activity as directed by your physician.   Cardiac Rehab may or may not be ordered.  Please consult with your physician  You may drive 24 hours after the procedure unless otherwise instructed by your caregiver.  A responsible adult should be with you for the first 24 hours after you arrive home.   Do not make any important legal decisions or sign legal  papers for 24 hours. Do not drink alcohol for 24 hours.    Take medicines only as directed by your health care provider.  Blood thinners may be prescribed after your procedure to improve blood flow through the stent.    Metformin or any medications containing Metformin should not be taken for 48 hours after your procedure.    Eat a heart-healthy diet. This should include plenty of fresh fruits and vegetables. Meat should be lean cuts. Avoid the following types of food:    Food that is high in salt.    Canned or highly processed food.    Food that is high in saturated fat or sugar.    Fried food.    Make any other lifestyle changes recommended by your health care provider. This may include:    Not using any tobacco products including cigarettes, chewing tobacco, or electronic cigarettes.   Managing your weight.    Getting regular exercise.    Managing your blood pressure.    Limiting your alcohol intake.    Managing other health problems, such as diabetes.    If you need an MRI after your heart stent was placed, be sure to tell the health care provider who orders the MRI that you have a heart stent.    Keep all follow-up visits as directed by your health care provider.      Call Your Doctor If:    You have unusual pain at the radial/ulnar (wrist) site.  You have redness, warmth, swelling, or pain at the radial/ulnar (wrist) site.  You have drainage (other than a small amount of blood on the dressing).  `You have chills or a fever > 101.  Your arm becomes pale or dark, cool, tingly, or numb.  You develop chest pain, shortness of breath, feel faint or pass out.    You have heavy bleeding from the site.  If you do, hold pressure on the site for 20 minutes.  If the bleeding stops, apply a fresh bandage and call your cardiologist.  However, if you continue to have bleeding, maintain pressure and call 911.    You have any symptoms of a stroke.  Remember BE FAST  B-balance. Sudden trouble walking or loss of  balance.  E-eyes.  Sudden changes in how you see or a sudden onset of a very bad headache.   F-face. Sudden weakness or loss of feeling of the face or facial droop on one side.   A-arms Sudden weakness or numbness in one arm. One arm drifts down if they are both held out in front of you. This happens suddenly and usually on one side of the body.   S-speech.  Sudden trouble speaking, slurred speech or trouble understanding what people are saying.   T-time  Time to call emergency services.  Write down the symptoms and the time they started.

## 2025-02-06 NOTE — Clinical Note
First balloon inflation max pressure = 10 nader. Second inflation of balloon - Max pressure = 10 nader. 2nd inflation was done at 09:17 EST.

## 2025-02-06 NOTE — Clinical Note
First balloon inflation max pressure = 20 nader. Second inflation of balloon - Max pressure = 20 nader. 2nd inflation was done at 09:26 EST.

## 2025-02-06 NOTE — Clinical Note
First balloon inflation max pressure = 20 nader. First balloon inflation duration = 8 seconds. Second inflation of balloon - Max pressure = 22 nader. 2nd Inflation of balloon - Duration = 15 seconds. 2nd inflation was done at 09:11 EST. Third inflation of balloon - Max pressure = 20 nader. 3rd Inflation of balloon - Duration = 6 seconds. 3rd inflation was done at 09:11 EST.

## 2025-02-06 NOTE — Clinical Note
Hemostasis started on the left radial artery. Radial compression device applied to vessel. Hemostasis achieved successfully. Closure device additional comment: Vasc band appliedwith 13 cc of air

## 2025-02-06 NOTE — Clinical Note
First balloon inflation max pressure = 12 nader. Second inflation of balloon - Max pressure = 12 nader. 2nd inflation was done at 09:23 EST.

## 2025-02-06 NOTE — Clinical Note
First balloon inflation max pressure = 4 nader. First balloon inflation duration = 10 seconds. Second inflation of balloon - Max pressure = 6 nader. 2nd Inflation of balloon - Duration = 10 seconds. 2nd inflation was done at 09:29 EST. Third inflation of balloon - Max pressure = 6 nader. 3rd Inflation of balloon - Duration = 10 seconds. 3rd inflation was done at 09:30 EST. Fourth inflation of balloon - Max pressure = 6 nader. 4th Inflation of b alloon - Duration = 10 seconds. 4th inflation was done at 09:30 EST.

## 2025-02-06 NOTE — Clinical Note
First balloon inflation max pressure = 16 nader. First balloon inflation duration = 12 seconds. Second inflation of balloon - Max pressure = 20 nader. 2nd Inflation of balloon - Duration = 20 seconds. 2nd inflation was done at 09:04 EST.

## 2025-02-06 NOTE — Clinical Note
First balloon inflation max pressure = 6 nader. First balloon inflation duration = 10 seconds. Second inflation of balloon - Max pressure = 6 nader. 2nd Inflation of balloon - Duration = 10 seconds. 2nd inflation was done at 09:34 EST.

## 2025-02-06 NOTE — Clinical Note
Wire inserted in saphenous vein graft. Launch [employer] and print the 'Prescriptions from this Visit' Report

## 2025-02-06 NOTE — Clinical Note
First balloon inflation max pressure = 6 nader. First balloon inflation duration = 10 seconds. Second inflation of balloon - Max pressure = 6 nader. 2nd Inflation of balloon - Duration = 10 seconds. 2nd inflation was done at 09:32 EST. Third inflation of balloon - Max pressure = 6 nader. 3rd Inflation of balloon - Duration = 10 seconds. 3rd inflation was done at 09:32 EST. Fourth inflation of balloon - Max pressure = 6 nader. 4th Inflation of b alloon - Duration = 10 seconds. 4th inflation was done at 09:33 EST.

## 2025-02-06 NOTE — Clinical Note
First balloon inflation max pressure = 12 nader. Second inflation of balloon - Max pressure = 20 nader. 2nd inflation was done at 09:46 EST. Third inflation of balloon - Max pressure = 20 nader. 3rd inflation was done at 09:46 EST. Fourth inflation of balloon - Max pressure = 20 nader. 4th inflation was done at 09:46 EST.

## 2025-02-11 RX ORDER — SUCRALFATE 1 G/1
TABLET ORAL
Qty: 120 TABLET | Refills: 5 | Status: SHIPPED | OUTPATIENT
Start: 2025-02-11

## 2025-02-12 ENCOUNTER — OFFICE VISIT (OUTPATIENT)
Dept: FAMILY MEDICINE CLINIC | Age: 85
End: 2025-02-12
Payer: MEDICARE

## 2025-02-12 VITALS
HEIGHT: 67 IN | TEMPERATURE: 97.6 F | WEIGHT: 131.6 LBS | BODY MASS INDEX: 20.65 KG/M2 | DIASTOLIC BLOOD PRESSURE: 47 MMHG | HEART RATE: 52 BPM | OXYGEN SATURATION: 95 % | SYSTOLIC BLOOD PRESSURE: 129 MMHG

## 2025-02-12 DIAGNOSIS — M19.112 POST-TRAUMATIC OSTEOARTHRITIS OF LEFT SHOULDER: ICD-10-CM

## 2025-02-12 DIAGNOSIS — Z89.512 ACQUIRED ABSENCE OF LEFT LEG BELOW KNEE: ICD-10-CM

## 2025-02-12 DIAGNOSIS — M25.512 CHRONIC LEFT SHOULDER PAIN: Primary | ICD-10-CM

## 2025-02-12 DIAGNOSIS — N39.0 URINARY TRACT INFECTION WITHOUT HEMATURIA, SITE UNSPECIFIED: ICD-10-CM

## 2025-02-12 DIAGNOSIS — Z93.6 OTHER ARTIFICIAL OPENINGS OF URINARY TRACT STATUS: ICD-10-CM

## 2025-02-12 DIAGNOSIS — M12.812 ROTATOR CUFF ARTHROPATHY OF LEFT SHOULDER: ICD-10-CM

## 2025-02-12 DIAGNOSIS — G89.29 CHRONIC LEFT SHOULDER PAIN: Primary | ICD-10-CM

## 2025-02-12 PROCEDURE — 1160F RVW MEDS BY RX/DR IN RCRD: CPT | Performed by: FAMILY MEDICINE

## 2025-02-12 PROCEDURE — 87077 CULTURE AEROBIC IDENTIFY: CPT | Performed by: FAMILY MEDICINE

## 2025-02-12 PROCEDURE — 1126F AMNT PAIN NOTED NONE PRSNT: CPT | Performed by: FAMILY MEDICINE

## 2025-02-12 PROCEDURE — 99214 OFFICE O/P EST MOD 30 MIN: CPT | Performed by: FAMILY MEDICINE

## 2025-02-12 PROCEDURE — G2211 COMPLEX E/M VISIT ADD ON: HCPCS | Performed by: FAMILY MEDICINE

## 2025-02-12 PROCEDURE — 3078F DIAST BP <80 MM HG: CPT | Performed by: FAMILY MEDICINE

## 2025-02-12 PROCEDURE — 87086 URINE CULTURE/COLONY COUNT: CPT | Performed by: FAMILY MEDICINE

## 2025-02-12 PROCEDURE — 3074F SYST BP LT 130 MM HG: CPT | Performed by: FAMILY MEDICINE

## 2025-02-12 PROCEDURE — 87186 SC STD MICRODIL/AGAR DIL: CPT | Performed by: FAMILY MEDICINE

## 2025-02-12 PROCEDURE — 1159F MED LIST DOCD IN RCRD: CPT | Performed by: FAMILY MEDICINE

## 2025-02-12 NOTE — ASSESSMENT & PLAN NOTE
Marcello has longstanding history of urostomy.  He was recently evaluated by Urology Brenda Arroyo for UTI after he started to develop white pussy discharge around his urostomy site.  She prescribed him doxycycline for 10 days after culture results came back but unfortunately, he only received 7 days from the pharmacy.  He did complete all 7 days of the prescription but has been off of it for at least a week at this point.  He said that the pussy discharge improved but never completely resolved.  I am repeating a urine culture today prior to making decision about additional therapy.

## 2025-02-12 NOTE — ASSESSMENT & PLAN NOTE
MANAV w/ Necessity Statements : Misc. MANAV  Marcello is having a lot of trouble getting in and out of chairs with the significant degree of left shoulder pathology as described above (severe GH osteoarthritis, supraspinatus tendinopathy with partial tear, moderate AC OA).  He would like to get a lift chair to help him get in and out of a seated position, as he also has a left BKA.  Prescription written today and will be faxed over to [facility]

## 2025-02-12 NOTE — ASSESSMENT & PLAN NOTE
Marcello has been dealing with chronic left shoulder pain for quite some time.  He is following with Pain Management in [place] and Dr. Rascon has been treating him with both procedural interventions and Norco for pain.  He is unable to take NSAIDs due to being on both Eliquis and Plavix.  He saw Dr. Margaux Ellison after MRI of the left shoulder in December showed quite a lot of shoulder pathology and they discussed left shoulder replacement.  He is interested in proceeding with this, but prefers to have it done in [facility].  Referral placed to the [facility] group for further evaluation and treatment.  Orders:    Ambulatory Referral to Orthopedic Surgery

## 2025-02-12 NOTE — PROGRESS NOTES
Chief Complaint  Hospital Follow Up Visit and Shoulder Pain (Wondering if shoulder pain will qualify for lift chair.)    Subjective          Marcello Larson presents to [facility] today for follow-up after he was seen by Dr. Oliveira on 2/6/2025 for left heart cath.  He goes back to see Dr. Oliveira on 3/5/2025.    He is following with Dr. [facility] and Dr. Margaux Ellison for chronic left shoulder pain.  He had MRI L shoulder done 12/2024 showed severe GH joint OA, diffuse rotator cuff tendinopathy with mild partial-thickness tear of the supraspinatus and moderate AC joint OA.  He has noted weakness and pain with abduction.  The pain is so severe that he has trouble sleeping at night.  He has tried all conservative measures including steroid injections.  He is on hydrocodone at baseline.  They are now looking at him going for left shoulder arthroplasty.  He would like to get this done in [facility].    He has urostomy.  Worries that he has UTI due to white pussy discharge around the urostomy.  Saw Brenda Arroyo who gave him doxycycline x 10 days.  This improved the pussy discharge but did not resolve it completely.  Never went away entirely.        Current Outpatient Medications:     apixaban (ELIQUIS) 5 MG tablet tablet, Take 0.5 tablets by mouth 2 (Two) Times a Day., Disp: , Rfl:     carvedilol (COREG) 12.5 MG tablet, Take 1 tablet by mouth 2 (Two) Times a Day., Disp: 180 tablet, Rfl: 1    clopidogrel (PLAVIX) 75 MG tablet, Take 1 tablet by mouth Daily., Disp: 90 tablet, Rfl: 3    Diclofenac Sodium (VOLTAREN) 1 % gel gel, APPLY 4 grams TO THE AFFECTED AREA TWICE DAILY, Disp: 100 g, Rfl: 3    ferrous sulfate 325 (65 FE) MG tablet, Take 1 tablet by mouth 2 (Two) Times a Day., Disp: , Rfl:     fexofenadine (ALLEGRA) 180 MG tablet, Take 1 tablet by mouth Daily., Disp: , Rfl:     fluticasone (FLONASE) 50 MCG/ACT nasal spray, USE 2 SPRAYS NASALLY EVERY DAY AS DIRECTED BY PROVIDER, Disp:  48 g, Rfl: 3    furosemide (LASIX) 40 MG tablet, Take 1 tablet by mouth 2 (Two) Times a Day., Disp: 180 tablet, Rfl: 1    guaiFENesin (MUCINEX) 600 MG 12 hr tablet, Take 2 tablets by mouth., Disp: , Rfl:     HYDROcodone-acetaminophen (NORCO) 5-325 MG per tablet, Take 1 tablet by mouth., Disp: , Rfl:     levothyroxine (SYNTHROID, LEVOTHROID) 25 MCG tablet, Take 1 tablet by mouth Daily., Disp: 30 tablet, Rfl: 5    losartan (COZAAR) 100 MG tablet, Take 1 tablet by mouth Daily., Disp: 90 tablet, Rfl: 3    lovastatin (MEVACOR) 40 MG tablet, Take 1 tablet by mouth Every Evening., Disp: 90 tablet, Rfl: 1    montelukast (SINGULAIR) 10 MG tablet, Take 1 tablet by mouth Every Night., Disp: 90 tablet, Rfl: 3    nitroglycerin (NITROSTAT) 0.4 MG SL tablet, DISSOLVE 1 TABLET UNDER THE TONGUE EVERY 5 MINUTES AS NEEDED FOR CHEST PAIN. DO NOT EXCEED A TOTAL OF 3 DOSES IN 15 MINUTES. IF NO RELIEF, CALL 911 (Patient taking differently: Place 1 tablet under the tongue Every 5 (Five) Minutes As Needed for Chest Pain.), Disp: 25 tablet, Rfl: 0    pantoprazole (PROTONIX) 40 MG EC tablet, Take 1 tablet by mouth Daily., Disp: 90 tablet, Rfl: 1    potassium chloride (MICRO-K) 10 MEQ CR capsule, Take 1 capsule by mouth 2 (Two) Times a Day., Disp: , Rfl:     SITagliptin (Januvia) 25 MG tablet, Take 1 tablet by mouth Daily., Disp: 30 tablet, Rfl: 5    SSD 1 % cream, APPLY TO THE AFFECTED AREA(S) TWICE DAILY, Disp: 50 g, Rfl: 0    sucralfate (CARAFATE) 1 g tablet, TAKE 1 TABLET BY MOUTH FOUR TIMES DAILY, Disp: 120 tablet, Rfl: 5    vitamin B-6 (PYRIDOXINE) 100 MG tablet, Take 1 tablet by mouth Daily., Disp: , Rfl:     vitamin C (ASCORBIC ACID) 500 MG tablet, Take 1 tablet by mouth Daily., Disp: , Rfl:   Medications Discontinued During This Encounter   Medication Reason    dapagliflozin Propanediol (Farxiga) 10 MG tablet Historical Med - Therapy completed    predniSONE (DELTASONE) 50 MG tablet Historical Med - Therapy completed  "        Allergies:  Iodinated contrast media, Lisinopril, and Prednisone      Objective   Vital Signs:   Vitals:    02/12/25 1416   BP: 129/47   BP Location: Left arm   Patient Position: Sitting   Cuff Size: Adult   Pulse: 52   Temp: 97.6 °F (36.4 °C)   TempSrc: Oral   SpO2: 95%   Weight: 59.7 kg (131 lb 9.6 oz)   Height: 170.2 cm (67.01\")     Body mass index is 20.61 kg/m².  BMI is within normal parameters. No other follow-up for BMI required.        Physical Exam  Constitutional:       Appearance: Normal appearance.   HENT:      Head: Normocephalic and atraumatic.   Eyes:      Extraocular Movements: Extraocular movements intact.      Conjunctiva/sclera: Conjunctivae normal.   Pulmonary:      Effort: Pulmonary effort is normal. No respiratory distress.   Musculoskeletal:         General: Normal range of motion.   Skin:     General: Skin is warm and dry.   Neurological:      General: No focal deficit present.      Mental Status: He is alert and oriented to person, place, and time.   Psychiatric:         Mood and Affect: Mood normal.         Behavior: Behavior normal.         Thought Content: Thought content normal.         Judgment: Judgment normal.           Lab Results   Component Value Date    GLUCOSE 152 (H) 01/31/2025    BUN 56 (H) 01/31/2025    CREATININE 1.97 (H) 01/31/2025    EGFRIFNONA 90 12/01/2021    EGFRIFAFRI 96 03/20/2018    BCR 28.4 (H) 01/31/2025    K 4.7 01/31/2025    CO2 21.5 (L) 01/31/2025    CALCIUM 8.9 01/31/2025    ALBUMIN 3.4 (L) 11/25/2024    LABIL2 1.6 02/20/2021    AST 12 11/25/2024    ALT 11 11/25/2024       Lab Results   Component Value Date    CHOL 102 04/30/2024    CHLPL 129 02/20/2021    TRIG 77 04/30/2024    HDL 31 (L) 04/30/2024    LDL 55 04/30/2024       Lab Results   Component Value Date    WBC 6.84 01/31/2025    HGB 10.7 (L) 01/31/2025    HCT 34.3 (L) 01/31/2025    MCV 91.7 01/31/2025     (L) 01/31/2025            Assessment and Plan    Assessment & Plan  Chronic left " shoulder pain  Marcello has been dealing with chronic left shoulder pain for quite some time.  He is following with Pain Management in [place] and Dr. Rascon has been treating him with both procedural interventions and Norco for pain.  He is unable to take NSAIDs due to being on both Eliquis and Plavix.  He saw Dr. Margaux Ellison after MRI of the left shoulder in December showed quite a lot of shoulder pathology and they discussed left shoulder replacement.  He is interested in proceeding with this, but prefers to have it done in [facility].  Referral placed to the [facility] group for further evaluation and treatment.  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  He was recently evaluated by Urology Brenda Arroyo for UTI after he started to develop white pussy discharge around his urostomy site.  She prescribed him doxycycline for 10 days after culture results came back but unfortunately, he only received 7 days from the pharmacy.  He did complete all 7 days of the prescription but has been off of it for at least a week at this point.  He said that the pussy discharge improved but never completely resolved.  I am repeating a urine culture today prior to making decision about additional therapy.       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 DME  Marcello is having a lot of trouble getting in and out of chairs with the significant degree of left shoulder pathology as described above (severe GH osteoarthritis, supraspinatus tendinopathy with  partial tear, moderate AC OA).  He would like to get a lift chair to help him get in and out of a seated position, as he also has a left BKA.  Prescription written today and will be faxed over to [facility].                 Follow Up   Return if symptoms worsen or fail to improve, for or as scheduled 3/26/2025.  Patient was given instructions and counseling regarding his condition or for health maintenance advice. Please see specific information pulled into the AVS if appropriate.           02/12/2025

## 2025-02-14 LAB — BACTERIA SPEC AEROBE CULT: ABNORMAL

## 2025-02-14 RX ORDER — CEPHALEXIN 500 MG/1
500 TABLET, FILM COATED ORAL 2 TIMES DAILY
Qty: 14 TABLET | Refills: 0 | Status: SHIPPED | OUTPATIENT
Start: 2025-02-14 | End: 2025-02-21

## 2025-02-24 ENCOUNTER — OFFICE VISIT (OUTPATIENT)
Dept: ORTHOPEDIC SURGERY | Facility: CLINIC | Age: 85
End: 2025-02-24
Payer: MEDICARE

## 2025-02-24 VITALS
OXYGEN SATURATION: 95 % | DIASTOLIC BLOOD PRESSURE: 57 MMHG | BODY MASS INDEX: 20.56 KG/M2 | HEART RATE: 61 BPM | SYSTOLIC BLOOD PRESSURE: 134 MMHG | WEIGHT: 131 LBS | HEIGHT: 67 IN

## 2025-02-24 DIAGNOSIS — M19.012 PRIMARY OSTEOARTHRITIS OF LEFT SHOULDER: ICD-10-CM

## 2025-02-24 DIAGNOSIS — M25.512 LEFT SHOULDER PAIN, UNSPECIFIED CHRONICITY: Primary | ICD-10-CM

## 2025-02-24 PROCEDURE — 99203 OFFICE O/P NEW LOW 30 MIN: CPT | Performed by: STUDENT IN AN ORGANIZED HEALTH CARE EDUCATION/TRAINING PROGRAM

## 2025-02-24 PROCEDURE — 20610 DRAIN/INJ JOINT/BURSA W/O US: CPT | Performed by: STUDENT IN AN ORGANIZED HEALTH CARE EDUCATION/TRAINING PROGRAM

## 2025-02-24 PROCEDURE — 1159F MED LIST DOCD IN RCRD: CPT | Performed by: STUDENT IN AN ORGANIZED HEALTH CARE EDUCATION/TRAINING PROGRAM

## 2025-02-24 PROCEDURE — 3075F SYST BP GE 130 - 139MM HG: CPT | Performed by: STUDENT IN AN ORGANIZED HEALTH CARE EDUCATION/TRAINING PROGRAM

## 2025-02-24 PROCEDURE — 1160F RVW MEDS BY RX/DR IN RCRD: CPT | Performed by: STUDENT IN AN ORGANIZED HEALTH CARE EDUCATION/TRAINING PROGRAM

## 2025-02-24 PROCEDURE — 3078F DIAST BP <80 MM HG: CPT | Performed by: STUDENT IN AN ORGANIZED HEALTH CARE EDUCATION/TRAINING PROGRAM

## 2025-02-24 RX ORDER — LIDOCAINE HYDROCHLORIDE 10 MG/ML
5 INJECTION, SOLUTION INFILTRATION; PERINEURAL
Status: COMPLETED | OUTPATIENT
Start: 2025-02-24 | End: 2025-02-24

## 2025-02-24 RX ORDER — CEPHALEXIN 500 MG/1
1 CAPSULE ORAL EVERY 12 HOURS SCHEDULED
COMMUNITY
Start: 2025-02-14

## 2025-02-24 RX ORDER — TRIAMCINOLONE ACETONIDE 40 MG/ML
40 INJECTION, SUSPENSION INTRA-ARTICULAR; INTRAMUSCULAR
Status: COMPLETED | OUTPATIENT
Start: 2025-02-24 | End: 2025-02-24

## 2025-02-24 RX ADMIN — TRIAMCINOLONE ACETONIDE 40 MG: 40 INJECTION, SUSPENSION INTRA-ARTICULAR; INTRAMUSCULAR at 11:00

## 2025-02-24 RX ADMIN — LIDOCAINE HYDROCHLORIDE 5 ML: 10 INJECTION, SOLUTION INFILTRATION; PERINEURAL at 11:00
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
## 2025-03-02 DIAGNOSIS — E03.9 ACQUIRED HYPOTHYROIDISM: ICD-10-CM

## 2025-03-03 RX ORDER — LEVOTHYROXINE SODIUM 25 UG/1
25 TABLET ORAL DAILY
Qty: 30 TABLET | Refills: 5 | Status: SHIPPED | OUTPATIENT
Start: 2025-03-03

## 2025-03-05 ENCOUNTER — OFFICE VISIT (OUTPATIENT)
Dept: CARDIOLOGY | Facility: CLINIC | Age: 85
End: 2025-03-05
Payer: MEDICARE

## 2025-03-05 VITALS
HEART RATE: 74 BPM | SYSTOLIC BLOOD PRESSURE: 106 MMHG | WEIGHT: 132 LBS | HEIGHT: 67 IN | OXYGEN SATURATION: 84 % | BODY MASS INDEX: 20.72 KG/M2 | DIASTOLIC BLOOD PRESSURE: 62 MMHG

## 2025-03-05 DIAGNOSIS — J30.1 SEASONAL ALLERGIC RHINITIS DUE TO POLLEN: ICD-10-CM

## 2025-03-05 DIAGNOSIS — I48.21 PERMANENT ATRIAL FIBRILLATION: ICD-10-CM

## 2025-03-05 DIAGNOSIS — I50.42 CHRONIC COMBINED SYSTOLIC AND DIASTOLIC CONGESTIVE HEART FAILURE: Primary | ICD-10-CM

## 2025-03-05 DIAGNOSIS — I25.10 CORONARY ARTERY DISEASE INVOLVING NATIVE CORONARY ARTERY OF NATIVE HEART WITHOUT ANGINA PECTORIS: ICD-10-CM

## 2025-03-05 DIAGNOSIS — I10 PRIMARY HYPERTENSION: ICD-10-CM

## 2025-03-05 DIAGNOSIS — Z95.5 STENTED CORONARY ARTERY: Chronic | ICD-10-CM

## 2025-03-05 RX ORDER — LOSARTAN POTASSIUM 100 MG/1
100 TABLET ORAL DAILY
Qty: 90 TABLET | Refills: 3 | Status: SHIPPED | OUTPATIENT
Start: 2025-03-05

## 2025-03-05 RX ORDER — CARVEDILOL 25 MG/1
25 TABLET ORAL 2 TIMES DAILY
Qty: 180 TABLET | Refills: 3 | Status: SHIPPED | OUTPATIENT
Start: 2025-03-05

## 2025-03-05 RX ORDER — MONTELUKAST SODIUM 10 MG/1
10 TABLET ORAL NIGHTLY
Qty: 90 TABLET | Refills: 0 | Status: SHIPPED | OUTPATIENT
Start: 2025-03-05

## 2025-03-05 NOTE — PROGRESS NOTES
Subjective:     Encounter Date:03/05/2025      Patient ID: Marcello Larson is a 84 y.o. male.    Chief Complaint:  Management of HFrEF, CAD    HPI:   84 y.o. male with CAD status post CABG, HFrEF secondary to ischemic cardiomyopathy (EF 30 to 35%) status post ICD, permanent atrial fibrillation on Eliquis, hypertension, hyperlipidemia who presents for follow-up and management of his chronic conditions.  Patient was last seen in January for follow-up.  He had been admitted to an outside hospital with volume overload secondary to acute on chronic heart failure exacerbation.  Echocardiogram at that time revealed worsening EF to 15 to 20%.  As a result, we pursued coronary angiography on 2/6/25 and this revealed a 99% stenosis of his SVG to RCA for which she received IVUS guided PCI to 99% proximal SVG to RCA stenosis with intravascular lithotripsy and a 4.0 x 32 mm Megatron stent (postdilated throughout with a 5.5 mm NC to 20 nader.) He also underwent successful PCI to 90% ostial RPL stenosis with a 2.75 x 23 mm Xience Skypoint drug-eluting stent (postdilated proximally with a 4.0 mm NC and throughout with a 3.0 mm NC to high-pressure.) His LIMA to LAD was patent.  He presents today for follow-up and has been feeling great.  He states that he has much more energy than prior to his PCI.    Echo 2/22/2022 with an EF 30 to 35%, grade 3 diastolic dysfunction, moderately dilated LA, mildly dilated RA.    Echocardiogram late Nov 2024 performed at that time revealed severe hypokinesis of the LV with EF of 15 to 20% as well as moderate MR.    The following portions of the patient's history were reviewed and updated as appropriate: allergies, current medications, past family history, past medical history, past social history, past surgical history and problem list.     REVIEW OF SYSTEMS:   All systems reviewed.  Pertinent positives identified in HPI.  All other systems are negative.    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  LV systolic function is moderate to severely reduced.  EF 30-35%.  The transmittal spectral doppler flow pattern is suggestive of pseudonormalization.  There is moderate to severe global hypokinesis of the LV.  The left atrium is mildly dilated.  The mitral leaflets appear thickened, but open well.  Mild MR and AR.    Hx of CABG 08/13/2019 01/01/1991 - left internal mammary graft to the LAD, SVG to OM1, OM2 in the RCA    Hyperlipidemia 08/13/2019    Hypo-osmolality and hyponatremia     Low back pain     Myocardial infarct     Other long term (current) drug therapy     Palpitations 08/13/2019    Personal history of malignant neoplasm of bladder     Sleep apnea 08/13/2019    Stented coronary artery 08/13/2019 04/11/2014 - VISION bare metal stent to the proximal stenosis of the vein graft to the right and ISION bare metal stent in the mid to distal 80% stenosis.    Type 2 diabetes mellitus without complication     Unstable angina        Family History   Problem Relation Age of Onset    No Known Problems Mother     No Known Problems Father        Social History     Socioeconomic History    Marital status:     Number of children: 2   Tobacco Use     Smoking status: Former     Current packs/day: 0.00     Average packs/day: 1 pack/day for 24.0 years (24.0 ttl pk-yrs)     Types: Cigarettes     Start date: 1960     Quit date: 1984     Years since quittin.2     Passive exposure: Past    Smokeless tobacco: Never    Tobacco comments:     Have not smoked since    Vaping Use    Vaping status: Never Used   Substance and Sexual Activity    Alcohol use: Never    Drug use: Never    Sexual activity: Not Currently     Partners: Female       Allergies   Allergen Reactions    Iodinated Contrast Media Anaphylaxis    Lisinopril Other (See Comments)    Prednisone Cough       Past Surgical History:   Procedure Laterality Date    AMPUTATION Left     LOWER EXTREMITY BKA    APPENDECTOMY      CARDIAC CATHETERIZATION      CARDIAC CATHETERIZATION N/A 10/13/2020    Procedure: Left Heart Cath;  Surgeon: Kofi Campoverde MD;  Location: [facility] CATH [facility]VASIVE LOC[facility];  Service: Cardiology;  Laterality: N/A;    CARDIAC CATHETERIZATION N/A 10/13/2020    Procedure: Coronary angiography;  Surgeon: Kofi Campoverde MD;  [facility];  Service: Cardiology;  Laterality: N/A;    CARDIAC CATHETERIZATION N/A 10/13/2020    Procedure: Saphenous Vein Graft;  Surgeon: Kofi Campoverde MD;  [facility];  Service: Cardiology;  Laterality: N/A;    CARDIAC CATHETERIZATION N/A 2025    Procedure: Left Heart Cath;  Surgeon: Andrew Castañeda MD;  Location: [facility] CATH INVASIVE LOCATION;  Service: Cardiovascular;  Laterality: N/A;    CARDIAC CATHETERIZATION N/A 2025    Procedure: Stent MINE bypass graft;  Surgeon: Andrew Castañeda MD;  Location: [facility] CATH INVASIVE LOCATION;  Service: Cardiovascular;  Laterality: N/A;    CARDIAC CATHETERIZATION N/A 2025    Procedure: Coronary angiography;  Surgeon: Andrew Castañeda MD;  Location: [facility] CATH INVASIVE LOCATION;  Service: Cardiovascular;  Laterality: N/A;    CARDIAC CATHETERIZATION  2025     Procedure: Saphenous Vein Graft;  Surgeon: Andrew Castañeda MD;  Location:  [facility] CATH INVASIVE LOCATION;  Service: Cardiovascular;;    CARDIAC CATHETERIZATION N/A 2/6/2025    Procedure: Native mammary injection;  Surgeon: Andrew Castañeda MD;  Location:  [facility] CATH INVASIVE LOCATION;  Service: Cardiovascular;  Laterality: N/A;    CARDIAC DEFIBRILLATOR PLACEMENT      CARDIAC ELECTROPHYSIOLOGY PROCEDURE N/A 04/04/2022    Procedure: ICD DC new/SJM;  Surgeon: Mamadou Terry MD;  Location:  [facility] CATH INVASIVE LOCATION;  Service: Cardiology;  Laterality: N/A;    CARDIAC VALVE REPLACEMENT      CAROTID STENT      CATARACT EXTRACTION      CHOLECYSTECTOMY      CORONARY ARTERY BYPASS GRAFT      1984, 1991 4 VESSEL    CORONARY STENT PLACEMENT      INSERT / REPLACE / REMOVE PACEMAKER      INTERNAL CARDIAC DEFIBRILLATOR INSERTION  04/2022    INTRAVASCULAR ULTRASOUND N/A 2/6/2025    Procedure: Intravascular Ultrasound;  Surgeon: Andrew Castañeda MD;  Location:  [facility] CATH INVASIVE LOCATION;  Service: Cardiovascular;  Laterality: N/A;    OTHER SURGICAL HISTORY      UROSTOMY S/P BLADDER CA       Procedures       Objective:         Vitals:    03/05/25 1121   BP: 106/62   Pulse: 74   SpO2: (!) 84%           PHYSICAL EXAM:  GEN: well appearing, in NAD   HEENT: NCAT, EOMI, moist mucus membranes   Respiratory: CTAB, no wheezes, rales or rhonchi  CV: normal rate, regular rhythm, normal S1, S2, no murmurs, rubs, gallops, +2 radial pulses b/l  GI: soft, nontender, nondistended  MSK: Left leg BKA, no edema of right leg  Skin: no rash, warm, dry  Heme/Lymph: no bruising or bleeding  Psych: organized thought, normal behavior and affect  Neuro: Alert and Oriented x 3, grossly normal motor function        Assessment:         (I50.42) Chronic combined systolic and diastolic congestive heart failure - Plan: Adult Transthoracic Echo Complete w/ Color, Spectral and Contrast if Necessary Per Protocol    (I10) Primary hypertension - Plan: losartan (COZAAR)  100 MG tablet, Adult Transthoracic Echo Complete w/ Color, Spectral and Contrast if Necessary Per Protocol    (I48.21) Permanent atrial fibrillation    (I25.10) Coronary artery disease involving native coronary artery of native heart without angina pectoris    (Z95.5) Stented coronary artery    82 year old man with CAD status post CABG, HFrEF secondary to ischemic cardiomyopathy (EF 30 to 35%) status post ICD, permanent atrial fibrillation on Eliquis, hypertension, hyperlipidemia, CKD stage 3 who presents for follow-up and management of his chronic conditions.       Plan:       #HFrEF  Well compensated. NYHA class I. Echo 2/22/2022 with EF 30 to 35%, grade 3 diastolic dysfunction, moderately dilated LA, mildly dilated RA.   - increase Coreg to 25 mg twice daily  - continue losartan 100 mg  - SGLT2i cost prohibitive  - Continue Lasix 60 mg twice daily  - echo in 3 months    #CAD status post CABG  Coronary angiography on 2/6/25 revealed a 99% stenosis of his SVG to RCA for which he received IVUS guided PCI with intravascular lithotripsy and a 4.0 x 32 mm Megatron stent (postdilated throughout with a 5.5 mm NC to 20 nader.) He also underwent successful PCI to 90% ostial RPL stenosis with a 2.75 x 23 mm Xience Skypoint drug-eluting stent (postdilated proximally with a 4.0 mm NC and throughout with a 3.0 mm NC to high-pressure.)  - continue plavix 75 mg daily, lovastatin 40 mg daily    #Permanent atrial fibrillation  [id number] 5.  -Continue Coreg as above, Eliquis 2.5 mg twice daily    Dr Corley, thank you very much for referring this kind patient to me. Please call me with any questions or concerns. I will see the patient again in the office in 3 months or earlier as needed.         Andrew Castañeda MD  03/05/25  [facility]    Outpatient Encounter Medications as of 3/5/2025   Medication Sig Dispense Refill    apixaban (ELIQUIS) 5 MG tablet tablet Take 0.5 tablets by mouth 2 (Two) Times a Day.       carvedilol (COREG) 25 MG tablet Take 1 tablet by mouth 2 (Two) Times a Day. 180 tablet 3    cephalexin (KEFLEX) 500 MG capsule Take 1 capsule by mouth Every 12 (Twelve) Hours.      clopidogrel (PLAVIX) 75 MG tablet Take 1 tablet by mouth Daily. 90 tablet 3    Diclofenac Sodium (VOLTAREN) 1 % gel gel APPLY 4 grams TO THE AFFECTED AREA TWICE DAILY 100 g 3    ferrous sulfate 325 (65 FE) MG tablet Take 1 tablet by mouth 2 (Two) Times a Day.      fexofenadine (ALLEGRA) 180 MG tablet Take 1 tablet by mouth Daily.      fluticasone (FLONASE) 50 MCG/ACT nasal spray USE 2 SPRAYS NASALLY EVERY DAY AS DIRECTED BY PROVIDER 48 g 3    furosemide (LASIX) 40 MG tablet Take 1 tablet by mouth 2 (Two) Times a Day. (Patient taking differently: Take 1.5 tablets by mouth 2 (Two) Times a Day. 1.5 tablets BID) 180 tablet 1    guaiFENesin (MUCINEX) 600 MG 12 hr tablet Take 2 tablets by mouth.      HYDROcodone-acetaminophen (NORCO) 5-325 MG per tablet Take 1 tablet by mouth.      levothyroxine (SYNTHROID, LEVOTHROID) 25 MCG tablet TAKE 1 TABLET BY MOUTH ONCE DAILY 30 tablet 5    losartan (COZAAR) 100 MG tablet Take 1 tablet by mouth Daily. 90 tablet 3    lovastatin (MEVACOR) 40 MG tablet Take 1 tablet by mouth Every Evening. 90 tablet 1    montelukast (SINGULAIR) 10 MG tablet TAKE 1 TABLET BY MOUTH NIGHTLY 90 tablet 0    nitroglycerin (NITROSTAT) 0.4 MG SL tablet DISSOLVE 1 TABLET UNDER THE TONGUE EVERY 5 MINUTES AS NEEDED FOR CHEST PAIN. DO NOT EXCEED A TOTAL OF 3 DOSES IN 15 MINUTES. IF NO RELIEF, CALL 911 (Patient taking differently: Place 1 tablet under the tongue Every 5 (Five) Minutes As Needed for Chest Pain.) 25 tablet 0    pantoprazole (PROTONIX) 40 MG EC tablet Take 1 tablet by mouth Daily. 90 tablet 1    potassium chloride (MICRO-K) 10 MEQ CR capsule Take 1 capsule by mouth 2 (Two) Times a Day.      SITagliptin (Januvia) 25 MG tablet Take 1 tablet by mouth Daily. 30 tablet 5    SSD 1 % cream APPLY TO THE AFFECTED AREA(S) TWICE  DAILY 50 g 0    sucralfate (CARAFATE) 1 g tablet TAKE 1 TABLET BY MOUTH FOUR TIMES DAILY 120 tablet 5    vitamin B-6 (PYRIDOXINE) 100 MG tablet Take 1 tablet by mouth Daily.      vitamin C (ASCORBIC ACID) 500 MG tablet Take 1 tablet by mouth Daily.      [DISCONTINUED] carvedilol (COREG) 12.5 MG tablet Take 1 tablet by mouth 2 (Two) Times a Day. 180 tablet 1    [DISCONTINUED] losartan (COZAAR) 100 MG tablet Take 1 tablet by mouth Daily. 90 tablet 3    [DISCONTINUED] levothyroxine (SYNTHROID, LEVOTHROID) 25 MCG tablet Take 1 tablet by mouth Daily. 30 tablet 5    [DISCONTINUED] montelukast (SINGULAIR) 10 MG tablet Take 1 tablet by mouth Every Night. 90 tablet 3    [] lidocaine (XYLOCAINE) 1 % injection 5 mL       [] triamcinolone acetonide (KENALOG-40) injection 40 mg        No facility-administered encounter medications on file as of 3/5/2025.

## 2025-03-21 RX ORDER — POTASSIUM CHLORIDE 750 MG/1
10 TABLET, EXTENDED RELEASE ORAL 2 TIMES DAILY
COMMUNITY
End: 2025-03-21 | Stop reason: SDUPTHER

## 2025-03-21 NOTE — TELEPHONE ENCOUNTER
Pharmacy sent over fax.  Last filled 1/28/25 #60 by Dr Bautista.  Please advise. (Was not on med list)

## 2025-03-25 RX ORDER — POTASSIUM CHLORIDE 750 MG/1
10 TABLET, EXTENDED RELEASE ORAL EVERY 12 HOURS SCHEDULED
Qty: 60 TABLET | Refills: 9 | OUTPATIENT
Start: 2025-03-25

## 2025-03-26 ENCOUNTER — OFFICE VISIT (OUTPATIENT)
Dept: FAMILY MEDICINE CLINIC | Age: 85
End: 2025-03-26
Payer: MEDICARE

## 2025-03-26 VITALS
WEIGHT: 128 LBS | BODY MASS INDEX: 20.09 KG/M2 | DIASTOLIC BLOOD PRESSURE: 54 MMHG | SYSTOLIC BLOOD PRESSURE: 101 MMHG | HEART RATE: 59 BPM | OXYGEN SATURATION: 95 % | HEIGHT: 67 IN | TEMPERATURE: 97.7 F

## 2025-03-26 DIAGNOSIS — K29.50 CHRONIC GASTRITIS WITHOUT BLEEDING, UNSPECIFIED GASTRITIS TYPE: ICD-10-CM

## 2025-03-26 DIAGNOSIS — I48.21 PERMANENT ATRIAL FIBRILLATION: ICD-10-CM

## 2025-03-26 DIAGNOSIS — I10 PRIMARY HYPERTENSION: ICD-10-CM

## 2025-03-26 DIAGNOSIS — J30.1 SEASONAL ALLERGIC RHINITIS DUE TO POLLEN: ICD-10-CM

## 2025-03-26 DIAGNOSIS — E78.2 MIXED HYPERLIPIDEMIA: ICD-10-CM

## 2025-03-26 DIAGNOSIS — E11.51 TYPE 2 DIABETES MELLITUS WITH DIABETIC PERIPHERAL ANGIOPATHY WITHOUT GANGRENE, WITHOUT LONG-TERM CURRENT USE OF INSULIN: Primary | ICD-10-CM

## 2025-03-26 DIAGNOSIS — I50.42 CHRONIC COMBINED SYSTOLIC AND DIASTOLIC CONGESTIVE HEART FAILURE: ICD-10-CM

## 2025-03-26 DIAGNOSIS — I25.5 ISCHEMIC CARDIOMYOPATHY: ICD-10-CM

## 2025-03-26 DIAGNOSIS — M46.97 UNSPECIFIED INFLAMMATORY SPONDYLOPATHY, LUMBOSACRAL REGION: ICD-10-CM

## 2025-03-26 DIAGNOSIS — I25.10 CORONARY ARTERY DISEASE INVOLVING NATIVE CORONARY ARTERY OF NATIVE HEART WITHOUT ANGINA PECTORIS: ICD-10-CM

## 2025-03-26 DIAGNOSIS — G47.33 OBSTRUCTIVE SLEEP APNEA SYNDROME: ICD-10-CM

## 2025-03-26 LAB
EXPIRATION DATE: ABNORMAL
HBA1C MFR BLD: 6 % (ref 4.5–5.7)
Lab: ABNORMAL

## 2025-03-26 RX ORDER — ALBUTEROL SULFATE 90 UG/1
2 INHALANT RESPIRATORY (INHALATION) AS NEEDED
COMMUNITY

## 2025-03-26 RX ORDER — HYDROCODONE BITARTRATE AND ACETAMINOPHEN 5; 325 MG/1; MG/1
1 TABLET ORAL 2 TIMES DAILY PRN
COMMUNITY

## 2025-03-26 RX ORDER — MONTELUKAST SODIUM 10 MG/1
10 TABLET ORAL NIGHTLY
Qty: 90 TABLET | Refills: 1 | Status: SHIPPED | OUTPATIENT
Start: 2025-03-26

## 2025-03-26 RX ORDER — DAPAGLIFLOZIN 10 MG/1
10 TABLET, FILM COATED ORAL DAILY
COMMUNITY

## 2025-03-26 RX ORDER — POTASSIUM CHLORIDE 750 MG/1
10 TABLET, EXTENDED RELEASE ORAL 2 TIMES DAILY
Qty: 60 TABLET | Refills: 5 | Status: SHIPPED | OUTPATIENT
Start: 2025-03-26

## 2025-03-26 RX ORDER — LOVASTATIN 40 MG/1
40 TABLET ORAL EVERY EVENING
Qty: 90 TABLET | Refills: 1 | Status: SHIPPED | OUTPATIENT
Start: 2025-03-26

## 2025-03-26 RX ORDER — LOSARTAN POTASSIUM 50 MG/1
50 TABLET ORAL DAILY
COMMUNITY
Start: 2025-03-22 | End: 2025-03-26 | Stop reason: SDUPTHER

## 2025-03-26 RX ORDER — PANTOPRAZOLE SODIUM 40 MG/1
40 TABLET, DELAYED RELEASE ORAL DAILY
Qty: 90 TABLET | Refills: 1 | Status: SHIPPED | OUTPATIENT
Start: 2025-03-26

## 2025-03-26 NOTE — ASSESSMENT & PLAN NOTE
He is on Januvia for diabetes.  Last A1c was 6.2 in Nov.  He does adhere to a diabetic diet.  He is UTD on eye exam (last done 9/2024).  He is on an ARB and statin.  He has previously been on metformin (CKD) and Jardiance/Invokana/Farxiga (cost).  Checking A1c today.    Orders:    POC Glycosylated Hemoglobin (Hb A1C)    SITagliptin (Januvia) 25 MG tablet; Take 1 tablet by mouth Daily.

## 2025-03-26 NOTE — ASSESSMENT & PLAN NOTE
Following with Dr. Oliveira Cardiology.  Stable on Eliquis 5 mg twice daily for anticoagulation and carvedilol 6.25 mg twice daily for rate control.

## 2025-03-26 NOTE — ASSESSMENT & PLAN NOTE
Stable on lovastatin 40 mg daily.  Refills were needed today.  Labs were due today.  Continue to monitor.    Orders:    lovastatin (MEVACOR) 40 MG tablet; Take 1 tablet by mouth Every Evening.

## 2025-03-26 NOTE — PROGRESS NOTES
Chief Complaint  Hypertension (Follow up)    Subjective          Marcello Larson presents to [facility] today for routine f/u of chronic issues.    He is on Januvia for diabetes.  Last A1c was 6.2 in Nov.  He does adhere to a diabetic diet.  He is UTD on eye exam (last done 9/2024).  He is on an ARB and statin.  He has previously been on metformin (CKD) and Jardiance/Invokana/Farxiga (cost).      He is on carvedilol and losartan for HTN, atrial fib and CAD, s/p 2v. CABG. his blood pressure has been well-controlled.  Denies CP, palpitations or SOB.  He has previously been on Ranexa.  He is on Eliquis for anticoagulation through patient assistance.  He is on lovastatin for HLD.  He has NTG for as needed use.  Follows with Dr. Castañeda Cardiology.  He is on Lasix for systolic CHF.  S/p ICD.      He is following with Dr. Espinoza for CKD.    He is on levothyroxine for hypothyroidism.  No symptoms.      He is on pantoprazole for GERD and h/o bleeding ulcer (NSAID-induced.)  He has prn sucralfate.  He is on iron supplement.     He is on Norco for chronic low back and leg pain.  He follows with Dr. Herman at [facility] now that Dr. Rascon has left for medication management and epidurals.  He has previously used gabapentin.  S/p physical therapy and RFA.      +L BKA with prosthesis.        He is on Flonase and montelukast for chronic allergies.      Current Outpatient Medications:     albuterol sulfate  (90 Base) MCG/ACT inhaler, Inhale 2 puffs As Needed for Wheezing., Disp: , Rfl:     apixaban (ELIQUIS) 5 MG tablet tablet, Take 0.5 tablets by mouth 2 (Two) Times a Day., Disp: , Rfl:     carvedilol (COREG) 25 MG tablet, Take 1 tablet by mouth 2 (Two) Times a Day. (Patient taking differently: Take 0.5 tablets by mouth 2 (Two) Times a Day. 6.25 BID), Disp: 180 tablet, Rfl: 3    dapagliflozin Propanediol (Farxiga) 10 MG tablet, Take 10 mg by mouth Daily., Disp: , Rfl:      Diclofenac Sodium (VOLTAREN) 1 % gel gel, APPLY 4 grams TO THE AFFECTED AREA TWICE DAILY, Disp: 100 g, Rfl: 3    ferrous sulfate 325 (65 FE) MG tablet, Take 1 tablet by mouth 2 (Two) Times a Day., Disp: , Rfl:     fexofenadine (ALLEGRA) 180 MG tablet, Take 1 tablet by mouth Daily., Disp: , Rfl:     fluticasone (FLONASE) 50 MCG/ACT nasal spray, USE 2 SPRAYS NASALLY EVERY DAY AS DIRECTED BY PROVIDER, Disp: 48 g, Rfl: 3    furosemide (LASIX) 40 MG tablet, Take 1 tablet by mouth 2 (Two) Times a Day. (Patient taking differently: Take 1.5 tablets by mouth 2 (Two) Times a Day. 1.5 tablets BID), Disp: 180 tablet, Rfl: 1    guaiFENesin (MUCINEX) 600 MG 12 hr tablet, Take 2 tablets by mouth., Disp: , Rfl:     HYDROcodone-acetaminophen (NORCO) 5-325 MG per tablet, Take 1 tablet by mouth 2 (Two) Times a Day As Needed. for pain, Disp: , Rfl:     levothyroxine (SYNTHROID, LEVOTHROID) 25 MCG tablet, TAKE 1 TABLET BY MOUTH ONCE DAILY, Disp: 30 tablet, Rfl: 5    losartan (COZAAR) 100 MG tablet, Take 1 tablet by mouth Daily., Disp: 90 tablet, Rfl: 3    lovastatin (MEVACOR) 40 MG tablet, Take 1 tablet by mouth Every Evening., Disp: 90 tablet, Rfl: 1    montelukast (SINGULAIR) 10 MG tablet, Take 1 tablet by mouth Every Night., Disp: 90 tablet, Rfl: 1    nitroglycerin (NITROSTAT) 0.4 MG SL tablet, DISSOLVE 1 TABLET UNDER THE TONGUE EVERY 5 MINUTES AS NEEDED FOR CHEST PAIN. DO NOT EXCEED A TOTAL OF 3 DOSES IN 15 MINUTES. IF NO RELIEF, CALL 911 (Patient taking differently: Place 1 tablet under the tongue Every 5 (Five) Minutes As Needed for Chest Pain.), Disp: 25 tablet, Rfl: 0    pantoprazole (PROTONIX) 40 MG EC tablet, Take 1 tablet by mouth Daily., Disp: 90 tablet, Rfl: 1    potassium chloride 10 MEQ CR tablet, Take 1 tablet by mouth 2 (Two) Times a Day., Disp: , Rfl:     SITagliptin (Januvia) 25 MG tablet, Take 1 tablet by mouth Daily., Disp: 90 tablet, Rfl: 1    SSD 1 % cream, APPLY TO THE AFFECTED AREA(S) TWICE DAILY, Disp: 50 g,  "Rfl: 0    sucralfate (CARAFATE) 1 g tablet, TAKE 1 TABLET BY MOUTH FOUR TIMES DAILY, Disp: 120 tablet, Rfl: 5    vitamin B-6 (PYRIDOXINE) 100 MG tablet, Take 1 tablet by mouth Daily., Disp: , Rfl:     vitamin C (ASCORBIC ACID) 500 MG tablet, Take 1 tablet by mouth Daily., Disp: , Rfl:     cephalexin (KEFLEX) 500 MG capsule, Take 1 capsule by mouth Every 12 (Twelve) Hours., Disp: , Rfl:     clopidogrel (PLAVIX) 75 MG tablet, Take 1 tablet by mouth Daily., Disp: 90 tablet, Rfl: 3  Medications Discontinued During This Encounter   Medication Reason    losartan (COZAAR) 50 MG tablet Duplicate order    pantoprazole (PROTONIX) 40 MG EC tablet Reorder    lovastatin (MEVACOR) 40 MG tablet Reorder    montelukast (SINGULAIR) 10 MG tablet Reorder    SITagliptin (Januvia) 25 MG tablet Reorder         Allergies:  Iodinated contrast media, Lisinopril, and Prednisone      Objective   Vital Signs:   Vitals:    03/26/25 0750   BP: 101/54   Pulse: 59   Temp: 97.7 °F (36.5 °C)   TempSrc: Oral   SpO2: 95%   Weight: 58.1 kg (128 lb)   Height: 170.2 cm (67.01\")     Body mass index is 20.04 kg/m².  BMI is within normal parameters. No other follow-up for BMI required.        Physical Exam  Vitals reviewed.   Constitutional:       General: He is not in acute distress.     Appearance: Normal appearance. He is well-developed.   HENT:      Head: Normocephalic and atraumatic.      Right Ear: External ear normal.      Left Ear: External ear normal.   Eyes:      Extraocular Movements: Extraocular movements intact.      Conjunctiva/sclera: Conjunctivae normal.      Pupils: Pupils are equal, round, and reactive to light.   Cardiovascular:      Rate and Rhythm: Normal rate. Rhythm irregularly irregular.      Heart sounds: No murmur heard.  Pulmonary:      Effort: Pulmonary effort is normal.      Breath sounds: Normal breath sounds. No wheezing, rhonchi or rales.   Abdominal:      General: Bowel sounds are normal. There is no distension.      " Palpations: Abdomen is soft.      Tenderness: There is no abdominal tenderness.   Musculoskeletal:         General: Normal range of motion.      Comments: LLE below knee absent   Neurological:      Mental Status: He is alert.   Psychiatric:         Mood and Affect: Affect normal.           Lab Results   Component Value Date    GLUCOSE 152 (H) 01/31/2025    BUN 56 (H) 01/31/2025    CREATININE 1.97 (H) 01/31/2025    EGFRIFNONA 90 12/01/2021    EGFRIFAFRI 96 03/20/2018    BCR 28.4 (H) 01/31/2025    K 4.7 01/31/2025    CO2 21.5 (L) 01/31/2025    CALCIUM 8.9 01/31/2025    ALBUMIN 3.4 (L) 11/25/2024    AST 12 11/25/2024    ALT 11 11/25/2024       Lab Results   Component Value Date    CHOL 102 04/30/2024    CHLPL 129 02/20/2021    TRIG 77 04/30/2024    HDL 31 (L) 04/30/2024    LDL 55 04/30/2024       Lab Results   Component Value Date    WBC 6.84 01/31/2025    HGB 10.7 (L) 01/31/2025    HCT 34.3 (L) 01/31/2025    MCV 91.7 01/31/2025     (L) 01/31/2025     Lab Results   Component Value Date    HGBA1C 6.30 (H) 08/06/2024             Assessment and Plan    Assessment & Plan  Type 2 diabetes mellitus with diabetic peripheral angiopathy without gangrene, without long-term current use of insulin  He is on Januvia for diabetes.  Last A1c was 6.2 in Nov.  He does adhere to a diabetic diet.  He is UTD on eye exam (last done 9/2024).  He is on an ARB and statin.  He has previously been on metformin (CKD) and Jardiance/Invokana/Farxiga (cost).  Checking A1c today.    Orders:    POC Glycosylated Hemoglobin (Hb A1C)    SITagliptin (Januvia) 25 MG tablet; Take 1 tablet by mouth Daily.    Primary hypertension  Blood pressure has been running at goal.  Continue carvedilol 6.25 mg BID and losartan 100 mg daily.  Refills were not needed today.  Labs were not needed today.  Continue to monitor.         Mixed hyperlipidemia   Stable on lovastatin 40 mg daily.  Refills were needed today.  Labs were due today.  Continue to  monitor.    Orders:    lovastatin (MEVACOR) 40 MG tablet; Take 1 tablet by mouth Every Evening.    Permanent atrial fibrillation  Following with Dr. Tee Bush.  Stable on Eliquis 5 mg twice daily for anticoagulation and carvedilol 6.25 mg twice daily for rate control.       Coronary artery disease involving native coronary artery of native heart without angina pectoris  Follows with Dr. Tee Bush. He is on Eliquis for the combination of CAD, atrial fibrillation and systolic heart failure. Status post ICD placement. He is on statin, beta-blocker and ARB.        Ischemic cardiomyopathy  Follows with Dr. Tee Betts.  Stable on ARB and beta-blocker.  Status post ICD placement.       Chronic combined systolic and diastolic congestive heart failure  Follows with Dr. Tee Betts.  Stable on ARB and beta-blocker.  Status post ICD placement.       Unspecified inflammatory spondylopathy, lumbosacral region  Following with Pain Management.       Chronic gastritis without bleeding, unspecified gastritis type  Stable on pantoprazole 40 mg daily.  Refills were needed today.  Continue to monitor.  Wean PPI as able.  Orders:    pantoprazole (PROTONIX) 40 MG EC tablet; Take 1 tablet by mouth Daily.    Seasonal allergic rhinitis due to pollen  Stable on montelukast 10 mg daily.  Refills were needed today.  Continue to monitor.  Orders:    montelukast (SINGULAIR) 10 MG tablet; Take 1 tablet by mouth Every Night.    Obstructive sleep apnea syndrome  Stable on CPAP.                 Follow Up   Return in about 2 months (around 5/26/2025) for Recheck.  Patient was given instructions and counseling regarding his condition or for health maintenance advice. Please see specific information pulled into the AVS if appropriate.           03/26/2025

## 2025-03-26 NOTE — ASSESSMENT & PLAN NOTE
Blood pressure has been running at goal.  Continue carvedilol 6.25 mg BID and losartan 100 mg daily.  Refills were not needed today.  Labs were not needed today.  Continue to monitor.

## 2025-04-01 ENCOUNTER — OFFICE VISIT (OUTPATIENT)
Dept: FAMILY MEDICINE CLINIC | Age: 85
End: 2025-04-01
Payer: MEDICARE

## 2025-04-01 ENCOUNTER — LAB (OUTPATIENT)
Dept: LAB | Facility: HOSPITAL | Age: 85
End: 2025-04-01
Payer: MEDICARE

## 2025-04-01 ENCOUNTER — TRANSCRIBE ORDERS (OUTPATIENT)
Dept: ADMINISTRATIVE | Facility: HOSPITAL | Age: 85
End: 2025-04-01
Payer: MEDICARE

## 2025-04-01 VITALS
HEIGHT: 67 IN | BODY MASS INDEX: 20.03 KG/M2 | WEIGHT: 127.6 LBS | SYSTOLIC BLOOD PRESSURE: 113 MMHG | TEMPERATURE: 97.5 F | OXYGEN SATURATION: 95 % | HEART RATE: 66 BPM | DIASTOLIC BLOOD PRESSURE: 60 MMHG

## 2025-04-01 DIAGNOSIS — N18.30 STAGE 3 CHRONIC KIDNEY DISEASE, UNSPECIFIED WHETHER STAGE 3A OR 3B CKD: ICD-10-CM

## 2025-04-01 DIAGNOSIS — R04.0 NASAL BLEEDING: Primary | ICD-10-CM

## 2025-04-01 DIAGNOSIS — I10 HTN (HYPERTENSION) WITH GOAL TO BE DETERMINED: ICD-10-CM

## 2025-04-01 DIAGNOSIS — D64.9 ANEMIA, UNSPECIFIED TYPE: ICD-10-CM

## 2025-04-01 DIAGNOSIS — D64.9 ANEMIA, UNSPECIFIED TYPE: Primary | ICD-10-CM

## 2025-04-01 DIAGNOSIS — I50.42 CHRONIC COMBINED SYSTOLIC AND DIASTOLIC CONGESTIVE HEART FAILURE: ICD-10-CM

## 2025-04-01 LAB
BASOPHILS # BLD AUTO: 0.03 10*3/MM3 (ref 0–0.2)
BASOPHILS NFR BLD AUTO: 0.5 % (ref 0–1.5)
DEPRECATED RDW RBC AUTO: 51.6 FL (ref 37–54)
EOSINOPHIL # BLD AUTO: 0.18 10*3/MM3 (ref 0–0.4)
EOSINOPHIL NFR BLD AUTO: 2.9 % (ref 0.3–6.2)
ERYTHROCYTE [DISTWIDTH] IN BLOOD BY AUTOMATED COUNT: 15.4 % (ref 12.3–15.4)
HCT VFR BLD AUTO: 32.7 % (ref 37.5–51)
HGB BLD-MCNC: 10.3 G/DL (ref 13–17.7)
IMM GRANULOCYTES # BLD AUTO: 0.03 10*3/MM3 (ref 0–0.05)
IMM GRANULOCYTES NFR BLD AUTO: 0.5 % (ref 0–0.5)
LYMPHOCYTES # BLD AUTO: 0.9 10*3/MM3 (ref 0.7–3.1)
LYMPHOCYTES NFR BLD AUTO: 14.7 % (ref 19.6–45.3)
MCH RBC QN AUTO: 28.9 PG (ref 26.6–33)
MCHC RBC AUTO-ENTMCNC: 31.5 G/DL (ref 31.5–35.7)
MCV RBC AUTO: 91.6 FL (ref 79–97)
MONOCYTES # BLD AUTO: 0.54 10*3/MM3 (ref 0.1–0.9)
MONOCYTES NFR BLD AUTO: 8.8 % (ref 5–12)
NEUTROPHILS NFR BLD AUTO: 4.44 10*3/MM3 (ref 1.7–7)
NEUTROPHILS NFR BLD AUTO: 72.6 % (ref 42.7–76)
PLATELET # BLD AUTO: 149 10*3/MM3 (ref 140–450)
PMV BLD AUTO: 8.7 FL (ref 6–12)
RBC # BLD AUTO: 3.57 10*6/MM3 (ref 4.14–5.8)
WBC NRBC COR # BLD AUTO: 6.12 10*3/MM3 (ref 3.4–10.8)

## 2025-04-01 PROCEDURE — 80053 COMPREHEN METABOLIC PANEL: CPT

## 2025-04-01 PROCEDURE — 36415 COLL VENOUS BLD VENIPUNCTURE: CPT

## 2025-04-01 PROCEDURE — 83540 ASSAY OF IRON: CPT

## 2025-04-01 PROCEDURE — 85025 COMPLETE CBC W/AUTO DIFF WBC: CPT

## 2025-04-01 PROCEDURE — 84466 ASSAY OF TRANSFERRIN: CPT

## 2025-04-01 NOTE — PROGRESS NOTES
Chief Complaint     Nose Bleed (Dripping since yesterday)    History of Present Illness     Marcello Larson is a 84 y.o. male who presents to [facility].     Patient or patient representative verbalized consent for the use of Ambient Listening during the visit with  RONALD Enamorado for chart documentation. 4/1/2025  11:56 EDT      History of Present Illness  The patient is an 84-year-old male who presents for evaluation of nosebleed.    He began experiencing a nosebleed yesterday morning around 6:30, which persisted throughout the night. The bleeding has ceased for the past 15 minutes but was continuous until then. He describes the bleeding as a slow trickle rather than a rapid flow. He reports no trauma to the nose and is uncertain if he blew his nose during the episode. He has previously undergone nasal cauterization and has been using nasal saline. He also utilizes a humidifier and reports no other health issues. He has been using nasal tampons, which he finds comfortable, but notes that they do not alleviate the bleeding. Despite attempts to manage the situation with ice, he did not take his anticoagulant medication this morning due to concerns of excessive bleeding.         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  LV systolic function is moderate to severely reduced.  EF 30-35%.  The transmittal spectral doppler flow pattern is suggestive of pseudonormalization.  There is moderate to severe global hypokinesis of the LV.  The left atrium is mildly dilated.  The mitral leaflets appear thickened, but open well.  Mild MR and AR.    Hx of CABG 08/13/2019 01/01/1991 - left internal mammary graft to the LAD, SVG to OM1, OM2 in the RCA    Hyperlipidemia 08/13/2019    Hypo-osmolality and hyponatremia     Low back pain     Myocardial infarct     Other long term (current) drug therapy     Palpitations 08/13/2019    Personal history of malignant neoplasm of bladder     Sleep apnea 08/13/2019    Stented coronary artery 08/13/2019 04/11/2014 - VISION bare metal stent to the proximal stenosis of the vein graft to the right and ISION bare metal stent in the mid to distal 80% stenosis.    Type 2 diabetes mellitus without complication     Unstable angina        Past Surgical History:   Procedure Laterality Date    AMPUTATION Left     LOWER EXTREMITY BKA    APPENDECTOMY      CARDIAC CATHETERIZATION      CARDIAC CATHETERIZATION N/A 10/13/2020    Procedure: Left Heart Cath;  Surgeon: Kofi Campoverde MD;  Location: [facility] CATH INVASIVE LOCATION;  Service: Cardiology;  Laterality: N/A;    CARDIAC CATHETERIZATION N/A 10/13/2020    Procedure: Coronary angiography;  Surgeon: Kofi Campoverde MD;  Location[facility] CATH INVASIVE LOC[facility];  Service: Cardiology;  Laterality: N/A;    CARDIAC CATHETERIZATION N/A 10/13/2020    Procedure: Saphenous Vein Graft;  Surgeon: Kofi Campoverde MD;  [facility];  Service: Cardiology;  Laterality: N/A;    CARDIAC CATHETERIZATION N/A 2/6/2025    Procedure: Left Heart Cath;  Surgeon: Andrew Castañeda MD;  [facility];  Service: Cardiovascular;   Laterality: N/A;    CARDIAC CATHETERIZATION N/A 2/6/2025    Procedure: Stent MINE bypass graft;  Surgeon: Andrew Castañeda MD;  Location:  [facility] CATH INVASIVE LOCATION;  Service: Cardiovascular;  Laterality: N/A;    CARDIAC CATHETERIZATION N/A 2/6/2025    Procedure: Coronary angiography;  Surgeon: Andrew Castañeda MD;  Location:  [facility] CATH INVASIVE LOCATION;  Service: Cardiovascular;  Laterality: N/A;    CARDIAC CATHETERIZATION  2/6/2025    Procedure: Saphenous Vein Graft;  Surgeon: Andrew Castañeda MD;  Location:  [facility] CATH INVASIVE LOCATION;  Service: Cardiovascular;;    CARDIAC CATHETERIZATION N/A 2/6/2025    Procedure: Native mammary injection;  Surgeon: Andrew Castañeda MD;  Location:  [facility] CATH INVASIVE LOCATION;  Service: Cardiovascular;  Laterality: N/A;    CARDIAC DEFIBRILLATOR PLACEMENT      CARDIAC ELECTROPHYSIOLOGY PROCEDURE N/A 04/04/2022    Procedure: ICD DC new/SJM;  Surgeon: Mamadou Terry MD;  Location:  [facility] CATH INVASIVE LOCATION;  Service: Cardiology;  Laterality: N/A;    CARDIAC VALVE REPLACEMENT      CAROTID STENT      CATARACT EXTRACTION      CHOLECYSTECTOMY      CORONARY ARTERY BYPASS GRAFT      1984, 1991 4 VESSEL    CORONARY STENT PLACEMENT      INSERT / REPLACE / REMOVE PACEMAKER      INTERNAL CARDIAC DEFIBRILLATOR INSERTION  04/2022    INTRAVASCULAR ULTRASOUND N/A 2/6/2025    Procedure: Intravascular Ultrasound;  Surgeon: Andrew Castañeda MD;  Location:  [facility] CATH INVASIVE LOCATION;  Service: Cardiovascular;  Laterality: N/A;    OTHER SURGICAL HISTORY      UROSTOMY S/P BLADDER CA       Family History   Problem Relation Age of Onset    No Known Problems Mother     No Known Problems Father         Current Medications        Current Outpatient Medications:     albuterol sulfate  (90 Base) MCG/ACT inhaler, Inhale 2 puffs As Needed for Wheezing., Disp: , Rfl:     apixaban (ELIQUIS) 5 MG tablet tablet, Take 0.5 tablets by mouth 2 (Two) Times a Day., Disp: , Rfl:     carvedilol (COREG) 25 MG tablet,  Take 1 tablet by mouth 2 (Two) Times a Day. (Patient taking differently: Take 0.5 tablets by mouth 2 (Two) Times a Day. 6.25 BID), Disp: 180 tablet, Rfl: 3    clopidogrel (PLAVIX) 75 MG tablet, Take 1 tablet by mouth Daily., Disp: 90 tablet, Rfl: 3    dapagliflozin Propanediol (Farxiga) 10 MG tablet, Take 10 mg by mouth Daily., Disp: , Rfl:     Diclofenac Sodium (VOLTAREN) 1 % gel gel, APPLY 4 grams TO THE AFFECTED AREA TWICE DAILY, Disp: 100 g, Rfl: 3    ferrous sulfate 325 (65 FE) MG tablet, Take 1 tablet by mouth 2 (Two) Times a Day., Disp: , Rfl:     fexofenadine (ALLEGRA) 180 MG tablet, Take 1 tablet by mouth Daily., Disp: , Rfl:     fluticasone (FLONASE) 50 MCG/ACT nasal spray, USE 2 SPRAYS NASALLY EVERY DAY AS DIRECTED BY PROVIDER, Disp: 48 g, Rfl: 3    furosemide (LASIX) 40 MG tablet, Take 1 tablet by mouth 2 (Two) Times a Day. (Patient taking differently: Take 1.5 tablets by mouth 2 (Two) Times a Day. 1.5 tablets BID), Disp: 180 tablet, Rfl: 1    guaiFENesin (MUCINEX) 600 MG 12 hr tablet, Take 2 tablets by mouth., Disp: , Rfl:     HYDROcodone-acetaminophen (NORCO) 5-325 MG per tablet, Take 1 tablet by mouth 2 (Two) Times a Day As Needed. for pain, Disp: , Rfl:     levothyroxine (SYNTHROID, LEVOTHROID) 25 MCG tablet, TAKE 1 TABLET BY MOUTH ONCE DAILY, Disp: 30 tablet, Rfl: 5    losartan (COZAAR) 100 MG tablet, Take 1 tablet by mouth Daily., Disp: 90 tablet, Rfl: 3    lovastatin (MEVACOR) 40 MG tablet, Take 1 tablet by mouth Every Evening., Disp: 90 tablet, Rfl: 1    montelukast (SINGULAIR) 10 MG tablet, Take 1 tablet by mouth Every Night., Disp: 90 tablet, Rfl: 1    nitroglycerin (NITROSTAT) 0.4 MG SL tablet, DISSOLVE 1 TABLET UNDER THE TONGUE EVERY 5 MINUTES AS NEEDED FOR CHEST PAIN. DO NOT EXCEED A TOTAL OF 3 DOSES IN 15 MINUTES. IF NO RELIEF, CALL 911 (Patient taking differently: Place 1 tablet under the tongue Every 5 (Five) Minutes As Needed for Chest Pain.), Disp: 25 tablet, Rfl: 0    pantoprazole  (PROTONIX) 40 MG EC tablet, Take 1 tablet by mouth Daily., Disp: 90 tablet, Rfl: 1    potassium chloride 10 MEQ CR tablet, Take 1 tablet by mouth 2 (Two) Times a Day., Disp: 60 tablet, Rfl: 5    SITagliptin (Januvia) 25 MG tablet, Take 1 tablet by mouth Daily., Disp: 90 tablet, Rfl: 1    SSD 1 % cream, APPLY TO THE AFFECTED AREA(S) TWICE DAILY, Disp: 50 g, Rfl: 0    sucralfate (CARAFATE) 1 g tablet, TAKE 1 TABLET BY MOUTH FOUR TIMES DAILY, Disp: 120 tablet, Rfl: 5    vitamin B-6 (PYRIDOXINE) 100 MG tablet, Take 1 tablet by mouth Daily., Disp: , Rfl:     vitamin C (ASCORBIC ACID) 500 MG tablet, Take 1 tablet by mouth Daily., Disp: , Rfl:     cephalexin (KEFLEX) 500 MG capsule, Take 1 capsule by mouth Every 12 (Twelve) Hours., Disp: , Rfl:      Allergies     Allergies   Allergen Reactions    Iodinated Contrast Media Anaphylaxis    Lisinopril Other (See Comments)    Prednisone Cough       Social History       Social History     Social History Narrative    Not on file       Immunizations     Immunization:  Immunization History   Administered Date(s) Administered    Arexvy (RSV, Adults 60+ yrs) 11/02/2023    COVID-19 (MODERNA) 1st,2nd,3rd Dose Monovalent 01/22/2021, 02/25/2021, 10/18/2021, 11/18/2021, 04/14/2022    COVID-19 (MODERNA) Monovalent Original Booster 09/09/2022    COVID-19 (PFIZER) 12YRS+ (COMIRNATY) 10/09/2023, 04/30/2024, 09/27/2024    Fluad Quad 65+ 09/20/2021, 09/28/2023    Fluzone High-Dose 65+YRS 10/03/2016, 09/19/2017, 09/16/2022    Fluzone High-Dose 65+yrs 09/27/2024    Hepatitis A 06/14/2018, 11/20/2019    Influenza, Unspecified 10/01/2020    Pneumococcal Conjugate 13-Valent (PCV13) 09/01/2015    Pneumococcal Conjugate 20-Valent (PCV20) 02/28/2024    Pneumococcal Polysaccharide (PPSV23) 10/01/2008    Shingrix 12/11/2019, 05/06/2020    Tdap 01/01/2013, 02/04/2013, 02/10/2023    Zoster, Unspecified 10/01/2006, 05/06/2020          Objective     Objective     Vital Signs:   /60 (BP Location: Left  "arm, Patient Position: Sitting)   Pulse 66   Temp 97.5 °F (36.4 °C) (Oral)   Ht 170.2 cm (67\")   Wt 57.9 kg (127 lb 9.6 oz)   SpO2 95% Comment: on room air  BMI 19.98 kg/m²       Physical Exam  Vitals and nursing note reviewed.   Constitutional:       Appearance: Normal appearance. He is normal weight.   HENT:      Head: Normocephalic.      Nose:      Left Nostril: Epistaxis present.      Comments: Epistaxis left nare.  Eyes:      Conjunctiva/sclera: Conjunctivae normal.      Pupils: Pupils are equal, round, and reactive to light.   Cardiovascular:      Rate and Rhythm: Normal rate and regular rhythm.      Pulses: Normal pulses.      Heart sounds: Normal heart sounds.   Pulmonary:      Effort: Pulmonary effort is normal.      Breath sounds: Normal breath sounds.   Abdominal:      General: Bowel sounds are normal.      Palpations: Abdomen is soft.   Musculoskeletal:         General: Normal range of motion.      Cervical back: Normal range of motion and neck supple.   Skin:     General: Skin is warm and dry.   Neurological:      General: No focal deficit present.      Mental Status: He is alert and oriented to person, place, and time.   Psychiatric:         Attention and Perception: Attention normal.         Mood and Affect: Mood and affect normal.         Behavior: Behavior normal. Behavior is cooperative.         Physical Exam  Nose was examined.  Lungs were auscultated.      Results    The following data was reviewed by: RONALD Enamorado on 04/01/25               Results           Assessment and Plan        Assessment and Plan       Nasal bleeding    Orders:    Ambulatory Referral to ENT (Otolaryngology)         Assessment & Plan  1. Epistaxis.  The epistaxis is likely due to the use of anticoagulants, which can increase the risk of bleeding. He has been using nasal saline and a humidifier to prevent dryness, which can exacerbate bleeding. A nasal tampon can be inserted as a temporary measure to control " the bleeding. He was advised to continue using nasal saline and a humidifier to keep the nasal passages moist. A referral to an Ear, Nose, and Throat (ENT) specialist will be made for further evaluation and potential cauterization of the affected area.    PROCEDURE  Nasal cauterization was performed in the past.        Follow Up        Follow Up   Return for With PCP, Next scheduled follow up, sooner if condition worsens.  Patient was given instructions and counseling regarding his condition or for health maintenance advice. Please see specific information pulled into the AVS if appropriate.

## 2025-04-02 LAB
ALBUMIN SERPL-MCNC: 3.6 G/DL (ref 3.5–5.2)
ALBUMIN/GLOB SERPL: 0.9 G/DL
ALP SERPL-CCNC: 89 U/L (ref 39–117)
ALT SERPL W P-5'-P-CCNC: 5 U/L (ref 1–41)
ANION GAP SERPL CALCULATED.3IONS-SCNC: 13.4 MMOL/L (ref 5–15)
AST SERPL-CCNC: 12 U/L (ref 1–40)
BILIRUB SERPL-MCNC: <0.2 MG/DL (ref 0–1.2)
BUN SERPL-MCNC: 65 MG/DL (ref 8–23)
BUN/CREAT SERPL: 27.8 (ref 7–25)
CALCIUM SPEC-SCNC: 9.7 MG/DL (ref 8.6–10.5)
CHLORIDE SERPL-SCNC: 104 MMOL/L (ref 98–107)
CO2 SERPL-SCNC: 19.6 MMOL/L (ref 22–29)
CREAT SERPL-MCNC: 2.34 MG/DL (ref 0.76–1.27)
EGFRCR SERPLBLD CKD-EPI 2021: 26.8 ML/MIN/1.73
GLOBULIN UR ELPH-MCNC: 3.9 GM/DL
GLUCOSE SERPL-MCNC: 135 MG/DL (ref 65–99)
IRON 24H UR-MRATE: 68 MCG/DL (ref 59–158)
IRON SATN MFR SERPL: 23 % (ref 20–50)
POTASSIUM SERPL-SCNC: 4.4 MMOL/L (ref 3.5–5.2)
PROT SERPL-MCNC: 7.5 G/DL (ref 6–8.5)
SODIUM SERPL-SCNC: 137 MMOL/L (ref 136–145)
TIBC SERPL-MCNC: 301 MCG/DL (ref 298–536)
TRANSFERRIN SERPL-MCNC: 202 MG/DL (ref 200–360)

## 2025-04-02 RX ORDER — FUROSEMIDE 40 MG/1
TABLET ORAL
Qty: 270 TABLET | Refills: 0 | Status: SHIPPED | OUTPATIENT
Start: 2025-04-02

## 2025-04-03 DIAGNOSIS — I10 PRIMARY HYPERTENSION: ICD-10-CM

## 2025-04-03 RX ORDER — LOSARTAN POTASSIUM 50 MG/1
50 TABLET ORAL DAILY
Qty: 90 TABLET | Refills: 1 | OUTPATIENT
Start: 2025-04-03

## 2025-04-13 DIAGNOSIS — M46.97 UNSPECIFIED INFLAMMATORY SPONDYLOPATHY, LUMBOSACRAL REGION: ICD-10-CM

## 2025-04-14 ENCOUNTER — TELEPHONE (OUTPATIENT)
Dept: UROLOGY | Facility: CLINIC | Age: 85
End: 2025-04-14

## 2025-04-14 NOTE — TELEPHONE ENCOUNTER
Hub staff attempted to follow warm transfer process and was unsuccessful     Caller: Marcello Larson    Relationship to patient: Self    Best call back number: [phone]    Patient is needing: PT RETURNING CALL TO DAVID

## 2025-05-01 ENCOUNTER — PATIENT OUTREACH (OUTPATIENT)
Dept: CASE MANAGEMENT | Facility: OTHER | Age: 85
End: 2025-05-01
Payer: MEDICARE

## 2025-05-01 NOTE — OUTREACH NOTE
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  He was busy doing something when I called so I told him I would touch base next week.   We did discuss trying to merge  his blood draw into 1 draw between nephrology and hematology.  He had abnormal labs from both, so it may take one more follow up to make that happen.  He was asking the difference with nephrology and urology.   Printed list of dispensed medications and need some clarification.  Nephrology made changes also.     Confirmed with patient and office notes from cardiology:  losartan 100mg, carvedilol 25 bid.  Taking furosemide 60mg bid- He is unaware if nephrology changed furosemide dose, note is unclear. Called and left a message with nephrology to ask Dr. Espinoza to clarify and contact patient     Maren DAY  Ambulatory Case Management    5/1/2025, 10:25 EDT

## 2025-05-13 DIAGNOSIS — J34.89 OTHER SPECIFIED DISORDERS OF NOSE AND NASAL SINUSES: ICD-10-CM

## 2025-05-13 RX ORDER — MUPIROCIN 20 MG/G
OINTMENT TOPICAL
Qty: 22 G | Refills: 0 | Status: SHIPPED | OUTPATIENT
Start: 2025-05-13

## 2025-05-14 ENCOUNTER — OFFICE VISIT (OUTPATIENT)
Dept: UROLOGY | Facility: CLINIC | Age: 85
End: 2025-05-14
Payer: MEDICARE

## 2025-05-14 VITALS — WEIGHT: 130 LBS | HEIGHT: 67 IN | BODY MASS INDEX: 20.4 KG/M2

## 2025-05-14 DIAGNOSIS — Z85.51 PERSONAL HISTORY OF BLADDER CANCER: ICD-10-CM

## 2025-05-14 DIAGNOSIS — Z85.51 PERSONAL HISTORY OF MALIGNANT NEOPLASM OF BLADDER: ICD-10-CM

## 2025-05-14 DIAGNOSIS — R31.9 HEMATURIA, UNSPECIFIED TYPE: Primary | ICD-10-CM

## 2025-05-14 DIAGNOSIS — N39.0 RECURRENT UTI: ICD-10-CM

## 2025-05-14 LAB
BILIRUB BLD-MCNC: NEGATIVE MG/DL
CLARITY, POC: CLEAR
COLOR UR: YELLOW
EXPIRATION DATE: ABNORMAL
GLUCOSE UR STRIP-MCNC: NEGATIVE MG/DL
KETONES UR QL: NEGATIVE
LEUKOCYTE EST, POC: ABNORMAL
Lab: ABNORMAL
NITRITE UR-MCNC: POSITIVE MG/ML
PH UR: 7 [PH] (ref 5–8)
PROT UR STRIP-MCNC: NEGATIVE MG/DL
RBC # UR STRIP: NEGATIVE /UL
SP GR UR: 1.01 (ref 1–1.03)
UROBILINOGEN UR QL: ABNORMAL

## 2025-05-14 PROCEDURE — 88108 CYTOPATH CONCENTRATE TECH: CPT | Performed by: NURSE PRACTITIONER

## 2025-05-14 NOTE — PROGRESS NOTES
"Chief Complaint: Blood in Urine (Patient denies any urinary issues at this time)    Subjective         Blood in Urine      Marcello Larson is a 85 y.o. male presents to [facility] to be seen for f/u.    He states no blood in the urine recently.     No UTI symptoms today .     Dr. Bryant treated for a UTI in 2/2025 had c/o d/c around stoma. This cleared with 7 days of keflex from his PCP.     Ua chronically nitrite positive.             Previous:     He states no blood in the urine recently.     He does feel as if he has been developing a UTI over the last few days. Ua nitrite positive in office today.    He states aside from the last few days he has been doing well at this time.    Does not appear that he has had any urinary tract infections since we last saw him.  He did get admitted to the hospital for acute on chronic CHF and hypoxia in November 2024 as well as an admission to [facility] in November for pneumonia however does not appear that he has had any further urinary issues since we last saw him.        Previous:     He was seen in an urgent care  in 5/2024 he is unaware if he was having symptoms.      Did get admitted \"He presented to the [facility] ED initially with complaint of generalized weakness and was transferred to [facility] for further evaluation.  He was seen by Nephrology and sodium level improved.  Hemoglobin was low and decreasing.  He was found to have iron deficiency and received iron infusion x 2 while admitted.  He was discharged on p.o. iron supplementation.  His apixaban was decreased to 2.5 mg twice daily.  He was instructed to take Lasix 40 mg daily at the time of discharge but home losartan was discontinued.  He was instructed to follow-up with Nephrology as an outpatient.\"     No GH      He states he is doing well at this time.         Previous:      The patient states that he has been having issues with UTIs.      He states Uti " symptoms are usually just back pain.      He has had no positive urine cx since 1/2021.      Most recent urine cx was with >100,000 cfu/ml of mixed ibrahima.      He states that he was seen in 2007 by Dr. Rojas with [facility] for cystectomy for bladder cancer high grade papillary TCC.     He has not had any f/u with them since 2009.      The concern at present is that he has had some blood in his urine.      He has had 0-2 rbc/ HPF for at least the last 2 years.     He has no complaints today.      Urine cultures:   2/20/2023 greater than 100,000 colony-forming units per milliliter mixed urogenital ibrahima.       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  LV systolic function is moderate to severely reduced.  EF 30-35%.  The transmittal spectral doppler flow pattern is suggestive of pseudonormalization.  There is moderate to severe global hypokinesis of the LV.  The left atrium is mildly dilated.  The mitral leaflets appear thickened, but open well.  Mild MR and AR.    Hx of CABG 08/13/2019 01/01/1991 - left internal mammary graft to the LAD, SVG to OM1, OM2 in the RCA    Hyperlipidemia 08/13/2019    Hypo-osmolality and hyponatremia     Low back pain      Myocardial infarct     Other long term (current) drug therapy     Palpitations 08/13/2019    Personal history of malignant neoplasm of bladder     Sleep apnea 08/13/2019    Stented coronary artery 08/13/2019 04/11/2014 - VISION bare metal stent to the proximal stenosis of the vein graft to the right and ISION bare metal stent in the mid to distal 80% stenosis.    Type 2 diabetes mellitus without complication     Unstable angina        Past Surgical History:   Procedure Laterality Date    AMPUTATION Left     LOWER EXTREMITY BKA    APPENDECTOMY      CARDIAC CATHETERIZATION      CARDIAC CATHETERIZATION N/A 10/13/2020    Procedure: Left Heart Cath;  Surgeon: Kofi Campoverde MD;  Location: [facility] CATH [facility]VASIVE LOC[facility];  Service: Cardiology;  Laterality: N/A;    CARDIAC CATHETERIZATION N/A 10/13/2020    Procedure: Coronary angiography;  Surgeon: Kofi Campoverde MD;  Location: [facility] C[facility]IVE LOC[facility];  Service: Cardiology;  Laterality: N/A;    CARDIAC CATHETERIZATION N/A 10/13/2020    Procedure: Saphenous Vein Graft;  Surgeon: Kofi Campoverde MD;  Location: [facility] C[facility]IVE LOC[facility];  Service: Cardiology;  Laterality: N/A;    CARDIAC CATHETERIZATION N/A 2/6/2025    Procedure: Left Heart Cath;  Surgeon: Andrew Castañeda MD;  Location: [facility] C[facility] LOC[facility];  Service: Cardiovascular;  Laterality: N/A;    CARDIAC CATHETERIZATION N/A 2/6/2025    Procedure: Stent MINE bypass graft;  Surgeon: Andrew Castaeñda MD;  Location: [facility] C[facility]IVE LOC[facility];  Service: Cardiovascular;  Laterality: N/A;    CARDIAC CATHETERIZATION N/A 2/6/2025    Procedure: Coronary angiography;  Surgeon: Andrew Castañeda MD;  Location: [facility] CATH [facility]IVE LOC[facility];  Service: Cardiovascular;  Laterality: N/A;    CARDIAC CATHETERIZATION  2/6/2025    Procedure: Saphenous Vein Graft;  Surgeon: Andrew Castañeda MD;  Location: [facility] CATH INVASIVE LOCATION;  Service: Cardiovascular;;    CARDIAC CATHETERIZATION N/A 2/6/2025    Procedure: Native  mammary injection;  Surgeon: Andrew Castañeda MD;  Location:  [facility] CATH INVASIVE LOCATION;  Service: Cardiovascular;  Laterality: N/A;    CARDIAC DEFIBRILLATOR PLACEMENT      CARDIAC ELECTROPHYSIOLOGY PROCEDURE N/A 04/04/2022    Procedure: ICD DC new/SJM;  Surgeon: Mamadou Terry MD;  Location:  [facility] CATH INVASIVE LOCATION;  Service: Cardiology;  Laterality: N/A;    CARDIAC VALVE REPLACEMENT      CAROTID STENT      CATARACT EXTRACTION      CHOLECYSTECTOMY      CORONARY ARTERY BYPASS GRAFT      1984, 1991 4 VESSEL    CORONARY STENT PLACEMENT      INSERT / REPLACE / REMOVE PACEMAKER      INTERNAL CARDIAC DEFIBRILLATOR INSERTION  04/2022    INTRAVASCULAR ULTRASOUND N/A 2/6/2025    Procedure: Intravascular Ultrasound;  Surgeon: Andrew Castañeda MD;  Location:  [facility] CATH INVASIVE LOCATION;  Service: Cardiovascular;  Laterality: N/A;    OTHER SURGICAL HISTORY      UROSTOMY S/P BLADDER CA         Current Outpatient Medications:     albuterol sulfate  (90 Base) MCG/ACT inhaler, Inhale 2 puffs As Needed for Wheezing., Disp: , Rfl:     apixaban (ELIQUIS) 5 MG tablet tablet, Take 0.5 tablets by mouth 2 (Two) Times a Day., Disp: , Rfl:     carvedilol (COREG) 25 MG tablet, Take 1 tablet by mouth 2 (Two) Times a Day. (Patient taking differently: Take 0.5 tablets by mouth 2 (Two) Times a Day. 6.25 BID), Disp: 180 tablet, Rfl: 3    clopidogrel (PLAVIX) 75 MG tablet, Take 1 tablet by mouth Daily., Disp: 90 tablet, Rfl: 3    dapagliflozin Propanediol (Farxiga) 10 MG tablet, Take 10 mg by mouth Daily., Disp: , Rfl:     Diclofenac Sodium (VOLTAREN) 1 % gel gel, APPLY 4 grams TO THE AFFECTED AREA TWICE DAILY, Disp: 100 g, Rfl: 3    ferrous sulfate 325 (65 FE) MG tablet, Take 1 tablet by mouth 2 (Two) Times a Day., Disp: , Rfl:     fexofenadine (ALLEGRA) 180 MG tablet, Take 1 tablet by mouth Daily., Disp: , Rfl:     fluticasone (FLONASE) 50 MCG/ACT nasal spray, USE 2 SPRAYS NASALLY EVERY DAY AS DIRECTED BY PROVIDER, Disp: 48 g,  Rfl: 3    furosemide (LASIX) 40 MG tablet, TAKE 1&1/2 TABLETS BY MOUTH TWICE DAILY., Disp: 270 tablet, Rfl: 0    guaiFENesin (MUCINEX) 600 MG 12 hr tablet, Take 2 tablets by mouth., Disp: , Rfl:     HYDROcodone-acetaminophen (NORCO) 5-325 MG per tablet, Take 1 tablet by mouth 2 (Two) Times a Day As Needed. for pain, Disp: , Rfl:     levothyroxine (SYNTHROID, LEVOTHROID) 25 MCG tablet, TAKE 1 TABLET BY MOUTH ONCE DAILY, Disp: 30 tablet, Rfl: 5    losartan (COZAAR) 100 MG tablet, Take 1 tablet by mouth Daily., Disp: 90 tablet, Rfl: 3    lovastatin (MEVACOR) 40 MG tablet, Take 1 tablet by mouth Every Evening., Disp: 90 tablet, Rfl: 1    montelukast (SINGULAIR) 10 MG tablet, Take 1 tablet by mouth Every Night., Disp: 90 tablet, Rfl: 1    mupirocin (BACTROBAN) 2 % ointment, APPLY A thin layer TO THE nostril THREE TIMES DAILY FOR 7 DAYS, Disp: 22 g, Rfl: 0    nitroglycerin (NITROSTAT) 0.4 MG SL tablet, DISSOLVE 1 TABLET UNDER THE TONGUE EVERY 5 MINUTES AS NEEDED FOR CHEST PAIN. DO NOT EXCEED A TOTAL OF 3 DOSES IN 15 MINUTES. IF NO RELIEF, CALL 911 (Patient taking differently: Place 1 tablet under the tongue Every 5 (Five) Minutes As Needed for Chest Pain.), Disp: 25 tablet, Rfl: 0    pantoprazole (PROTONIX) 40 MG EC tablet, Take 1 tablet by mouth Daily., Disp: 90 tablet, Rfl: 1    potassium chloride 10 MEQ CR tablet, Take 1 tablet by mouth 2 (Two) Times a Day., Disp: 60 tablet, Rfl: 5    SITagliptin (Januvia) 25 MG tablet, Take 1 tablet by mouth Daily., Disp: 90 tablet, Rfl: 1    SSD 1 % cream, APPLY TO THE AFFECTED AREA(S) TWICE DAILY, Disp: 50 g, Rfl: 0    sucralfate (CARAFATE) 1 g tablet, TAKE 1 TABLET BY MOUTH FOUR TIMES DAILY, Disp: 120 tablet, Rfl: 5    vitamin B-6 (PYRIDOXINE) 100 MG tablet, Take 1 tablet by mouth Daily., Disp: , Rfl:     vitamin C (ASCORBIC ACID) 500 MG tablet, Take 1 tablet by mouth Daily., Disp: , Rfl:     Allergies   Allergen Reactions    Iodinated Contrast Media Anaphylaxis    Lisinopril Other  "(See Comments)    Prednisone Cough        Family History   Problem Relation Age of Onset    No Known Problems Mother     No Known Problems Father        Social History     Socioeconomic History    Marital status:     Number of children: 2   Tobacco Use    Smoking status: Former     Current packs/day: 0.00     Average packs/day: 1 pack/day for 24.0 years (24.0 ttl pk-yrs)     Types: Cigarettes     Start date: 1960     Quit date: 1984     Years since quittin.3     Passive exposure: Past    Smokeless tobacco: Never    Tobacco comments:     Have not smoked since    Vaping Use    Vaping status: Never Used   Substance and Sexual Activity    Alcohol use: Never    Drug use: Never    Sexual activity: Not Currently     Partners: Female       Vital Signs:   Ht 170.2 cm (67\")   Wt 59 kg (130 lb)   BMI 20.36 kg/m²      Physical Exam     Result Review :   The following data was reviewed by: RONALD Dean on 2025:  Results for orders placed or performed in visit on 25   POC Urinalysis Dipstick, Automated    Collection Time: 25  9:40 AM    Specimen: Urine   Result Value Ref Range    Color Yellow Yellow, Straw, Dark Yellow, [place]    Clarity, UA Clear Clear    Specific Gravity  1.015 1.005 - 1.030    pH, Urine 7.0 5.0 - 8.0    Leukocytes Small (1+) (A) Negative    Nitrite, UA Positive (A) Negative    Protein, POC Negative Negative mg/dL    Glucose, UA Negative Negative mg/dL    Ketones, UA Negative Negative    Urobilinogen, UA 0.2 E.U./dL Normal, 0.2 E.U./dL    Bilirubin Negative Negative    Blood, UA Negative Negative    Lot Number [id number]     Expiration Date              Procedures        Assessment and Plan    Diagnoses and all orders for this visit:    1. Hematuria, unspecified type (Primary)  -     POC Urinalysis Dipstick, Automated    2. Recurrent UTI    3. Personal history of bladder cancer  -     Cytology, Urine; Future    4. Personal history of malignant neoplasm of " bladder  -     Cytology, Urine; Future      We will continue to monitor symptoms.     He will call with s/s of UTI for cx.     He will change ostomy supplies every 2 days.     Will need this for lifetime and uses an overnight bag once a month.     I spent 10 minutes caring for Marcello on this date of service. This time includes time spent by me in the following activities:reviewing tests, obtaining and/or reviewing a separately obtained history, performing a medically appropriate examination and/or evaluation , counseling and educating the patient/family/caregiver, ordering medications, tests, or procedures, and documenting information in the medical record  Follow Up   Return in about 6 months (around 11/14/2025) for f/u utis/ history of bladder cancer.  Patient was given instructions and counseling regarding his condition or for health maintenance advice. Please see specific information pulled into the AVS if appropriate.         This document has been electronically signed by RONALD Dean  May 14, 2025 10:00 EDT

## 2025-05-16 LAB
CYTO UR: NORMAL
LAB AP CASE REPORT: NORMAL
LAB AP CLINICAL INFORMATION: NORMAL
LAB AP NON-GYN INTERPRETATION: NORMAL
PATH REPORT.FINAL DX SPEC: NORMAL
PATH REPORT.GROSS SPEC: NORMAL

## 2025-05-24 DIAGNOSIS — L89.152 PRESSURE ULCER OF SACRAL REGION, STAGE 2: ICD-10-CM

## 2025-05-27 RX ORDER — SILVER SULFADIAZINE 10 MG/G
CREAM TOPICAL
Qty: 50 G | Refills: 11 | Status: SHIPPED | OUTPATIENT
Start: 2025-05-27

## 2025-05-28 ENCOUNTER — OFFICE VISIT (OUTPATIENT)
Dept: ORTHOPEDIC SURGERY | Facility: CLINIC | Age: 85
End: 2025-05-28
Payer: MEDICARE

## 2025-05-28 VITALS — OXYGEN SATURATION: 97 % | DIASTOLIC BLOOD PRESSURE: 71 MMHG | SYSTOLIC BLOOD PRESSURE: 126 MMHG | HEART RATE: 50 BPM

## 2025-05-28 DIAGNOSIS — M19.012 PRIMARY OSTEOARTHRITIS OF LEFT SHOULDER: Primary | ICD-10-CM

## 2025-05-28 RX ORDER — LIDOCAINE HYDROCHLORIDE 10 MG/ML
5 INJECTION, SOLUTION INFILTRATION; PERINEURAL
Status: COMPLETED | OUTPATIENT
Start: 2025-05-28 | End: 2025-05-28

## 2025-05-28 RX ORDER — TRIAMCINOLONE ACETONIDE 40 MG/ML
40 INJECTION, SUSPENSION INTRA-ARTICULAR; INTRAMUSCULAR
Status: COMPLETED | OUTPATIENT
Start: 2025-05-28 | End: 2025-05-28

## 2025-05-28 RX ADMIN — TRIAMCINOLONE ACETONIDE 40 MG: 40 INJECTION, SUSPENSION INTRA-ARTICULAR; INTRAMUSCULAR at 11:00

## 2025-05-28 RX ADMIN — LIDOCAINE HYDROCHLORIDE 5 ML: 10 INJECTION, SOLUTION INFILTRATION; PERINEURAL at 11:00

## 2025-05-28 NOTE — PROGRESS NOTES
Chief Complaint  Follow-up of the Left Shoulder    Subjective          Marcello Larson presents to [facility]   History of Present Illness    Marcello Larson presents to [facility]  today for follow-up of left shoulder .  He has left shoulder arthritis has been treating nonoperatively. Patient was last seen at office on 2025 and was given a shoulder steroid injection.  He states that overall he is doing much better, he believes the injection has been very helpful he is able to lift his shoulder up or be more.  Patient states that he has been doing daily activity, cutting grass and cleaning house and doing well.  He would like to have another injection today.  Patient denies any consitutional symptoms today.        Allergies   Allergen Reactions    Iodinated Contrast Media Anaphylaxis    Lisinopril Other (See Comments)    Prednisone Cough        Social History     Socioeconomic History    Marital status:     Number of children: 2   Tobacco Use    Smoking status: Former     Current packs/day: 0.00     Average packs/day: 1 pack/day for 24.0 years (24.0 ttl pk-yrs)     Types: Cigarettes     Start date: 1960     Quit date: 1984     Years since quittin.4     Passive exposure: Past    Smokeless tobacco: Never    Tobacco comments:     Have not smoked since    Vaping Use    Vaping status: Never Used   Substance and Sexual Activity    Alcohol use: Never    Drug use: Never    Sexual activity: Not Currently     Partners: Female        I reviewed the patient's chief complaint, history of present illness, review of systems, past medical history, surgical history, family history, social history, medications, and allergy list.     REVIEW OF SYSTEMS    Constitutional: Denies fevers, chills, weight loss  Cardiovascular: Denies chest pain, shortness of breath  Skin: Denies rashes, acute skin changes  Neurologic: Denies headache, loss of  consciousness  MSK: Left shoulder pain      Objective   Vital Signs:   /71   Pulse 50   SpO2 97%     There is no height or weight on file to calculate BMI.    Physical Exam    General: Well developed, well nourished. Alert. No acute distress.    Left upper Extremity: Skin is intact, dry, Well healed incision sites. No erythema, warmth, drainage, or bruising. No skin discoloration, atrophy.100 degrees active elevation. External rotation to 20 degrees. Internal rotation to mid lumbar spine. Able to get hand behind head. Sensation intact in the axillary, median, radial, ulnar nerve distributions. Intact active motor function in the AIN, PIN, ulnar nerve distribution. Neurovascularly intact.  Weakness with rotator cuff testing.           Large Joint Arthrocentesis  Date/Time: 5/28/2025 11:00 AM  Consent given by: patient  Site marked: site marked  Timeout: Immediately prior to procedure a time out was called to verify the correct patient, procedure, equipment, support staff and site/side marked as required   Supporting Documentation  Indications: pain   Procedure Details  Location: -   Location: left shoulder.Needle gauge: 21 G.  Medications administered: 5 mL lidocaine 1 %; 40 mg triamcinolone acetonide 40 MG/ML  Patient tolerance: patient tolerated the procedure well with no immediate complications      This injection documentation was Scribed for Jenn Garza PA-C by Naa Santos MA.  05/28/25   11:01 EDT    Imaging Results (Most Recent)       None                   Assessment and Plan    Diagnoses and all orders for this visit:    1. Primary osteoarthritis of left shoulder (Primary)  -     Large Joint Arthrocentesis        aMrcello Larson presents today for follow-up left shoulder arthritis,  1.  Patient is examined and doing well.  2.  Left shoulder steroid injection was given today.  He tolerated the procedure well.  Confirmed with the patient regarding his prednisone allergy, he states that he is  doing well with the steroid injection last time have no problem.  3.  Continue home exercise program and slow progress his weightbearing status.  4.  Overall he is doing well.  He will return to clinic for follow-up in 3 months.  Advised the patient to call if the pain get worse or any other concern in the future.      Follow Up     Return in about 3 months (around 8/28/2025).    Jenn Garza PA-C     Patient was given instructions and counseling regarding his condition or for health maintenance advice. Please see specific information pulled into the AVS if appropriate.     Electronically signed by RONALD Brock, 05/28/25, 11:13 AM EDT.

## 2025-05-28 NOTE — PROGRESS NOTES
Chief Complaint    Follow-up of the Left Shoulder    Subjective     {Problem List  Visit Diagnosis   Encounters  Notes  Medications  Labs  Result Review Imaging  Media :23}       History of Present Illness    Marcello Larosn presents to [facility]  today for follow-up of left shoulder .  He has left shoulder arthritis has been treating nonoperatively. Patient was last seen at office on 2025 and was given a shoulder steroid injection.  He states that overall he is doing much better, he believes the injection has been very helpful he is able to lift his shoulder up or be more.  Patient states that he has been doing daily activity, cutting grass and cleaning house and doing well.  He would like to have another injection today.  Patient denies any consitutional symptoms today.      Allergies   Allergen Reactions    Iodinated Contrast Media Anaphylaxis    Lisinopril Other (See Comments)    Prednisone Cough        Social History     Socioeconomic History    Marital status:     Number of children: 2   Tobacco Use    Smoking status: Former     Current packs/day: 0.00     Average packs/day: 1 pack/day for 24.0 years (24.0 ttl pk-yrs)     Types: Cigarettes     Start date: 1960     Quit date: 1984     Years since quittin.4     Passive exposure: Past    Smokeless tobacco: Never    Tobacco comments:     Have not smoked since    Vaping Use    Vaping status: Never Used   Substance and Sexual Activity    Alcohol use: Never    Drug use: Never    Sexual activity: Not Currently     Partners: Female        I reviewed the patient's chief complaint, history of present illness, review of systems, past medical history, surgical history, family history, social history, medications, and allergy list.     REVIEW OF SYSTEMS    14 point review of systems negative except as noted above in the HPI      Objective     Vital Signs:   /71   Pulse 50   SpO2 97%      There is no height or weight on file to calculate BMI.    Physical Exam    ***      Procedures    Imaging Results (Most Recent)       None                   Assessment and Plan {CC Problem List  Visit Diagnosis   ROS  Review (Popup)  Health Maintenance  Quality  BestPractice  Medications  SmartSets  SnapShot Encounters  Media :23}     There are no diagnoses linked to this encounter.       Marcello Larson presents today for *** evaluation that been treating conservatively,     Patient is examined and X-rays were reviewed with the patient today.   Patient is to continue weightbearing on his affected extremity and use assisted advice as needed. Patient is slow progress up his weightbearing status.  Patient is to continue home exercises program. Discussed the importance of these exercises to prevent stiffness. Patient addresses that his have received exercises handout. We also discussed ordering formal physical therapy if necessary.   Patient is to take NSAIDs/Tylenol for pain control as needed, advised to take it with food to prevent GI upset.   Encourage the patient to apply ice pack to help decrease pain. Patient is okay to apply topical cream as well.  Advised the patient to elevate affected extremity above heart to help with inflammation and swelling. Recommend to wear compression socks if tolerated. Patient understood and agreed.  A work note was provided.   Patient will follow up in *** weeks for re-evaluation.  We will need repeat *** Xrays at next office visit.  Advise the patient to call or return if symptoms worsen or patient has any concerns.         Follow Up {Instructions Charge Capture  Follow-up Communications :23}    No follow-ups on file.    Patient was given instructions and counseling regarding his condition or for health maintenance advice. Please see specific information pulled into the AVS if appropriate.     Electronically signed by RONALD Brock, 05/28/25, 11:04 AM  "EDT.    \" Dictated utilizing Dragon dictation: Part of this note may be electronic transcription/translation of spoken language to printed text by using the Dragon dictation system.\"  "

## 2025-05-29 ENCOUNTER — TRANSCRIBE ORDERS (OUTPATIENT)
Dept: ADMINISTRATIVE | Facility: HOSPITAL | Age: 85
End: 2025-05-29
Payer: MEDICARE

## 2025-05-29 ENCOUNTER — LAB (OUTPATIENT)
Dept: LAB | Facility: HOSPITAL | Age: 85
End: 2025-05-29
Payer: MEDICARE

## 2025-05-29 DIAGNOSIS — N18.30 STAGE 3 CHRONIC KIDNEY DISEASE, UNSPECIFIED WHETHER STAGE 3A OR 3B CKD: ICD-10-CM

## 2025-05-29 DIAGNOSIS — D64.9 ANEMIA, UNSPECIFIED TYPE: Primary | ICD-10-CM

## 2025-05-29 DIAGNOSIS — E53.9 VITAMIN B DEFICIENCY: ICD-10-CM

## 2025-05-29 DIAGNOSIS — I50.9 HEART FAILURE, UNSPECIFIED HF CHRONICITY, UNSPECIFIED HEART FAILURE TYPE: ICD-10-CM

## 2025-05-29 DIAGNOSIS — E11.9 DIABETES MELLITUS WITHOUT COMPLICATION: ICD-10-CM

## 2025-05-29 DIAGNOSIS — E55.9 VITAMIN D DEFICIENCY: ICD-10-CM

## 2025-05-29 DIAGNOSIS — I10 HYPERTENSION, ESSENTIAL: ICD-10-CM

## 2025-05-29 DIAGNOSIS — D64.9 ANEMIA, UNSPECIFIED TYPE: ICD-10-CM

## 2025-05-29 LAB
25(OH)D3 SERPL-MCNC: 37.3 NG/ML (ref 30–100)
ALBUMIN SERPL-MCNC: 4.2 G/DL (ref 3.5–5.2)
ALBUMIN/GLOB SERPL: 1.2 G/DL
ALP SERPL-CCNC: 77 U/L (ref 39–117)
ALT SERPL W P-5'-P-CCNC: 7 U/L (ref 1–41)
ANION GAP SERPL CALCULATED.3IONS-SCNC: 15 MMOL/L (ref 5–15)
AST SERPL-CCNC: 11 U/L (ref 1–40)
BACTERIA UR QL AUTO: ABNORMAL /HPF
BASOPHILS # BLD AUTO: 0.02 10*3/MM3 (ref 0–0.2)
BASOPHILS NFR BLD AUTO: 0.3 % (ref 0–1.5)
BILIRUB SERPL-MCNC: 0.3 MG/DL (ref 0–1.2)
BILIRUB UR QL STRIP: NEGATIVE
BUN SERPL-MCNC: 53 MG/DL (ref 8–23)
BUN/CREAT SERPL: 26.2 (ref 7–25)
CALCIUM SPEC-SCNC: 9.1 MG/DL (ref 8.6–10.5)
CHLORIDE SERPL-SCNC: 101 MMOL/L (ref 98–107)
CLARITY UR: CLEAR
CO2 SERPL-SCNC: 20 MMOL/L (ref 22–29)
COLOR UR: YELLOW
CREAT SERPL-MCNC: 2.02 MG/DL (ref 0.76–1.27)
CREAT UR-MCNC: 23.7 MG/DL
DEPRECATED RDW RBC AUTO: 48.2 FL (ref 37–54)
EGFRCR SERPLBLD CKD-EPI 2021: 31.7 ML/MIN/1.73
EOSINOPHIL # BLD AUTO: 0.06 10*3/MM3 (ref 0–0.4)
EOSINOPHIL NFR BLD AUTO: 0.9 % (ref 0.3–6.2)
ERYTHROCYTE [DISTWIDTH] IN BLOOD BY AUTOMATED COUNT: 13.5 % (ref 12.3–15.4)
FOLATE SERPL-MCNC: >20 NG/ML (ref 4.78–24.2)
GLOBULIN UR ELPH-MCNC: 3.4 GM/DL
GLUCOSE SERPL-MCNC: 139 MG/DL (ref 65–99)
GLUCOSE UR STRIP-MCNC: NEGATIVE MG/DL
HBA1C MFR BLD: 5.6 % (ref 4.8–5.6)
HCT VFR BLD AUTO: 35.1 % (ref 37.5–51)
HGB BLD-MCNC: 10.8 G/DL (ref 13–17.7)
HGB UR QL STRIP.AUTO: ABNORMAL
IMM GRANULOCYTES # BLD AUTO: 0.02 10*3/MM3 (ref 0–0.05)
IMM GRANULOCYTES NFR BLD AUTO: 0.3 % (ref 0–0.5)
IRON 24H UR-MRATE: 77 MCG/DL (ref 59–158)
IRON SATN MFR SERPL: 23 % (ref 20–50)
KETONES UR QL STRIP: NEGATIVE
LEUKOCYTE ESTERASE UR QL STRIP.AUTO: ABNORMAL
LYMPHOCYTES # BLD AUTO: 0.84 10*3/MM3 (ref 0.7–3.1)
LYMPHOCYTES NFR BLD AUTO: 12.3 % (ref 19.6–45.3)
MAGNESIUM SERPL-MCNC: 2.3 MG/DL (ref 1.6–2.4)
MCH RBC QN AUTO: 29.2 PG (ref 26.6–33)
MCHC RBC AUTO-ENTMCNC: 30.8 G/DL (ref 31.5–35.7)
MCV RBC AUTO: 94.9 FL (ref 79–97)
MONOCYTES # BLD AUTO: 0.41 10*3/MM3 (ref 0.1–0.9)
MONOCYTES NFR BLD AUTO: 6 % (ref 5–12)
NEUTROPHILS NFR BLD AUTO: 5.48 10*3/MM3 (ref 1.7–7)
NEUTROPHILS NFR BLD AUTO: 80.2 % (ref 42.7–76)
NITRITE UR QL STRIP: NEGATIVE
NT-PROBNP SERPL-MCNC: 6356 PG/ML (ref 0–1800)
PH UR STRIP.AUTO: 7 [PH] (ref 5–8)
PLATELET # BLD AUTO: 131 10*3/MM3 (ref 140–450)
PMV BLD AUTO: 9.4 FL (ref 6–12)
POTASSIUM SERPL-SCNC: 4.4 MMOL/L (ref 3.5–5.2)
PROT ?TM UR-MCNC: 5.7 MG/DL
PROT SERPL-MCNC: 7.6 G/DL (ref 6–8.5)
PROT UR QL STRIP: NEGATIVE
PROT/CREAT UR: 0.24 MG/G{CREAT}
RBC # BLD AUTO: 3.7 10*6/MM3 (ref 4.14–5.8)
RBC # UR STRIP: ABNORMAL /HPF
REF LAB TEST METHOD: ABNORMAL
SODIUM SERPL-SCNC: 136 MMOL/L (ref 136–145)
SP GR UR STRIP: 1.01 (ref 1–1.03)
SQUAMOUS #/AREA URNS HPF: ABNORMAL /HPF
TIBC SERPL-MCNC: 335 MCG/DL (ref 298–536)
TRANSFERRIN SERPL-MCNC: 225 MG/DL (ref 200–360)
UROBILINOGEN UR QL STRIP: ABNORMAL
VIT B12 BLD-MCNC: 822 PG/ML (ref 211–946)
WBC # UR STRIP: ABNORMAL /HPF
WBC NRBC COR # BLD AUTO: 6.83 10*3/MM3 (ref 3.4–10.8)

## 2025-05-29 PROCEDURE — 82746 ASSAY OF FOLIC ACID SERUM: CPT

## 2025-05-29 PROCEDURE — 83735 ASSAY OF MAGNESIUM: CPT

## 2025-05-29 PROCEDURE — 81001 URINALYSIS AUTO W/SCOPE: CPT

## 2025-05-29 PROCEDURE — 36415 COLL VENOUS BLD VENIPUNCTURE: CPT

## 2025-05-29 PROCEDURE — 82570 ASSAY OF URINE CREATININE: CPT

## 2025-05-29 PROCEDURE — 83540 ASSAY OF IRON: CPT

## 2025-05-29 PROCEDURE — 80053 COMPREHEN METABOLIC PANEL: CPT

## 2025-05-29 PROCEDURE — 82306 VITAMIN D 25 HYDROXY: CPT

## 2025-05-29 PROCEDURE — 85025 COMPLETE CBC W/AUTO DIFF WBC: CPT

## 2025-05-29 PROCEDURE — 84156 ASSAY OF PROTEIN URINE: CPT

## 2025-05-29 PROCEDURE — 83036 HEMOGLOBIN GLYCOSYLATED A1C: CPT

## 2025-05-29 PROCEDURE — 84466 ASSAY OF TRANSFERRIN: CPT

## 2025-05-29 PROCEDURE — 83880 ASSAY OF NATRIURETIC PEPTIDE: CPT

## 2025-05-29 PROCEDURE — 82607 VITAMIN B-12: CPT

## 2025-06-03 ENCOUNTER — HOSPITAL ENCOUNTER (OUTPATIENT)
Dept: CARDIOLOGY | Facility: HOSPITAL | Age: 85
Discharge: HOME | End: 2025-06-03
Admitting: STUDENT IN AN ORGANIZED HEALTH CARE EDUCATION/TRAINING PROGRAM
Payer: MEDICARE

## 2025-06-03 ENCOUNTER — OFFICE VISIT (OUTPATIENT)
Age: 85
End: 2025-06-03
Payer: MEDICARE

## 2025-06-03 VITALS
HEART RATE: 58 BPM | BODY MASS INDEX: 20.4 KG/M2 | HEIGHT: 67 IN | WEIGHT: 130 LBS | OXYGEN SATURATION: 94 % | DIASTOLIC BLOOD PRESSURE: 56 MMHG | SYSTOLIC BLOOD PRESSURE: 110 MMHG

## 2025-06-03 VITALS
WEIGHT: 130 LBS | DIASTOLIC BLOOD PRESSURE: 56 MMHG | HEIGHT: 67 IN | SYSTOLIC BLOOD PRESSURE: 110 MMHG | BODY MASS INDEX: 20.4 KG/M2 | HEART RATE: 51 BPM | OXYGEN SATURATION: 96 %

## 2025-06-03 DIAGNOSIS — I50.42 CHRONIC COMBINED SYSTOLIC AND DIASTOLIC CONGESTIVE HEART FAILURE: ICD-10-CM

## 2025-06-03 DIAGNOSIS — I25.10 CORONARY ARTERY DISEASE INVOLVING NATIVE CORONARY ARTERY OF NATIVE HEART WITHOUT ANGINA PECTORIS: Primary | ICD-10-CM

## 2025-06-03 DIAGNOSIS — I48.21 PERMANENT ATRIAL FIBRILLATION: ICD-10-CM

## 2025-06-03 DIAGNOSIS — I10 PRIMARY HYPERTENSION: ICD-10-CM

## 2025-06-03 DIAGNOSIS — Z95.810 ICD (IMPLANTABLE CARDIOVERTER-DEFIBRILLATOR), DUAL, IN SITU: ICD-10-CM

## 2025-06-03 LAB
AORTIC ARCH: 2.7 CM
AORTIC DIMENSIONLESS INDEX: 0.7 (DI)
ASCENDING AORTA: 3.7 CM
AV MEAN PRESS GRAD SYS DOP V1V2: 2.7 MMHG
AV VMAX SYS DOP: 117.3 CM/SEC
BH CV ECHO LEFT VENTRICLE GLOBAL LONGITUDINAL STRAIN: -9.7 %
BH CV ECHO MEAS - ACS: 2.44 CM
BH CV ECHO MEAS - AO MAX PG: 5.5 MMHG
BH CV ECHO MEAS - AO ROOT DIAM: 4 CM
BH CV ECHO MEAS - AO V2 VTI: 26.6 CM
BH CV ECHO MEAS - AVA(I,D): 2.9 CM2
BH CV ECHO MEAS - EDV(CUBED): 175.6 ML
BH CV ECHO MEAS - EDV(MOD-SP2): 165 ML
BH CV ECHO MEAS - EDV(MOD-SP4): 180 ML
BH CV ECHO MEAS - EF(MOD-SP2): 24.2 %
BH CV ECHO MEAS - EF(MOD-SP4): 38.9 %
BH CV ECHO MEAS - ESV(CUBED): 91.6 ML
BH CV ECHO MEAS - ESV(MOD-SP2): 125 ML
BH CV ECHO MEAS - ESV(MOD-SP4): 110 ML
BH CV ECHO MEAS - FS: 19.5 %
BH CV ECHO MEAS - IVS/LVPW: 1.22 CM
BH CV ECHO MEAS - IVSD: 1.1 CM
BH CV ECHO MEAS - LAT PEAK E' VEL: 9.9 CM/SEC
BH CV ECHO MEAS - LV DIASTOLIC VOL/BSA (35-75): 106.9 CM2
BH CV ECHO MEAS - LV MASS(C)D: 219.7 GRAMS
BH CV ECHO MEAS - LV MAX PG: 4 MMHG
BH CV ECHO MEAS - LV MEAN PG: 1.92 MMHG
BH CV ECHO MEAS - LV SYSTOLIC VOL/BSA (12-30): 65.3 CM2
BH CV ECHO MEAS - LV V1 MAX: 100.1 CM/SEC
BH CV ECHO MEAS - LV V1 VTI: 18.7 CM
BH CV ECHO MEAS - LVIDD: 5.6 CM
BH CV ECHO MEAS - LVIDS: 4.5 CM
BH CV ECHO MEAS - LVOT AREA: 4.1 CM2
BH CV ECHO MEAS - LVOT DIAM: 2.29 CM
BH CV ECHO MEAS - LVPWD: 0.9 CM
BH CV ECHO MEAS - MED PEAK E' VEL: 4.8 CM/SEC
BH CV ECHO MEAS - MR MAX PG: 101.5 MMHG
BH CV ECHO MEAS - MR MAX VEL: 503.9 CM/SEC
BH CV ECHO MEAS - MV DEC SLOPE: 158.5 CM/SEC2
BH CV ECHO MEAS - MV DEC TIME: 0.21 SEC
BH CV ECHO MEAS - MV E MAX VEL: 81 CM/SEC
BH CV ECHO MEAS - MV MAX PG: 3.1 MMHG
BH CV ECHO MEAS - MV MEAN PG: 1.17 MMHG
BH CV ECHO MEAS - MV P1/2T: 158.5 MSEC
BH CV ECHO MEAS - MV V2 VTI: 30.7 CM
BH CV ECHO MEAS - MVA(P1/2T): 1.39 CM2
BH CV ECHO MEAS - MVA(VTI): 2.5 CM2
BH CV ECHO MEAS - PA ACC TIME: 0.11 SEC
BH CV ECHO MEAS - PA V2 MAX: 65.2 CM/SEC
BH CV ECHO MEAS - QP/QS: 0.43
BH CV ECHO MEAS - RAP SYSTOLE: 3 MMHG
BH CV ECHO MEAS - RV MAX PG: 0.87 MMHG
BH CV ECHO MEAS - RV V1 MAX: 46.7 CM/SEC
BH CV ECHO MEAS - RV V1 VTI: 10.3 CM
BH CV ECHO MEAS - RVOT DIAM: 2.03 CM
BH CV ECHO MEAS - RVSP: 35.9 MMHG
BH CV ECHO MEAS - SUP REN AO DIAM: 2.2 CM
BH CV ECHO MEAS - SV(LVOT): 77.1 ML
BH CV ECHO MEAS - SV(MOD-SP2): 40 ML
BH CV ECHO MEAS - SV(MOD-SP4): 70 ML
BH CV ECHO MEAS - SV(RVOT): 33.3 ML
BH CV ECHO MEAS - SVI(LVOT): 45.8 ML/M2
BH CV ECHO MEAS - SVI(MOD-SP2): 23.8 ML/M2
BH CV ECHO MEAS - SVI(MOD-SP4): 41.6 ML/M2
BH CV ECHO MEAS - TAPSE (>1.6): 1.73 CM
BH CV ECHO MEAS - TR MAX PG: 32.9 MMHG
BH CV ECHO MEAS - TR MAX VEL: 286.7 CM/SEC
BH CV ECHO MEASUREMENTS AVERAGE E/E' RATIO: 11.02
BH CV XLRA - RV BASE: 3.6 CM
BH CV XLRA - RV LENGTH: 9.3 CM
BH CV XLRA - RV MID: 3.1 CM
BH CV XLRA - TDI S': 8.6 CM/SEC
LEFT ATRIUM VOLUME INDEX: 42.1 ML/M2
LV EF BIPLANE MOD: 31.4 %
SINUS: 3.7 CM
STJ: 3 CM

## 2025-06-03 PROCEDURE — 93356 MYOCRD STRAIN IMG SPCKL TRCK: CPT

## 2025-06-03 PROCEDURE — 93306 TTE W/DOPPLER COMPLETE: CPT

## 2025-06-03 PROCEDURE — 25510000001 PERFLUTREN 6.52 MG/ML SUSPENSION 2 ML VIAL: Performed by: STUDENT IN AN ORGANIZED HEALTH CARE EDUCATION/TRAINING PROGRAM

## 2025-06-03 RX ORDER — CARVEDILOL 25 MG/1
25 TABLET ORAL 2 TIMES DAILY
Qty: 180 TABLET | Refills: 3 | Status: SHIPPED | OUTPATIENT
Start: 2025-06-03

## 2025-06-03 RX ADMIN — PERFLUTREN 3 ML: 6.52 INJECTION, SUSPENSION INTRAVENOUS at 11:00

## 2025-06-03 NOTE — PROGRESS NOTES
Subjective:     Encounter Date:06/03/2025      Patient ID: Marcello Larson is a 85 y.o. male.    Chief Complaint:  Management of HFrEF, CAD    HPI:   85 y.o. male with CAD status post CABG, HFrEF secondary to ischemic cardiomyopathy (EF 30 to 35%) status post ICD, permanent atrial fibrillation on Eliquis, hypertension, hyperlipidemia who presents for follow-up and management of his chronic conditions.  Patient was last seen in March for follow-up after PCI to his SVG.  At the time we increased his carvedilol and ordered an echo.  He had the echocardiogram 35 to 40% (most recently was 15 to 20%.) over the last couple months he has been feeling great.  He denies any dyspnea, lower extremity edema.      Per prior note:   Patient was last seen in January for follow-up.  He had been admitted to an outside hospital with volume overload secondary to acute on chronic heart failure exacerbation.  Echocardiogram at that time revealed worsening EF to 15 to 20%.  As a result, we pursued coronary angiography on 2/6/25 and this revealed a 99% stenosis of his SVG to RCA for which she received IVUS guided PCI to 99% proximal SVG to RCA stenosis with intravascular lithotripsy and a 4.0 x 32 mm Megatron stent (postdilated throughout with a 5.5 mm NC to 20 nader.) He also underwent successful PCI to 90% ostial RPL stenosis with a 2.75 x 23 mm Xience Skypoint drug-eluting stent (postdilated proximally with a 4.0 mm NC and throughout with a 3.0 mm NC to high-pressure.) His LIMA to LAD was patent.  He presents today for follow-up and has been feeling great.  He states that he has much more energy than prior to his PCI.    Echo 2/22/2022 with an EF 30 to 35%, grade 3 diastolic dysfunction, moderately dilated LA, mildly dilated RA.    Echocardiogram late Nov 2024 performed at that time revealed severe hypokinesis of the LV with EF of 15 to 20% as well as moderate MR.    The following portions of the patient's history were reviewed and  updated as appropriate: allergies, current medications, past family history, past medical history, past social history, past surgical history and problem list.     REVIEW OF SYSTEMS:   All systems reviewed.  Pertinent positives identified in HPI.  All other systems are negative.    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  LV systolic function is moderate to severely reduced.  EF 30-35%.  The transmittal spectral doppler flow pattern is suggestive of pseudonormalization.  There is moderate to severe global hypokinesis of the LV.  The left atrium is mildly dilated.  The mitral leaflets appear thickened, but open well.  Mild MR and AR.    Hx of CABG 08/13/2019 01/01/1991 - left internal mammary graft to the LAD, SVG to OM1, OM2 in the RCA    Hyperlipidemia 08/13/2019    Hypo-osmolality and hyponatremia     Low back pain     Myocardial infarct     Other long term (current) drug therapy     Palpitations 08/13/2019    Personal history of malignant neoplasm of bladder     Sleep apnea 08/13/2019    Stented coronary artery 08/13/2019 04/11/2014 - VISION bare metal stent to the proximal stenosis of the vein graft to the right and ISION bare metal stent in the mid  to distal 80% stenosis.    Type 2 diabetes mellitus without complication     Unstable angina        Family History   Problem Relation Age of Onset    No Known Problems Mother     No Known Problems Father        Social History     Socioeconomic History    Marital status:     Number of children: 2   Tobacco Use    Smoking status: Former     Current packs/day: 0.00     Average packs/day: 1 pack/day for 24.0 years (24.0 ttl pk-yrs)     Types: Cigarettes     Start date: 1960     Quit date: 1984     Years since quittin.4     Passive exposure: Past    Smokeless tobacco: Never    Tobacco comments:     Have not smoked since    Vaping Use    Vaping status: Never Used   Substance and Sexual Activity    Alcohol use: Never    Drug use: Never    Sexual activity: Not Currently     Partners: Female       Allergies   Allergen Reactions    Iodinated Contrast Media Anaphylaxis    Lisinopril Other (See Comments)    Prednisone Cough       Past Surgical History:   Procedure Laterality Date    AMPUTATION Left     LOWER EXTREMITY BKA    APPENDECTOMY      CARDIAC CATHETERIZATION      CARDIAC CATHETERIZATION N/A 10/13/2020    Procedure: Left Heart Cath;  Surgeon: Kofi Campoverde MD;  Location: [facility] CATH INVASIVE LOCATION;  Service: Cardiology;  Laterality: N/A;    CARDIAC CATHETERIZATION N/A 10/13/2020    Procedure: Coronary angiography;  Surgeon: Kofi Campoverde MD;  Location: [facility] CATH INVASIVE LOC[facility];  Service: Cardiology;  Laterality: N/A;    CARDIAC CATHETERIZATION N/A 10/13/2020    Procedure: Saphenous Vein Graft;  Surgeon: Kofi Campoverde MD;  [facility];  Service: Cardiology;  Laterality: N/A;    CARDIAC CATHETERIZATION N/A 2025    Procedure: Left Heart Cath;  Surgeon: Andrew Castañeda MD;  Location: [facility] CATH INVASIVE LOCATION;  Service: Cardiovascular;  Laterality: N/A;    CARDIAC CATHETERIZATION N/A 2025    Procedure: Stent MINE bypass graft;  Surgeon: Andrew Castañeda  MD;  Location:  [facility] CATH INVASIVE LOCATION;  Service: Cardiovascular;  Laterality: N/A;    CARDIAC CATHETERIZATION N/A 2/6/2025    Procedure: Coronary angiography;  Surgeon: Andrew Castañeda MD;  Location:  [facility] CATH INVASIVE LOCATION;  Service: Cardiovascular;  Laterality: N/A;    CARDIAC CATHETERIZATION  2/6/2025    Procedure: Saphenous Vein Graft;  Surgeon: Andrew Castañeda MD;  Location:  [facility] CATH INVASIVE LOCATION;  Service: Cardiovascular;;    CARDIAC CATHETERIZATION N/A 2/6/2025    Procedure: Native mammary injection;  Surgeon: Andrew Castañeda MD;  Location:  [facility] CATH INVASIVE LOCATION;  Service: Cardiovascular;  Laterality: N/A;    CARDIAC DEFIBRILLATOR PLACEMENT      CARDIAC ELECTROPHYSIOLOGY PROCEDURE N/A 04/04/2022    Procedure: ICD DC new/SJM;  Surgeon: Mamadou Terry MD;  Location:  [facility] CATH INVASIVE LOCATION;  Service: Cardiology;  Laterality: N/A;    CARDIAC VALVE REPLACEMENT      CAROTID STENT      CATARACT EXTRACTION      CHOLECYSTECTOMY      CORONARY ARTERY BYPASS GRAFT      1984, 1991 4 VESSEL    CORONARY STENT PLACEMENT      INSERT / REPLACE / REMOVE PACEMAKER      INTERNAL CARDIAC DEFIBRILLATOR INSERTION  04/2022    INTRAVASCULAR ULTRASOUND N/A 2/6/2025    Procedure: Intravascular Ultrasound;  Surgeon: Andrew Castañeda MD;  Location:  [facility] CATH INVASIVE LOCATION;  Service: Cardiovascular;  Laterality: N/A;    OTHER SURGICAL HISTORY      UROSTOMY S/P BLADDER CA         ECG 12 Lead    Date/Time: 6/3/2025 12:06 PM  Performed by: Andrew Castañeda MD    Authorized by: Andrew Castañeda MD  Comparison: compared with previous ECG from 2/13/2024  Similar to previous ECG  Rhythm: atrial fibrillation and paced  Rate: bradycardic    Clinical impression: abnormal EKG             Objective:         Vitals:    06/03/25 1141   BP: 110/56   Pulse: 51   SpO2: 96%           PHYSICAL EXAM:  GEN: well appearing, in NAD   HEENT: NCAT, EOMI, moist mucus membranes   Respiratory: CTAB, no wheezes, rales or rhonchi  CV: normal rate,  irregularly irregular rhythm, normal S1, S2, no murmurs, rubs, gallops, +2 radial pulses b/l  GI: soft, nontender, nondistended  MSK: Left leg BKA, no edema of right leg  Skin: no rash, warm, dry  Heme/Lymph: no bruising or bleeding  Psych: organized thought, normal behavior and affect  Neuro: Alert and Oriented x 3, grossly normal motor function        Assessment:         (I25.10) Coronary artery disease involving native coronary artery of native heart without angina pectoris    (I50.42) Chronic combined systolic and diastolic congestive heart failure    (Z95.810) ICD (implantable cardioverter-defibrillator), dual, in situ    (I48.21) Permanent atrial fibrillation    (I10) Primary hypertension    85 y.o. male with CAD status post CABG, HFrEF secondary to ischemic cardiomyopathy (EF 30 to 35%) status post ICD, permanent atrial fibrillation on Eliquis, hypertension, hyperlipidemia who presents for follow-up and management of his chronic conditions.       Plan:       #HFrEF  Well compensated. NYHA class I. Echo 2/22/2022 with EF 30 to 35%, grade 3 diastolic dysfunction, moderately dilated LA, mildly dilated RA.  Echocardiogram during hospitalization in December with an EF of 15 to 20%.  Echocardiogram today with improved EF to 35 to 40%.  - continue Coreg 25 mg twice daily, losartan 100 mg  - SGLT2i cost prohibitive  - Continue Lasix 60 mg twice daily      #CAD status post CABG  Coronary angiography on 2/6/25 revealed a 99% stenosis of his SVG to RCA for which he received IVUS guided PCI with intravascular lithotripsy and a 4.0 x 32 mm Megatron stent (postdilated throughout with a 5.5 mm NC to 20 nader.) He also underwent successful PCI to 90% ostial RPL stenosis with a 2.75 x 23 mm Xience Skypoint drug-eluting stent (postdilated proximally with a 4.0 mm NC and throughout with a 3.0 mm NC to high-pressure.)  - continue plavix 75 mg daily, lovastatin 40 mg daily    #Permanent atrial fibrillation  [id number]  5.  -Continue Coreg as above, Eliquis 2.5 mg twice daily    Dr Corley, thank you very much for referring this kind patient to me. Please call me with any questions or concerns. I will see the patient again in the office in 6 months or earlier as needed.         Andrew Castañeda MD  06/03/25  [facility]    Outpatient Encounter Medications as of 6/3/2025   Medication Sig Dispense Refill    albuterol sulfate  (90 Base) MCG/ACT inhaler Inhale 2 puffs As Needed for Wheezing.      apixaban (ELIQUIS) 5 MG tablet tablet Take 0.5 tablets by mouth 2 (Two) Times a Day.      carvedilol (COREG) 25 MG tablet Take 1 tablet by mouth 2 (Two) Times a Day. 180 tablet 3    clopidogrel (PLAVIX) 75 MG tablet Take 1 tablet by mouth Daily. 90 tablet 3    dapagliflozin Propanediol (Farxiga) 10 MG tablet Take 10 mg by mouth Daily.      Diclofenac Sodium (VOLTAREN) 1 % gel gel APPLY 4 grams TO THE AFFECTED AREA TWICE DAILY 100 g 3    ferrous sulfate 325 (65 FE) MG tablet Take 1 tablet by mouth 2 (Two) Times a Day.      fexofenadine (ALLEGRA) 180 MG tablet Take 1 tablet by mouth Daily.      fluticasone (FLONASE) 50 MCG/ACT nasal spray USE 2 SPRAYS NASALLY EVERY DAY AS DIRECTED BY PROVIDER 48 g 3    furosemide (LASIX) 40 MG tablet TAKE 1&1/2 TABLETS BY MOUTH TWICE DAILY. 270 tablet 0    guaiFENesin (MUCINEX) 600 MG 12 hr tablet Take 2 tablets by mouth.      HYDROcodone-acetaminophen (NORCO) 5-325 MG per tablet Take 1 tablet by mouth 2 (Two) Times a Day As Needed. for pain      levothyroxine (SYNTHROID, LEVOTHROID) 25 MCG tablet TAKE 1 TABLET BY MOUTH ONCE DAILY 30 tablet 5    losartan (COZAAR) 100 MG tablet Take 1 tablet by mouth Daily. 90 tablet 3    lovastatin (MEVACOR) 40 MG tablet Take 1 tablet by mouth Every Evening. 90 tablet 1    montelukast (SINGULAIR) 10 MG tablet Take 1 tablet by mouth Every Night. 90 tablet 1    mupirocin (BACTROBAN) 2 % ointment APPLY A thin layer TO THE nostril THREE TIMES DAILY FOR 7 DAYS 22 g 0     nitroglycerin (NITROSTAT) 0.4 MG SL tablet DISSOLVE 1 TABLET UNDER THE TONGUE EVERY 5 MINUTES AS NEEDED FOR CHEST PAIN. DO NOT EXCEED A TOTAL OF 3 DOSES IN 15 MINUTES. IF NO RELIEF, CALL 911 (Patient taking differently: Place 1 tablet under the tongue Every 5 (Five) Minutes As Needed for Chest Pain.) 25 tablet 0    pantoprazole (PROTONIX) 40 MG EC tablet Take 1 tablet by mouth Daily. 90 tablet 1    potassium chloride 10 MEQ CR tablet Take 1 tablet by mouth 2 (Two) Times a Day. 60 tablet 5    silver sulfadiazine (SILVADENE, SSD) 1 % cream APPLY TO THE AFFECTED AREA(S) TWICE DAILY 50 g 11    SITagliptin (Januvia) 25 MG tablet Take 1 tablet by mouth Daily. 90 tablet 1    sucralfate (CARAFATE) 1 g tablet TAKE 1 TABLET BY MOUTH FOUR TIMES DAILY 120 tablet 5    vitamin B-6 (PYRIDOXINE) 100 MG tablet Take 1 tablet by mouth Daily.      vitamin C (ASCORBIC ACID) 500 MG tablet Take 1 tablet by mouth Daily.      [DISCONTINUED] carvedilol (COREG) 25 MG tablet Take 1 tablet by mouth 2 (Two) Times a Day. (Patient taking differently: Take 0.5 tablets by mouth 2 (Two) Times a Day. 6.25 BID) 180 tablet 3    [DISCONTINUED] SSD 1 % cream APPLY TO THE AFFECTED AREA(S) TWICE DAILY 50 g 0     Facility-Administered Encounter Medications as of 6/3/2025   Medication Dose Route Frequency Provider Last Rate Last Admin    [COMPLETED] perflutren (DEFINITY) 8.476 mg in Sodium Chloride (PF) 0.9 % 10 mL injection  3 mL Intravenous Once in imaging Andrew Castañeda MD   3 mL at 06/03/25 1100

## 2025-06-06 ENCOUNTER — OFFICE VISIT (OUTPATIENT)
Dept: FAMILY MEDICINE CLINIC | Age: 85
End: 2025-06-06
Payer: MEDICARE

## 2025-06-06 VITALS
TEMPERATURE: 97.9 F | DIASTOLIC BLOOD PRESSURE: 65 MMHG | HEART RATE: 51 BPM | HEIGHT: 67 IN | SYSTOLIC BLOOD PRESSURE: 135 MMHG | OXYGEN SATURATION: 97 % | WEIGHT: 131 LBS | BODY MASS INDEX: 20.56 KG/M2

## 2025-06-06 DIAGNOSIS — N18.4 STAGE 4 CHRONIC KIDNEY DISEASE: ICD-10-CM

## 2025-06-06 DIAGNOSIS — I25.10 CORONARY ARTERY DISEASE INVOLVING NATIVE CORONARY ARTERY OF NATIVE HEART WITHOUT ANGINA PECTORIS: ICD-10-CM

## 2025-06-06 DIAGNOSIS — D69.6 PLATELETS DECREASED: ICD-10-CM

## 2025-06-06 DIAGNOSIS — I10 PRIMARY HYPERTENSION: ICD-10-CM

## 2025-06-06 DIAGNOSIS — Z85.51 HISTORY OF BLADDER CANCER: ICD-10-CM

## 2025-06-06 DIAGNOSIS — Z93.6 OTHER ARTIFICIAL OPENINGS OF URINARY TRACT STATUS: ICD-10-CM

## 2025-06-06 DIAGNOSIS — E78.2 MIXED HYPERLIPIDEMIA: ICD-10-CM

## 2025-06-06 DIAGNOSIS — I50.42 CHRONIC COMBINED SYSTOLIC AND DIASTOLIC CONGESTIVE HEART FAILURE: ICD-10-CM

## 2025-06-06 DIAGNOSIS — Z23 ENCOUNTER FOR IMMUNIZATION: ICD-10-CM

## 2025-06-06 DIAGNOSIS — E11.51 TYPE 2 DIABETES MELLITUS WITH DIABETIC PERIPHERAL ANGIOPATHY WITHOUT GANGRENE, WITHOUT LONG-TERM CURRENT USE OF INSULIN: Primary | ICD-10-CM

## 2025-06-06 RX ORDER — PROCHLORPERAZINE MALEATE 10 MG
10 TABLET ORAL EVERY 6 HOURS PRN
COMMUNITY

## 2025-06-06 NOTE — ASSESSMENT & PLAN NOTE
He is on Januvia for diabetes.  Last A1c 5.6 in May.  He does adhere to a diabetic diet.  He is UTD on eye exam (last done 9/2024).  He is due for foot exam; exam today normal.  He is on an ARB and statin.  He has previously been on metformin (CKD) and Jardiance/Invokana/Farxiga (cost).

## 2025-06-06 NOTE — ASSESSMENT & PLAN NOTE
Follows with Dr. Oliveira Cardiology. He is on Eliquis for the combination of CAD, atrial fibrillation and systolic heart failure. Status post ICD placement. He is on statin, beta-blocker and ARB.     Orders:    Lipid Panel; Future

## 2025-06-06 NOTE — PROGRESS NOTES
Chief Complaint  Diabetes    Subjective          Marcello Larson presents to [facility] today for follow-up of routine issues.     He did see Tamraa Garcia back in April for epistaxis with referral to ENT.  He is on Eliquis at baseline for his A-fib.  He was seen by ENT and they switched him to a water-based nasal spray.  No epistaxis \since.    He is on Januvia for diabetes.  Last A1c 5.6 in May.  He does adhere to a diabetic diet.  He is UTD on eye exam (last done 9/2024).  He is due for foot exam.  He is on an ARB and statin.  He has previously been on metformin (CKD) and Jardiance/Invokana/Farxiga (cost).      He is on carvedilol and losartan for HTN, atrial fib and CAD, s/p 2v. CABG.  He has previously been on Ranexa.  He is on Eliquis for anticoagulation through patient assistance.  He is on lovastatin for HLD.  He has NTG for as needed use.  He follows with Dr. Castañeda Cardiology.  He is on Lasix for systolic CHF.  S/p ICD.  Just saw Dr. Oliveira on 6/3/2025.  No changes to guidance.    He follows with [facility] for iron deficiency anemia and anemia of chronic kidney disease.     He follows with Dr. Espinoza for CKD stage 4.    He is on levothyroxine for hypothyroidism.        He is on pantoprazole for GERD and h/o bleeding ulcer (NSAID-induced.)  He has prn sucralfate.  He is on iron supplement.     He is on Norco for chronic low back and leg pain.  He follows with [facility] for medication management and epidurals.  He has previously used gabapentin.  S/p physical therapy and RFA.      +L BKA with prosthesis.      He has urostomy s/p bladder CA.  He is having trouble with getting his ostomy supplies so he is going to be switching to [facility] locally.      He is on Flonase and montelukast for chronic allergies.      Current Outpatient Medications:     apixaban (ELIQUIS) 5 MG tablet tablet, Take 0.5 tablets by mouth 2 (Two) Times a Day., Disp: , Rfl:     carvedilol  (COREG) 25 MG tablet, Take 1 tablet by mouth 2 (Two) Times a Day., Disp: 180 tablet, Rfl: 3    clopidogrel (PLAVIX) 75 MG tablet, Take 1 tablet by mouth Daily., Disp: 90 tablet, Rfl: 3    Diclofenac Sodium (VOLTAREN) 1 % gel gel, APPLY 4 grams TO THE AFFECTED AREA TWICE DAILY, Disp: 100 g, Rfl: 3    ferrous sulfate 325 (65 FE) MG tablet, Take 1 tablet by mouth 2 (Two) Times a Day., Disp: , Rfl:     fexofenadine (ALLEGRA) 180 MG tablet, Take 1 tablet by mouth Daily., Disp: , Rfl:     furosemide (LASIX) 40 MG tablet, TAKE 1&1/2 TABLETS BY MOUTH TWICE DAILY., Disp: 270 tablet, Rfl: 0    guaiFENesin (MUCINEX) 600 MG 12 hr tablet, Take 2 tablets by mouth., Disp: , Rfl:     HYDROcodone-acetaminophen (NORCO) 5-325 MG per tablet, Take 1 tablet by mouth 2 (Two) Times a Day As Needed. for pain, Disp: , Rfl:     levothyroxine (SYNTHROID, LEVOTHROID) 25 MCG tablet, TAKE 1 TABLET BY MOUTH ONCE DAILY, Disp: 30 tablet, Rfl: 5    losartan (COZAAR) 100 MG tablet, Take 1 tablet by mouth Daily., Disp: 90 tablet, Rfl: 3    lovastatin (MEVACOR) 40 MG tablet, Take 1 tablet by mouth Every Evening., Disp: 90 tablet, Rfl: 1    montelukast (SINGULAIR) 10 MG tablet, Take 1 tablet by mouth Every Night., Disp: 90 tablet, Rfl: 1    mupirocin (BACTROBAN) 2 % ointment, APPLY A thin layer TO THE nostril THREE TIMES DAILY FOR 7 DAYS, Disp: 22 g, Rfl: 0    nitroglycerin (NITROSTAT) 0.4 MG SL tablet, DISSOLVE 1 TABLET UNDER THE TONGUE EVERY 5 MINUTES AS NEEDED FOR CHEST PAIN. DO NOT EXCEED A TOTAL OF 3 DOSES IN 15 MINUTES. IF NO RELIEF, CALL 911 (Patient taking differently: Place 1 tablet under the tongue Every 5 (Five) Minutes As Needed for Chest Pain.), Disp: 25 tablet, Rfl: 0    pantoprazole (PROTONIX) 40 MG EC tablet, Take 1 tablet by mouth Daily., Disp: 90 tablet, Rfl: 1    potassium chloride 10 MEQ CR tablet, Take 1 tablet by mouth 2 (Two) Times a Day., Disp: 60 tablet, Rfl: 5    prochlorperazine (COMPAZINE) 10 MG tablet, Take 1 tablet by mouth  "Every 6 (Six) Hours As Needed for Nausea or Vomiting., Disp: , Rfl:     silver sulfadiazine (SILVADENE, SSD) 1 % cream, APPLY TO THE AFFECTED AREA(S) TWICE DAILY, Disp: 50 g, Rfl: 11    SITagliptin (Januvia) 25 MG tablet, Take 1 tablet by mouth Daily., Disp: 90 tablet, Rfl: 1    sucralfate (CARAFATE) 1 g tablet, TAKE 1 TABLET BY MOUTH FOUR TIMES DAILY, Disp: 120 tablet, Rfl: 5    vitamin B-6 (PYRIDOXINE) 100 MG tablet, Take 1 tablet by mouth Daily., Disp: , Rfl:     vitamin C (ASCORBIC ACID) 500 MG tablet, Take 1 tablet by mouth Daily., Disp: , Rfl:   Medications Discontinued During This Encounter   Medication Reason    dapagliflozin Propanediol (Farxiga) 10 MG tablet Historical Med - Therapy completed    fluticasone (FLONASE) 50 MCG/ACT nasal spray Historical Med - Therapy completed    albuterol sulfate  (90 Base) MCG/ACT inhaler Historical Med - Therapy completed           Allergies:  Iodinated contrast media, Lisinopril, and Prednisone      Objective   Vital Signs:   Vitals:    06/06/25 1054   BP: 135/65   BP Location: Left arm   Patient Position: Sitting   Cuff Size: Adult   Pulse: 51   Temp: 97.9 °F (36.6 °C)   TempSrc: Temporal   SpO2: 97%   Weight: 59.4 kg (131 lb)   Height: 170.2 cm (67.01\")     Body mass index is 20.51 kg/m².  BMI is within normal parameters. No other follow-up for BMI required.        Physical Exam  Vitals reviewed.   Constitutional:       General: He is not in acute distress.     Appearance: Normal appearance. He is well-developed.   HENT:      Head: Normocephalic and atraumatic.      Right Ear: External ear normal.      Left Ear: External ear normal.   Eyes:      Extraocular Movements: Extraocular movements intact.      Conjunctiva/sclera: Conjunctivae normal.      Pupils: Pupils are equal, round, and reactive to light.   Cardiovascular:      Rate and Rhythm: Normal rate. Rhythm irregular.      Pulses:           Dorsalis pedis pulses are 2+ on the right side.      Heart sounds: " Murmur (3/6 systolic) heard.   Pulmonary:      Effort: Pulmonary effort is normal.      Breath sounds: Normal breath sounds. No wheezing, rhonchi or rales.   Abdominal:      General: Bowel sounds are normal. There is no distension.      Palpations: Abdomen is soft.      Tenderness: There is no abdominal tenderness.   Musculoskeletal:         General: Normal range of motion.      Right foot: Bunion present.      Comments: LLE below knee absent      Left Lower Extremity: Left leg is amputated below knee.   Feet:      Right foot:      Protective Sensation: 3 sites tested.  3 sites sensed.      Skin integrity: Skin integrity normal. No ulcer or blister.      Toenail Condition: Right toenails are normal.      Left foot:      Toenail Condition: Left toenails are normal.      Comments: Diabetic Foot Exam Performed and Monofilament Test Performed     Neurological:      Mental Status: He is alert.   Psychiatric:         Mood and Affect: Affect normal.           Lab Results   Component Value Date    GLUCOSE 139 (H) 05/29/2025    BUN 53.0 (H) 05/29/2025    CREATININE 2.02 (H) 05/29/2025    EGFRIFNONA 90 12/01/2021    EGFRIFAFRI 96 03/20/2018    BCR 26.2 (H) 05/29/2025    K 4.4 05/29/2025    CO2 20.0 (L) 05/29/2025    CALCIUM 9.1 05/29/2025    ALBUMIN 4.2 05/29/2025    AST 11 05/29/2025    ALT 7 05/29/2025       Lab Results   Component Value Date    CHOL 102 04/30/2024    CHLPL 129 02/20/2021    TRIG 77 04/30/2024    HDL 31 (L) 04/30/2024    LDL 55 04/30/2024       Lab Results   Component Value Date    WBC 6.83 05/29/2025    HGB 10.8 (L) 05/29/2025    HCT 35.1 (L) 05/29/2025    MCV 94.9 05/29/2025     (L) 05/29/2025     Lab Results   Component Value Date    HGBA1C 5.60 05/29/2025             Assessment and Plan    Assessment & Plan  Type 2 diabetes mellitus with diabetic peripheral angiopathy without gangrene, without long-term current use of insulin    He is on Januvia for diabetes.  Last A1c 5.6 in May.  He does adhere  to a diabetic diet.  He is UTD on eye exam (last done 9/2024).  He is due for foot exam; exam today normal.  He is on an ARB and statin.  He has previously been on metformin (CKD) and Jardiance/Invokana/Farxiga (cost).         Primary hypertension  Checking labs.  Orders:    Lipid Panel; Future    Mixed hyperlipidemia   Checking labs.  Orders:    Lipid Panel; Future    Coronary artery disease involving native coronary artery of native heart without angina pectoris  Follows with Dr. Oliveira Cardiology. He is on Eliquis for the combination of CAD, atrial fibrillation and systolic heart failure. Status post ICD placement. He is on statin, beta-blocker and ARB.     Orders:    Lipid Panel; Future    Chronic combined systolic and diastolic congestive heart failure    Follows with Dr. Oliveira Cards.  Stable on ARB and beta-blocker.  S/p ICD placement.       Other artificial openings of urinary tract status  He is having some trouble getting ostomy supplies from his current supplier so he is planning to switch over to [facility] here locally.  He will take his list and packing of supplies down there and work with them to create the order, which I will then be happy to sign off on.       History of bladder cancer  As above.       Stage 4 chronic kidney disease    He follows with Dr. Espinoza for CKD stage 4.       Platelets decreased  Stable.  Continue to monitor.  Seeing Hematology.       Encounter for immunization    Orders:    COVID-19 (Pfizer) 12yrs+ (COMIRNATY)              Follow Up   Return in about 3 months (around 9/6/2025) for Recheck.  Patient was given instructions and counseling regarding his condition or for health maintenance advice. Please see specific information pulled into the AVS if appropriate.           03/26/2025

## 2025-06-06 NOTE — ASSESSMENT & PLAN NOTE
He is having some trouble getting ostomy supplies from his current supplier so he is planning to switch over to [facility] here locally.  He will take his list and packing of supplies down there and work with them to create the order, which I will then be happy to sign off on.

## 2025-06-09 ENCOUNTER — LAB (OUTPATIENT)
Dept: LAB | Facility: HOSPITAL | Age: 85
End: 2025-06-09
Payer: MEDICARE

## 2025-06-09 DIAGNOSIS — I10 PRIMARY HYPERTENSION: ICD-10-CM

## 2025-06-09 DIAGNOSIS — E78.2 MIXED HYPERLIPIDEMIA: ICD-10-CM

## 2025-06-09 DIAGNOSIS — I25.10 CORONARY ARTERY DISEASE INVOLVING NATIVE CORONARY ARTERY OF NATIVE HEART WITHOUT ANGINA PECTORIS: ICD-10-CM

## 2025-06-09 LAB
CHOLEST SERPL-MCNC: 86 MG/DL (ref 0–200)
HDLC SERPL-MCNC: 36 MG/DL (ref 40–60)
LDLC SERPL CALC-MCNC: 43 MG/DL (ref 0–100)
LDLC/HDLC SERPL: 1.32 {RATIO}
TRIGL SERPL-MCNC: 12 MG/DL (ref 0–150)
VLDLC SERPL-MCNC: 7 MG/DL (ref 5–40)

## 2025-06-09 PROCEDURE — 80061 LIPID PANEL: CPT

## 2025-06-09 PROCEDURE — 36415 COLL VENOUS BLD VENIPUNCTURE: CPT

## 2025-06-13 DIAGNOSIS — E03.9 ACQUIRED HYPOTHYROIDISM: ICD-10-CM

## 2025-06-13 DIAGNOSIS — I25.118 CORONARY ARTERY DISEASE OF NATIVE ARTERY OF NATIVE HEART WITH STABLE ANGINA PECTORIS: ICD-10-CM

## 2025-06-13 DIAGNOSIS — L89.152 PRESSURE ULCER OF SACRAL REGION, STAGE 2: ICD-10-CM

## 2025-06-13 RX ORDER — POTASSIUM CHLORIDE 750 MG/1
10 TABLET, EXTENDED RELEASE ORAL 2 TIMES DAILY
Qty: 180 TABLET | Refills: 1 | Status: SHIPPED | OUTPATIENT
Start: 2025-06-13

## 2025-06-13 RX ORDER — NITROGLYCERIN 0.4 MG/1
0.4 TABLET SUBLINGUAL
Qty: 25 TABLET | Refills: 0 | Status: SHIPPED | OUTPATIENT
Start: 2025-06-13

## 2025-06-13 RX ORDER — LEVOTHYROXINE SODIUM 25 UG/1
25 TABLET ORAL DAILY
Qty: 90 TABLET | Refills: 1 | Status: SHIPPED | OUTPATIENT
Start: 2025-06-13

## 2025-06-13 RX ORDER — SUCRALFATE 1 G/1
1 TABLET ORAL 4 TIMES DAILY
Qty: 120 TABLET | Refills: 5 | Status: SHIPPED | OUTPATIENT
Start: 2025-06-13

## 2025-06-13 RX ORDER — SILVER SULFADIAZINE 10 MG/G
CREAM TOPICAL 2 TIMES DAILY
Qty: 50 G | Refills: 11 | Status: SHIPPED | OUTPATIENT
Start: 2025-06-13

## 2025-06-13 NOTE — TELEPHONE ENCOUNTER
Caller: [facility] - [place], [place] - [address] - [phone] [facility] [phone] FX    Relationship: Pharmacy    Best call back number: [phone]    Requested Prescriptions:   Requested Prescriptions     Pending Prescriptions Disp Refills    levothyroxine (SYNTHROID, LEVOTHROID) 25 MCG tablet 30 tablet 5     Sig: Take 1 tablet by mouth Daily.    potassium chloride 10 MEQ CR tablet 60 tablet 5     Sig: Take 1 tablet by mouth 2 (Two) Times a Day.    silver sulfadiazine (SILVADENE, SSD) 1 % cream 50 g 11    sucralfate (CARAFATE) 1 g tablet 120 tablet 5     Sig: Take 1 tablet by mouth 4 (Four) Times a Day.    nitroglycerin (NITROSTAT) 0.4 MG SL tablet 25 tablet 0     Sig: Take no more than 3 doses in 15 minutes.        Pharmacy where request should be sent: [facility] - [place], [place] - [address] - [phone] [facility] [phone] FX     Last office visit with prescribing clinician: 6/6/2025   Last telemedicine visit with prescribing clinician: Visit date not found   Next office visit with prescribing clinician: 9/4/2025     Additional details provided by patient: PHARMACY IS NOT SURE IF PATIENT HAS LESS THAN A THREE DAY SUPPLY OF MEDICATION OR NOT. PLEASE SEND NEW PRESCRIPTIONS WITH REFILLS TO PHARMACY ASAP AS THIS IS MAIL ORDER.PATIENT IS OUT OF REFILLS ON THESE SCRIPTS.    Does the patient have less than a 3 day supply:  [] Yes  [] No    Would you like a call back once the refill request has been completed: [] Yes [] No    If the office needs to give you a call back, can they leave a voicemail: [] Yes [] No    Ijeoma Philippe Rep   06/13/25 10:39 EDT

## 2025-07-01 LAB
MC_CV_MDC_IDC_RATE_1: 200
MC_CV_MDC_IDC_SHOCK_MEASURED_IMPEDANCE: 69
MC_CV_MDC_IDC_THERAPIES: NORMAL
MC_CV_MDC_IDC_ZONE_ID: 1
MC_CV_MDC_IDC_ZONE_ID: 2
MC_CV_MDC_IDC_ZONE_ID: 3
MDC_IDC_MSMT_BATTERY_REMAINING_LONGEVITY: 67 MO
MDC_IDC_MSMT_BATTERY_REMAINING_PERCENTAGE: 62 %
MDC_IDC_MSMT_BATTERY_RRT_TRIGGER: 2.62
MDC_IDC_MSMT_BATTERY_STATUS: NORMAL
MDC_IDC_MSMT_BATTERY_VOLTAGE: 2.96
MDC_IDC_MSMT_CAP_CHARGE_TIME: 9
MDC_IDC_MSMT_LEADCHNL_RA_IMPEDANCE_VALUE: 450
MDC_IDC_MSMT_LEADCHNL_RA_PACING_THRESHOLD_POLARITY: NORMAL
MDC_IDC_MSMT_LEADCHNL_RA_SENSING_INTR_AMPL: 4.6
MDC_IDC_MSMT_LEADCHNL_RV_IMPEDANCE_VALUE: 510
MDC_IDC_MSMT_LEADCHNL_RV_PACING_THRESHOLD_POLARITY: NORMAL
MDC_IDC_MSMT_LEADCHNL_RV_SENSING_INTR_AMPL: 12
MDC_IDC_PG_IMPLANT_DTM: NORMAL
MDC_IDC_PG_MFG: NORMAL
MDC_IDC_PG_MODEL: NORMAL
MDC_IDC_PG_SERIAL: NORMAL
MDC_IDC_PG_TYPE: NORMAL
MDC_IDC_SESS_DTM: NORMAL
MDC_IDC_SESS_TYPE: NORMAL
MDC_IDC_SET_BRADY_AT_MODE_SWITCH_RATE: 180
MDC_IDC_SET_BRADY_LOWRATE: 50
MDC_IDC_SET_BRADY_MAX_SENSOR_RATE: 130
MDC_IDC_SET_BRADY_MAX_TRACKING_RATE: 130
MDC_IDC_SET_BRADY_MODE: NORMAL
MDC_IDC_SET_BRADY_PAV_DELAY: 225
MDC_IDC_SET_BRADY_SAV_DELAY: 200
MDC_IDC_SET_LEADCHNL_RA_PACING_AMPLITUDE: 2.5
MDC_IDC_SET_LEADCHNL_RA_PACING_POLARITY: NORMAL
MDC_IDC_SET_LEADCHNL_RA_PACING_PULSEWIDTH: 0.5
MDC_IDC_SET_LEADCHNL_RA_SENSING_POLARITY: NORMAL
MDC_IDC_SET_LEADCHNL_RA_SENSING_SENSITIVITY: 0.3
MDC_IDC_SET_LEADCHNL_RV_PACING_AMPLITUDE: 2
MDC_IDC_SET_LEADCHNL_RV_PACING_POLARITY: NORMAL
MDC_IDC_SET_LEADCHNL_RV_PACING_PULSEWIDTH: 0.5
MDC_IDC_SET_LEADCHNL_RV_SENSING_POLARITY: NORMAL
MDC_IDC_SET_LEADCHNL_RV_SENSING_SENSITIVITY: 0.5
MDC_IDC_SET_ZONE_STATUS: NORMAL
MDC_IDC_SET_ZONE_TYPE: NORMAL
MDC_IDC_STAT_AT_BURDEN_PERCENT: 99
MDC_IDC_STAT_BRADY_RA_PERCENT_PACED: 1
MDC_IDC_STAT_BRADY_RV_PERCENT_PACED: 57
MDC_IDC_STAT_TACHYTHERAPY_ATP_DELIVERED_RECENT: 0
MDC_IDC_STAT_TACHYTHERAPY_SHOCKS_ABORTED_RECENT: 0
MDC_IDC_STAT_TACHYTHERAPY_SHOCKS_DELIVERED_RECENT: 0

## 2025-07-02 ENCOUNTER — PATIENT MESSAGE (OUTPATIENT)
Age: 85
End: 2025-07-02
Payer: MEDICARE

## 2025-07-03 ENCOUNTER — OFFICE VISIT (OUTPATIENT)
Dept: FAMILY MEDICINE CLINIC | Age: 85
End: 2025-07-03
Payer: MEDICARE

## 2025-07-03 ENCOUNTER — TELEPHONE (OUTPATIENT)
Dept: FAMILY MEDICINE CLINIC | Age: 85
End: 2025-07-03
Payer: MEDICARE

## 2025-07-03 VITALS
DIASTOLIC BLOOD PRESSURE: 54 MMHG | TEMPERATURE: 98.8 F | SYSTOLIC BLOOD PRESSURE: 99 MMHG | HEART RATE: 71 BPM | OXYGEN SATURATION: 94 % | BODY MASS INDEX: 19.93 KG/M2 | WEIGHT: 127 LBS | HEIGHT: 67 IN

## 2025-07-03 DIAGNOSIS — J06.9 ACUTE URI: Primary | ICD-10-CM

## 2025-07-03 PROCEDURE — 3078F DIAST BP <80 MM HG: CPT | Performed by: NURSE PRACTITIONER

## 2025-07-03 PROCEDURE — 99213 OFFICE O/P EST LOW 20 MIN: CPT | Performed by: NURSE PRACTITIONER

## 2025-07-03 PROCEDURE — 1126F AMNT PAIN NOTED NONE PRSNT: CPT | Performed by: NURSE PRACTITIONER

## 2025-07-03 PROCEDURE — 3074F SYST BP LT 130 MM HG: CPT | Performed by: NURSE PRACTITIONER

## 2025-07-03 RX ORDER — DOXYCYCLINE HYCLATE 100 MG
100 TABLET ORAL 2 TIMES DAILY
Qty: 14 TABLET | Refills: 0 | Status: SHIPPED | OUTPATIENT
Start: 2025-07-03 | End: 2025-07-10

## 2025-07-03 NOTE — PROGRESS NOTES
"Chief Complaint  Nasal Congestion (X 2 week) and Facial Pain    Subjective          Marcello Larson presents to [facility] for an acute visit complaining of sinus pressure and congestion  for the last 2 weeks.  Denies fever, chills, cough, sore throat, ear pain, abdominal pain, flank pain, nausea, vomiting, diarrhea, loss of taste or smell, shortness of air or chest pain. No known ill contacts.     Objective   Vital Signs:   Vitals:    07/03/25 1047   BP: 99/54   Pulse: 71   Temp: 98.8 °F (37.1 °C)   TempSrc: Oral   SpO2: 94%   Weight: 57.6 kg (127 lb)   Height: 170.2 cm (67.01\")       Body mass index is 19.89 kg/m².  BMI is within normal parameters. No other follow-up for BMI required.       Physical Exam  Constitutional:       Appearance: He is ill-appearing.   HENT:      Right Ear: Tympanic membrane, ear canal and external ear normal.      Left Ear: Tympanic membrane, ear canal and external ear normal.      Nose: Congestion and rhinorrhea present.      Mouth/Throat:      Mouth: Mucous membranes are moist.      Pharynx: Posterior oropharyngeal erythema present. No oropharyngeal exudate.   Eyes:      Conjunctiva/sclera: Conjunctivae normal.      Pupils: Pupils are equal, round, and reactive to light.   Cardiovascular:      Rate and Rhythm: Normal rate and regular rhythm.      Heart sounds: Normal heart sounds. No murmur heard.  Pulmonary:      Effort: Pulmonary effort is normal. No respiratory distress.      Breath sounds: Normal breath sounds.   Lymphadenopathy:      Cervical: No cervical adenopathy.   Skin:     General: Skin is warm and dry.   Neurological:      Mental Status: He is alert and oriented to person, place, and time.   Psychiatric:         Mood and Affect: Mood normal.         Behavior: Behavior normal.         Thought Content: Thought content normal.         Judgment: Judgment normal.                        Assessment and Plan    Assessment & Plan  Acute " URI  Increase fluid intake and rest.  Tylenol/ibuprofen for discomfort/fever.  Complete prescribed antibiotic. Zyrtec and Flonase daily.  Cough medication and albuterol as needed. Potential side effects of medication discussed.  Notify office with any acute concerns or issues.  Follow-up as needed.  Patient to go to ED with any chest pain or shortness of air.  Patient verbalizes understanding and has no further questions upon discharge    Please note that portions of this note were completed with a voice recognition program.                  Patient to notify office with any acute concerns or issues.  Patient verbalizes understanding, agrees with plan of care and has no further questions upon discharge.    Please note that portions of this note were completed with a voice recognition program.      Follow Up    Return if symptoms worsen or fail to improve, for Recheck.  Patient was given instructions and counseling regarding his condition or for health maintenance advice. Please see specific information pulled into the AVS if appropriate.       Current Outpatient Medications:     apixaban (ELIQUIS) 5 MG tablet tablet, Take 0.5 tablets by mouth 2 (Two) Times a Day., Disp: , Rfl:     carvedilol (COREG) 25 MG tablet, Take 1 tablet by mouth 2 (Two) Times a Day., Disp: 180 tablet, Rfl: 3    clopidogrel (PLAVIX) 75 MG tablet, Take 1 tablet by mouth Daily., Disp: 90 tablet, Rfl: 3    Diclofenac Sodium (VOLTAREN) 1 % gel gel, APPLY 4 grams TO THE AFFECTED AREA TWICE DAILY, Disp: 100 g, Rfl: 3    ferrous sulfate 325 (65 FE) MG tablet, Take 1 tablet by mouth 2 (Two) Times a Day., Disp: , Rfl:     fexofenadine (ALLEGRA) 180 MG tablet, Take 1 tablet by mouth Daily., Disp: , Rfl:     furosemide (LASIX) 40 MG tablet, TAKE 1&1/2 TABLETS BY MOUTH TWICE DAILY., Disp: 270 tablet, Rfl: 0    guaiFENesin (MUCINEX) 600 MG 12 hr tablet, Take 2 tablets by mouth., Disp: , Rfl:     HYDROcodone-acetaminophen (NORCO) 5-325 MG per tablet, Take 1  tablet by mouth 2 (Two) Times a Day As Needed. for pain, Disp: , Rfl:     levothyroxine (SYNTHROID, LEVOTHROID) 25 MCG tablet, Take 1 tablet by mouth Daily., Disp: 90 tablet, Rfl: 1    losartan (COZAAR) 100 MG tablet, Take 1 tablet by mouth Daily., Disp: 90 tablet, Rfl: 3    lovastatin (MEVACOR) 40 MG tablet, Take 1 tablet by mouth Every Evening., Disp: 90 tablet, Rfl: 1    montelukast (SINGULAIR) 10 MG tablet, Take 1 tablet by mouth Every Night., Disp: 90 tablet, Rfl: 1    mupirocin (BACTROBAN) 2 % ointment, APPLY A thin layer TO THE nostril THREE TIMES DAILY FOR 7 DAYS, Disp: 22 g, Rfl: 0    nitroglycerin (NITROSTAT) 0.4 MG SL tablet, Place 1 tablet under the tongue Every 5 (Five) Minutes As Needed for Chest Pain. Take no more than 3 doses in 15 minutes., Disp: 25 tablet, Rfl: 0    pantoprazole (PROTONIX) 40 MG EC tablet, Take 1 tablet by mouth Daily., Disp: 90 tablet, Rfl: 1    potassium chloride 10 MEQ CR tablet, Take 1 tablet by mouth 2 (Two) Times a Day., Disp: 180 tablet, Rfl: 1    prochlorperazine (COMPAZINE) 10 MG tablet, Take 1 tablet by mouth Every 6 (Six) Hours As Needed for Nausea or Vomiting., Disp: , Rfl:     silver sulfadiazine (SILVADENE, SSD) 1 % cream, Apply  topically to the appropriate area as directed 2 (Two) Times a Day., Disp: 50 g, Rfl: 11    sucralfate (CARAFATE) 1 g tablet, Take 1 tablet by mouth 4 (Four) Times a Day., Disp: 120 tablet, Rfl: 5    vitamin B-6 (PYRIDOXINE) 100 MG tablet, Take 1 tablet by mouth Daily., Disp: , Rfl:     vitamin C (ASCORBIC ACID) 500 MG tablet, Take 1 tablet by mouth Daily., Disp: , Rfl:     cefdinir (OMNICEF) 300 MG capsule, Take 1 capsule by mouth 2 (Two) Times a Day., Disp: 20 capsule, Rfl: 0    saccharomyces boulardii (Florastor) 250 MG capsule, Take 1 capsule by mouth 2 (Two) Times a Day., Disp: 20 capsule, Rfl: 0  There are no discontinued medications.

## 2025-07-03 NOTE — TELEPHONE ENCOUNTER
Caller: Marcello Larson    Relationship: Self    Best call back number: [phone]     What medication are you requesting: UNKNOWN    What are your current symptoms: RUNNY NOSE AND COUGH    How long have you been experiencing symptoms: A WEEK AND A HALF     Have you had these symptoms before:    [x] Yes  [] No    Have you been treated for these symptoms before:   [x] Yes  [] No    If a prescription is needed, what is your preferred pharmacy and phone number: [facility] [address] [phone] [facility] [phone]      Additional notes:CALLER STATED THAT HE IS REQUESTING A CALL BACK

## 2025-07-10 ENCOUNTER — OFFICE VISIT (OUTPATIENT)
Dept: FAMILY MEDICINE CLINIC | Age: 85
End: 2025-07-10
Payer: MEDICARE

## 2025-07-10 VITALS
OXYGEN SATURATION: 94 % | SYSTOLIC BLOOD PRESSURE: 116 MMHG | DIASTOLIC BLOOD PRESSURE: 64 MMHG | HEIGHT: 67 IN | HEART RATE: 73 BPM | WEIGHT: 125.8 LBS | BODY MASS INDEX: 19.74 KG/M2 | TEMPERATURE: 99.3 F

## 2025-07-10 DIAGNOSIS — B96.89 ACUTE BACTERIAL RHINOSINUSITIS: Primary | ICD-10-CM

## 2025-07-10 DIAGNOSIS — J01.90 ACUTE BACTERIAL RHINOSINUSITIS: Primary | ICD-10-CM

## 2025-07-10 RX ORDER — AMOXICILLIN AND CLAVULANATE POTASSIUM 500; 125 MG/1; MG/1
1 TABLET, FILM COATED ORAL 3 TIMES DAILY
Qty: 21 TABLET | Refills: 0 | Status: SHIPPED | OUTPATIENT
Start: 2025-07-10 | End: 2025-07-17

## 2025-07-10 RX ORDER — SACCHAROMYCES BOULARDII 250 MG
250 CAPSULE ORAL 2 TIMES DAILY
Qty: 20 CAPSULE | Refills: 0 | Status: SHIPPED | OUTPATIENT
Start: 2025-07-10

## 2025-07-10 NOTE — PROGRESS NOTES
"Chief Complaint  Sinusitis (Not improved from 7-3-25 visit, has completed course of doxycycline)    Subjective      Marcello Larson is a 85 y.o. male who presents to [facility]     History of Present Illness  This is an 85-year-old male presenting with sinus drainage, pain and pressure..    The patient reports experiencing persistent sinus drainage accompanied by pain for approximately 3 weeks. The drainage is located in the sinus area and is associated with a productive cough producing green phlegm, although he does not find the cough bothersome. He is not experiencing any shortness of breath. The patient was previously using Flonase but has discontinued it. He has been using Astelin as needed for the past 5 to 6 months, which he finds beneficial. However, he notes that regular nasal sprays tend to dry out his nose and cause bleeding. He has a prescription for Mucinex but has not been taking. He was seen last week and started on doxycycline without improvement. Despite taking doxycycline, there has been no improvement in his symptoms. He also mentioned getting soaked and wet in the rain, which he believes may have exacerbated his symptoms.    The patient takes iron tablets daily but was advised by a nurse practitioner last week to stop until he completed his current medication. He is wanting to know if it is okay to restart this medication.          Objective   Vital Signs:   Vitals:    07/10/25 0909   BP: 116/64   BP Location: Left arm   Patient Position: Sitting   Pulse: 73   Temp: 99.3 °F (37.4 °C)   TempSrc: Oral   SpO2: 94%  Comment: on RA   Weight: 57.1 kg (125 lb 12.8 oz)   Height: 170.2 cm (67\")     Body mass index is 19.7 kg/m².    Wt Readings from Last 3 Encounters:   07/10/25 57.1 kg (125 lb 12.8 oz)   07/03/25 57.6 kg (127 lb)   06/06/25 59.4 kg (131 lb)     BP Readings from Last 3 Encounters:   07/10/25 116/64   07/03/25 99/54   06/06/25 135/65       Health Maintenance "   Topic Date Due    DIABETIC FOOT EXAM  Never done    DIABETIC EYE EXAM  09/25/2025    INFLUENZA VACCINE  10/01/2025    URINE MICROALBUMIN-CREATININE RATIO (uACR)  10/11/2025    HEMOGLOBIN A1C  11/29/2025    ANNUAL WELLNESS VISIT  12/27/2025    LIPID PANEL  06/09/2026    TDAP/TD VACCINES (4 - Td or Tdap) 02/10/2033    COVID-19 Vaccine  Completed    RSV Vaccine - Adults  Completed    Pneumococcal Vaccine 50+  Completed    ZOSTER VACCINE  Completed       No Images in the past 120 days found..     Physical Exam  Vitals and nursing note reviewed.   HENT:      Head: Normocephalic and atraumatic.      Right Ear: Tympanic membrane normal.      Nose: Congestion present.      Right Turbinates: Enlarged.      Left Turbinates: Enlarged.      Mouth/Throat:      Mouth: Mucous membranes are moist.   Eyes:      Conjunctiva/sclera: Conjunctivae normal.      Pupils: Pupils are equal, round, and reactive to light.   Cardiovascular:      Rate and Rhythm: Normal rate and regular rhythm.      Pulses: Normal pulses.   Pulmonary:      Effort: Pulmonary effort is normal.      Breath sounds: Examination of the right-lower field reveals rhonchi. Examination of the left-lower field reveals rhonchi. Rhonchi present.      Comments: Rhonci to bilateral bases that clears with cough.   Musculoskeletal:         General: Normal range of motion.      Cervical back: Normal range of motion.   Skin:     General: Skin is warm and dry.   Neurological:      Mental Status: He is alert and oriented to person, place, and time.   Psychiatric:         Mood and Affect: Mood normal.          Physical Exam  ENT: Throat and nose were examined.       Result Review :  The following data was reviewed by: RONALD Tripp on 07/10/2025:  Common labs          4/1/2025    16:03 5/29/2025    10:28 6/9/2025    09:07   Common Labs   Glucose 135  139     BUN 65  53.0     Creatinine 2.34  2.02     Sodium 137  136     Potassium 4.4  4.4     Chloride 104  101     Calcium  9.7  9.1     Albumin 3.6  4.2     Total Bilirubin <0.2  0.3     Alkaline Phosphatase 89  77     AST (SGOT) 12  11     ALT (SGPT) 5  7     WBC 6.12  6.83     Hemoglobin 10.3  10.8     Hematocrit 32.7  35.1     Platelets 149  131     Total Cholesterol   86    Triglycerides   12    HDL Cholesterol   36    LDL Cholesterol    43    Hemoglobin A1C  5.60            Procedures          ASSESSMENT/PLAN  Diagnoses and all orders for this visit:    1. Acute bacterial rhinosinusitis (Primary)  -     amoxicillin-clavulanate (Augmentin) 500-125 MG per tablet; Take 1 tablet by mouth 3 (Three) Times a Day for 7 days.  Dispense: 21 tablet; Refill: 0  -     saccharomyces boulardii (Florastor) 250 MG capsule; Take 1 capsule by mouth 2 (Two) Times a Day.  Dispense: 20 capsule; Refill: 0        Assessment & Plan  Initial Assessment:  Sinus drainage, pain and pressure for approximately 3 weeks, mild cough with green phlegm.    Differential Diagnosis:  - Sinusitis: Persistent drainage, mild cough, green phlegm. Augmentin 500 mg twice daily prescribed.  -acute cough- restart mucinex and drink a full glass of water when taking.     ED Course:  - Augmentin 500 mg twice daily prescribed.  - Probiotic prescription sent.  - Advised to continue Mucinex with plenty of water.    Final Assessment:  Sinus drainage, pain and pressure for 3 weeks, mild cough with green phlegm. Augmentin prescribed considering kidney function dose decreased based on creatinine clearance of 21.. Probiotics recommended to prevent C. difficile infection. Mucinex advised to clear lungs and help thin secretions. .    Clinical Impression:  - Sinusitis  - Risk of C. difficile infection  -acute cough    Disposition:  Discharge: Home. Return if experiencing diarrhea with loose stools and strong odor.  Follow-Up:. Seek over-the-counter alternative for probiotic if not covered by insurance. Discuss with pharmacist the best option.     Patient Education:   Advised to take  probiotics or yogurt to help prevent C. difficile infection. Continue Mucinex with plenty of water. Return if experiencing diarrhea with loose stools.       BMI is within normal parameters. No other follow-up for BMI required.       Marcello Larson  reports that he quit smoking about 41 years ago. His smoking use included cigarettes. He started smoking about 65 years ago. He has a 24 pack-year smoking history. He has been exposed to tobacco smoke. He has never used smokeless tobacco. I have educated him on the risk of diseases from using tobacco products such as cancer, COPD, and heart disease.           FOLLOW UP  Return if symptoms worsen or fail to improve, for Next scheduled follow up.  Patient was given instructions and counseling regarding his condition or for health maintenance advice. Please see specific information pulled into the AVS if appropriate.       RONALD Tripp  07/10/25  10:14 EDT    Patient or patient representative verbalized consent for the use of Ambient Listening during the visit with  RONALD Tripp for chart documentation. 7/10/2025  10:14 EDT

## 2025-07-15 ENCOUNTER — TELEPHONE (OUTPATIENT)
Dept: FAMILY MEDICINE CLINIC | Age: 85
End: 2025-07-15
Payer: MEDICARE

## 2025-07-15 RX ORDER — CEFDINIR 300 MG/1
300 CAPSULE ORAL 2 TIMES DAILY
Qty: 20 CAPSULE | Refills: 0 | Status: SHIPPED | OUTPATIENT
Start: 2025-07-15

## 2025-07-15 NOTE — TELEPHONE ENCOUNTER
Pt came into the office today and said the antibiotic he got on 07/10/25 for nasal congestion and cough was augmentin,  it is causing diarrhea, he can not take it.   He was seen on 07/03/25 by VIRGINIA rodriguez and given doxycycline and it didn't help.  He came back on 07/10/25 and saw Ria rodriguez and was given the augmentin.    He said he still has green sputum and cough, no fever.     Ria rodriguez is out on vacation this week.  Will send to on call provider Dr Ramos.     Pt said he uses [facility].

## 2025-07-15 NOTE — TELEPHONE ENCOUNTER
Noted.  I would recommend the patient STOP Augmentin.  Please queue a prescription for cefdinir 300 mg twice daily, #20, no refill.  He may want to consider taking Pepto as needed for diarrhea or possibly ask his pharmacist for a recommendation for a probiotic for the near term.  If he does not improve, he should be seen again.  Thanks.

## 2025-08-05 ENCOUNTER — TELEPHONE (OUTPATIENT)
Dept: FAMILY MEDICINE CLINIC | Age: 85
End: 2025-08-05
Payer: MEDICARE

## 2025-08-05 DIAGNOSIS — M19.112 POST-TRAUMATIC OSTEOARTHRITIS OF LEFT SHOULDER: Primary | ICD-10-CM

## 2025-08-05 DIAGNOSIS — G89.4 CHRONIC PAIN SYNDROME: ICD-10-CM

## 2025-08-05 DIAGNOSIS — M25.512 CHRONIC LEFT SHOULDER PAIN: ICD-10-CM

## 2025-08-05 DIAGNOSIS — G89.29 CHRONIC LEFT SHOULDER PAIN: ICD-10-CM

## 2025-08-11 ENCOUNTER — CLINICAL SUPPORT NO REQUIREMENTS (OUTPATIENT)
Age: 85
End: 2025-08-11
Payer: MEDICARE

## 2025-08-22 LAB
MC_CV_MDC_IDC_RATE_1: 200
MC_CV_MDC_IDC_THERAPIES: NORMAL
MC_CV_MDC_IDC_ZONE_ID: 1
MDC_IDC_MSMT_BATTERY_STATUS: NORMAL
MDC_IDC_MSMT_LEADCHNL_RA_DTM: NORMAL
MDC_IDC_MSMT_LEADCHNL_RA_IMPEDANCE_VALUE: 440
MDC_IDC_MSMT_LEADCHNL_RA_SENSING_INTR_AMPL: 1.4
MDC_IDC_MSMT_LEADCHNL_RV_DTM: NORMAL
MDC_IDC_MSMT_LEADCHNL_RV_IMPEDANCE_VALUE: 460
MDC_IDC_MSMT_LEADCHNL_RV_PACING_THRESHOLD_AMPLITUDE: 0.75
MDC_IDC_MSMT_LEADCHNL_RV_PACING_THRESHOLD_PULSEWIDTH: 0.5
MDC_IDC_MSMT_LEADCHNL_RV_SENSING_INTR_AMPL: 12
MDC_IDC_PG_IMPLANT_DTM: NORMAL
MDC_IDC_PG_MFG: NORMAL
MDC_IDC_PG_MODEL: NORMAL
MDC_IDC_PG_SERIAL: NORMAL
MDC_IDC_PG_TYPE: NORMAL
MDC_IDC_SESS_DTM: NORMAL
MDC_IDC_SET_BRADY_AT_MODE_SWITCH_RATE: 180
MDC_IDC_SET_BRADY_LOWRATE: 50
MDC_IDC_SET_BRADY_MAX_SENSOR_RATE: 130
MDC_IDC_SET_BRADY_MAX_TRACKING_RATE: 130
MDC_IDC_SET_BRADY_MODE: NORMAL
MDC_IDC_SET_BRADY_PAV_DELAY: 225
MDC_IDC_SET_BRADY_SAV_DELAY: 200
MDC_IDC_SET_LEADCHNL_RA_PACING_AMPLITUDE: 2.5
MDC_IDC_SET_LEADCHNL_RA_PACING_PULSEWIDTH: 0.5
MDC_IDC_SET_LEADCHNL_RV_PACING_AMPLITUDE: 2
MDC_IDC_SET_LEADCHNL_RV_PACING_PULSEWIDTH: 0.5
MDC_IDC_SET_LEADCHNL_RV_SENSING_SENSITIVITY: 0.5
MDC_IDC_SET_ZONE_STATUS: NORMAL
MDC_IDC_SET_ZONE_TYPE: NORMAL
MDC_IDC_STAT_AT_BURDEN_PERCENT: 99
MDC_IDC_STAT_BRADY_RA_PERCENT_PACED: 1
MDC_IDC_STAT_BRADY_RV_PERCENT_PACED: 59
MDC_IDC_STAT_TACHYTHERAPY_SHOCKS_ABORTED_RECENT: 0
MDC_IDC_STAT_TACHYTHERAPY_SHOCKS_DELIVERED_RECENT: 0

## 2025-08-23 DIAGNOSIS — K29.50 CHRONIC GASTRITIS WITHOUT BLEEDING, UNSPECIFIED GASTRITIS TYPE: ICD-10-CM

## 2025-08-25 RX ORDER — PANTOPRAZOLE SODIUM 40 MG/1
40 TABLET, DELAYED RELEASE ORAL DAILY
Qty: 90 TABLET | Refills: 1 | Status: SHIPPED | OUTPATIENT
Start: 2025-08-25

## 2025-08-29 DIAGNOSIS — I50.42 CHRONIC COMBINED SYSTOLIC AND DIASTOLIC CONGESTIVE HEART FAILURE: ICD-10-CM

## 2025-08-29 RX ORDER — FUROSEMIDE 40 MG/1
60 TABLET ORAL 2 TIMES DAILY
Qty: 45 TABLET | Refills: 11 | Status: SHIPPED | OUTPATIENT
Start: 2025-08-29

## (undated) DEVICE — LOU PACE DEFIB: Brand: MEDLINE INDUSTRIES, INC.

## (undated) DEVICE — CATH DIAG IMPULSE FL4 5F 100CM

## (undated) DEVICE — CATH DIAG IMPULSE IM 5F 100CM

## (undated) DEVICE — COPILOT BLEEDBACK CONTROL VALVE: Brand: COPILOT

## (undated) DEVICE — NC TREK NEO™ CORONARY DILATATION CATHETER 3.00 MM X 15 MM / RAPID-EXCHANGE: Brand: NC TREK NEO™

## (undated) DEVICE — Device: Brand: EXTENSION

## (undated) DEVICE — KT MANIFLD CARDIAC

## (undated) DEVICE — INTRO SHEATH PRELUDE SNAP .038 6F 13CM W/SDPRT

## (undated) DEVICE — INTRO SHEATH PRELUDE SNAP .038 8F 13CM W/SDPRT

## (undated) DEVICE — GW EMR FIX EXCHG J STD .035 3MM 260CM

## (undated) DEVICE — TREK CORONARY DILATATION CATHETER 4.0 MM X 30 MM / RAPID-EXCHANGE: Brand: TREK

## (undated) DEVICE — Device

## (undated) DEVICE — NC TREK NEO™ CORONARY DILATATION CATHETER 5.00 MM X 15 MM / RAPID-EXCHANGE: Brand: NC TREK NEO™

## (undated) DEVICE — PENCL E/S HNDSWTCH SMOKEEVAC HOLSTR 10FT

## (undated) DEVICE — GLIDESHEATH SLENDER STAINLESS STEEL KIT: Brand: GLIDESHEATH SLENDER

## (undated) DEVICE — NC TREK NEO™ CORONARY DILATATION CATHETER 4.50 MM X 15 MM / RAPID-EXCHANGE: Brand: NC TREK NEO™

## (undated) DEVICE — CATH DIAG IMPULSE FR4 5F 100CM

## (undated) DEVICE — PK CATH CARD 40

## (undated) DEVICE — BALN PTCA TAKERURX DIL 2.5X15MM

## (undated) DEVICE — VBT GC 6F .070 MPA 1 100CM: Brand: VISTA BRITE TIP

## (undated) DEVICE — HI-TORQUE PILOT 200 GUIDE WIRE .014 STRAIGHT TIP 3.0 CM X 190 CM: Brand: HI-TORQUE PILOT

## (undated) DEVICE — DGW .035 FC J3MM 260CM TEF: Brand: EMERALD

## (undated) DEVICE — CATH DIAG IMPULSE PIG 5F 100CM

## (undated) DEVICE — NC TREK NEO™ CORONARY DILATATION CATHETER 5.50 MM X 20 MM / RAPID-EXCHANGE: Brand: NC TREK NEO™

## (undated) DEVICE — Device: Brand: FIELDER XT

## (undated) DEVICE — Device: Brand: CORSAIR PRO

## (undated) DEVICE — INTRO SHEATH ART/FEM ENGAGE .035 5F12CM

## (undated) DEVICE — CATH DIAG IMPULSE MPA2 5F 125CM

## (undated) DEVICE — NC TREK NEO™ CORONARY DILATATION CATHETER 3.00 MM X 20 MM / RAPID-EXCHANGE: Brand: NC TREK NEO™

## (undated) DEVICE — CATH BALN INTRAVASC/LITHO C2PLUS 4X12MM

## (undated) DEVICE — CORONARY IMAGING CATHETER: Brand: OPTICROSS™ 6 HD

## (undated) DEVICE — NC TREK NEO™ CORONARY DILATATION CATHETER 4.00 MM X 8 MM / RAPID-EXCHANGE: Brand: NC TREK NEO™

## (undated) DEVICE — Device: Brand: ASAHI SION BLUE